# Patient Record
Sex: FEMALE | Race: WHITE | Employment: OTHER | URBAN - METROPOLITAN AREA
[De-identification: names, ages, dates, MRNs, and addresses within clinical notes are randomized per-mention and may not be internally consistent; named-entity substitution may affect disease eponyms.]

---

## 2018-11-21 ENCOUNTER — APPOINTMENT (OUTPATIENT)
Dept: CT IMAGING | Age: 83
DRG: 571 | End: 2018-11-21
Payer: MEDICARE

## 2018-11-21 ENCOUNTER — HOSPITAL ENCOUNTER (INPATIENT)
Age: 83
LOS: 4 days | Discharge: HOME HEALTH | DRG: 571 | End: 2018-11-25
Attending: FAMILY MEDICINE | Admitting: INTERNAL MEDICINE
Payer: MEDICARE

## 2018-11-21 DIAGNOSIS — L03.311 ABDOMINAL WALL CELLULITIS: Primary | ICD-10-CM

## 2018-11-21 DIAGNOSIS — R65.10 SIRS (SYSTEMIC INFLAMMATORY RESPONSE SYNDROME) (HCC): ICD-10-CM

## 2018-11-21 LAB
A/G RATIO: 1 (ref 1.1–2.2)
ALBUMIN SERPL-MCNC: 4 G/DL (ref 3.4–5)
ALP BLD-CCNC: 82 U/L (ref 40–129)
ALT SERPL-CCNC: 14 U/L (ref 10–40)
ANION GAP SERPL CALCULATED.3IONS-SCNC: 13 MMOL/L (ref 3–16)
AST SERPL-CCNC: 19 U/L (ref 15–37)
BASOPHILS ABSOLUTE: 0.1 K/UL (ref 0–0.2)
BASOPHILS RELATIVE PERCENT: 0.7 %
BILIRUB SERPL-MCNC: 0.3 MG/DL (ref 0–1)
BUN BLDV-MCNC: 24 MG/DL (ref 7–20)
CALCIUM SERPL-MCNC: 9.6 MG/DL (ref 8.3–10.6)
CHLORIDE BLD-SCNC: 101 MMOL/L (ref 99–110)
CO2: 24 MMOL/L (ref 21–32)
CREAT SERPL-MCNC: 1.2 MG/DL (ref 0.6–1.2)
EOSINOPHILS ABSOLUTE: 0.2 K/UL (ref 0–0.6)
EOSINOPHILS RELATIVE PERCENT: 2.9 %
GFR AFRICAN AMERICAN: 52
GFR NON-AFRICAN AMERICAN: 43
GLOBULIN: 4.1 G/DL
GLUCOSE BLD-MCNC: 119 MG/DL (ref 70–99)
GLUCOSE BLD-MCNC: 98 MG/DL (ref 70–99)
HCT VFR BLD CALC: 38.5 % (ref 36–48)
HEMOGLOBIN: 12.6 G/DL (ref 12–16)
INR BLD: 2.11 (ref 0.86–1.14)
LACTIC ACID, SEPSIS: 2 MMOL/L (ref 0.4–1.9)
LACTIC ACID, SEPSIS: 2.4 MMOL/L (ref 0.4–1.9)
LYMPHOCYTES ABSOLUTE: 1.6 K/UL (ref 1–5.1)
LYMPHOCYTES RELATIVE PERCENT: 20.6 %
MCH RBC QN AUTO: 31.4 PG (ref 26–34)
MCHC RBC AUTO-ENTMCNC: 32.7 G/DL (ref 31–36)
MCV RBC AUTO: 96.1 FL (ref 80–100)
MONOCYTES ABSOLUTE: 0.7 K/UL (ref 0–1.3)
MONOCYTES RELATIVE PERCENT: 8.5 %
NEUTROPHILS ABSOLUTE: 5.3 K/UL (ref 1.7–7.7)
NEUTROPHILS RELATIVE PERCENT: 67.3 %
PDW BLD-RTO: 14 % (ref 12.4–15.4)
PERFORMED ON: NORMAL
PLATELET # BLD: 265 K/UL (ref 135–450)
PMV BLD AUTO: 10.8 FL (ref 5–10.5)
POTASSIUM SERPL-SCNC: 5.2 MMOL/L (ref 3.5–5.1)
PROTHROMBIN TIME: 24 SEC (ref 9.8–13)
RBC # BLD: 4 M/UL (ref 4–5.2)
SODIUM BLD-SCNC: 138 MMOL/L (ref 136–145)
TOTAL PROTEIN: 8.1 G/DL (ref 6.4–8.2)
WBC # BLD: 7.9 K/UL (ref 4–11)

## 2018-11-21 PROCEDURE — 96367 TX/PROPH/DG ADDL SEQ IV INF: CPT

## 2018-11-21 PROCEDURE — 96366 THER/PROPH/DIAG IV INF ADDON: CPT

## 2018-11-21 PROCEDURE — 87186 SC STD MICRODIL/AGAR DIL: CPT

## 2018-11-21 PROCEDURE — 90715 TDAP VACCINE 7 YRS/> IM: CPT | Performed by: FAMILY MEDICINE

## 2018-11-21 PROCEDURE — 90471 IMMUNIZATION ADMIN: CPT | Performed by: FAMILY MEDICINE

## 2018-11-21 PROCEDURE — 85025 COMPLETE CBC W/AUTO DIFF WBC: CPT

## 2018-11-21 PROCEDURE — 2580000003 HC RX 258: Performed by: FAMILY MEDICINE

## 2018-11-21 PROCEDURE — 99285 EMERGENCY DEPT VISIT HI MDM: CPT

## 2018-11-21 PROCEDURE — 6360000002 HC RX W HCPCS: Performed by: FAMILY MEDICINE

## 2018-11-21 PROCEDURE — 96365 THER/PROPH/DIAG IV INF INIT: CPT

## 2018-11-21 PROCEDURE — 83605 ASSAY OF LACTIC ACID: CPT

## 2018-11-21 PROCEDURE — 87040 BLOOD CULTURE FOR BACTERIA: CPT

## 2018-11-21 PROCEDURE — 74177 CT ABD & PELVIS W/CONTRAST: CPT

## 2018-11-21 PROCEDURE — 83036 HEMOGLOBIN GLYCOSYLATED A1C: CPT

## 2018-11-21 PROCEDURE — 80053 COMPREHEN METABOLIC PANEL: CPT

## 2018-11-21 PROCEDURE — 6360000004 HC RX CONTRAST MEDICATION: Performed by: FAMILY MEDICINE

## 2018-11-21 PROCEDURE — 85610 PROTHROMBIN TIME: CPT

## 2018-11-21 PROCEDURE — 1200000000 HC SEMI PRIVATE

## 2018-11-21 PROCEDURE — 87801 DETECT AGNT MULT DNA AMPLI: CPT

## 2018-11-21 RX ORDER — HYDROCODONE BITARTRATE AND ACETAMINOPHEN 5; 325 MG/1; MG/1
1 TABLET ORAL EVERY 4 HOURS PRN
Status: DISCONTINUED | OUTPATIENT
Start: 2018-11-21 | End: 2018-11-25 | Stop reason: HOSPADM

## 2018-11-21 RX ORDER — SODIUM CHLORIDE 0.9 % (FLUSH) 0.9 %
10 SYRINGE (ML) INJECTION EVERY 12 HOURS SCHEDULED
Status: DISCONTINUED | OUTPATIENT
Start: 2018-11-22 | End: 2018-11-25 | Stop reason: HOSPADM

## 2018-11-21 RX ORDER — DEXTROSE MONOHYDRATE 25 G/50ML
12.5 INJECTION, SOLUTION INTRAVENOUS PRN
Status: DISCONTINUED | OUTPATIENT
Start: 2018-11-21 | End: 2018-11-25 | Stop reason: HOSPADM

## 2018-11-21 RX ORDER — 0.9 % SODIUM CHLORIDE 0.9 %
1000 INTRAVENOUS SOLUTION INTRAVENOUS ONCE
Status: COMPLETED | OUTPATIENT
Start: 2018-11-21 | End: 2018-11-21

## 2018-11-21 RX ORDER — SODIUM CHLORIDE 0.9 % (FLUSH) 0.9 %
10 SYRINGE (ML) INJECTION PRN
Status: DISCONTINUED | OUTPATIENT
Start: 2018-11-21 | End: 2018-11-25 | Stop reason: HOSPADM

## 2018-11-21 RX ORDER — WARFARIN SODIUM 2.5 MG/1
2.5 TABLET ORAL
Status: ON HOLD | COMMUNITY
End: 2019-03-21 | Stop reason: HOSPADM

## 2018-11-21 RX ORDER — HYDROCODONE BITARTRATE AND ACETAMINOPHEN 5; 325 MG/1; MG/1
2 TABLET ORAL EVERY 4 HOURS PRN
Status: DISCONTINUED | OUTPATIENT
Start: 2018-11-21 | End: 2018-11-25 | Stop reason: HOSPADM

## 2018-11-21 RX ORDER — CLONIDINE HYDROCHLORIDE 0.1 MG/1
0.1 TABLET ORAL 2 TIMES DAILY
COMMUNITY

## 2018-11-21 RX ORDER — DEXTROSE MONOHYDRATE 50 MG/ML
100 INJECTION, SOLUTION INTRAVENOUS PRN
Status: DISCONTINUED | OUTPATIENT
Start: 2018-11-21 | End: 2018-11-25 | Stop reason: HOSPADM

## 2018-11-21 RX ORDER — SODIUM CHLORIDE 9 MG/ML
INJECTION, SOLUTION INTRAVENOUS CONTINUOUS
Status: DISCONTINUED | OUTPATIENT
Start: 2018-11-22 | End: 2018-11-22

## 2018-11-21 RX ORDER — AMLODIPINE BESYLATE 10 MG/1
10 TABLET ORAL DAILY
Status: ON HOLD | COMMUNITY
End: 2019-03-21 | Stop reason: HOSPADM

## 2018-11-21 RX ORDER — CLONIDINE HYDROCHLORIDE 0.1 MG/1
0.1 TABLET ORAL 2 TIMES DAILY
Status: DISCONTINUED | OUTPATIENT
Start: 2018-11-22 | End: 2018-11-25 | Stop reason: HOSPADM

## 2018-11-21 RX ORDER — WARFARIN SODIUM 5 MG/1
2.5-5 TABLET ORAL DAILY
COMMUNITY

## 2018-11-21 RX ORDER — LISINOPRIL 20 MG/1
20 TABLET ORAL DAILY
Status: ON HOLD | COMMUNITY
End: 2021-01-31 | Stop reason: HOSPADM

## 2018-11-21 RX ORDER — AMLODIPINE BESYLATE 5 MG/1
5 TABLET ORAL DAILY
Status: DISCONTINUED | OUTPATIENT
Start: 2018-11-22 | End: 2018-11-24

## 2018-11-21 RX ORDER — LISINOPRIL 10 MG/1
20 TABLET ORAL DAILY
Status: DISCONTINUED | OUTPATIENT
Start: 2018-11-22 | End: 2018-11-25 | Stop reason: HOSPADM

## 2018-11-21 RX ORDER — ONDANSETRON 2 MG/ML
4 INJECTION INTRAMUSCULAR; INTRAVENOUS EVERY 6 HOURS PRN
Status: DISCONTINUED | OUTPATIENT
Start: 2018-11-21 | End: 2018-11-25 | Stop reason: HOSPADM

## 2018-11-21 RX ORDER — NICOTINE POLACRILEX 4 MG
15 LOZENGE BUCCAL PRN
Status: DISCONTINUED | OUTPATIENT
Start: 2018-11-21 | End: 2018-11-25 | Stop reason: HOSPADM

## 2018-11-21 RX ADMIN — SODIUM CHLORIDE 1000 ML: 9 INJECTION, SOLUTION INTRAVENOUS at 21:04

## 2018-11-21 RX ADMIN — VANCOMYCIN HYDROCHLORIDE 1500 MG: 10 INJECTION, POWDER, LYOPHILIZED, FOR SOLUTION INTRAVENOUS at 21:54

## 2018-11-21 RX ADMIN — TETANUS TOXOID, REDUCED DIPHTHERIA TOXOID AND ACELLULAR PERTUSSIS VACCINE, ADSORBED 0.5 ML: 5; 2.5; 8; 8; 2.5 SUSPENSION INTRAMUSCULAR at 23:08

## 2018-11-21 RX ADMIN — TAZOBACTAM SODIUM AND PIPERACILLIN SODIUM 4.5 G: 500; 4 INJECTION, SOLUTION INTRAVENOUS at 21:25

## 2018-11-21 RX ADMIN — IOPAMIDOL 75 ML: 755 INJECTION, SOLUTION INTRAVENOUS at 21:13

## 2018-11-21 ASSESSMENT — PAIN SCALES - GENERAL: PAINLEVEL_OUTOF10: 0

## 2018-11-22 LAB
ANION GAP SERPL CALCULATED.3IONS-SCNC: 12 MMOL/L (ref 3–16)
BASOPHILS ABSOLUTE: 0.1 K/UL (ref 0–0.2)
BASOPHILS RELATIVE PERCENT: 0.8 %
BUN BLDV-MCNC: 19 MG/DL (ref 7–20)
CALCIUM SERPL-MCNC: 9.1 MG/DL (ref 8.3–10.6)
CHLORIDE BLD-SCNC: 109 MMOL/L (ref 99–110)
CO2: 22 MMOL/L (ref 21–32)
CREAT SERPL-MCNC: 1.1 MG/DL (ref 0.6–1.2)
EOSINOPHILS ABSOLUTE: 0.2 K/UL (ref 0–0.6)
EOSINOPHILS RELATIVE PERCENT: 3.5 %
ESTIMATED AVERAGE GLUCOSE: 139.9 MG/DL
GFR AFRICAN AMERICAN: 58
GFR NON-AFRICAN AMERICAN: 48
GLUCOSE BLD-MCNC: 113 MG/DL (ref 70–99)
GLUCOSE BLD-MCNC: 142 MG/DL (ref 70–99)
GLUCOSE BLD-MCNC: 165 MG/DL (ref 70–99)
GLUCOSE BLD-MCNC: 173 MG/DL (ref 70–99)
GLUCOSE BLD-MCNC: 182 MG/DL (ref 70–99)
GLUCOSE BLD-MCNC: 303 MG/DL (ref 70–99)
HBA1C MFR BLD: 6.5 %
HCT VFR BLD CALC: 34.8 % (ref 36–48)
HEMOGLOBIN: 11.1 G/DL (ref 12–16)
INR BLD: 2.32 (ref 0.86–1.14)
LACTIC ACID: 1 MMOL/L (ref 0.4–2)
LYMPHOCYTES ABSOLUTE: 1.4 K/UL (ref 1–5.1)
LYMPHOCYTES RELATIVE PERCENT: 19.9 %
MCH RBC QN AUTO: 31.5 PG (ref 26–34)
MCHC RBC AUTO-ENTMCNC: 31.9 G/DL (ref 31–36)
MCV RBC AUTO: 99 FL (ref 80–100)
MONOCYTES ABSOLUTE: 0.7 K/UL (ref 0–1.3)
MONOCYTES RELATIVE PERCENT: 10.2 %
NEUTROPHILS ABSOLUTE: 4.6 K/UL (ref 1.7–7.7)
NEUTROPHILS RELATIVE PERCENT: 65.6 %
PDW BLD-RTO: 14.3 % (ref 12.4–15.4)
PERFORMED ON: ABNORMAL
PLATELET # BLD: 230 K/UL (ref 135–450)
PMV BLD AUTO: 11 FL (ref 5–10.5)
POTASSIUM REFLEX MAGNESIUM: 4.8 MMOL/L (ref 3.5–5.1)
PROTHROMBIN TIME: 26.5 SEC (ref 9.8–13)
RBC # BLD: 3.52 M/UL (ref 4–5.2)
SODIUM BLD-SCNC: 143 MMOL/L (ref 136–145)
WBC # BLD: 6.9 K/UL (ref 4–11)

## 2018-11-22 PROCEDURE — 6370000000 HC RX 637 (ALT 250 FOR IP): Performed by: NURSE PRACTITIONER

## 2018-11-22 PROCEDURE — 85025 COMPLETE CBC W/AUTO DIFF WBC: CPT

## 2018-11-22 PROCEDURE — 2580000003 HC RX 258: Performed by: NURSE PRACTITIONER

## 2018-11-22 PROCEDURE — 6360000002 HC RX W HCPCS: Performed by: NURSE PRACTITIONER

## 2018-11-22 PROCEDURE — 36415 COLL VENOUS BLD VENIPUNCTURE: CPT

## 2018-11-22 PROCEDURE — 85610 PROTHROMBIN TIME: CPT

## 2018-11-22 PROCEDURE — 1200000000 HC SEMI PRIVATE

## 2018-11-22 PROCEDURE — 83605 ASSAY OF LACTIC ACID: CPT

## 2018-11-22 PROCEDURE — 80048 BASIC METABOLIC PNL TOTAL CA: CPT

## 2018-11-22 PROCEDURE — 94760 N-INVAS EAR/PLS OXIMETRY 1: CPT

## 2018-11-22 RX ORDER — WARFARIN SODIUM 2.5 MG/1
2.5 TABLET ORAL
Status: DISCONTINUED | OUTPATIENT
Start: 2018-11-23 | End: 2018-11-23

## 2018-11-22 RX ORDER — WARFARIN SODIUM 5 MG/1
5 TABLET ORAL
Status: DISCONTINUED | OUTPATIENT
Start: 2018-11-22 | End: 2018-11-23

## 2018-11-22 RX ORDER — INSULIN LISPRO 100 [IU]/ML
10 INJECTION, SUSPENSION SUBCUTANEOUS 2 TIMES DAILY WITH MEALS
Status: DISCONTINUED | OUTPATIENT
Start: 2018-11-22 | End: 2018-11-25 | Stop reason: HOSPADM

## 2018-11-22 RX ORDER — DIPHENHYDRAMINE HCL 25 MG
25 TABLET ORAL ONCE
Status: COMPLETED | OUTPATIENT
Start: 2018-11-22 | End: 2018-11-22

## 2018-11-22 RX ADMIN — INSULIN LISPRO 1 UNITS: 100 INJECTION, SOLUTION INTRAVENOUS; SUBCUTANEOUS at 21:22

## 2018-11-22 RX ADMIN — INSULIN LISPRO 4 UNITS: 100 INJECTION, SOLUTION INTRAVENOUS; SUBCUTANEOUS at 18:00

## 2018-11-22 RX ADMIN — INSULIN LISPRO 1 UNITS: 100 INJECTION, SOLUTION INTRAVENOUS; SUBCUTANEOUS at 12:23

## 2018-11-22 RX ADMIN — AMLODIPINE BESYLATE 5 MG: 5 TABLET ORAL at 09:11

## 2018-11-22 RX ADMIN — HYDROCODONE BITARTRATE AND ACETAMINOPHEN 2 TABLET: 5; 325 TABLET ORAL at 23:38

## 2018-11-22 RX ADMIN — SODIUM CHLORIDE 3 G: 900 INJECTION INTRAVENOUS at 01:17

## 2018-11-22 RX ADMIN — INSULIN LISPRO 1 UNITS: 100 INJECTION, SOLUTION INTRAVENOUS; SUBCUTANEOUS at 01:24

## 2018-11-22 RX ADMIN — LISINOPRIL 20 MG: 10 TABLET ORAL at 09:10

## 2018-11-22 RX ADMIN — SODIUM CHLORIDE: 9 INJECTION, SOLUTION INTRAVENOUS at 00:56

## 2018-11-22 RX ADMIN — VANCOMYCIN HYDROCHLORIDE 1500 MG: 10 INJECTION, POWDER, LYOPHILIZED, FOR SOLUTION INTRAVENOUS at 21:16

## 2018-11-22 RX ADMIN — CLONIDINE HYDROCHLORIDE 0.1 MG: 0.1 TABLET ORAL at 09:11

## 2018-11-22 RX ADMIN — SODIUM CHLORIDE 3 G: 900 INJECTION INTRAVENOUS at 12:22

## 2018-11-22 RX ADMIN — INSULIN LISPRO 10 UNITS: 100 INJECTION, SUSPENSION SUBCUTANEOUS at 09:11

## 2018-11-22 RX ADMIN — SODIUM CHLORIDE 3 G: 900 INJECTION INTRAVENOUS at 23:38

## 2018-11-22 RX ADMIN — SODIUM CHLORIDE 3 G: 900 INJECTION INTRAVENOUS at 06:02

## 2018-11-22 RX ADMIN — WARFARIN SODIUM 5 MG: 5 TABLET ORAL at 09:11

## 2018-11-22 RX ADMIN — SODIUM CHLORIDE 3 G: 900 INJECTION INTRAVENOUS at 18:05

## 2018-11-22 RX ADMIN — DIPHENHYDRAMINE HCL 25 MG: 25 TABLET ORAL at 01:51

## 2018-11-22 RX ADMIN — SODIUM CHLORIDE: 9 INJECTION, SOLUTION INTRAVENOUS at 12:22

## 2018-11-22 RX ADMIN — Medication 10 ML: at 09:11

## 2018-11-22 RX ADMIN — INSULIN LISPRO 10 UNITS: 100 INJECTION, SUSPENSION SUBCUTANEOUS at 17:59

## 2018-11-22 RX ADMIN — CLONIDINE HYDROCHLORIDE 0.1 MG: 0.1 TABLET ORAL at 21:17

## 2018-11-22 RX ADMIN — CLONIDINE HYDROCHLORIDE 0.1 MG: 0.1 TABLET ORAL at 00:57

## 2018-11-22 RX ADMIN — Medication 10 ML: at 21:16

## 2018-11-22 RX ADMIN — HYDROCODONE BITARTRATE AND ACETAMINOPHEN 1 TABLET: 5; 325 TABLET ORAL at 16:07

## 2018-11-22 ASSESSMENT — PAIN SCALES - GENERAL
PAINLEVEL_OUTOF10: 7
PAINLEVEL_OUTOF10: 7

## 2018-11-22 NOTE — PROGRESS NOTES
Pt arrived to 5T in stable condition. VSS. Pt A&O x4. Unable to recall all home meds. Home med list completed with medications that pt could recall. Was able to stand and pivot from stretcher to bed but pt very weak. Oriented to room and call light system. drsg changed to R abdomen. Wound black, hard, pt denies any pain. Redness and warmth noted around wound to R lower abd. Pt states she cannot feel. FSBS 183. Call light in reach. Fall precautions in place. Will continue to monitor.

## 2018-11-22 NOTE — ED PROVIDER NOTES
mis-transcribed.)    Electronically signed, SHALINI Lopez CNP,          SHALINI Lopez CNP  11/21/18 8519

## 2018-11-22 NOTE — ED NOTES
Saline lock inserted with #2 blood culture drawn and sent to lab.  Pt to [phone] Stevo Ojeda RN  11/21/18 [id number]

## 2018-11-22 NOTE — PROGRESS NOTES
Hospital Medicine Progress Note     Date:  11/22/2018    PCP: No primary care provider on file. (Tel: None)    Date of Admission: 11/21/2018      Brief hospital course: Pt presented to the ER w/ right lower abdominal wound and cellulitis that she has been dealing with for the past 1.5 weeks. Subjective  Pt sitting up eating lunch, wondering when she can go home. Objective  Physical exam:  Vitals: /66   Pulse 75   Temp 98.7 °F (37.1 °C) (Oral)   Resp 16   Ht 5' (1.524 m)   Wt 254 lb 4.8 oz (115.3 kg)   SpO2 95%   BMI 49.66 kg/m²   Gen: Not in distress. Alert. Head: Normocephalic. Atraumatic. Eyes: EOMI. Good acuity. ENT: Oral mucosa moist  Neck: No JVD. No obvious thyromegaly. CVS: Nml S1S2, no MRG, RRR  Pulmomary: Clear bilaterally. No crackles. No wheezes. Gastrointestinal: Soft, NT/ND. Positive bowel sounds. Musculoskeletal: No edema. Warm  Neuro: No focal deficit. Moves extremity spontaneously. Psychiatry: Appropriate affect. Not agitated. Skin: RLQ abd wound necrotic w/ surround erythema, warmth, TTP      24HR INTAKE/OUTPUT:    Intake/Output Summary (Last 24 hours) at 11/22/18 1627  Last data filed at 11/22/18 1607   Gross per 24 hour   Intake              949 ml   Output             1600 ml   Net             -651 ml     I/O last 3 completed shifts: In: 0 [P.O.:120;  I.V.:829]  Out: 1500 [Urine:1500]  I/O this shift:  In: -   Out: 100 [Urine:100]      Meds:    insulin lispro prot & lispro  10 Units Subcutaneous BID WC    vancomycin  1,500 mg Intravenous Q24H    [START ON 11/23/2018] warfarin  2.5 mg Oral Once per day on Mon Wed Fri Sat    And    warfarin  5 mg Oral Once per day on Sun Tue Thu    lisinopril  20 mg Oral Daily    amLODIPine  5 mg Oral Daily    cloNIDine  0.1 mg Oral BID    sodium chloride flush  10 mL Intravenous 2 times per day    ampicillin-sulbactam  3 g Intravenous Q6H    insulin lispro  0-6 Units Subcutaneous TID WC    insulin lispro  0-3

## 2018-11-22 NOTE — ED PROVIDER NOTES
Triage Chief Complaint:   Wound Check (was seen by home karine today and was told wound was bad and looked like turning into MRSA. )    DARION Donaldson is a 82 y.o. female that presents after being sent to the emergency department by her home health care nurse with concern of a wound infection on her right lower abdomen. She denies fevers chills. She states that she believes it is been there for anywhere from 1-2 weeks but is gradually become more painful. No known injury although patient is an insulin-dependent diabetic and this may be where she injected insulin. No nausea vomiting. No diarrhea constipation. This area will occasionally bleed and ooze. She is not been seen by anybody else for it. Unknown last tetanus. ROS:  General:  No fevers, no chills, no weakness  Eyes:  No recent vison changes, no discharge  ENT:  No sore throat, no nasal congestion, no hearing changes  Cardiovascular:  No chest pain, no palpitations  Respiratory:  No shortness of breath, no cough, no wheezing  Gastrointestinal: Superficial and his pain. No intra-abdominal pain. Musculoskeletal:  No muscle pain, no joint pain  Skin:  No other rash or complaint of wounds other than right abdominal wall  Neurologic:  No speech problems, no headache, no extremity numbness, no extremity tingling, no extremity weakness  Psychiatric:  No anxiety, no hallucinations or delusions, no suicidal or homicidal ideation  Genitourinary:  No dysuria, no hematuria  Endocrine:  No unexpected weight gain, no unexpected weight loss  Extremities:  no edema, no pain    Past Medical History:   Diagnosis Date    Arthritis     [facility] occlusion [facility] infarction [facility]     Diabetes mellitus ([facility]     Hypertension     Pulmonary embolism ([address]     On warfarin     History reviewed. No pertinent surgical history. History reviewed. No pertinent family history. Social History     Social History    Marital status:       Spouse name: N/A    Number of children: N/A    Years of education: N/A     Occupational History    Not on file.      Social History Main Topics    Smoking status: Never Smoker    Smokeless tobacco: Not on file    Alcohol use No    Drug use: Unknown    Sexual activity: Not on file     Other Topics Concern    Not on file     Social History Narrative    No narrative on file     Current Facility-Administered Medications   Medication Dose Route Frequency Provider Last Rate Last Dose    insulin lispro protamine & lispro (HUMALOG MIX) (75-25) 100 UNIT per ML injection pen SUPN 10 Units  10 Units Subcutaneous BID WC Doree Stade, APRN - CNP        vancomycin (VANCOCIN) 1,500 mg in dextrose 5 % 250 mL IVPB  1,500 mg Intravenous Q24H Doree Stade, APRN - CNP        [START ON 11/23/2018] warfarin (COUMADIN) tablet 2.5 mg  2.5 mg Oral Once per day on Mon Wed Fri Sat Doree Stade, APRN - CNP        And    warfarin (COUMADIN) tablet 5 mg  5 mg Oral Once per day on Sun Tue Thu Doree Stade, APRN - CNP        lisinopril (PRINIVIL;ZESTRIL) tablet 20 mg  20 mg Oral Daily Doree Stade, APRN - CNP        amLODIPine (NORVASC) tablet 5 mg  5 mg Oral Daily Doree Stade, APRN - CNP        cloNIDine (CATAPRES) tablet 0.1 mg  0.1 mg Oral BID Doree Stade, APRN - CNP   0.1 mg at 11/22/18 0057    HYDROcodone-acetaminophen (NORCO) 5-325 MG per tablet 1 tablet  1 tablet Oral Q4H PRN Doree Stade, APRN - CNP        Or    HYDROcodone-acetaminophen (NORCO) 5-325 MG per tablet 2 tablet  2 tablet Oral Q4H PRN Doree Stade, APRN - CNP        0.9 % sodium chloride infusion   Intravenous Continuous Doree Stade, APRN -  mL/hr at 11/22/18 0056      sodium chloride flush 0.9 % injection 10 mL  10 mL Intravenous 2 times per day Doree Stade, APRN - CNP        sodium chloride flush 0.9 % injection 10 mL  10 mL Intravenous PRN Doree Stade, APRN - CNP        magnesium hydroxide (MILK OF MAGNESIA) 400 MG/5ML suspension 30 mL  30 mL Oral Daily PRN retroperitoneal, iliac chain, or inguinal lymphadenopathy is seen. Bones/Soft Tissues: There is subcutaneous fat stranding seen within the pannus, especially on the right. More focal area of dermal thickening is seen within the rightward aspect of the pannus measures approximately 4.2 cm. A rim enhancing fluid collection is not identified. No soft tissue gas is found. No acute or suspicious bony abnormalities. Fat stranding and dermal thickening within the pannus, especially on the right. Findings most compatible with cellulitis. No focal fluid collection or soft tissue gas is seen at this time. Moderate diverticulosis without CT evidence of diverticulitis. Moderate-sized hiatal hernia. Advanced atherosclerotic disease. MDM:  82-year-old nontoxic well-appearing female who is afebrile with no hypotension but is diabetic and therefore immunocompromised presents with concern for wound infection. Blood cultures sent. Patient treated empirically with Zosyn and vancomycin. CT scan demonstrates fat stranding and dermal thickening consistent with cellulitis but no abscess, necrotizing fasciitis, intra-abdominal wound or abnormality. Patient afebrile. White blood cell count 7.9 with no left shift. Patient does have a mildly elevated lactic acid at 2.4. I did not give a full 30 mL/kg fluid bolus as the patient has normal kidney function, normal cap refill, normal vital signs, and a normal white blood cell count and she is not septic. Glucose 119. Normal bicarb. Normal anion gap. No evidence of DKA. Hemoglobin normal no evidence of anemia. INR 2.11 which is therapeutic for the patient. Taking account patient's age, diabetes, and that she lives at home I do believe admission for IV antibiotics, wound care evaluation, and fever vital signs trending is appropriate. No acute surgical indication. No fluctuant area amenable to drainage.   Critical care time of 15 minutes with endocrine,

## 2018-11-22 NOTE — ED NOTES
Pt used bedpan. Tetanus booster given IM. #2 lactate drawn and sent to lab. No complaints verbalized. Pt resting. Admit bed ready -will call report.      Eleni Carrera RN  11/21/18 1642

## 2018-11-23 PROBLEM — N18.30 CKD (CHRONIC KIDNEY DISEASE) STAGE 3, GFR 30-59 ML/MIN (HCC): Status: ACTIVE | Noted: 2018-11-23

## 2018-11-23 PROBLEM — Z86.711 HISTORY OF PULMONARY EMBOLISM: Status: ACTIVE | Noted: 2018-11-23

## 2018-11-23 PROBLEM — E11.65 POORLY CONTROLLED TYPE 2 DIABETES MELLITUS (HCC): Status: ACTIVE | Noted: 2018-11-23

## 2018-11-23 PROBLEM — I10 HTN (HYPERTENSION): Status: ACTIVE | Noted: 2018-11-23

## 2018-11-23 LAB
BILIRUBIN URINE: NEGATIVE
BLOOD, URINE: NEGATIVE
CLARITY: CLEAR
COLOR: YELLOW
EPITHELIAL CELLS, UA: 5 /HPF (ref 0–5)
GLUCOSE BLD-MCNC: 116 MG/DL (ref 70–99)
GLUCOSE BLD-MCNC: 180 MG/DL (ref 70–99)
GLUCOSE BLD-MCNC: 203 MG/DL (ref 70–99)
GLUCOSE BLD-MCNC: 210 MG/DL (ref 70–99)
GLUCOSE URINE: NEGATIVE MG/DL
HYALINE CASTS: 1 /LPF (ref 0–8)
INR BLD: 1.97 (ref 0.86–1.14)
KETONES, URINE: NEGATIVE MG/DL
LEUKOCYTE ESTERASE, URINE: ABNORMAL
MICROSCOPIC EXAMINATION: YES
NITRITE, URINE: NEGATIVE
PERFORMED ON: ABNORMAL
PH UA: 6
PROTEIN UA: NEGATIVE MG/DL
PROTHROMBIN TIME: 22.5 SEC (ref 9.8–13)
RBC UA: 1 /HPF (ref 0–4)
REPORT: NORMAL
SPECIFIC GRAVITY UA: 1.02
URINE REFLEX TO CULTURE: YES
URINE TYPE: ABNORMAL
UROBILINOGEN, URINE: 0.2 E.U./DL
WBC UA: 5 /HPF (ref 0–5)

## 2018-11-23 PROCEDURE — 99223 1ST HOSP IP/OBS HIGH 75: CPT | Performed by: INTERNAL MEDICINE

## 2018-11-23 PROCEDURE — 2500000003 HC RX 250 WO HCPCS: Performed by: FAMILY MEDICINE

## 2018-11-23 PROCEDURE — 87086 URINE CULTURE/COLONY COUNT: CPT

## 2018-11-23 PROCEDURE — 36415 COLL VENOUS BLD VENIPUNCTURE: CPT

## 2018-11-23 PROCEDURE — 87116 MYCOBACTERIA CULTURE: CPT

## 2018-11-23 PROCEDURE — 81001 URINALYSIS AUTO W/SCOPE: CPT

## 2018-11-23 PROCEDURE — 87015 SPECIMEN INFECT AGNT CONCNTJ: CPT

## 2018-11-23 PROCEDURE — 87206 SMEAR FLUORESCENT/ACID STAI: CPT

## 2018-11-23 PROCEDURE — 6360000002 HC RX W HCPCS: Performed by: NURSE PRACTITIONER

## 2018-11-23 PROCEDURE — 0JB80ZZ EXCISION OF ABDOMEN SUBCUTANEOUS TISSUE AND FASCIA, OPEN APPROACH: ICD-10-PCS | Performed by: SURGERY

## 2018-11-23 PROCEDURE — 1200000000 HC SEMI PRIVATE

## 2018-11-23 PROCEDURE — 87070 CULTURE OTHR SPECIMN AEROBIC: CPT

## 2018-11-23 PROCEDURE — 11042 DBRDMT SUBQ TIS 1ST 20SQCM/<: CPT | Performed by: SURGERY

## 2018-11-23 PROCEDURE — 85610 PROTHROMBIN TIME: CPT

## 2018-11-23 PROCEDURE — 2580000003 HC RX 258: Performed by: NURSE PRACTITIONER

## 2018-11-23 PROCEDURE — 87205 SMEAR GRAM STAIN: CPT

## 2018-11-23 PROCEDURE — 6370000000 HC RX 637 (ALT 250 FOR IP): Performed by: FAMILY MEDICINE

## 2018-11-23 PROCEDURE — 87641 MR-STAPH DNA AMP PROBE: CPT

## 2018-11-23 PROCEDURE — 6370000000 HC RX 637 (ALT 250 FOR IP): Performed by: INTERNAL MEDICINE

## 2018-11-23 PROCEDURE — 6370000000 HC RX 637 (ALT 250 FOR IP): Performed by: NURSE PRACTITIONER

## 2018-11-23 PROCEDURE — 87102 FUNGUS ISOLATION CULTURE: CPT

## 2018-11-23 RX ORDER — WARFARIN SODIUM 2.5 MG/1
2.5 TABLET ORAL
Status: DISCONTINUED | OUTPATIENT
Start: 2018-11-24 | End: 2018-11-25 | Stop reason: HOSPADM

## 2018-11-23 RX ORDER — WARFARIN SODIUM 5 MG/1
5 TABLET ORAL
Status: COMPLETED | OUTPATIENT
Start: 2018-11-23 | End: 2018-11-23

## 2018-11-23 RX ORDER — LIDOCAINE HYDROCHLORIDE 10 MG/ML
5 INJECTION, SOLUTION EPIDURAL; INFILTRATION; INTRACAUDAL; PERINEURAL ONCE
Status: DISCONTINUED | OUTPATIENT
Start: 2018-11-23 | End: 2018-11-25 | Stop reason: HOSPADM

## 2018-11-23 RX ORDER — ACETAMINOPHEN 80 MG
TABLET,CHEWABLE ORAL
Status: DISPENSED
Start: 2018-11-23 | End: 2018-11-24

## 2018-11-23 RX ORDER — WARFARIN SODIUM 5 MG/1
5 TABLET ORAL
Status: DISCONTINUED | OUTPATIENT
Start: 2018-11-25 | End: 2018-11-25 | Stop reason: HOSPADM

## 2018-11-23 RX ORDER — LIDOCAINE HYDROCHLORIDE 10 MG/ML
INJECTION, SOLUTION EPIDURAL; INFILTRATION; INTRACAUDAL; PERINEURAL
Status: DISPENSED
Start: 2018-11-23 | End: 2018-11-24

## 2018-11-23 RX ORDER — CIPROFLOXACIN 500 MG/1
500 TABLET, FILM COATED ORAL EVERY 12 HOURS SCHEDULED
Status: DISCONTINUED | OUTPATIENT
Start: 2018-11-23 | End: 2018-11-25 | Stop reason: HOSPADM

## 2018-11-23 RX ADMIN — HYDROCODONE BITARTRATE AND ACETAMINOPHEN 2 TABLET: 5; 325 TABLET ORAL at 23:30

## 2018-11-23 RX ADMIN — INSULIN LISPRO 1 UNITS: 100 INJECTION, SOLUTION INTRAVENOUS; SUBCUTANEOUS at 21:55

## 2018-11-23 RX ADMIN — INSULIN LISPRO 2 UNITS: 100 INJECTION, SOLUTION INTRAVENOUS; SUBCUTANEOUS at 18:06

## 2018-11-23 RX ADMIN — Medication 10 ML: at 05:54

## 2018-11-23 RX ADMIN — MICONAZOLE NITRATE: 2 POWDER TOPICAL at 23:30

## 2018-11-23 RX ADMIN — INSULIN LISPRO 10 UNITS: 100 INJECTION, SUSPENSION SUBCUTANEOUS at 08:19

## 2018-11-23 RX ADMIN — AMLODIPINE BESYLATE 5 MG: 5 TABLET ORAL at 08:18

## 2018-11-23 RX ADMIN — SODIUM CHLORIDE 3 G: 900 INJECTION INTRAVENOUS at 11:13

## 2018-11-23 RX ADMIN — WARFARIN SODIUM 5 MG: 5 TABLET ORAL at 18:03

## 2018-11-23 RX ADMIN — CLONIDINE HYDROCHLORIDE 0.1 MG: 0.1 TABLET ORAL at 08:18

## 2018-11-23 RX ADMIN — LISINOPRIL 20 MG: 10 TABLET ORAL at 08:18

## 2018-11-23 RX ADMIN — CLONIDINE HYDROCHLORIDE 0.1 MG: 0.1 TABLET ORAL at 18:21

## 2018-11-23 RX ADMIN — HYDROCODONE BITARTRATE AND ACETAMINOPHEN 1 TABLET: 5; 325 TABLET ORAL at 06:01

## 2018-11-23 RX ADMIN — INSULIN LISPRO 10 UNITS: 100 INJECTION, SUSPENSION SUBCUTANEOUS at 18:04

## 2018-11-23 RX ADMIN — VANCOMYCIN HYDROCHLORIDE 1500 MG: 10 INJECTION, POWDER, LYOPHILIZED, FOR SOLUTION INTRAVENOUS at 22:06

## 2018-11-23 RX ADMIN — HYDROCODONE BITARTRATE AND ACETAMINOPHEN 1 TABLET: 5; 325 TABLET ORAL at 18:03

## 2018-11-23 RX ADMIN — Medication 10 ML: at 22:05

## 2018-11-23 RX ADMIN — SODIUM CHLORIDE 3 G: 900 INJECTION INTRAVENOUS at 05:53

## 2018-11-23 RX ADMIN — CIPROFLOXACIN HYDROCHLORIDE 500 MG: 500 TABLET, FILM COATED ORAL at 21:50

## 2018-11-23 RX ADMIN — HYDROCODONE BITARTRATE AND ACETAMINOPHEN 1 TABLET: 5; 325 TABLET ORAL at 11:13

## 2018-11-23 RX ADMIN — MICONAZOLE NITRATE: 2 POWDER TOPICAL at 14:23

## 2018-11-23 ASSESSMENT — PAIN DESCRIPTION - ORIENTATION
ORIENTATION_2: LOWER
ORIENTATION_2: LOWER

## 2018-11-23 ASSESSMENT — PAIN SCALES - GENERAL
PAINLEVEL_OUTOF10: 8
PAINLEVEL_OUTOF10: 6
PAINLEVEL_OUTOF10: 8
PAINLEVEL_OUTOF10: 7
PAINLEVEL_OUTOF10: 8

## 2018-11-23 ASSESSMENT — PAIN DESCRIPTION - INTENSITY
RATING_2: 8
RATING_2: 6

## 2018-11-23 ASSESSMENT — ENCOUNTER SYMPTOMS
STRIDOR: 0
DIARRHEA: 0
TROUBLE SWALLOWING: 0
SHORTNESS OF BREATH: 0
PHOTOPHOBIA: 0
NAUSEA: 0
EYE REDNESS: 0
ABDOMINAL PAIN: 0
COLOR CHANGE: 0
CHOKING: 0
FACIAL SWELLING: 0
RHINORRHEA: 0
CHEST TIGHTNESS: 0
BLOOD IN STOOL: 0
APNEA: 0
EYE DISCHARGE: 0
COUGH: 0

## 2018-11-23 ASSESSMENT — PAIN DESCRIPTION - LOCATION
LOCATION_2: ABDOMEN
LOCATION_2: ABDOMEN

## 2018-11-23 ASSESSMENT — PAIN DESCRIPTION - PAIN TYPE
TYPE: CHRONIC PAIN
TYPE_2: ACUTE PAIN
TYPE_2: ACUTE PAIN
TYPE: CHRONIC PAIN

## 2018-11-23 NOTE — PROGRESS NOTES
Intravenous 2 times per day    ampicillin-sulbactam  3 g Intravenous Q6H    insulin lispro  0-6 Units Subcutaneous TID WC    insulin lispro  0-3 Units Subcutaneous Nightly       Infusions:    dextrose           PRN Meds: HYDROcodone 5 mg - acetaminophen **OR** HYDROcodone 5 mg - acetaminophen, sodium chloride flush, magnesium hydroxide, ondansetron, glucose, dextrose, glucagon (rDNA), dextrose    Labs/imaging:  CBC:   Recent Labs      11/21/18 1931 11/22/18   0624   WBC  7.9  6.9   HGB  12.6  11.1*   PLT  265  230         BMP:    Recent Labs      11/21/18 1931 11/22/18   0624   NA  138  143   K  5.2*  4.8   CL  101  109   CO2  24  22   BUN  24*  19   CREATININE  1.2  1.1   GLUCOSE  119*  142*         Hepatic:   Recent Labs      11/21/18 1931   AST  19   ALT  14   BILITOT  0.3   ALKPHOS  82       Troponin: No results for input(s): TROPONINI in the last 72 hours. BNP: No results for input(s): BNP in the last 72 hours. INR:   Recent Labs      11/21/18 1931 11/22/18   0624  11/23/18   0539   INR  2.11*  2.32*  1.97*           Reviewed imaging and reports noted      Assessment:  Active Problems:    Abdominal wall cellulitis    IDDM (insulin dependent diabetes mellitus) (HCC)    HTN (hypertension)    CKD (chronic kidney disease) stage 3, GFR 30-59 ml/min ([facility])    History of pulmonary embolism  Resolved Problems:    * No resolved hospital problems.  *        Plan:  RLQ Abdominal wall cellulitis  - IV vanc and Unasyn, blood cxs pending  - wound care RN evaluated, GS did bedside debridement on 11/23/18  - ID consulted      IDDM  - metformin on hold, A1C 6.5, cont Humalog 75-25 10U BID AC (home dose if 70/30 15U QAm and 12U QPM) and SSI low for now      HTN  - BP variable, cont norvasc 5mg QD (home dose 10mg QD), clonidine 0.1mg BID, lisinopril 20mg QD, CTM      Hx of PE  - on coumadin      CKD 3  - stable at baseline, CTM        Diet: DIET CARB CONTROL; Carb Control: 4 carb choices (60 gms)/meal

## 2018-11-23 NOTE — CONSULTS
External ear normal.   Left Ear: External ear normal.   Nose: Nose normal.   Mouth/Throat: Oropharynx is clear and moist.   Eyes: Pupils are equal, round, and reactive to light. Conjunctivae are normal. Right eye exhibits no discharge. Left eye exhibits no discharge. No scleral icterus. Neck: Normal range of motion. Neck supple. Cardiovascular: Normal rate and regular rhythm. Exam reveals no friction rub. No murmur heard. Pulmonary/Chest: Effort normal. No stridor. She has no wheezes. She has no rales. She exhibits no tenderness. Abdominal: Soft. She exhibits no mass. There is no tenderness. There is no rebound and no guarding. Large deep ulcer with surrounding redness in the right lower quadrant area   Musculoskeletal: She exhibits no edema or tenderness. Lymphadenopathy:     She has no cervical adenopathy. Neurological: She is alert and oriented to person, place, and time. She exhibits normal muscle tone. Skin: Skin is warm and dry. No rash noted. She is not diaphoretic. There is erythema. Psychiatric: She has a normal mood and affect. Judgment normal.   Nursing note and vitals reviewed. Lines: All vascular access sites are healthy with no local erythema, discharge or tenderness. Intake and output:     I/O last 3 completed shifts: In: 240 [P.O.:240]  Out: 450 [Urine:450]    Lab Data:   All available labs were reviewed by me today.      CBC:   Recent Labs      11/21/18 1931 11/22/18   0624   WBC  7.9  6.9   RBC  4.00  3.52*   HGB  12.6  11.1*   HCT  38.5  34.8*   PLT  265  230   MCV  96.1  99.0   MCH  31.4  31.5   MCHC  32.7  31.9   RDW  14.0  14.3        BMP:  Recent Labs      11/21/18 1931 11/22/18   0624   NA  138  143   K  5.2*  4.8   CL  101  109   CO2  24  22   BUN  24*  19   CREATININE  1.2  1.1   CALCIUM  9.6  9.1   GLUCOSE  119*  142*        Hepatic FunctionPanel:   Lab Results   Component Value Date    ALKPHOS 82 11/21/2018    ALT 14 11/21/2018    AST 19 11/21/2018 PROT 8.1 11/21/2018    BILITOT 0.3 11/21/2018    LABALBU 4.0 11/21/2018       CPK: No results found for: CKTOTAL  ESR: No results found for: SEDRATE  CRP: No results found for: CRP      Imaging: All pertinent images and reports for the current visit were reviewed by meduring this visit. CT ABDOMEN PELVIS W IV CONTRAST   Final Result   Fat stranding and dermal thickening within the pannus, especially on the   right. Findings most compatible with cellulitis. No focal fluid collection   or soft tissue gas is seen at this time. Moderate diverticulosis without CT evidence of diverticulitis. Moderate-sized hiatal hernia. Advanced atherosclerotic disease. Outside records:    Labs, Microbiology, Radiology and pertinent results from Care everywhere, if available, were reviewed as a part ofthe consultation. Known drug Allergies: All allergies were reviewed and updated    No Known Allergies    Microbiology: I have reviewed allavailable micro lab data and cultures    · Blood culture (1/2) - collected on 11/21/2018: Coagulase negative Staphylococcus    Problems addressed today:       Patient Active Problem List   Diagnosis Code    Abdominal wall cellulitis L03.311    Poorly controlled type 2 diabetes mellitus ([address] E11.65    HTN (hypertension) I10    CKD (chronic kidney disease) stage 3, GFR 30-59 ml/min ([facility]) N18.3    History of pulmonary embolism Z86.711    Wound infection T14. 8XXA, L08.9    Open abdominal wall wound S31.109A    Severe infection B99.9    Acute kidney injury superimposed on chronic kidney disease (HCC) N17.9, N18.9    Diverticulosis of colon without diverticulitis K57.30    Morbid obesity due to excess calories ([facility]) E66.01         Assessment:     The patient is a 82 y.o. old female who  has a past medical history of Arthritis; Cerebral artery occlusion with cerebral infarction ([address] Diabetes mellitus ([address] Hypertension; and Pulmonary embolism ([address]  with following problems:    · Severe abdominal wall Infected wound  · Poorly controlled type 2 diabetes mellitus  · Acute kidney injury on chronic kidney disease stage III  · Essential hypertension  · History of pulmonary embolism  · History of stroke  · Diverticulosis without diverticulitis  · History of pulmonary embolism on warfarin  · Morbid obesity due to excess calories        Discussion:      The patient has been started on IV vancomycin and IV Unasyn. One set of blood culture is positive for coag was negative Staphylococcus    She seems to have developed lower abdominal wall panniculitis with secondary infection evolving into a large ulcer. The patient is afebrile. WBC count was 6900 on admission but lactic acid level was high. The patient noted the wound about 2 weeks ago while she was taking a shower and noted blood coming out from a right lower abdominal area. She has a larger deeper tender wound in the right lower quadrant area in the abdominal wall with surrounding redness    Plan:     Diagnostic Workup:    · Will order Nasal MRSA screen  · Will order wound culture for bacteria, fungi and AFB  · Continue to follow fever curve, WBC count and blood cultures  · Follow up on liverand renal functions closely    Antimicrobials:    · Agree with IV vancomycin  Check Vancomycin trough before the 5th dose. Target vancomycin trough of around 15. Keep vancomycin trough below 20 at all times. Avoid increasing the dose of vancomycin above a total of 4 grams in a 24-hour period in patients younger than 45 years and above 3 grams in a 24-hour period in a patient of age 39 years or older. Continue to monitor serum creatinine and Vanco levels closely, while the patient is on I/v Vancomycin.    · Will stop IV Unasyn today  · Will order oral ciprofloxacin 500 mg every 12 hours  · Will follow-up on the culture results and will make further changes in the antibiotics accordingly in coming days  · Abdominal wound care  · Pain

## 2018-11-24 LAB
ANION GAP SERPL CALCULATED.3IONS-SCNC: 12 MMOL/L (ref 3–16)
BUN BLDV-MCNC: 24 MG/DL (ref 7–20)
CALCIUM SERPL-MCNC: 8.6 MG/DL (ref 8.3–10.6)
CHLORIDE BLD-SCNC: 105 MMOL/L (ref 99–110)
CO2: 23 MMOL/L (ref 21–32)
CREAT SERPL-MCNC: 1.4 MG/DL (ref 0.6–1.2)
GFR AFRICAN AMERICAN: 44
GFR NON-AFRICAN AMERICAN: 36
GLUCOSE BLD-MCNC: 131 MG/DL (ref 70–99)
GLUCOSE BLD-MCNC: 131 MG/DL (ref 70–99)
GLUCOSE BLD-MCNC: 193 MG/DL (ref 70–99)
GLUCOSE BLD-MCNC: 224 MG/DL (ref 70–99)
GLUCOSE BLD-MCNC: 225 MG/DL (ref 70–99)
HCT VFR BLD CALC: 34.3 % (ref 36–48)
HEMOGLOBIN: 11.2 G/DL (ref 12–16)
INR BLD: 1.75 (ref 0.86–1.14)
MCH RBC QN AUTO: 31.6 PG (ref 26–34)
MCHC RBC AUTO-ENTMCNC: 32.7 G/DL (ref 31–36)
MCV RBC AUTO: 96.5 FL (ref 80–100)
MRSA SCREEN RT-PCR: NORMAL
PDW BLD-RTO: 14 % (ref 12.4–15.4)
PERFORMED ON: ABNORMAL
PLATELET # BLD: 238 K/UL (ref 135–450)
PMV BLD AUTO: 11.1 FL (ref 5–10.5)
POTASSIUM SERPL-SCNC: 4 MMOL/L (ref 3.5–5.1)
PROTHROMBIN TIME: 20 SEC (ref 9.8–13)
RBC # BLD: 3.55 M/UL (ref 4–5.2)
SODIUM BLD-SCNC: 140 MMOL/L (ref 136–145)
URINE CULTURE, ROUTINE: NORMAL
VANCOMYCIN TROUGH: 15.3 UG/ML (ref 10–20)
WBC # BLD: 6.9 K/UL (ref 4–11)

## 2018-11-24 PROCEDURE — 97116 GAIT TRAINING THERAPY: CPT

## 2018-11-24 PROCEDURE — 80048 BASIC METABOLIC PNL TOTAL CA: CPT

## 2018-11-24 PROCEDURE — 97530 THERAPEUTIC ACTIVITIES: CPT

## 2018-11-24 PROCEDURE — 6370000000 HC RX 637 (ALT 250 FOR IP): Performed by: INTERNAL MEDICINE

## 2018-11-24 PROCEDURE — G8982 BODY POS GOAL STATUS: HCPCS

## 2018-11-24 PROCEDURE — G8981 BODY POS CURRENT STATUS: HCPCS

## 2018-11-24 PROCEDURE — 97161 PT EVAL LOW COMPLEX 20 MIN: CPT

## 2018-11-24 PROCEDURE — 97165 OT EVAL LOW COMPLEX 30 MIN: CPT

## 2018-11-24 PROCEDURE — 6370000000 HC RX 637 (ALT 250 FOR IP): Performed by: FAMILY MEDICINE

## 2018-11-24 PROCEDURE — 2580000003 HC RX 258: Performed by: NURSE PRACTITIONER

## 2018-11-24 PROCEDURE — 36415 COLL VENOUS BLD VENIPUNCTURE: CPT

## 2018-11-24 PROCEDURE — 6370000000 HC RX 637 (ALT 250 FOR IP): Performed by: NURSE PRACTITIONER

## 2018-11-24 PROCEDURE — G8979 MOBILITY GOAL STATUS: HCPCS

## 2018-11-24 PROCEDURE — G8978 MOBILITY CURRENT STATUS: HCPCS

## 2018-11-24 PROCEDURE — 80202 ASSAY OF VANCOMYCIN: CPT

## 2018-11-24 PROCEDURE — 99024 POSTOP FOLLOW-UP VISIT: CPT | Performed by: SURGERY

## 2018-11-24 PROCEDURE — 85610 PROTHROMBIN TIME: CPT

## 2018-11-24 PROCEDURE — 1200000000 HC SEMI PRIVATE

## 2018-11-24 PROCEDURE — 6360000002 HC RX W HCPCS: Performed by: NURSE PRACTITIONER

## 2018-11-24 PROCEDURE — 97535 SELF CARE MNGMENT TRAINING: CPT

## 2018-11-24 PROCEDURE — 85027 COMPLETE CBC AUTOMATED: CPT

## 2018-11-24 RX ORDER — AMLODIPINE BESYLATE 5 MG/1
10 TABLET ORAL DAILY
Status: DISCONTINUED | OUTPATIENT
Start: 2018-11-25 | End: 2018-11-25 | Stop reason: HOSPADM

## 2018-11-24 RX ADMIN — HYDROCODONE BITARTRATE AND ACETAMINOPHEN 1 TABLET: 5; 325 TABLET ORAL at 10:43

## 2018-11-24 RX ADMIN — INSULIN LISPRO 1 UNITS: 100 INJECTION, SOLUTION INTRAVENOUS; SUBCUTANEOUS at 12:52

## 2018-11-24 RX ADMIN — MICONAZOLE NITRATE: 2 POWDER TOPICAL at 09:46

## 2018-11-24 RX ADMIN — HYDROCODONE BITARTRATE AND ACETAMINOPHEN 1 TABLET: 5; 325 TABLET ORAL at 04:46

## 2018-11-24 RX ADMIN — AMLODIPINE BESYLATE 5 MG: 5 TABLET ORAL at 09:46

## 2018-11-24 RX ADMIN — INSULIN LISPRO 10 UNITS: 100 INJECTION, SUSPENSION SUBCUTANEOUS at 17:40

## 2018-11-24 RX ADMIN — MICONAZOLE NITRATE: 2 POWDER TOPICAL at 21:49

## 2018-11-24 RX ADMIN — HYDROCODONE BITARTRATE AND ACETAMINOPHEN 2 TABLET: 5; 325 TABLET ORAL at 23:26

## 2018-11-24 RX ADMIN — CIPROFLOXACIN HYDROCHLORIDE 500 MG: 500 TABLET, FILM COATED ORAL at 21:45

## 2018-11-24 RX ADMIN — Medication 10 ML: at 21:49

## 2018-11-24 RX ADMIN — VANCOMYCIN HYDROCHLORIDE 1500 MG: 10 INJECTION, POWDER, LYOPHILIZED, FOR SOLUTION INTRAVENOUS at 23:41

## 2018-11-24 RX ADMIN — HYDROCODONE BITARTRATE AND ACETAMINOPHEN 1 TABLET: 5; 325 TABLET ORAL at 14:51

## 2018-11-24 RX ADMIN — LISINOPRIL 20 MG: 10 TABLET ORAL at 09:46

## 2018-11-24 RX ADMIN — WARFARIN SODIUM 2.5 MG: 2.5 TABLET ORAL at 17:43

## 2018-11-24 RX ADMIN — INSULIN LISPRO 10 UNITS: 100 INJECTION, SUSPENSION SUBCUTANEOUS at 09:47

## 2018-11-24 RX ADMIN — INSULIN LISPRO 2 UNITS: 100 INJECTION, SOLUTION INTRAVENOUS; SUBCUTANEOUS at 17:40

## 2018-11-24 RX ADMIN — INSULIN LISPRO 1 UNITS: 100 INJECTION, SOLUTION INTRAVENOUS; SUBCUTANEOUS at 21:50

## 2018-11-24 RX ADMIN — Medication 10 ML: at 09:46

## 2018-11-24 RX ADMIN — CLONIDINE HYDROCHLORIDE 0.1 MG: 0.1 TABLET ORAL at 09:45

## 2018-11-24 RX ADMIN — CIPROFLOXACIN HYDROCHLORIDE 500 MG: 500 TABLET, FILM COATED ORAL at 09:46

## 2018-11-24 RX ADMIN — CLONIDINE HYDROCHLORIDE 0.1 MG: 0.1 TABLET ORAL at 21:45

## 2018-11-24 ASSESSMENT — PAIN DESCRIPTION - INTENSITY
RATING_2: 5
RATING_2: 0

## 2018-11-24 ASSESSMENT — PAIN SCALES - GENERAL
PAINLEVEL_OUTOF10: 5
PAINLEVEL_OUTOF10: 7
PAINLEVEL_OUTOF10: 5
PAINLEVEL_OUTOF10: 6
PAINLEVEL_OUTOF10: 0
PAINLEVEL_OUTOF10: 7
PAINLEVEL_OUTOF10: 7
PAINLEVEL_OUTOF10: 5
PAINLEVEL_OUTOF10: 5
PAINLEVEL_OUTOF10: 7
PAINLEVEL_OUTOF10: 7

## 2018-11-24 ASSESSMENT — PAIN DESCRIPTION - PAIN TYPE
TYPE: ACUTE PAIN
TYPE_2: ACUTE PAIN
TYPE: CHRONIC PAIN

## 2018-11-24 ASSESSMENT — PAIN DESCRIPTION - LOCATION
LOCATION: ABDOMEN
LOCATION: ABDOMEN
LOCATION: GENERALIZED
LOCATION_2: ABDOMEN

## 2018-11-24 ASSESSMENT — PAIN DESCRIPTION - PROGRESSION: CLINICAL_PROGRESSION: GRADUALLY IMPROVING

## 2018-11-24 ASSESSMENT — PAIN DESCRIPTION - DESCRIPTORS
DESCRIPTORS: SORE
DESCRIPTORS: SORE

## 2018-11-24 ASSESSMENT — PAIN DESCRIPTION - ORIENTATION: ORIENTATION_2: LOWER

## 2018-11-24 NOTE — PROGRESS NOTES
Pt dressing changed to right lower quadrant. Old dressing removed with saturated dressing - bloody drainage. Wound cleansed with normal saline and aquacel ag applied to wound per order. Covered with gauze, abd and medipore. Pt had been medicated prior to dressing change and tolerated well.   .Electronically signed by Rosalie Ray RN on 11/24/2018 at 8:12 AM

## 2018-11-24 NOTE — PROGRESS NOTES
Bedside report received from [facility]. Pt sitting up in bed with no s/s pain or distress. No needs at this time. Removed food trays from room. Call light within reach and bed alarm engaged.   .Electronically signed by Waqas Jay RN on 11/23/2018 at 7:48 PM

## 2018-11-24 NOTE — PROGRESS NOTES
Walker  Assistance: Contact guard assistance  Quality of Gait: slow sudheer, steady gait, shuffling steps (ankles and knees in significant valgus)  Distance: 25' from chair to toilet; 10 ft back to chair  Stairs/Curb  Stairs?: No     Balance  Posture: Fair  Sitting - Static: Good  Sitting - Dynamic: Good  Standing - Static: Good  Standing - Dynamic: Fair;+        Assessment   Body structures, Functions, Activity limitations: Decreased functional mobility ; Decreased ROM; Decreased strength;Decreased endurance;Decreased high-level IADLs  Assessment: Pt presents with decreased strength and ease of functional mobility s/p abdominal cellulitis and surgery. She is near baseline but is needing a little extra help. Should be fine to return home with family assist and home health therapy. Prognosis: Good  Decision Making: Low Complexity  REQUIRES PT FOLLOW UP: Yes  Activity Tolerance  Activity Tolerance: Patient Tolerated treatment well         Plan   Plan  Times per week: 2-3  Times per day: Daily  Current Treatment Recommendations: Strengthening, Functional Mobility Training, Gait Training, Endurance Training  Safety Devices  Type of devices: Call light within reach, All fall risk precautions in place, Gait belt, Nurse notified, Left in chair    G-Code  PT G-Codes  Functional Assessment Tool Used: PT AM-PAC  Score: 17  Functional Limitation: Mobility: Walking and moving around  Mobility: Walking and Moving Around Current Status (): At least 40 percent but less than 60 percent impaired, limited or restricted  Mobility: Walking and Moving Around Goal Status ():  At least 20 percent but less than 40 percent impaired, limited or restricted  OutComes Score                                           AM-PAC Score  AM-PAC Inpatient Mobility Raw Score : 17  AM-PAC Inpatient T-Scale Score : 42.13  Mobility Inpatient CMS 0-100% Score: 50.57  Mobility Inpatient CMS G-Code Modifier : CK          Goals  Short term goals  Time

## 2018-11-25 VITALS
RESPIRATION RATE: 18 BRPM | BODY MASS INDEX: 50.38 KG/M2 | DIASTOLIC BLOOD PRESSURE: 69 MMHG | TEMPERATURE: 98.3 F | SYSTOLIC BLOOD PRESSURE: 143 MMHG | HEART RATE: 69 BPM | WEIGHT: 256.6 LBS | HEIGHT: 60 IN | OXYGEN SATURATION: 94 %

## 2018-11-25 LAB
ANION GAP SERPL CALCULATED.3IONS-SCNC: 14 MMOL/L (ref 3–16)
BUN BLDV-MCNC: 30 MG/DL (ref 7–20)
CALCIUM SERPL-MCNC: 8.5 MG/DL (ref 8.3–10.6)
CHLORIDE BLD-SCNC: 105 MMOL/L (ref 99–110)
CO2: 21 MMOL/L (ref 21–32)
CREAT SERPL-MCNC: 1.4 MG/DL (ref 0.6–1.2)
CULTURE, BLOOD 2: ABNORMAL
GFR AFRICAN AMERICAN: 44
GFR NON-AFRICAN AMERICAN: 36
GLUCOSE BLD-MCNC: 141 MG/DL (ref 70–99)
GLUCOSE BLD-MCNC: 151 MG/DL (ref 70–99)
GLUCOSE BLD-MCNC: 269 MG/DL (ref 70–99)
INR BLD: 1.62 (ref 0.86–1.14)
ORGANISM: ABNORMAL
ORGANISM: ABNORMAL
PERFORMED ON: ABNORMAL
PERFORMED ON: ABNORMAL
POTASSIUM SERPL-SCNC: 4 MMOL/L (ref 3.5–5.1)
PROTHROMBIN TIME: 18.5 SEC (ref 9.8–13)
SODIUM BLD-SCNC: 140 MMOL/L (ref 136–145)

## 2018-11-25 PROCEDURE — 6370000000 HC RX 637 (ALT 250 FOR IP): Performed by: NURSE PRACTITIONER

## 2018-11-25 PROCEDURE — 99232 SBSQ HOSP IP/OBS MODERATE 35: CPT | Performed by: INTERNAL MEDICINE

## 2018-11-25 PROCEDURE — 2580000003 HC RX 258: Performed by: NURSE PRACTITIONER

## 2018-11-25 PROCEDURE — 6370000000 HC RX 637 (ALT 250 FOR IP): Performed by: PHYSICIAN ASSISTANT

## 2018-11-25 PROCEDURE — 85610 PROTHROMBIN TIME: CPT

## 2018-11-25 PROCEDURE — 80048 BASIC METABOLIC PNL TOTAL CA: CPT

## 2018-11-25 PROCEDURE — 6370000000 HC RX 637 (ALT 250 FOR IP): Performed by: INTERNAL MEDICINE

## 2018-11-25 PROCEDURE — 36415 COLL VENOUS BLD VENIPUNCTURE: CPT

## 2018-11-25 RX ORDER — CLINDAMYCIN HYDROCHLORIDE 300 MG/1
300 CAPSULE ORAL 3 TIMES DAILY
Qty: 21 CAPSULE | Refills: 0 | Status: SHIPPED | OUTPATIENT
Start: 2018-11-25 | End: 2018-12-02

## 2018-11-25 RX ORDER — CLINDAMYCIN HYDROCHLORIDE 300 MG/1
300 CAPSULE ORAL 3 TIMES DAILY
Qty: 21 CAPSULE | Refills: 0 | Status: SHIPPED | OUTPATIENT
Start: 2018-11-25 | End: 2018-11-25

## 2018-11-25 RX ADMIN — INSULIN LISPRO 10 UNITS: 100 INJECTION, SUSPENSION SUBCUTANEOUS at 08:42

## 2018-11-25 RX ADMIN — MICONAZOLE NITRATE: 2 POWDER TOPICAL at 08:41

## 2018-11-25 RX ADMIN — AMLODIPINE BESYLATE 10 MG: 5 TABLET ORAL at 08:41

## 2018-11-25 RX ADMIN — INSULIN LISPRO 2 UNITS: 100 INJECTION, SOLUTION INTRAVENOUS; SUBCUTANEOUS at 08:43

## 2018-11-25 RX ADMIN — CLONIDINE HYDROCHLORIDE 0.1 MG: 0.1 TABLET ORAL at 08:41

## 2018-11-25 RX ADMIN — HYDROCODONE BITARTRATE AND ACETAMINOPHEN 1 TABLET: 5; 325 TABLET ORAL at 15:08

## 2018-11-25 RX ADMIN — CIPROFLOXACIN HYDROCHLORIDE 500 MG: 500 TABLET, FILM COATED ORAL at 08:41

## 2018-11-25 RX ADMIN — HYDROCODONE BITARTRATE AND ACETAMINOPHEN 1 TABLET: 5; 325 TABLET ORAL at 11:09

## 2018-11-25 RX ADMIN — LISINOPRIL 20 MG: 10 TABLET ORAL at 08:41

## 2018-11-25 RX ADMIN — Medication 10 ML: at 08:41

## 2018-11-25 RX ADMIN — INSULIN LISPRO 6 UNITS: 100 INJECTION, SOLUTION INTRAVENOUS; SUBCUTANEOUS at 12:04

## 2018-11-25 RX ADMIN — HYDROCODONE BITARTRATE AND ACETAMINOPHEN 1 TABLET: 5; 325 TABLET ORAL at 04:46

## 2018-11-25 ASSESSMENT — PAIN SCALES - GENERAL
PAINLEVEL_OUTOF10: 8
PAINLEVEL_OUTOF10: 2
PAINLEVEL_OUTOF10: 4
PAINLEVEL_OUTOF10: 2
PAINLEVEL_OUTOF10: 8
PAINLEVEL_OUTOF10: 8
PAINLEVEL_OUTOF10: 6
PAINLEVEL_OUTOF10: 4

## 2018-11-25 ASSESSMENT — PAIN DESCRIPTION - LOCATION
LOCATION: LEG
LOCATION: LEG

## 2018-11-25 ASSESSMENT — PAIN DESCRIPTION - PAIN TYPE
TYPE: CHRONIC PAIN
TYPE: CHRONIC PAIN

## 2018-11-25 ASSESSMENT — PAIN DESCRIPTION - ORIENTATION
ORIENTATION: RIGHT;LEFT
ORIENTATION: RIGHT;LEFT

## 2018-11-25 NOTE — DISCHARGE INSTR - COC
Nurse Assessment:  Last Vital Signs: BP (!) 172/70   Pulse 71   Temp 97.6 °F (36.4 °C) (Axillary)   Resp 17   Ht 5' (1.524 m)   Wt 256 lb 9.6 oz (116.4 kg)   SpO2 92%   BMI 50.11 kg/m²     Last documented pain score (0-10 scale): Pain Level: 6  Last Weight:   Wt Readings from Last 1 Encounters:   11/25/18 256 lb 9.6 oz (116.4 kg)     Mental Status:  oriented and alert    IV Access:  - None    Nursing Mobility/ADLs:  Walking   Assisted  Transfer  Assisted  Bathing  Assisted  Dressing  Assisted  Toileting  Assisted  Feeding  Assisted  Med Admin  Independent  Med Delivery   none    Wound Care Documentation and Therapy:  Wound 11/22/18 Abdomen Lower;Right (Active)   Wound Image   11/23/2018 10:35 AM   Wound Type Wound 11/25/2018  8:55 AM   Wound Other 11/25/2018 12:45 AM   Dressing Status Clean;Dry; Intact 11/25/2018  8:55 AM   Dressing Changed Changed/New 11/25/2018 12:45 AM   Dressing/Treatment 4x4;ABD; Medipore 11/25/2018  8:55 AM   Wound Cleansed Rinsed/Irrigated with saline 11/25/2018 12:45 AM   Dressing Change Due 11/26/18 11/25/2018  8:55 AM   Wound Length (cm) 4.5 cm 11/23/2018 10:35 AM   Wound Width (cm) 5 cm 11/23/2018 10:35 AM   Wound Depth (cm)  0 11/23/2018 10:35 AM   Calculated Wound Size (cm^2) (l*w) 22.5 cm^2 11/23/2018 10:35 AM   Wound Assessment Dry;Black;Drainage 11/23/2018 10:35 AM   Drainage Amount Scant 11/23/2018 10:35 AM   Drainage Description Serosanguinous 11/23/2018 10:35 AM   Odor None 11/23/2018 10:35 AM   Margins Defined edges; Attached edges 11/23/2018 10:35 AM   [place]-wound Assessment Red 11/23/2018 10:35 AM   Black%Wound Bed 100 11/23/2018 10:35 AM   Culture Taken No 11/25/2018 12:45 AM   Number of days: 3        Elimination:  Continence:   · Bowel:  Yes  · Bladder: Yes  Urinary Catheter: None   Colostomy/Ileostomy/Ileal Conduit: No       Date of Last BM: 11/25/18      Intake/Output Summary (Last 24 hours) at 11/25/18 1019  Last data filed at 11/25/18 0855   Gross per 24 hour

## 2018-11-25 NOTE — PROGRESS NOTES
Bedside report received from [facility]. Pt assisted to bsc and back to bed. No s/s pain or distress. Pt positioned for comfort. No needs at this time. Call light within reach and bed alarm engaged.   .Electronically signed by Myranda Cordon RN on 11/24/2018 at 7:31 PM

## 2018-11-25 NOTE — PROGRESS NOTES
Bedside rounding completed with [facility], RN. Pt denies needs at this time. White board updated. Call light in hand and pt verbalizes correct use. All needs within reach. Bed alarm set.  Electronically signed by Brett Westfall RN on 11/24/2018 at 7:25 PM

## 2018-11-25 NOTE — PROGRESS NOTES
Hospital Medicine Progress Note     Date:  11/25/2018    PCP: No primary care provider on file. (Tel: None)    Date of Admission: 11/21/2018      Brief hospital course: Pt presented to the ER w/ right lower abdominal wound and cellulitis that she has been dealing with for the past 1.5 weeks. Subjective  Feeling well, no complaints. Wants to go home. Objective  Physical exam:  Vitals: BP (!) 148/71   Pulse 56   Temp 97.7 °F (36.5 °C) (Oral)   Resp 16   Ht 5' (1.524 m)   Wt 256 lb 9.6 oz (116.4 kg)   SpO2 91%   BMI 50.11 kg/m²   Gen: Not in distress. Alert. Head: Normocephalic. Atraumatic. Eyes: EOMI. Good acuity. ENT: Oral mucosa moist  Neck: No JVD. No obvious thyromegaly. CVS: Nml S1S2, no MRG, RRR  Pulmomary: Clear bilaterally. No crackles. No wheezes. Gastrointestinal: Soft, NT/ND. Positive bowel sounds. Musculoskeletal: No edema. Warm  Neuro: No focal deficit. Moves extremity spontaneously. Psychiatry: Appropriate affect. Not agitated. Skin: RLQ abd wound covered, mild erythema around, warmth, mild TTP      24HR INTAKE/OUTPUT:      Intake/Output Summary (Last 24 hours) at 11/25/18 0816  Last data filed at 11/24/18 1924   Gross per 24 hour   Intake              720 ml   Output              200 ml   Net              520 ml     I/O last 3 completed shifts: In: 5 [P.O.:720]  Out: 200 [Urine:200]  No intake/output data recorded.       Meds:    amLODIPine  10 mg Oral Daily    warfarin  2.5 mg Oral Once per day on Mon Wed Fri Sat    And    warfarin  5 mg Oral Once per day on Sun Tue Thu    lidocaine PF  5 mL Intradermal Once    miconazole   Topical BID    ciprofloxacin  500 mg Oral 2 times per day    insulin lispro prot & lispro  10 Units Subcutaneous BID WC    vancomycin  1,500 mg Intravenous Q24H    lisinopril  20 mg Oral Daily    cloNIDine  0.1 mg Oral BID    sodium chloride flush  10 mL Intravenous 2 times per day    insulin lispro  0-6 Units Subcutaneous TID WC    insulin lispro  0-3 Units Subcutaneous Nightly       Infusions:    dextrose           PRN Meds: HYDROcodone 5 mg - acetaminophen **OR** HYDROcodone 5 mg - acetaminophen, sodium chloride flush, magnesium hydroxide, ondansetron, glucose, dextrose, glucagon (rDNA), dextrose    Labs/imaging:  CBC:   Recent Labs      11/24/18   0616   WBC  6.9   HGB  11.2*   PLT  238         BMP:    Recent Labs      11/24/18   0616  11/25/18   0602   NA  140  140   K  4.0  4.0   CL  105  105   CO2  23  21   BUN  24*  30*   CREATININE  1.4*  1.4*   GLUCOSE  131*  151*         Hepatic:   No results for input(s): AST, ALT, ALB, BILITOT, ALKPHOS in the last 72 hours. Troponin: No results for input(s): TROPONINI in the last 72 hours. BNP: No results for input(s): BNP in the last 72 hours. INR:   Recent Labs      11/23/18   0539  11/24/18   0616  11/25/18   0602   INR  1.97*  1.75*  1.62*       1/2 bl cultures positive for coag neg staph      Assessment:  Active Problems:    Abdominal wall cellulitis    Poorly controlled type 2 diabetes mellitus (HCC)    HTN (hypertension)    CKD (chronic kidney disease) stage 3, GFR 30-59 ml/min ([facility])    History of pulmonary embolism    Wound infection    Open abdominal wall wound    Severe infection    Acute kidney injury superimposed on chronic kidney disease ([facility] [address]    Diverticulosis of colon without diverticulitis    Morbid obesity due to excess calories ([facility] [address]  Resolved Problems:    * No resolved hospital problems. *        Plan:  1. RLQ Abdominal wall cellulitis-on vanco and unasyn-had bedside debridement. abx per ID. Cultures negative thus far. Hopefully home today if ok with ID.  2. Bacteremia-1/2 bottles-contaminate (coag neg staph)   3. DM 2-controlled. a1c 6.5. Change SSI to medium. 3. Hypertension-BP has been up but PCP reading show good control. Monitor for now. 4. H/p PE on coumadin  5. CKD 3 stable at baseline, creat 1.4 and appears at baseline. Care everywhere labs reviewed. Diet: DIET CARB CONTROL; Carb Control: 4 carb choices (60 gms)/meal    Activity: up with assist  Prophylaxis: coumadin    Code status: Full Code     ----------        Rajan Yarbrough PA-C  -------------------------------  Margo Eleanor Slater Hospitalist

## 2018-11-25 NOTE — CARE COORDINATION
Met with pt to discuss discharge plans, pt is active with home care but only received 2 RN visits prior to admit and doesn't remember the agency's name. Pt reports she has a hard time at home with cooking and getting around and is agreeable to home PT and OT too. Called Dr. Jules Coelho on call RN and they ordered [facility]. Pt is an active  and reports she drives better than she walks. All questions answered and support provided. Home Care order and ANGELO needs signed prior to d/c. Will continue to follow for support and discharge planning.  Reinier Campos, MSW, LSW

## 2018-11-26 LAB
BLOOD CULTURE, ROUTINE: NORMAL
GRAM STAIN RESULT: NORMAL
WOUND/ABSCESS: NORMAL

## 2018-12-04 ENCOUNTER — HOSPITAL ENCOUNTER (OUTPATIENT)
Dept: WOUND CARE | Age: 83
Discharge: HOME | End: 2018-12-04
Attending: SURGERY
Payer: MEDICARE

## 2018-12-04 VITALS
DIASTOLIC BLOOD PRESSURE: 81 MMHG | HEART RATE: 95 BPM | TEMPERATURE: 98 F | RESPIRATION RATE: 18 BRPM | SYSTOLIC BLOOD PRESSURE: 185 MMHG

## 2018-12-04 PROCEDURE — 11042 DBRDMT SUBQ TIS 1ST 20SQCM/<: CPT | Performed by: SURGERY

## 2018-12-04 PROCEDURE — 99213 OFFICE O/P EST LOW 20 MIN: CPT

## 2018-12-04 PROCEDURE — 11042 DBRDMT SUBQ TIS 1ST 20SQCM/<: CPT

## 2018-12-04 RX ORDER — LIDOCAINE HYDROCHLORIDE 40 MG/ML
SOLUTION TOPICAL ONCE
Status: DISCONTINUED | OUTPATIENT
Start: 2018-12-04 | End: 2018-12-05 | Stop reason: HOSPADM

## 2018-12-04 RX ORDER — GLIMEPIRIDE 4 MG/1
4 TABLET ORAL
Status: ON HOLD | COMMUNITY
End: 2021-01-31 | Stop reason: HOSPADM

## 2018-12-04 RX ORDER — HYDROCODONE BITARTRATE AND ACETAMINOPHEN 5; 325 MG/1; MG/1
1 TABLET ORAL EVERY 6 HOURS PRN
COMMUNITY
End: 2019-04-30 | Stop reason: HOSPADM

## 2018-12-04 RX ORDER — LOVASTATIN 40 MG/1
40 TABLET ORAL NIGHTLY
COMMUNITY

## 2018-12-04 NOTE — PROGRESS NOTES
Other%Wound Bed 0 12/4/2018 10:26 AM   Number of days: 0           Total Surface Area Debrided:  19 sq cm     Bleeding:  Minimal    Hemostasis Achieved:  by pressure    Procedural Pain:  3  / 10     Post Procedural Pain:  0 / 10     Response to treatment:  Well tolerated by patient. The nature of the patient's condition was explained in depth. The patient was informed that their compliance to the treatment plan is paramount to successful healing and prevention of further ulceration and/or infection     Treatment Plan:   A 51-year-old female who is here for follow-up of a wound of the right lower quadrant of the abdomen. The patient developed a hematoma from an injection site which subsequently became infected. She was seen in the hospital and is here today for follow-up. Debridement was performed at bedside. Will change to wet-to-dry dressing changes with hopes that it will have somewhat of a debriding effect. Follow-up in 1 week.     Lowell Resendez M.D.  12/4/2018

## 2018-12-11 ENCOUNTER — HOSPITAL ENCOUNTER (OUTPATIENT)
Dept: WOUND CARE | Age: 83
Discharge: HOME | End: 2018-12-11
Attending: SURGERY
Payer: MEDICARE

## 2018-12-11 VITALS — SYSTOLIC BLOOD PRESSURE: 185 MMHG | RESPIRATION RATE: 18 BRPM | DIASTOLIC BLOOD PRESSURE: 79 MMHG | HEART RATE: 82 BPM

## 2018-12-11 PROCEDURE — 11042 DBRDMT SUBQ TIS 1ST 20SQCM/<: CPT | Performed by: SURGERY

## 2018-12-11 PROCEDURE — 11045 DBRDMT SUBQ TISS EACH ADDL: CPT | Performed by: SURGERY

## 2018-12-11 PROCEDURE — 11042 DBRDMT SUBQ TIS 1ST 20SQCM/<: CPT

## 2018-12-11 PROCEDURE — 11045 DBRDMT SUBQ TISS EACH ADDL: CPT

## 2018-12-11 RX ORDER — LIDOCAINE HYDROCHLORIDE 40 MG/ML
SOLUTION TOPICAL ONCE
Status: DISCONTINUED | OUTPATIENT
Start: 2018-12-11 | End: 2018-12-12 | Stop reason: HOSPADM

## 2018-12-11 NOTE — PROGRESS NOTES
Matt Frances  AGE: 82 y.o. GENDER: female  : 1935  TODAY'S DATE:  2018    Chief Complaint   Patient presents with    Wound Check     Right abdominal wound        HISTORY of PRESENT ILLNESS HPI     Matt Frances is a 82 y.o. female who presents today for wound evaluation. History of Wound: Abdominal wound  Wound Pain:  moderate  Severity:  5 / 10   Wound Type: Infectious  Modifying Factors:  none  Associated Signs/Symptoms:  none    Procedure Note    Performed by: Vonnie Zabala MD    Consent obtained: Yes    Time out taken:  Yes    Pain Control: Anesthetic  Anesthetic: 4% Lidocaine Liquid Topical     Debridement:Excisional Debridement    Using curette, #15 blade scalpel and forceps the wound was sharply debrided    down through and including the removal of subcutaneous tissue. Devitalized Tissue Debrided:  necrotic/eschar    Pre Debridement Measurements:  Are located in the Wound Documentation Flow Sheet    Wound #: 1     Post  Debridement Measurements:  Wound 18 Abdomen Right #1 (Active)   Wound Image   2018 10:26 AM   Wound Other 2018 11:18 AM   Dressing/Treatment ABD; Moist to dry 2018 10:55 AM   Wound Cleansed Rinsed/Irrigated with saline 2018 11:29 AM   Wound Length (cm) 4.4 cm 2018 11:18 AM   Wound Width (cm) 7.2 cm 2018 11:18 AM   Wound Depth (cm) 1 cm 2018 11:18 AM   Wound Surface Area (cm^2) 31.68 cm^2 2018 11:18 AM   Change in Wound Size % (l*w) -81.03 2018 11:18 AM   Wound Volume (cm^3) 31.68 cm^3 2018 11:18 AM   Wound Healing % -503 2018 11:18 AM   Post-Procedure Length (cm) 4.4 cm 2018 11:29 AM   Post-Procedure Width (cm) 7.2 cm 2018 11:29 AM   Post-Procedure Depth (cm) 1 cm 2018 11:29 AM   Post-Procedure Surface Area (cm^2) 31.68 cm^2 2018 11:29 AM   Post-Procedure Volume (cm^3) 31.68 cm^3 2018 11:29 AM   Tunneling Position ___ O'Clock 1400 2018 11:29 AM

## 2018-12-18 ENCOUNTER — HOSPITAL ENCOUNTER (OUTPATIENT)
Dept: WOUND CARE | Age: 83
Discharge: HOME | End: 2018-12-18
Attending: SURGERY
Payer: MEDICARE

## 2018-12-18 VITALS
HEART RATE: 88 BPM | DIASTOLIC BLOOD PRESSURE: 80 MMHG | TEMPERATURE: 96.9 F | BODY MASS INDEX: 50 KG/M2 | WEIGHT: 256 LBS | SYSTOLIC BLOOD PRESSURE: 175 MMHG

## 2018-12-18 PROCEDURE — 11045 DBRDMT SUBQ TISS EACH ADDL: CPT

## 2018-12-18 PROCEDURE — 11042 DBRDMT SUBQ TIS 1ST 20SQCM/<: CPT | Performed by: SURGERY

## 2018-12-18 PROCEDURE — 11045 DBRDMT SUBQ TISS EACH ADDL: CPT | Performed by: SURGERY

## 2018-12-18 PROCEDURE — 11042 DBRDMT SUBQ TIS 1ST 20SQCM/<: CPT

## 2018-12-18 RX ORDER — LIDOCAINE HYDROCHLORIDE 40 MG/ML
SOLUTION TOPICAL ONCE
Status: DISCONTINUED | OUTPATIENT
Start: 2018-12-18 | End: 2018-12-19 | Stop reason: HOSPADM

## 2018-12-18 RX ORDER — LIDOCAINE HYDROCHLORIDE 20 MG/ML
10 INJECTION, SOLUTION INFILTRATION; PERINEURAL ONCE
Status: DISCONTINUED | OUTPATIENT
Start: 2018-12-18 | End: 2018-12-19 | Stop reason: HOSPADM

## 2018-12-18 NOTE — PROGRESS NOTES
Naya Balbuenar  AGE: 82 y.o. GENDER: female  : 1935  TODAY'S DATE:  2018    Chief Complaint   Patient presents with    Wound Check     right lower abdomen        HISTORY of PRESENT ILLNESS HPI     Naya Balbuenar is a 82 y.o. female who presents today for wound evaluation. History of Wound: Abdominal wound  Wound Pain:  moderate  Severity:  4 / 10   Wound Type: Infectious  Modifying Factors:  none  Associated Signs/Symptoms:  none    Procedure Note    Performed by: Kenia Whiet MD    Consent obtained: Yes    Time out taken:  Yes    Pain Control: Anesthetic  Anesthetic: 2% Lidocaine Injectable     Debridement:Excisional Debridement    Using curette, #15 blade scalpel, scissors and forceps the wound was sharply debrided    down through and including the removal of subcutaneous tissue. Devitalized Tissue Debrided:  necrotic/eschar    Pre Debridement Measurements:  Are located in the Wound Documentation Flow Sheet    Wound #: 1     Post  Debridement Measurements:  Wound 18 Abdomen Right #1 (Active)   Wound Image   2018 10:26 AM   Wound Other 2018 11:29 AM   Dressing/Treatment ABD; Moist to dry 2018 10:55 AM   Wound Cleansed Rinsed/Irrigated with saline 2018 11:47 AM   Wound Length (cm) 4 cm 2018 11:29 AM   Wound Width (cm) 7.5 cm 2018 11:29 AM   Wound Depth (cm) 1.8 cm 2018 11:29 AM   Wound Surface Area (cm^2) 30 cm^2 2018 11:29 AM   Change in Wound Size % (l*w) -71.43 2018 11:29 AM   Wound Volume (cm^3) 54 cm^3 2018 11:29 AM   Wound Healing % -929 2018 11:29 AM   Post-Procedure Length (cm) 4.4 cm 2018 11:47 AM   Post-Procedure Width (cm) 7.5 cm 2018 11:47 AM   Post-Procedure Depth (cm) 2 cm 2018 11:47 AM   Post-Procedure Surface Area (cm^2) 33 cm^2 2018 11:47 AM   Post-Procedure Volume (cm^3) 66 cm^3 2018 11:47 AM   Tunneling Position ___ O'Clock 1400 2018 11:29 AM   Undermining

## 2018-12-24 LAB
FUNGUS (MYCOLOGY) CULTURE: NORMAL
FUNGUS STAIN: NORMAL

## 2018-12-27 ENCOUNTER — HOSPITAL ENCOUNTER (OUTPATIENT)
Dept: WOUND CARE | Age: 83
Discharge: HOME | End: 2018-12-27
Attending: INTERNAL MEDICINE
Payer: MEDICARE

## 2018-12-27 VITALS
SYSTOLIC BLOOD PRESSURE: 126 MMHG | TEMPERATURE: 97 F | BODY MASS INDEX: 50 KG/M2 | HEART RATE: 81 BPM | DIASTOLIC BLOOD PRESSURE: 66 MMHG | WEIGHT: 256 LBS

## 2018-12-27 DIAGNOSIS — L97.412 DIABETIC ULCER OF RIGHT MIDFOOT ASSOCIATED WITH TYPE 2 DIABETES MELLITUS, WITH FAT LAYER EXPOSED (HCC): ICD-10-CM

## 2018-12-27 DIAGNOSIS — E11.621 DIABETIC ULCER OF RIGHT MIDFOOT ASSOCIATED WITH TYPE 2 DIABETES MELLITUS, WITH FAT LAYER EXPOSED (HCC): ICD-10-CM

## 2018-12-27 PROCEDURE — 99213 OFFICE O/P EST LOW 20 MIN: CPT

## 2018-12-27 PROCEDURE — 11042 DBRDMT SUBQ TIS 1ST 20SQCM/<: CPT | Performed by: INTERNAL MEDICINE

## 2018-12-27 PROCEDURE — 11045 DBRDMT SUBQ TISS EACH ADDL: CPT | Performed by: INTERNAL MEDICINE

## 2018-12-27 PROCEDURE — 11042 DBRDMT SUBQ TIS 1ST 20SQCM/<: CPT

## 2018-12-27 PROCEDURE — 99213 OFFICE O/P EST LOW 20 MIN: CPT | Performed by: INTERNAL MEDICINE

## 2018-12-27 PROCEDURE — 11045 DBRDMT SUBQ TISS EACH ADDL: CPT

## 2018-12-27 RX ORDER — CEPHALEXIN 500 MG/1
500 CAPSULE ORAL 4 TIMES DAILY
Qty: 40 CAPSULE | Refills: 0 | Status: SHIPPED | OUTPATIENT
Start: 2018-12-27 | End: 2019-01-06

## 2018-12-27 RX ORDER — LIDOCAINE HYDROCHLORIDE 40 MG/ML
SOLUTION TOPICAL ONCE
Status: DISCONTINUED | OUTPATIENT
Start: 2018-12-27 | End: 2018-12-28 | Stop reason: HOSPADM

## 2018-12-27 NOTE — PROGRESS NOTES
Tim Morin  Progress Note and Procedure Note      Dayan Metz  AGE: 82 y.o. GENDER: female  : 1935  TODAY'S DATE:  2018    Subjective:     Chief Complaint   Patient presents with    Wound Check     Abdomen  F/U         HISTORY of PRESENT ILLNESS HPI     Dayan Metz is a 82 y.o. female who presents today for wound evaluation. History of Wound: Abdominal; new foot wound after callus issue at right fifth metatarsal. Neighbor friend joins her and says that Lyubov does not wear shoes around house like recommended by her podiatrist.  Wound Pain:  mild  Severity:  3 / 10   Wound Type:  infectious  Modifying Factors:  none  Associated Signs/Symptoms:  none        PAST MEDICAL HISTORY        Diagnosis Date    Arthritis     Cerebral artery occlusion with cerebral infarction ([address]     Diabetes mellitus ([facility] [address]     Hypertension     Pulmonary embolism ([facility] [address]     On warfarin       PAST SURGICAL HISTORY    History reviewed. No pertinent surgical history. FAMILY HISTORY    History reviewed. No pertinent family history. SOCIAL HISTORY    Social History   Substance Use Topics    Smoking status: Never Smoker    Smokeless tobacco: Never Used    Alcohol use No       ALLERGIES    No Known Allergies    MEDICATIONS    Current Outpatient Prescriptions on File Prior to Encounter   Medication Sig Dispense Refill    aspirin 81 MG tablet Take by mouth daily      lovastatin (MEVACOR) 40 MG tablet Take 40 mg by mouth nightly      HYDROcodone-acetaminophen (NORCO) 5-325 MG per tablet Take 1 tablet by mouth every 6 hours as needed for Pain. Parveen Spaulding Insulin NPH Isophane & Regular (HUMULIN 70/30 SC) Inject into the skin      Cholecalciferol (VITAMIN D3) 5000 units TABS Take by mouth twice a week      glimepiride (AMARYL) 4 MG tablet Take 4 mg by mouth every morning (before breakfast)      warfarin (COUMADIN) 5 MG tablet Take 5 mg by mouth      warfarin (COUMADIN) 2.5 MG tablet Take 2.5

## 2019-01-02 ENCOUNTER — HOSPITAL ENCOUNTER (OUTPATIENT)
Dept: WOUND CARE | Age: 84
Discharge: HOME | End: 2019-01-02
Attending: SURGERY
Payer: MEDICARE

## 2019-01-02 VITALS
SYSTOLIC BLOOD PRESSURE: 141 MMHG | DIASTOLIC BLOOD PRESSURE: 59 MMHG | RESPIRATION RATE: 16 BRPM | HEART RATE: 70 BPM | TEMPERATURE: 96.7 F

## 2019-01-02 PROCEDURE — 11042 DBRDMT SUBQ TIS 1ST 20SQCM/<: CPT

## 2019-01-02 PROCEDURE — 11045 DBRDMT SUBQ TISS EACH ADDL: CPT

## 2019-01-02 PROCEDURE — 11042 DBRDMT SUBQ TIS 1ST 20SQCM/<: CPT | Performed by: SURGERY

## 2019-01-02 PROCEDURE — 11045 DBRDMT SUBQ TISS EACH ADDL: CPT | Performed by: SURGERY

## 2019-01-02 RX ORDER — LIDOCAINE HYDROCHLORIDE 40 MG/ML
SOLUTION TOPICAL ONCE
Status: DISCONTINUED | OUTPATIENT
Start: 2019-01-02 | End: 2019-01-03 | Stop reason: HOSPADM

## 2019-01-08 ENCOUNTER — HOSPITAL ENCOUNTER (OUTPATIENT)
Dept: WOUND CARE | Age: 84
Discharge: HOME | End: 2019-01-08
Attending: SURGERY
Payer: MEDICARE

## 2019-01-08 VITALS — TEMPERATURE: 96.9 F | SYSTOLIC BLOOD PRESSURE: 155 MMHG | DIASTOLIC BLOOD PRESSURE: 70 MMHG | HEART RATE: 73 BPM

## 2019-01-08 LAB
AFB CULTURE (MYCOBACTERIA): NORMAL
AFB SMEAR: NORMAL

## 2019-01-08 PROCEDURE — 11045 DBRDMT SUBQ TISS EACH ADDL: CPT | Performed by: SURGERY

## 2019-01-08 PROCEDURE — 11042 DBRDMT SUBQ TIS 1ST 20SQCM/<: CPT | Performed by: SURGERY

## 2019-01-08 PROCEDURE — 11045 DBRDMT SUBQ TISS EACH ADDL: CPT

## 2019-01-08 PROCEDURE — 11042 DBRDMT SUBQ TIS 1ST 20SQCM/<: CPT

## 2019-01-08 RX ORDER — LIDOCAINE HYDROCHLORIDE 20 MG/ML
5 INJECTION, SOLUTION INFILTRATION; PERINEURAL ONCE
Status: DISCONTINUED | OUTPATIENT
Start: 2019-01-08 | End: 2019-01-09 | Stop reason: HOSPADM

## 2019-01-08 RX ORDER — LIDOCAINE HYDROCHLORIDE 40 MG/ML
SOLUTION TOPICAL ONCE
Status: DISCONTINUED | OUTPATIENT
Start: 2019-01-08 | End: 2019-01-09 | Stop reason: HOSPADM

## 2019-01-15 ENCOUNTER — HOSPITAL ENCOUNTER (OUTPATIENT)
Dept: WOUND CARE | Age: 84
Discharge: HOME | End: 2019-01-15
Attending: SURGERY
Payer: MEDICARE

## 2019-01-15 VITALS — DIASTOLIC BLOOD PRESSURE: 72 MMHG | HEART RATE: 71 BPM | RESPIRATION RATE: 18 BRPM | SYSTOLIC BLOOD PRESSURE: 173 MMHG

## 2019-01-15 PROCEDURE — 88305 TISSUE EXAM BY PATHOLOGIST: CPT

## 2019-01-15 PROCEDURE — 11045 DBRDMT SUBQ TISS EACH ADDL: CPT

## 2019-01-15 PROCEDURE — 11042 DBRDMT SUBQ TIS 1ST 20SQCM/<: CPT

## 2019-01-15 PROCEDURE — 11104 PUNCH BX SKIN SINGLE LESION: CPT

## 2019-01-15 PROCEDURE — 11104 PUNCH BX SKIN SINGLE LESION: CPT | Performed by: SURGERY

## 2019-01-15 RX ORDER — LIDOCAINE HYDROCHLORIDE 40 MG/ML
SOLUTION TOPICAL ONCE
Status: DISCONTINUED | OUTPATIENT
Start: 2019-01-15 | End: 2019-01-16 | Stop reason: HOSPADM

## 2019-01-15 ASSESSMENT — PAIN DESCRIPTION - PAIN TYPE: TYPE: ACUTE PAIN

## 2019-01-15 ASSESSMENT — PAIN DESCRIPTION - LOCATION: LOCATION: ABDOMEN

## 2019-01-15 ASSESSMENT — PAIN SCALES - GENERAL: PAINLEVEL_OUTOF10: 7

## 2019-01-15 ASSESSMENT — PAIN DESCRIPTION - DESCRIPTORS: DESCRIPTORS: DISCOMFORT;BURNING

## 2019-01-22 ENCOUNTER — HOSPITAL ENCOUNTER (OUTPATIENT)
Dept: WOUND CARE | Age: 84
Discharge: HOME | End: 2019-01-22
Attending: SURGERY
Payer: MEDICARE

## 2019-01-22 VITALS
DIASTOLIC BLOOD PRESSURE: 71 MMHG | TEMPERATURE: 97.3 F | WEIGHT: 256 LBS | HEART RATE: 76 BPM | SYSTOLIC BLOOD PRESSURE: 150 MMHG | BODY MASS INDEX: 50 KG/M2

## 2019-01-22 PROCEDURE — 11042 DBRDMT SUBQ TIS 1ST 20SQCM/<: CPT | Performed by: SURGERY

## 2019-01-22 PROCEDURE — 11045 DBRDMT SUBQ TISS EACH ADDL: CPT | Performed by: SURGERY

## 2019-01-22 PROCEDURE — 11042 DBRDMT SUBQ TIS 1ST 20SQCM/<: CPT

## 2019-01-22 PROCEDURE — 11045 DBRDMT SUBQ TISS EACH ADDL: CPT

## 2019-01-22 RX ORDER — LIDOCAINE HYDROCHLORIDE 20 MG/ML
10 INJECTION, SOLUTION INFILTRATION; PERINEURAL ONCE
Status: DISCONTINUED | OUTPATIENT
Start: 2019-01-22 | End: 2019-01-23 | Stop reason: HOSPADM

## 2019-01-22 ASSESSMENT — PAIN DESCRIPTION - LOCATION: LOCATION: ABDOMEN

## 2019-01-22 ASSESSMENT — PAIN DESCRIPTION - PAIN TYPE: TYPE: CHRONIC PAIN

## 2019-01-22 ASSESSMENT — PAIN DESCRIPTION - ONSET: ONSET: ON-GOING

## 2019-01-22 ASSESSMENT — PAIN DESCRIPTION - DESCRIPTORS: DESCRIPTORS: ACHING

## 2019-01-22 ASSESSMENT — PAIN DESCRIPTION - FREQUENCY: FREQUENCY: CONTINUOUS

## 2019-01-22 ASSESSMENT — PAIN SCALES - GENERAL: PAINLEVEL_OUTOF10: 8

## 2019-01-29 ENCOUNTER — HOSPITAL ENCOUNTER (OUTPATIENT)
Dept: WOUND CARE | Age: 84
Discharge: HOME | End: 2019-01-29
Attending: SURGERY

## 2019-02-06 ENCOUNTER — HOSPITAL ENCOUNTER (OUTPATIENT)
Dept: WOUND CARE | Age: 84
Discharge: HOME | End: 2019-02-06
Payer: MEDICARE

## 2019-02-06 VITALS — HEART RATE: 79 BPM | RESPIRATION RATE: 18 BRPM | DIASTOLIC BLOOD PRESSURE: 65 MMHG | SYSTOLIC BLOOD PRESSURE: 154 MMHG

## 2019-02-06 PROCEDURE — 11042 DBRDMT SUBQ TIS 1ST 20SQCM/<: CPT | Performed by: SURGERY

## 2019-02-06 PROCEDURE — 11042 DBRDMT SUBQ TIS 1ST 20SQCM/<: CPT

## 2019-02-06 PROCEDURE — 11045 DBRDMT SUBQ TISS EACH ADDL: CPT

## 2019-02-06 PROCEDURE — 11045 DBRDMT SUBQ TISS EACH ADDL: CPT | Performed by: SURGERY

## 2019-02-06 RX ORDER — LIDOCAINE 40 MG/G
CREAM TOPICAL ONCE
Status: DISCONTINUED | OUTPATIENT
Start: 2019-02-06 | End: 2019-02-07 | Stop reason: HOSPADM

## 2019-02-12 ENCOUNTER — HOSPITAL ENCOUNTER (OUTPATIENT)
Dept: WOUND CARE | Age: 84
Discharge: HOME | End: 2019-02-12
Attending: SURGERY
Payer: MEDICARE

## 2019-02-12 VITALS
SYSTOLIC BLOOD PRESSURE: 114 MMHG | HEART RATE: 85 BPM | TEMPERATURE: 97.3 F | DIASTOLIC BLOOD PRESSURE: 61 MMHG | RESPIRATION RATE: 16 BRPM

## 2019-02-12 PROCEDURE — 99213 OFFICE O/P EST LOW 20 MIN: CPT | Performed by: SURGERY

## 2019-02-12 PROCEDURE — 99213 OFFICE O/P EST LOW 20 MIN: CPT

## 2019-02-12 RX ORDER — LIDOCAINE 40 MG/G
CREAM TOPICAL ONCE
Status: DISCONTINUED | OUTPATIENT
Start: 2019-02-12 | End: 2019-02-13 | Stop reason: HOSPADM

## 2019-02-12 ASSESSMENT — PAIN SCALES - GENERAL: PAINLEVEL_OUTOF10: 6

## 2019-02-12 ASSESSMENT — PAIN DESCRIPTION - PAIN TYPE: TYPE: CHRONIC PAIN

## 2019-02-12 ASSESSMENT — PAIN DESCRIPTION - DESCRIPTORS: DESCRIPTORS: ACHING

## 2019-02-12 ASSESSMENT — PAIN DESCRIPTION - LOCATION: LOCATION: ABDOMEN

## 2019-02-12 ASSESSMENT — PAIN DESCRIPTION - FREQUENCY: FREQUENCY: INTERMITTENT

## 2019-02-15 ENCOUNTER — ANESTHESIA EVENT (OUTPATIENT)
Dept: OPERATING ROOM | Age: 84
End: 2019-02-15
Payer: MEDICARE

## 2019-02-15 ENCOUNTER — ANESTHESIA (OUTPATIENT)
Dept: OPERATING ROOM | Age: 84
End: 2019-02-15
Payer: MEDICARE

## 2019-02-15 ENCOUNTER — HOSPITAL ENCOUNTER (OUTPATIENT)
Age: 84
Setting detail: OUTPATIENT SURGERY
Discharge: HOME | End: 2019-02-15
Attending: SURGERY | Admitting: SURGERY
Payer: MEDICARE

## 2019-02-15 VITALS
DIASTOLIC BLOOD PRESSURE: 56 MMHG | SYSTOLIC BLOOD PRESSURE: 119 MMHG | OXYGEN SATURATION: 95 % | RESPIRATION RATE: 2 BRPM

## 2019-02-15 VITALS
HEART RATE: 77 BPM | HEIGHT: 61 IN | DIASTOLIC BLOOD PRESSURE: 50 MMHG | BODY MASS INDEX: 48.33 KG/M2 | TEMPERATURE: 98.3 F | RESPIRATION RATE: 16 BRPM | SYSTOLIC BLOOD PRESSURE: 142 MMHG | OXYGEN SATURATION: 99 % | WEIGHT: 256 LBS

## 2019-02-15 DIAGNOSIS — S31.109D OPEN WOUND OF ABDOMEN, SUBSEQUENT ENCOUNTER: Primary | ICD-10-CM

## 2019-02-15 LAB
HCT VFR BLD CALC: 31.5 % (ref 36–48)
HEMOGLOBIN: 10.3 G/DL (ref 12–16)
INR BLD: 2.16 (ref 0.86–1.14)
MCH RBC QN AUTO: 31.5 PG (ref 26–34)
MCHC RBC AUTO-ENTMCNC: 32.9 G/DL (ref 31–36)
MCV RBC AUTO: 95.9 FL (ref 80–100)
PDW BLD-RTO: 14.8 % (ref 12.4–15.4)
PLATELET # BLD: 333 K/UL (ref 135–450)
PMV BLD AUTO: 10.5 FL (ref 5–10.5)
PROTHROMBIN TIME: 24.6 SEC (ref 9.8–13)
RBC # BLD: 3.28 M/UL (ref 4–5.2)
WBC # BLD: 9.1 K/UL (ref 4–11)

## 2019-02-15 PROCEDURE — 2500000003 HC RX 250 WO HCPCS: Performed by: NURSE ANESTHETIST, CERTIFIED REGISTERED

## 2019-02-15 PROCEDURE — 3700000001 HC ADD 15 MINUTES (ANESTHESIA): Performed by: SURGERY

## 2019-02-15 PROCEDURE — 11045 DBRDMT SUBQ TISS EACH ADDL: CPT | Performed by: SURGERY

## 2019-02-15 PROCEDURE — 7100000000 HC PACU RECOVERY - FIRST 15 MIN: Performed by: SURGERY

## 2019-02-15 PROCEDURE — 7100000010 HC PHASE II RECOVERY - FIRST 15 MIN: Performed by: SURGERY

## 2019-02-15 PROCEDURE — 2580000003 HC RX 258: Performed by: SURGERY

## 2019-02-15 PROCEDURE — 3600000012 HC SURGERY LEVEL 2 ADDTL 15MIN: Performed by: SURGERY

## 2019-02-15 PROCEDURE — 2709999900 HC NON-CHARGEABLE SUPPLY: Performed by: SURGERY

## 2019-02-15 PROCEDURE — 3600000002 HC SURGERY LEVEL 2 BASE: Performed by: SURGERY

## 2019-02-15 PROCEDURE — 6360000002 HC RX W HCPCS: Performed by: FAMILY MEDICINE

## 2019-02-15 PROCEDURE — 88304 TISSUE EXAM BY PATHOLOGIST: CPT

## 2019-02-15 PROCEDURE — 85610 PROTHROMBIN TIME: CPT

## 2019-02-15 PROCEDURE — 2580000003 HC RX 258: Performed by: NURSE ANESTHETIST, CERTIFIED REGISTERED

## 2019-02-15 PROCEDURE — 11042 DBRDMT SUBQ TIS 1ST 20SQCM/<: CPT | Performed by: SURGERY

## 2019-02-15 PROCEDURE — 85027 COMPLETE CBC AUTOMATED: CPT

## 2019-02-15 PROCEDURE — 3700000000 HC ANESTHESIA ATTENDED CARE: Performed by: SURGERY

## 2019-02-15 PROCEDURE — 7100000001 HC PACU RECOVERY - ADDTL 15 MIN: Performed by: SURGERY

## 2019-02-15 PROCEDURE — 6360000002 HC RX W HCPCS: Performed by: NURSE ANESTHETIST, CERTIFIED REGISTERED

## 2019-02-15 PROCEDURE — 6370000000 HC RX 637 (ALT 250 FOR IP)

## 2019-02-15 PROCEDURE — 6360000002 HC RX W HCPCS

## 2019-02-15 RX ORDER — FENTANYL CITRATE 50 UG/ML
INJECTION, SOLUTION INTRAMUSCULAR; INTRAVENOUS PRN
Status: DISCONTINUED | OUTPATIENT
Start: 2019-02-15 | End: 2019-02-15 | Stop reason: SDUPTHER

## 2019-02-15 RX ORDER — MEPERIDINE HYDROCHLORIDE 25 MG/ML
12.5 INJECTION INTRAMUSCULAR; INTRAVENOUS; SUBCUTANEOUS EVERY 5 MIN PRN
Status: DISCONTINUED | OUTPATIENT
Start: 2019-02-15 | End: 2019-02-18 | Stop reason: HOSPADM

## 2019-02-15 RX ORDER — DEXAMETHASONE SODIUM PHOSPHATE 4 MG/ML
INJECTION, SOLUTION INTRA-ARTICULAR; INTRALESIONAL; INTRAMUSCULAR; INTRAVENOUS; SOFT TISSUE PRN
Status: DISCONTINUED | OUTPATIENT
Start: 2019-02-15 | End: 2019-02-15 | Stop reason: SDUPTHER

## 2019-02-15 RX ORDER — LIDOCAINE HYDROCHLORIDE 20 MG/ML
INJECTION, SOLUTION INFILTRATION; PERINEURAL PRN
Status: DISCONTINUED | OUTPATIENT
Start: 2019-02-15 | End: 2019-02-15 | Stop reason: SDUPTHER

## 2019-02-15 RX ORDER — FENTANYL CITRATE 50 UG/ML
INJECTION, SOLUTION INTRAMUSCULAR; INTRAVENOUS
Status: COMPLETED
Start: 2019-02-15 | End: 2019-02-15

## 2019-02-15 RX ORDER — HYDROCODONE BITARTRATE AND ACETAMINOPHEN 5; 325 MG/1; MG/1
1-2 TABLET ORAL EVERY 6 HOURS PRN
Qty: 30 TABLET | Refills: 0 | Status: SHIPPED | OUTPATIENT
Start: 2019-02-15 | End: 2019-02-22

## 2019-02-15 RX ORDER — DIPHENHYDRAMINE HYDROCHLORIDE 50 MG/ML
12.5 INJECTION INTRAMUSCULAR; INTRAVENOUS
Status: ACTIVE | OUTPATIENT
Start: 2019-02-15 | End: 2019-02-15

## 2019-02-15 RX ORDER — HYDROMORPHONE HCL 110MG/55ML
0.25 PATIENT CONTROLLED ANALGESIA SYRINGE INTRAVENOUS EVERY 5 MIN PRN
Status: DISCONTINUED | OUTPATIENT
Start: 2019-02-15 | End: 2019-02-18 | Stop reason: HOSPADM

## 2019-02-15 RX ORDER — HYDROCODONE BITARTRATE AND ACETAMINOPHEN 5; 325 MG/1; MG/1
1 TABLET ORAL
Status: COMPLETED | OUTPATIENT
Start: 2019-02-15 | End: 2019-02-15

## 2019-02-15 RX ORDER — LABETALOL HYDROCHLORIDE 5 MG/ML
5 INJECTION, SOLUTION INTRAVENOUS EVERY 10 MIN PRN
Status: DISCONTINUED | OUTPATIENT
Start: 2019-02-15 | End: 2019-02-18 | Stop reason: HOSPADM

## 2019-02-15 RX ORDER — MAGNESIUM HYDROXIDE 1200 MG/15ML
LIQUID ORAL CONTINUOUS PRN
Status: DISCONTINUED | OUTPATIENT
Start: 2019-02-15 | End: 2019-02-18 | Stop reason: ALTCHOICE

## 2019-02-15 RX ORDER — OXYCODONE HYDROCHLORIDE 5 MG/1
5 TABLET ORAL PRN
Status: DISCONTINUED | OUTPATIENT
Start: 2019-02-15 | End: 2019-02-15

## 2019-02-15 RX ORDER — PROPOFOL 10 MG/ML
INJECTION, EMULSION INTRAVENOUS PRN
Status: DISCONTINUED | OUTPATIENT
Start: 2019-02-15 | End: 2019-02-15 | Stop reason: SDUPTHER

## 2019-02-15 RX ORDER — ONDANSETRON 2 MG/ML
INJECTION INTRAMUSCULAR; INTRAVENOUS PRN
Status: DISCONTINUED | OUTPATIENT
Start: 2019-02-15 | End: 2019-02-15 | Stop reason: SDUPTHER

## 2019-02-15 RX ORDER — SODIUM CHLORIDE 9 MG/ML
INJECTION, SOLUTION INTRAVENOUS CONTINUOUS PRN
Status: DISCONTINUED | OUTPATIENT
Start: 2019-02-15 | End: 2019-02-15 | Stop reason: SDUPTHER

## 2019-02-15 RX ORDER — FENTANYL CITRATE 50 UG/ML
50 INJECTION, SOLUTION INTRAMUSCULAR; INTRAVENOUS EVERY 5 MIN PRN
Status: DISCONTINUED | OUTPATIENT
Start: 2019-02-15 | End: 2019-02-18 | Stop reason: HOSPADM

## 2019-02-15 RX ORDER — PROMETHAZINE HYDROCHLORIDE 25 MG/ML
6.25 INJECTION, SOLUTION INTRAMUSCULAR; INTRAVENOUS PRN
Status: DISCONTINUED | OUTPATIENT
Start: 2019-02-15 | End: 2019-02-18 | Stop reason: HOSPADM

## 2019-02-15 RX ORDER — HYDROMORPHONE HCL 110MG/55ML
0.5 PATIENT CONTROLLED ANALGESIA SYRINGE INTRAVENOUS EVERY 5 MIN PRN
Status: DISCONTINUED | OUTPATIENT
Start: 2019-02-15 | End: 2019-02-18 | Stop reason: HOSPADM

## 2019-02-15 RX ORDER — HYDROCODONE BITARTRATE AND ACETAMINOPHEN 5; 325 MG/1; MG/1
TABLET ORAL
Status: COMPLETED
Start: 2019-02-15 | End: 2019-02-15

## 2019-02-15 RX ORDER — OXYCODONE HYDROCHLORIDE 5 MG/1
10 TABLET ORAL PRN
Status: DISCONTINUED | OUTPATIENT
Start: 2019-02-15 | End: 2019-02-15

## 2019-02-15 RX ORDER — SODIUM CHLORIDE 9 MG/ML
INJECTION, SOLUTION INTRAVENOUS
Status: COMPLETED
Start: 2019-02-15 | End: 2019-02-15

## 2019-02-15 RX ADMIN — FENTANYL CITRATE 50 MCG: 50 INJECTION INTRAMUSCULAR; INTRAVENOUS at 18:52

## 2019-02-15 RX ADMIN — HYDROCODONE BITARTRATE AND ACETAMINOPHEN 1 TABLET: 5; 325 TABLET ORAL at 19:42

## 2019-02-15 RX ADMIN — PROPOFOL 30 MG: 10 INJECTION, EMULSION INTRAVENOUS at 18:03

## 2019-02-15 RX ADMIN — PROPOFOL 30 MG: 10 INJECTION, EMULSION INTRAVENOUS at 18:13

## 2019-02-15 RX ADMIN — SODIUM CHLORIDE: 9 INJECTION, SOLUTION INTRAVENOUS at 17:51

## 2019-02-15 RX ADMIN — LIDOCAINE HYDROCHLORIDE 60 MG: 20 INJECTION, SOLUTION INFILTRATION; PERINEURAL at 17:56

## 2019-02-15 RX ADMIN — PROPOFOL 30 MG: 10 INJECTION, EMULSION INTRAVENOUS at 18:09

## 2019-02-15 RX ADMIN — FENTANYL CITRATE 50 MCG: 50 INJECTION INTRAMUSCULAR; INTRAVENOUS at 19:12

## 2019-02-15 RX ADMIN — FENTANYL CITRATE 25 MCG: 50 INJECTION, SOLUTION INTRAMUSCULAR; INTRAVENOUS at 18:02

## 2019-02-15 RX ADMIN — FENTANYL CITRATE 25 MCG: 50 INJECTION, SOLUTION INTRAMUSCULAR; INTRAVENOUS at 18:10

## 2019-02-15 RX ADMIN — PROPOFOL 30 MG: 10 INJECTION, EMULSION INTRAVENOUS at 18:05

## 2019-02-15 RX ADMIN — DEXAMETHASONE SODIUM PHOSPHATE 4 MG: 4 INJECTION, SOLUTION INTRAMUSCULAR; INTRAVENOUS at 18:14

## 2019-02-15 RX ADMIN — FENTANYL CITRATE 25 MCG: 50 INJECTION, SOLUTION INTRAMUSCULAR; INTRAVENOUS at 18:20

## 2019-02-15 RX ADMIN — ONDANSETRON 4 MG: 2 INJECTION INTRAMUSCULAR; INTRAVENOUS at 18:14

## 2019-02-15 RX ADMIN — PROPOFOL 50 MG: 10 INJECTION, EMULSION INTRAVENOUS at 17:56

## 2019-02-15 RX ADMIN — FENTANYL CITRATE 25 MCG: 50 INJECTION, SOLUTION INTRAMUSCULAR; INTRAVENOUS at 17:56

## 2019-02-15 RX ADMIN — PROPOFOL 30 MG: 10 INJECTION, EMULSION INTRAVENOUS at 18:00

## 2019-02-15 RX ADMIN — FENTANYL CITRATE 50 MCG: 50 INJECTION, SOLUTION INTRAMUSCULAR; INTRAVENOUS at 18:30

## 2019-02-15 ASSESSMENT — PULMONARY FUNCTION TESTS
PIF_VALUE: 9
PIF_VALUE: 1
PIF_VALUE: 11
PIF_VALUE: 11
PIF_VALUE: 0
PIF_VALUE: 11
PIF_VALUE: 12
PIF_VALUE: 0
PIF_VALUE: 16
PIF_VALUE: 0
PIF_VALUE: 1
PIF_VALUE: 0
PIF_VALUE: 0
PIF_VALUE: 2
PIF_VALUE: 0
PIF_VALUE: 1
PIF_VALUE: 0
PIF_VALUE: 1
PIF_VALUE: 0
PIF_VALUE: 1
PIF_VALUE: 3
PIF_VALUE: 18
PIF_VALUE: 0
PIF_VALUE: 18
PIF_VALUE: 13
PIF_VALUE: 5
PIF_VALUE: 11
PIF_VALUE: 0
PIF_VALUE: 1
PIF_VALUE: 0
PIF_VALUE: 10
PIF_VALUE: 11
PIF_VALUE: 0
PIF_VALUE: 16
PIF_VALUE: 1
PIF_VALUE: 2
PIF_VALUE: 1
PIF_VALUE: 0

## 2019-02-15 ASSESSMENT — PAIN SCALES - GENERAL
PAINLEVEL_OUTOF10: 6
PAINLEVEL_OUTOF10: 6
PAINLEVEL_OUTOF10: 10
PAINLEVEL_OUTOF10: 7

## 2019-02-15 ASSESSMENT — PAIN - FUNCTIONAL ASSESSMENT: PAIN_FUNCTIONAL_ASSESSMENT: 0-10

## 2019-02-16 LAB
GLUCOSE BLD-MCNC: 50 MG/DL (ref 70–99)
GLUCOSE BLD-MCNC: 77 MG/DL (ref 70–99)
GLUCOSE BLD-MCNC: 87 MG/DL (ref 70–99)
PERFORMED ON: ABNORMAL
PERFORMED ON: NORMAL
PERFORMED ON: NORMAL

## 2019-02-26 ENCOUNTER — HOSPITAL ENCOUNTER (OUTPATIENT)
Dept: WOUND CARE | Age: 84
Discharge: HOME | End: 2019-02-26
Attending: SURGERY
Payer: MEDICARE

## 2019-02-26 VITALS
BODY MASS INDEX: 48.37 KG/M2 | DIASTOLIC BLOOD PRESSURE: 71 MMHG | WEIGHT: 256 LBS | SYSTOLIC BLOOD PRESSURE: 155 MMHG | HEART RATE: 96 BPM | TEMPERATURE: 97.6 F

## 2019-02-26 PROCEDURE — 11042 DBRDMT SUBQ TIS 1ST 20SQCM/<: CPT | Performed by: SURGERY

## 2019-02-26 PROCEDURE — 11045 DBRDMT SUBQ TISS EACH ADDL: CPT

## 2019-02-26 PROCEDURE — 11045 DBRDMT SUBQ TISS EACH ADDL: CPT | Performed by: SURGERY

## 2019-02-26 PROCEDURE — 11042 DBRDMT SUBQ TIS 1ST 20SQCM/<: CPT

## 2019-02-26 RX ORDER — AMOXICILLIN AND CLAVULANATE POTASSIUM 875; 125 MG/1; MG/1
1 TABLET, FILM COATED ORAL 2 TIMES DAILY
Qty: 14 TABLET | Refills: 0 | Status: SHIPPED | OUTPATIENT
Start: 2019-02-26 | End: 2019-03-05

## 2019-02-26 RX ORDER — LIDOCAINE 40 MG/G
CREAM TOPICAL ONCE
Status: DISCONTINUED | OUTPATIENT
Start: 2019-02-26 | End: 2019-02-27 | Stop reason: HOSPADM

## 2019-02-26 RX ORDER — SODIUM HYPOCHLORITE 1.25 MG/ML
SOLUTION TOPICAL
Qty: 1000 ML | Refills: 2 | Status: ON HOLD | OUTPATIENT
Start: 2019-02-26 | End: 2021-01-11 | Stop reason: HOSPADM

## 2019-02-26 ASSESSMENT — PAIN DESCRIPTION - DESCRIPTORS: DESCRIPTORS: ACHING

## 2019-02-26 ASSESSMENT — PAIN DESCRIPTION - PAIN TYPE: TYPE: CHRONIC PAIN

## 2019-02-26 ASSESSMENT — PAIN DESCRIPTION - PROGRESSION: CLINICAL_PROGRESSION: NOT CHANGED

## 2019-02-26 ASSESSMENT — PAIN SCALES - GENERAL: PAINLEVEL_OUTOF10: 3

## 2019-02-26 ASSESSMENT — PAIN DESCRIPTION - LOCATION: LOCATION: ABDOMEN

## 2019-02-26 ASSESSMENT — PAIN DESCRIPTION - ONSET: ONSET: ON-GOING

## 2019-02-26 ASSESSMENT — PAIN DESCRIPTION - FREQUENCY: FREQUENCY: INTERMITTENT

## 2019-03-01 ENCOUNTER — OFFICE VISIT (OUTPATIENT)
Dept: SURGERY | Age: 84
End: 2019-03-01

## 2019-03-01 VITALS — DIASTOLIC BLOOD PRESSURE: 70 MMHG | SYSTOLIC BLOOD PRESSURE: 132 MMHG

## 2019-03-01 DIAGNOSIS — S31.109D OPEN WOUND OF ABDOMEN, SUBSEQUENT ENCOUNTER: Primary | ICD-10-CM

## 2019-03-01 PROCEDURE — 99024 POSTOP FOLLOW-UP VISIT: CPT | Performed by: SURGERY

## 2019-03-12 ENCOUNTER — HOSPITAL ENCOUNTER (OUTPATIENT)
Dept: WOUND CARE | Age: 84
Discharge: HOME | End: 2019-03-12
Attending: SURGERY
Payer: MEDICARE

## 2019-03-12 VITALS — WEIGHT: 256 LBS | TEMPERATURE: 97 F | BODY MASS INDEX: 48.37 KG/M2

## 2019-03-12 PROCEDURE — 11042 DBRDMT SUBQ TIS 1ST 20SQCM/<: CPT | Performed by: SURGERY

## 2019-03-12 PROCEDURE — 11045 DBRDMT SUBQ TISS EACH ADDL: CPT | Performed by: SURGERY

## 2019-03-12 ASSESSMENT — PAIN DESCRIPTION - DESCRIPTORS: DESCRIPTORS: ACHING

## 2019-03-12 ASSESSMENT — PAIN DESCRIPTION - FREQUENCY: FREQUENCY: CONTINUOUS

## 2019-03-12 ASSESSMENT — PAIN DESCRIPTION - ONSET: ONSET: ON-GOING

## 2019-03-12 ASSESSMENT — PAIN DESCRIPTION - PAIN TYPE: TYPE: CHRONIC PAIN

## 2019-03-12 ASSESSMENT — PAIN SCALES - GENERAL: PAINLEVEL_OUTOF10: 2

## 2019-03-13 PROBLEM — L08.9 WOUND INFECTION: Status: ACTIVE | Noted: 2019-03-13

## 2019-03-13 PROBLEM — T14.8XXA WOUND INFECTION: Status: ACTIVE | Noted: 2019-03-13

## 2019-03-14 ENCOUNTER — HOSPITAL ENCOUNTER (INPATIENT)
Age: 84
LOS: 7 days | Discharge: HOME HEALTH | DRG: 571 | End: 2019-03-21
Attending: INTERNAL MEDICINE | Admitting: INTERNAL MEDICINE
Payer: MEDICARE

## 2019-03-14 PROBLEM — S31.109A CHRONIC WOUND INFECTION OF ABDOMEN: Status: ACTIVE | Noted: 2019-03-13

## 2019-03-14 LAB
ANION GAP SERPL CALCULATED.3IONS-SCNC: 13 MMOL/L (ref 3–16)
BASOPHILS ABSOLUTE: 0.1 K/UL (ref 0–0.2)
BASOPHILS RELATIVE PERCENT: 0.7 %
BUN BLDV-MCNC: 20 MG/DL (ref 7–20)
C-REACTIVE PROTEIN: 41.4 MG/L (ref 0–5.1)
CALCIUM SERPL-MCNC: 9.2 MG/DL (ref 8.3–10.6)
CHLORIDE BLD-SCNC: 103 MMOL/L (ref 99–110)
CO2: 24 MMOL/L (ref 21–32)
CREAT SERPL-MCNC: 1.2 MG/DL (ref 0.6–1.2)
EOSINOPHILS ABSOLUTE: 0.3 K/UL (ref 0–0.6)
EOSINOPHILS RELATIVE PERCENT: 3.1 %
GFR AFRICAN AMERICAN: 52
GFR NON-AFRICAN AMERICAN: 43
GLUCOSE BLD-MCNC: 118 MG/DL (ref 70–99)
GLUCOSE BLD-MCNC: 128 MG/DL (ref 70–99)
GLUCOSE BLD-MCNC: 223 MG/DL (ref 70–99)
GLUCOSE BLD-MCNC: 39 MG/DL (ref 70–99)
GLUCOSE BLD-MCNC: 80 MG/DL (ref 70–99)
HCT VFR BLD CALC: 30.2 % (ref 36–48)
HEMOGLOBIN: 9.8 G/DL (ref 12–16)
LYMPHOCYTES ABSOLUTE: 2.4 K/UL (ref 1–5.1)
LYMPHOCYTES RELATIVE PERCENT: 28.3 %
MCH RBC QN AUTO: 30.3 PG (ref 26–34)
MCHC RBC AUTO-ENTMCNC: 32.4 G/DL (ref 31–36)
MCV RBC AUTO: 93.3 FL (ref 80–100)
MONOCYTES ABSOLUTE: 0.9 K/UL (ref 0–1.3)
MONOCYTES RELATIVE PERCENT: 11.1 %
NEUTROPHILS ABSOLUTE: 4.9 K/UL (ref 1.7–7.7)
NEUTROPHILS RELATIVE PERCENT: 56.8 %
PDW BLD-RTO: 14.7 % (ref 12.4–15.4)
PERFORMED ON: ABNORMAL
PERFORMED ON: NORMAL
PLATELET # BLD: 426 K/UL (ref 135–450)
PMV BLD AUTO: 10.1 FL (ref 5–10.5)
POTASSIUM REFLEX MAGNESIUM: 4 MMOL/L (ref 3.5–5.1)
RBC # BLD: 3.24 M/UL (ref 4–5.2)
SEDIMENTATION RATE, ERYTHROCYTE: 116 MM/HR (ref 0–30)
SODIUM BLD-SCNC: 140 MMOL/L (ref 136–145)
WBC # BLD: 8.6 K/UL (ref 4–11)

## 2019-03-14 PROCEDURE — 6370000000 HC RX 637 (ALT 250 FOR IP): Performed by: NURSE PRACTITIONER

## 2019-03-14 PROCEDURE — 87206 SMEAR FLUORESCENT/ACID STAI: CPT

## 2019-03-14 PROCEDURE — APPNB30 APP NON BILLABLE TIME 0-30 MINS: Performed by: NURSE PRACTITIONER

## 2019-03-14 PROCEDURE — 2500000003 HC RX 250 WO HCPCS: Performed by: INTERNAL MEDICINE

## 2019-03-14 PROCEDURE — 87040 BLOOD CULTURE FOR BACTERIA: CPT

## 2019-03-14 PROCEDURE — 87205 SMEAR GRAM STAIN: CPT

## 2019-03-14 PROCEDURE — 87070 CULTURE OTHR SPECIMN AEROBIC: CPT

## 2019-03-14 PROCEDURE — 87075 CULTR BACTERIA EXCEPT BLOOD: CPT

## 2019-03-14 PROCEDURE — 2580000003 HC RX 258: Performed by: INTERNAL MEDICINE

## 2019-03-14 PROCEDURE — 2580000003 HC RX 258

## 2019-03-14 PROCEDURE — 1200000000 HC SEMI PRIVATE

## 2019-03-14 PROCEDURE — 99024 POSTOP FOLLOW-UP VISIT: CPT | Performed by: SURGERY

## 2019-03-14 PROCEDURE — 86480 TB TEST CELL IMMUN MEASURE: CPT

## 2019-03-14 PROCEDURE — 87077 CULTURE AEROBIC IDENTIFY: CPT

## 2019-03-14 PROCEDURE — 85652 RBC SED RATE AUTOMATED: CPT

## 2019-03-14 PROCEDURE — 87385 HISTOPLASMA CAPSUL AG IA: CPT

## 2019-03-14 PROCEDURE — 87102 FUNGUS ISOLATION CULTURE: CPT

## 2019-03-14 PROCEDURE — 86698 HISTOPLASMA ANTIBODY: CPT

## 2019-03-14 PROCEDURE — 83036 HEMOGLOBIN GLYCOSYLATED A1C: CPT

## 2019-03-14 PROCEDURE — 99223 1ST HOSP IP/OBS HIGH 75: CPT | Performed by: INTERNAL MEDICINE

## 2019-03-14 PROCEDURE — 86606 ASPERGILLUS ANTIBODY: CPT

## 2019-03-14 PROCEDURE — 6360000002 HC RX W HCPCS: Performed by: INTERNAL MEDICINE

## 2019-03-14 PROCEDURE — 86635 COCCIDIOIDES ANTIBODY: CPT

## 2019-03-14 PROCEDURE — 87186 SC STD MICRODIL/AGAR DIL: CPT

## 2019-03-14 PROCEDURE — 87116 MYCOBACTERIA CULTURE: CPT

## 2019-03-14 PROCEDURE — 80048 BASIC METABOLIC PNL TOTAL CA: CPT

## 2019-03-14 PROCEDURE — 85025 COMPLETE CBC W/AUTO DIFF WBC: CPT

## 2019-03-14 PROCEDURE — 86140 C-REACTIVE PROTEIN: CPT

## 2019-03-14 PROCEDURE — 87015 SPECIMEN INFECT AGNT CONCNTJ: CPT

## 2019-03-14 PROCEDURE — 87641 MR-STAPH DNA AMP PROBE: CPT

## 2019-03-14 PROCEDURE — 6370000000 HC RX 637 (ALT 250 FOR IP): Performed by: INTERNAL MEDICINE

## 2019-03-14 PROCEDURE — 86612 BLASTOMYCES ANTIBODY: CPT

## 2019-03-14 PROCEDURE — 36415 COLL VENOUS BLD VENIPUNCTURE: CPT

## 2019-03-14 RX ORDER — ONDANSETRON 2 MG/ML
4 INJECTION INTRAMUSCULAR; INTRAVENOUS EVERY 6 HOURS PRN
Status: DISCONTINUED | OUTPATIENT
Start: 2019-03-14 | End: 2019-03-21 | Stop reason: HOSPADM

## 2019-03-14 RX ORDER — LINEZOLID 2 MG/ML
600 INJECTION, SOLUTION INTRAVENOUS EVERY 12 HOURS
Status: DISCONTINUED | OUTPATIENT
Start: 2019-03-14 | End: 2019-03-18

## 2019-03-14 RX ORDER — SODIUM CHLORIDE 9 MG/ML
INJECTION, SOLUTION INTRAVENOUS
Status: COMPLETED
Start: 2019-03-14 | End: 2019-03-14

## 2019-03-14 RX ORDER — CLONIDINE HYDROCHLORIDE 0.1 MG/1
0.1 TABLET ORAL 2 TIMES DAILY
Status: DISCONTINUED | OUTPATIENT
Start: 2019-03-14 | End: 2019-03-21 | Stop reason: HOSPADM

## 2019-03-14 RX ORDER — SODIUM CHLORIDE 0.9 % (FLUSH) 0.9 %
10 SYRINGE (ML) INJECTION EVERY 12 HOURS SCHEDULED
Status: DISCONTINUED | OUTPATIENT
Start: 2019-03-14 | End: 2019-03-21 | Stop reason: HOSPADM

## 2019-03-14 RX ORDER — HYDROCODONE BITARTRATE AND ACETAMINOPHEN 5; 325 MG/1; MG/1
1 TABLET ORAL EVERY 6 HOURS PRN
Status: DISCONTINUED | OUTPATIENT
Start: 2019-03-14 | End: 2019-03-14

## 2019-03-14 RX ORDER — NICOTINE POLACRILEX 4 MG
15 LOZENGE BUCCAL PRN
Status: DISCONTINUED | OUTPATIENT
Start: 2019-03-14 | End: 2019-03-21 | Stop reason: HOSPADM

## 2019-03-14 RX ORDER — LISINOPRIL 20 MG/1
20 TABLET ORAL DAILY
Status: DISCONTINUED | OUTPATIENT
Start: 2019-03-14 | End: 2019-03-18

## 2019-03-14 RX ORDER — ACETAMINOPHEN 325 MG/1
650 TABLET ORAL EVERY 4 HOURS PRN
Status: DISCONTINUED | OUTPATIENT
Start: 2019-03-14 | End: 2019-03-21 | Stop reason: HOSPADM

## 2019-03-14 RX ORDER — HYDROCODONE BITARTRATE AND ACETAMINOPHEN 5; 325 MG/1; MG/1
1 TABLET ORAL EVERY 4 HOURS PRN
Status: DISCONTINUED | OUTPATIENT
Start: 2019-03-14 | End: 2019-03-21 | Stop reason: HOSPADM

## 2019-03-14 RX ORDER — LANOLIN ALCOHOL/MO/W.PET/CERES
1000 CREAM (GRAM) TOPICAL
COMMUNITY

## 2019-03-14 RX ORDER — SODIUM HYPOCHLORITE 1.25 MG/ML
SOLUTION TOPICAL 2 TIMES DAILY
Status: DISCONTINUED | OUTPATIENT
Start: 2019-03-14 | End: 2019-03-14

## 2019-03-14 RX ORDER — DEXTROSE MONOHYDRATE 50 MG/ML
100 INJECTION, SOLUTION INTRAVENOUS PRN
Status: DISCONTINUED | OUTPATIENT
Start: 2019-03-14 | End: 2019-03-21 | Stop reason: HOSPADM

## 2019-03-14 RX ORDER — AMLODIPINE BESYLATE 5 MG/1
10 TABLET ORAL DAILY
Status: DISCONTINUED | OUTPATIENT
Start: 2019-03-15 | End: 2019-03-18

## 2019-03-14 RX ORDER — SODIUM HYPOCHLORITE 1.25 MG/ML
SOLUTION TOPICAL DAILY
Status: DISCONTINUED | OUTPATIENT
Start: 2019-03-14 | End: 2019-03-14

## 2019-03-14 RX ORDER — SIMVASTATIN 10 MG
20 TABLET ORAL NIGHTLY
Status: DISCONTINUED | OUTPATIENT
Start: 2019-03-14 | End: 2019-03-21 | Stop reason: HOSPADM

## 2019-03-14 RX ORDER — ASPIRIN 81 MG/1
81 TABLET ORAL DAILY
Status: DISCONTINUED | OUTPATIENT
Start: 2019-03-15 | End: 2019-03-21 | Stop reason: HOSPADM

## 2019-03-14 RX ORDER — HYDROCODONE BITARTRATE AND ACETAMINOPHEN 5; 325 MG/1; MG/1
2 TABLET ORAL EVERY 4 HOURS PRN
Status: DISCONTINUED | OUTPATIENT
Start: 2019-03-14 | End: 2019-03-21 | Stop reason: HOSPADM

## 2019-03-14 RX ORDER — SODIUM CHLORIDE 0.9 % (FLUSH) 0.9 %
10 SYRINGE (ML) INJECTION PRN
Status: DISCONTINUED | OUTPATIENT
Start: 2019-03-14 | End: 2019-03-21 | Stop reason: HOSPADM

## 2019-03-14 RX ORDER — DEXTROSE MONOHYDRATE 25 G/50ML
12.5 INJECTION, SOLUTION INTRAVENOUS PRN
Status: DISCONTINUED | OUTPATIENT
Start: 2019-03-14 | End: 2019-03-21 | Stop reason: HOSPADM

## 2019-03-14 RX ADMIN — HYDROCODONE BITARTRATE AND ACETAMINOPHEN 2 TABLET: 5; 325 TABLET ORAL at 22:17

## 2019-03-14 RX ADMIN — HYDROCODONE BITARTRATE AND ACETAMINOPHEN 1 TABLET: 5; 325 TABLET ORAL at 13:23

## 2019-03-14 RX ADMIN — SODIUM CHLORIDE 250 ML: 9 INJECTION, SOLUTION INTRAVENOUS at 14:45

## 2019-03-14 RX ADMIN — HYDROCODONE BITARTRATE AND ACETAMINOPHEN 1 TABLET: 5; 325 TABLET ORAL at 17:58

## 2019-03-14 RX ADMIN — LISINOPRIL 20 MG: 20 TABLET ORAL at 13:27

## 2019-03-14 RX ADMIN — INSULIN LISPRO 2 UNITS: 100 INJECTION, SOLUTION INTRAVENOUS; SUBCUTANEOUS at 17:13

## 2019-03-14 RX ADMIN — TAZOBACTAM SODIUM AND PIPERACILLIN SODIUM 3.38 G: 375; 3 INJECTION, SOLUTION INTRAVENOUS at 14:42

## 2019-03-14 RX ADMIN — LINEZOLID 600 MG: 600 INJECTION, SOLUTION INTRAVENOUS at 14:48

## 2019-03-14 RX ADMIN — Medication 10 ML: at 14:49

## 2019-03-14 ASSESSMENT — PAIN SCALES - GENERAL
PAINLEVEL_OUTOF10: 0
PAINLEVEL_OUTOF10: 9
PAINLEVEL_OUTOF10: 0
PAINLEVEL_OUTOF10: 6
PAINLEVEL_OUTOF10: 8
PAINLEVEL_OUTOF10: 7

## 2019-03-14 ASSESSMENT — PAIN DESCRIPTION - PAIN TYPE
TYPE: ACUTE PAIN;CHRONIC PAIN
TYPE: ACUTE PAIN;CHRONIC PAIN
TYPE: ACUTE PAIN

## 2019-03-14 ASSESSMENT — ENCOUNTER SYMPTOMS
FACIAL SWELLING: 0
SHORTNESS OF BREATH: 0
COLOR CHANGE: 0
APNEA: 0
COUGH: 0
BLOOD IN STOOL: 0
CHOKING: 0
STRIDOR: 0
EYE REDNESS: 0
PHOTOPHOBIA: 0
DIARRHEA: 0
EYE DISCHARGE: 0
TROUBLE SWALLOWING: 0
CHEST TIGHTNESS: 0
NAUSEA: 0
RHINORRHEA: 0
ABDOMINAL PAIN: 0

## 2019-03-14 ASSESSMENT — PAIN DESCRIPTION - PROGRESSION: CLINICAL_PROGRESSION: NOT CHANGED

## 2019-03-14 ASSESSMENT — PAIN DESCRIPTION - LOCATION
LOCATION: ABDOMEN

## 2019-03-14 ASSESSMENT — PAIN DESCRIPTION - ONSET: ONSET: ON-GOING

## 2019-03-14 ASSESSMENT — PAIN DESCRIPTION - FREQUENCY: FREQUENCY: CONTINUOUS

## 2019-03-14 ASSESSMENT — PAIN DESCRIPTION - ORIENTATION
ORIENTATION: RIGHT;LOWER
ORIENTATION: RIGHT
ORIENTATION: RIGHT;LOWER

## 2019-03-15 PROBLEM — Z71.89 DIABETES EDUCATION, ENCOUNTER FOR: Status: ACTIVE | Noted: 2018-11-23

## 2019-03-15 LAB
ESTIMATED AVERAGE GLUCOSE: 128.4 MG/DL
GLUCOSE BLD-MCNC: 191 MG/DL (ref 70–99)
GLUCOSE BLD-MCNC: 212 MG/DL (ref 70–99)
GLUCOSE BLD-MCNC: 303 MG/DL (ref 70–99)
GLUCOSE BLD-MCNC: 76 MG/DL (ref 70–99)
HBA1C MFR BLD: 6.1 %
HCT VFR BLD CALC: 28.1 % (ref 36–48)
HEMOGLOBIN: 9.2 G/DL (ref 12–16)
INR BLD: 2.26 (ref 0.86–1.14)
MCH RBC QN AUTO: 30.4 PG (ref 26–34)
MCHC RBC AUTO-ENTMCNC: 32.7 G/DL (ref 31–36)
MCV RBC AUTO: 93 FL (ref 80–100)
MRSA SCREEN RT-PCR: ABNORMAL
MRSA SCREEN RT-PCR: ABNORMAL
ORGANISM: ABNORMAL
PDW BLD-RTO: 14.9 % (ref 12.4–15.4)
PERFORMED ON: ABNORMAL
PERFORMED ON: NORMAL
PLATELET # BLD: 372 K/UL (ref 135–450)
PMV BLD AUTO: 9.6 FL (ref 5–10.5)
PROTHROMBIN TIME: 25.8 SEC (ref 9.8–13)
RBC # BLD: 3.02 M/UL (ref 4–5.2)
WBC # BLD: 7.8 K/UL (ref 4–11)

## 2019-03-15 PROCEDURE — 6370000000 HC RX 637 (ALT 250 FOR IP): Performed by: INTERNAL MEDICINE

## 2019-03-15 PROCEDURE — APPNB30 APP NON BILLABLE TIME 0-30 MINS: Performed by: NURSE PRACTITIONER

## 2019-03-15 PROCEDURE — 85610 PROTHROMBIN TIME: CPT

## 2019-03-15 PROCEDURE — 6370000000 HC RX 637 (ALT 250 FOR IP): Performed by: PHYSICIAN ASSISTANT

## 2019-03-15 PROCEDURE — 85027 COMPLETE CBC AUTOMATED: CPT

## 2019-03-15 PROCEDURE — 1200000000 HC SEMI PRIVATE

## 2019-03-15 PROCEDURE — 6360000002 HC RX W HCPCS: Performed by: INTERNAL MEDICINE

## 2019-03-15 PROCEDURE — 99024 POSTOP FOLLOW-UP VISIT: CPT | Performed by: SURGERY

## 2019-03-15 PROCEDURE — 2580000003 HC RX 258: Performed by: INTERNAL MEDICINE

## 2019-03-15 PROCEDURE — 36415 COLL VENOUS BLD VENIPUNCTURE: CPT

## 2019-03-15 PROCEDURE — 2500000003 HC RX 250 WO HCPCS: Performed by: INTERNAL MEDICINE

## 2019-03-15 PROCEDURE — 99233 SBSQ HOSP IP/OBS HIGH 50: CPT | Performed by: INTERNAL MEDICINE

## 2019-03-15 PROCEDURE — 6370000000 HC RX 637 (ALT 250 FOR IP): Performed by: NURSE PRACTITIONER

## 2019-03-15 RX ADMIN — CLONIDINE HYDROCHLORIDE 0.1 MG: 0.1 TABLET ORAL at 10:05

## 2019-03-15 RX ADMIN — CLONIDINE HYDROCHLORIDE 0.1 MG: 0.1 TABLET ORAL at 00:40

## 2019-03-15 RX ADMIN — HYDROCODONE BITARTRATE AND ACETAMINOPHEN 2 TABLET: 5; 325 TABLET ORAL at 23:11

## 2019-03-15 RX ADMIN — LISINOPRIL 20 MG: 20 TABLET ORAL at 10:05

## 2019-03-15 RX ADMIN — AMLODIPINE BESYLATE 10 MG: 5 TABLET ORAL at 10:05

## 2019-03-15 RX ADMIN — HYDROCODONE BITARTRATE AND ACETAMINOPHEN 1 TABLET: 5; 325 TABLET ORAL at 02:25

## 2019-03-15 RX ADMIN — INSULIN LISPRO 2 UNITS: 100 INJECTION, SOLUTION INTRAVENOUS; SUBCUTANEOUS at 22:25

## 2019-03-15 RX ADMIN — HYDROCODONE BITARTRATE AND ACETAMINOPHEN 1 TABLET: 5; 325 TABLET ORAL at 06:48

## 2019-03-15 RX ADMIN — Medication 10 ML: at 22:25

## 2019-03-15 RX ADMIN — HYDROCODONE BITARTRATE AND ACETAMINOPHEN 1 TABLET: 5; 325 TABLET ORAL at 11:35

## 2019-03-15 RX ADMIN — INSULIN LISPRO 8 UNITS: 100 INJECTION, SOLUTION INTRAVENOUS; SUBCUTANEOUS at 17:04

## 2019-03-15 RX ADMIN — HYDROCODONE BITARTRATE AND ACETAMINOPHEN 1 TABLET: 5; 325 TABLET ORAL at 18:50

## 2019-03-15 RX ADMIN — TAZOBACTAM SODIUM AND PIPERACILLIN SODIUM 3.38 G: 375; 3 INJECTION, SOLUTION INTRAVENOUS at 00:41

## 2019-03-15 RX ADMIN — SIMVASTATIN 20 MG: 10 TABLET, FILM COATED ORAL at 00:40

## 2019-03-15 RX ADMIN — TAZOBACTAM SODIUM AND PIPERACILLIN SODIUM 3.38 G: 375; 3 INJECTION, SOLUTION INTRAVENOUS at 17:06

## 2019-03-15 RX ADMIN — LINEZOLID 600 MG: 600 INJECTION, SOLUTION INTRAVENOUS at 16:58

## 2019-03-15 RX ADMIN — CLONIDINE HYDROCHLORIDE 0.1 MG: 0.1 TABLET ORAL at 22:23

## 2019-03-15 RX ADMIN — Medication 10 ML: at 00:42

## 2019-03-15 RX ADMIN — SIMVASTATIN 20 MG: 10 TABLET, FILM COATED ORAL at 22:24

## 2019-03-15 RX ADMIN — HYOSCYAMINE SULFATE: 16 SOLUTION at 23:13

## 2019-03-15 RX ADMIN — ASPIRIN 81 MG: 81 TABLET, COATED ORAL at 10:06

## 2019-03-15 RX ADMIN — TAZOBACTAM SODIUM AND PIPERACILLIN SODIUM 3.38 G: 375; 3 INJECTION, SOLUTION INTRAVENOUS at 10:05

## 2019-03-15 RX ADMIN — LINEZOLID 600 MG: 600 INJECTION, SOLUTION INTRAVENOUS at 03:31

## 2019-03-15 RX ADMIN — HYOSCYAMINE SULFATE: 16 SOLUTION at 14:04

## 2019-03-15 ASSESSMENT — PAIN SCALES - GENERAL
PAINLEVEL_OUTOF10: 5
PAINLEVEL_OUTOF10: 6
PAINLEVEL_OUTOF10: 7
PAINLEVEL_OUTOF10: 5
PAINLEVEL_OUTOF10: 8
PAINLEVEL_OUTOF10: 4
PAINLEVEL_OUTOF10: 0
PAINLEVEL_OUTOF10: 6

## 2019-03-15 ASSESSMENT — ENCOUNTER SYMPTOMS
APNEA: 0
ABDOMINAL PAIN: 0
TROUBLE SWALLOWING: 0
EYE DISCHARGE: 0
COUGH: 0
STRIDOR: 0
COLOR CHANGE: 0
FACIAL SWELLING: 0
PHOTOPHOBIA: 0
RHINORRHEA: 0
DIARRHEA: 0
CHEST TIGHTNESS: 0
SHORTNESS OF BREATH: 0
NAUSEA: 0
EYE REDNESS: 0
CHOKING: 0
BLOOD IN STOOL: 0

## 2019-03-15 ASSESSMENT — PAIN DESCRIPTION - ORIENTATION: ORIENTATION: RIGHT;LOWER

## 2019-03-15 ASSESSMENT — PAIN DESCRIPTION - LOCATION: LOCATION: ABDOMEN

## 2019-03-16 ENCOUNTER — APPOINTMENT (OUTPATIENT)
Dept: CT IMAGING | Age: 84
DRG: 571 | End: 2019-03-16
Attending: INTERNAL MEDICINE
Payer: MEDICARE

## 2019-03-16 LAB
ANION GAP SERPL CALCULATED.3IONS-SCNC: 13 MMOL/L (ref 3–16)
BUN BLDV-MCNC: 17 MG/DL (ref 7–20)
CALCIUM SERPL-MCNC: 7.9 MG/DL (ref 8.3–10.6)
CHLORIDE BLD-SCNC: 102 MMOL/L (ref 99–110)
CO2: 22 MMOL/L (ref 21–32)
CREAT SERPL-MCNC: 1.2 MG/DL (ref 0.6–1.2)
GFR AFRICAN AMERICAN: 52
GFR NON-AFRICAN AMERICAN: 43
GLUCOSE BLD-MCNC: 163 MG/DL (ref 70–99)
GLUCOSE BLD-MCNC: 247 MG/DL (ref 70–99)
GLUCOSE BLD-MCNC: 257 MG/DL (ref 70–99)
GLUCOSE BLD-MCNC: 279 MG/DL (ref 70–99)
HISTOPLASMA ANTIGEN URINE INTERP: NOT DETECTED
HISTOPLASMA ANTIGEN URINE: NOT DETECTED NG/ML
INR BLD: 1.62 (ref 0.86–1.14)
PERFORMED ON: ABNORMAL
POTASSIUM SERPL-SCNC: 4.3 MMOL/L (ref 3.5–5.1)
PROTHROMBIN TIME: 18.5 SEC (ref 9.8–13)
SODIUM BLD-SCNC: 137 MMOL/L (ref 136–145)

## 2019-03-16 PROCEDURE — 74177 CT ABD & PELVIS W/CONTRAST: CPT

## 2019-03-16 PROCEDURE — 85610 PROTHROMBIN TIME: CPT

## 2019-03-16 PROCEDURE — 6370000000 HC RX 637 (ALT 250 FOR IP): Performed by: PHYSICIAN ASSISTANT

## 2019-03-16 PROCEDURE — 6370000000 HC RX 637 (ALT 250 FOR IP): Performed by: NURSE PRACTITIONER

## 2019-03-16 PROCEDURE — 6360000004 HC RX CONTRAST MEDICATION: Performed by: INTERNAL MEDICINE

## 2019-03-16 PROCEDURE — 1200000000 HC SEMI PRIVATE

## 2019-03-16 PROCEDURE — 6370000000 HC RX 637 (ALT 250 FOR IP): Performed by: INTERNAL MEDICINE

## 2019-03-16 PROCEDURE — 80048 BASIC METABOLIC PNL TOTAL CA: CPT

## 2019-03-16 PROCEDURE — 2500000003 HC RX 250 WO HCPCS: Performed by: INTERNAL MEDICINE

## 2019-03-16 PROCEDURE — 6360000004 HC RX CONTRAST MEDICATION

## 2019-03-16 PROCEDURE — 6360000002 HC RX W HCPCS: Performed by: INTERNAL MEDICINE

## 2019-03-16 PROCEDURE — 99231 SBSQ HOSP IP/OBS SF/LOW 25: CPT | Performed by: SURGERY

## 2019-03-16 PROCEDURE — 36415 COLL VENOUS BLD VENIPUNCTURE: CPT

## 2019-03-16 RX ORDER — WARFARIN SODIUM 2.5 MG/1
2.5 TABLET ORAL DAILY
Status: DISCONTINUED | OUTPATIENT
Start: 2019-03-16 | End: 2019-03-19

## 2019-03-16 RX ADMIN — CLONIDINE HYDROCHLORIDE 0.1 MG: 0.1 TABLET ORAL at 10:20

## 2019-03-16 RX ADMIN — TAZOBACTAM SODIUM AND PIPERACILLIN SODIUM 3.38 G: 375; 3 INJECTION, SOLUTION INTRAVENOUS at 12:35

## 2019-03-16 RX ADMIN — IOPAMIDOL 75 ML: 755 INJECTION, SOLUTION INTRAVENOUS at 13:21

## 2019-03-16 RX ADMIN — HYOSCYAMINE SULFATE: 16 SOLUTION at 10:20

## 2019-03-16 RX ADMIN — TAZOBACTAM SODIUM AND PIPERACILLIN SODIUM 3.38 G: 375; 3 INJECTION, SOLUTION INTRAVENOUS at 02:55

## 2019-03-16 RX ADMIN — HYDROCODONE BITARTRATE AND ACETAMINOPHEN 2 TABLET: 5; 325 TABLET ORAL at 23:15

## 2019-03-16 RX ADMIN — INSULIN GLARGINE 10 UNITS: 100 INJECTION, SOLUTION SUBCUTANEOUS at 21:15

## 2019-03-16 RX ADMIN — AMLODIPINE BESYLATE 10 MG: 5 TABLET ORAL at 10:20

## 2019-03-16 RX ADMIN — WARFARIN SODIUM 2.5 MG: 2.5 TABLET ORAL at 18:00

## 2019-03-16 RX ADMIN — ASPIRIN 81 MG: 81 TABLET, COATED ORAL at 10:20

## 2019-03-16 RX ADMIN — LISINOPRIL 20 MG: 20 TABLET ORAL at 10:20

## 2019-03-16 RX ADMIN — HYDROCODONE BITARTRATE AND ACETAMINOPHEN 1 TABLET: 5; 325 TABLET ORAL at 18:03

## 2019-03-16 RX ADMIN — INSULIN LISPRO 2 UNITS: 100 INJECTION, SOLUTION INTRAVENOUS; SUBCUTANEOUS at 10:26

## 2019-03-16 RX ADMIN — LINEZOLID 600 MG: 600 INJECTION, SOLUTION INTRAVENOUS at 01:50

## 2019-03-16 RX ADMIN — HYDROCODONE BITARTRATE AND ACETAMINOPHEN 2 TABLET: 5; 325 TABLET ORAL at 04:12

## 2019-03-16 RX ADMIN — HYDROCODONE BITARTRATE AND ACETAMINOPHEN 2 TABLET: 5; 325 TABLET ORAL at 14:01

## 2019-03-16 RX ADMIN — SIMVASTATIN 20 MG: 10 TABLET, FILM COATED ORAL at 21:11

## 2019-03-16 RX ADMIN — TAZOBACTAM SODIUM AND PIPERACILLIN SODIUM 3.38 G: 375; 3 INJECTION, SOLUTION INTRAVENOUS at 21:11

## 2019-03-16 RX ADMIN — INSULIN LISPRO 3 UNITS: 100 INJECTION, SOLUTION INTRAVENOUS; SUBCUTANEOUS at 21:16

## 2019-03-16 RX ADMIN — INSULIN LISPRO 6 UNITS: 100 INJECTION, SOLUTION INTRAVENOUS; SUBCUTANEOUS at 12:38

## 2019-03-16 RX ADMIN — CLONIDINE HYDROCHLORIDE 0.1 MG: 0.1 TABLET ORAL at 21:11

## 2019-03-16 RX ADMIN — HYOSCYAMINE SULFATE: 16 SOLUTION at 23:16

## 2019-03-16 RX ADMIN — HYDROCODONE BITARTRATE AND ACETAMINOPHEN 1 TABLET: 5; 325 TABLET ORAL at 09:29

## 2019-03-16 RX ADMIN — IOHEXOL 50 ML: 240 INJECTION, SOLUTION INTRATHECAL; INTRAVASCULAR; INTRAVENOUS; ORAL at 10:22

## 2019-03-16 RX ADMIN — INSULIN LISPRO 4 UNITS: 100 INJECTION, SOLUTION INTRAVENOUS; SUBCUTANEOUS at 18:00

## 2019-03-16 RX ADMIN — LINEZOLID 600 MG: 600 INJECTION, SOLUTION INTRAVENOUS at 16:11

## 2019-03-16 ASSESSMENT — PAIN DESCRIPTION - LOCATION
LOCATION: ABDOMEN

## 2019-03-16 ASSESSMENT — PAIN DESCRIPTION - ORIENTATION
ORIENTATION: RIGHT;LOWER

## 2019-03-16 ASSESSMENT — PAIN SCALES - GENERAL
PAINLEVEL_OUTOF10: 4
PAINLEVEL_OUTOF10: 0
PAINLEVEL_OUTOF10: 7
PAINLEVEL_OUTOF10: 8
PAINLEVEL_OUTOF10: 4
PAINLEVEL_OUTOF10: 6
PAINLEVEL_OUTOF10: 5
PAINLEVEL_OUTOF10: 0
PAINLEVEL_OUTOF10: 6
PAINLEVEL_OUTOF10: 8

## 2019-03-16 ASSESSMENT — PAIN DESCRIPTION - PAIN TYPE
TYPE: CHRONIC PAIN

## 2019-03-17 LAB
ANION GAP SERPL CALCULATED.3IONS-SCNC: 14 MMOL/L (ref 3–16)
BASOPHILS ABSOLUTE: 0.1 K/UL (ref 0–0.2)
BASOPHILS RELATIVE PERCENT: 0.8 %
BUN BLDV-MCNC: 18 MG/DL (ref 7–20)
CALCIUM SERPL-MCNC: 8.3 MG/DL (ref 8.3–10.6)
CHLORIDE BLD-SCNC: 101 MMOL/L (ref 99–110)
CO2: 22 MMOL/L (ref 21–32)
CREAT SERPL-MCNC: 1.2 MG/DL (ref 0.6–1.2)
EOSINOPHILS ABSOLUTE: 0.5 K/UL (ref 0–0.6)
EOSINOPHILS RELATIVE PERCENT: 7.6 %
GFR AFRICAN AMERICAN: 52
GFR NON-AFRICAN AMERICAN: 43
GLUCOSE BLD-MCNC: 139 MG/DL (ref 70–99)
GLUCOSE BLD-MCNC: 169 MG/DL (ref 70–99)
GLUCOSE BLD-MCNC: 181 MG/DL (ref 70–99)
GLUCOSE BLD-MCNC: 296 MG/DL (ref 70–99)
GLUCOSE BLD-MCNC: 301 MG/DL (ref 70–99)
HCT VFR BLD CALC: 29.3 % (ref 36–48)
HEMOGLOBIN: 9.4 G/DL (ref 12–16)
INR BLD: 1.48 (ref 0.86–1.14)
LYMPHOCYTES ABSOLUTE: 1.6 K/UL (ref 1–5.1)
LYMPHOCYTES RELATIVE PERCENT: 22.1 %
MCH RBC QN AUTO: 30.4 PG (ref 26–34)
MCHC RBC AUTO-ENTMCNC: 32 G/DL (ref 31–36)
MCV RBC AUTO: 95.1 FL (ref 80–100)
MONOCYTES ABSOLUTE: 0.8 K/UL (ref 0–1.3)
MONOCYTES RELATIVE PERCENT: 11 %
NEUTROPHILS ABSOLUTE: 4.1 K/UL (ref 1.7–7.7)
NEUTROPHILS RELATIVE PERCENT: 58.5 %
PDW BLD-RTO: 14.8 % (ref 12.4–15.4)
PERFORMED ON: ABNORMAL
PLATELET # BLD: 369 K/UL (ref 135–450)
PMV BLD AUTO: 9.3 FL (ref 5–10.5)
POTASSIUM SERPL-SCNC: 4.3 MMOL/L (ref 3.5–5.1)
PROTHROMBIN TIME: 16.9 SEC (ref 9.8–13)
QUANTI TB GOLD PLUS: NEGATIVE
QUANTI TB1 MINUS NIL: 0.03 IU/ML (ref 0–0.34)
QUANTI TB2 MINUS NIL: 0.06 IU/ML (ref 0–0.34)
QUANTIFERON MITOGEN: 9.72 IU/ML
QUANTIFERON NIL: 0.02 IU/ML
RBC # BLD: 3.08 M/UL (ref 4–5.2)
SODIUM BLD-SCNC: 137 MMOL/L (ref 136–145)
WBC # BLD: 7.1 K/UL (ref 4–11)

## 2019-03-17 PROCEDURE — 6370000000 HC RX 637 (ALT 250 FOR IP): Performed by: NURSE PRACTITIONER

## 2019-03-17 PROCEDURE — 6360000002 HC RX W HCPCS: Performed by: INTERNAL MEDICINE

## 2019-03-17 PROCEDURE — 6370000000 HC RX 637 (ALT 250 FOR IP): Performed by: PHYSICIAN ASSISTANT

## 2019-03-17 PROCEDURE — 2500000003 HC RX 250 WO HCPCS: Performed by: INTERNAL MEDICINE

## 2019-03-17 PROCEDURE — 36415 COLL VENOUS BLD VENIPUNCTURE: CPT

## 2019-03-17 PROCEDURE — 6360000002 HC RX W HCPCS: Performed by: PHYSICIAN ASSISTANT

## 2019-03-17 PROCEDURE — 85610 PROTHROMBIN TIME: CPT

## 2019-03-17 PROCEDURE — 6370000000 HC RX 637 (ALT 250 FOR IP): Performed by: INTERNAL MEDICINE

## 2019-03-17 PROCEDURE — 1200000000 HC SEMI PRIVATE

## 2019-03-17 PROCEDURE — 85025 COMPLETE CBC W/AUTO DIFF WBC: CPT

## 2019-03-17 PROCEDURE — 80048 BASIC METABOLIC PNL TOTAL CA: CPT

## 2019-03-17 RX ORDER — KETOROLAC TROMETHAMINE 15 MG/ML
15 INJECTION, SOLUTION INTRAMUSCULAR; INTRAVENOUS EVERY 6 HOURS PRN
Status: DISCONTINUED | OUTPATIENT
Start: 2019-03-17 | End: 2019-03-18

## 2019-03-17 RX ORDER — SODIUM CHLORIDE 9 MG/ML
INJECTION, SOLUTION INTRAVENOUS
Status: DISPENSED
Start: 2019-03-17 | End: 2019-03-17

## 2019-03-17 RX ADMIN — INSULIN LISPRO 8 UNITS: 100 INJECTION, SOLUTION INTRAVENOUS; SUBCUTANEOUS at 17:59

## 2019-03-17 RX ADMIN — KETOROLAC TROMETHAMINE 15 MG: 15 INJECTION, SOLUTION INTRAMUSCULAR; INTRAVENOUS at 23:29

## 2019-03-17 RX ADMIN — INSULIN LISPRO 2 UNITS: 100 INJECTION, SOLUTION INTRAVENOUS; SUBCUTANEOUS at 10:23

## 2019-03-17 RX ADMIN — CLONIDINE HYDROCHLORIDE 0.1 MG: 0.1 TABLET ORAL at 10:23

## 2019-03-17 RX ADMIN — TAZOBACTAM SODIUM AND PIPERACILLIN SODIUM 3.38 G: 375; 3 INJECTION, SOLUTION INTRAVENOUS at 11:33

## 2019-03-17 RX ADMIN — HYDROCODONE BITARTRATE AND ACETAMINOPHEN 1 TABLET: 5; 325 TABLET ORAL at 03:17

## 2019-03-17 RX ADMIN — INSULIN LISPRO 6 UNITS: 100 INJECTION, SOLUTION INTRAVENOUS; SUBCUTANEOUS at 11:51

## 2019-03-17 RX ADMIN — INSULIN LISPRO 5 UNITS: 100 INJECTION, SOLUTION INTRAVENOUS; SUBCUTANEOUS at 18:00

## 2019-03-17 RX ADMIN — WARFARIN SODIUM 2.5 MG: 2.5 TABLET ORAL at 16:50

## 2019-03-17 RX ADMIN — LINEZOLID 600 MG: 600 INJECTION, SOLUTION INTRAVENOUS at 02:30

## 2019-03-17 RX ADMIN — HYDROCODONE BITARTRATE AND ACETAMINOPHEN 2 TABLET: 5; 325 TABLET ORAL at 11:33

## 2019-03-17 RX ADMIN — KETOROLAC TROMETHAMINE 15 MG: 15 INJECTION, SOLUTION INTRAMUSCULAR; INTRAVENOUS at 16:50

## 2019-03-17 RX ADMIN — LINEZOLID 600 MG: 600 INJECTION, SOLUTION INTRAVENOUS at 15:10

## 2019-03-17 RX ADMIN — AMLODIPINE BESYLATE 10 MG: 5 TABLET ORAL at 10:23

## 2019-03-17 RX ADMIN — HYOSCYAMINE SULFATE: 16 SOLUTION at 22:58

## 2019-03-17 RX ADMIN — ASPIRIN 81 MG: 81 TABLET, COATED ORAL at 10:23

## 2019-03-17 RX ADMIN — LISINOPRIL 20 MG: 20 TABLET ORAL at 10:23

## 2019-03-17 RX ADMIN — TAZOBACTAM SODIUM AND PIPERACILLIN SODIUM 3.38 G: 375; 3 INJECTION, SOLUTION INTRAVENOUS at 17:59

## 2019-03-17 RX ADMIN — INSULIN LISPRO 5 UNITS: 100 INJECTION, SOLUTION INTRAVENOUS; SUBCUTANEOUS at 11:51

## 2019-03-17 RX ADMIN — CLONIDINE HYDROCHLORIDE 0.1 MG: 0.1 TABLET ORAL at 22:53

## 2019-03-17 RX ADMIN — HYDROCODONE BITARTRATE AND ACETAMINOPHEN 2 TABLET: 5; 325 TABLET ORAL at 07:23

## 2019-03-17 RX ADMIN — HYOSCYAMINE SULFATE: 16 SOLUTION at 10:23

## 2019-03-17 RX ADMIN — TAZOBACTAM SODIUM AND PIPERACILLIN SODIUM 3.38 G: 375; 3 INJECTION, SOLUTION INTRAVENOUS at 03:32

## 2019-03-17 RX ADMIN — SIMVASTATIN 20 MG: 10 TABLET, FILM COATED ORAL at 22:53

## 2019-03-17 ASSESSMENT — PAIN DESCRIPTION - LOCATION
LOCATION: ABDOMEN
LOCATION: ABDOMEN

## 2019-03-17 ASSESSMENT — PAIN DESCRIPTION - PAIN TYPE
TYPE: CHRONIC PAIN
TYPE: CHRONIC PAIN

## 2019-03-17 ASSESSMENT — PAIN SCALES - GENERAL
PAINLEVEL_OUTOF10: 8
PAINLEVEL_OUTOF10: 9
PAINLEVEL_OUTOF10: 5
PAINLEVEL_OUTOF10: 0
PAINLEVEL_OUTOF10: 8
PAINLEVEL_OUTOF10: 5

## 2019-03-17 ASSESSMENT — PAIN DESCRIPTION - ORIENTATION
ORIENTATION: RIGHT;LOWER
ORIENTATION: RIGHT;LOWER

## 2019-03-18 LAB
ALBUMIN SERPL-MCNC: 2.9 G/DL (ref 3.4–5)
ANION GAP SERPL CALCULATED.3IONS-SCNC: 12 MMOL/L (ref 3–16)
BASOPHILS ABSOLUTE: 0.1 K/UL (ref 0–0.2)
BASOPHILS RELATIVE PERCENT: 0.8 %
BUN BLDV-MCNC: 23 MG/DL (ref 7–20)
CALCIUM SERPL-MCNC: 8.2 MG/DL (ref 8.3–10.6)
CHLORIDE BLD-SCNC: 101 MMOL/L (ref 99–110)
CO2: 23 MMOL/L (ref 21–32)
CREAT SERPL-MCNC: 1.5 MG/DL (ref 0.6–1.2)
EOSINOPHILS ABSOLUTE: 0.6 K/UL (ref 0–0.6)
EOSINOPHILS RELATIVE PERCENT: 7.7 %
GFR AFRICAN AMERICAN: 40
GFR NON-AFRICAN AMERICAN: 33
GLUCOSE BLD-MCNC: 149 MG/DL (ref 70–99)
GLUCOSE BLD-MCNC: 191 MG/DL (ref 70–99)
GLUCOSE BLD-MCNC: 265 MG/DL (ref 70–99)
GLUCOSE BLD-MCNC: 280 MG/DL (ref 70–99)
GLUCOSE BLD-MCNC: 305 MG/DL (ref 70–99)
HCT VFR BLD CALC: 29.5 % (ref 36–48)
HEMOGLOBIN: 9.3 G/DL (ref 12–16)
INR BLD: 1.53 (ref 0.86–1.14)
LYMPHOCYTES ABSOLUTE: 1.1 K/UL (ref 1–5.1)
LYMPHOCYTES RELATIVE PERCENT: 14.9 %
MCH RBC QN AUTO: 29.3 PG (ref 26–34)
MCHC RBC AUTO-ENTMCNC: 31.4 G/DL (ref 31–36)
MCV RBC AUTO: 93.2 FL (ref 80–100)
MONOCYTES ABSOLUTE: 0.7 K/UL (ref 0–1.3)
MONOCYTES RELATIVE PERCENT: 9.1 %
NEUTROPHILS ABSOLUTE: 4.9 K/UL (ref 1.7–7.7)
NEUTROPHILS RELATIVE PERCENT: 67.5 %
PDW BLD-RTO: 14.7 % (ref 12.4–15.4)
PERFORMED ON: ABNORMAL
PHOSPHORUS: 4 MG/DL (ref 2.5–4.9)
PLATELET # BLD: 371 K/UL (ref 135–450)
PMV BLD AUTO: 9.3 FL (ref 5–10.5)
POTASSIUM SERPL-SCNC: 5.5 MMOL/L (ref 3.5–5.1)
PROTHROMBIN TIME: 17.4 SEC (ref 9.8–13)
RBC # BLD: 3.17 M/UL (ref 4–5.2)
SODIUM BLD-SCNC: 136 MMOL/L (ref 136–145)
WBC # BLD: 7.2 K/UL (ref 4–11)

## 2019-03-18 PROCEDURE — 85610 PROTHROMBIN TIME: CPT

## 2019-03-18 PROCEDURE — 80069 RENAL FUNCTION PANEL: CPT

## 2019-03-18 PROCEDURE — 6370000000 HC RX 637 (ALT 250 FOR IP): Performed by: INTERNAL MEDICINE

## 2019-03-18 PROCEDURE — APPSS15 APP SPLIT SHARED TIME 0-15 MINUTES: Performed by: NURSE PRACTITIONER

## 2019-03-18 PROCEDURE — 6370000000 HC RX 637 (ALT 250 FOR IP): Performed by: NURSE PRACTITIONER

## 2019-03-18 PROCEDURE — 82570 ASSAY OF URINE CREATININE: CPT

## 2019-03-18 PROCEDURE — 2580000003 HC RX 258: Performed by: INTERNAL MEDICINE

## 2019-03-18 PROCEDURE — 84300 ASSAY OF URINE SODIUM: CPT

## 2019-03-18 PROCEDURE — 1200000000 HC SEMI PRIVATE

## 2019-03-18 PROCEDURE — 99024 POSTOP FOLLOW-UP VISIT: CPT | Performed by: SURGERY

## 2019-03-18 PROCEDURE — 85025 COMPLETE CBC W/AUTO DIFF WBC: CPT

## 2019-03-18 PROCEDURE — 99233 SBSQ HOSP IP/OBS HIGH 50: CPT | Performed by: INTERNAL MEDICINE

## 2019-03-18 PROCEDURE — 83935 ASSAY OF URINE OSMOLALITY: CPT

## 2019-03-18 PROCEDURE — APPNB30 APP NON BILLABLE TIME 0-30 MINS: Performed by: NURSE PRACTITIONER

## 2019-03-18 PROCEDURE — 36415 COLL VENOUS BLD VENIPUNCTURE: CPT

## 2019-03-18 PROCEDURE — 2500000003 HC RX 250 WO HCPCS: Performed by: INTERNAL MEDICINE

## 2019-03-18 PROCEDURE — 6360000002 HC RX W HCPCS: Performed by: INTERNAL MEDICINE

## 2019-03-18 RX ORDER — AMLODIPINE BESYLATE 5 MG/1
5 TABLET ORAL DAILY
Status: DISCONTINUED | OUTPATIENT
Start: 2019-03-19 | End: 2019-03-21 | Stop reason: HOSPADM

## 2019-03-18 RX ORDER — CIPROFLOXACIN 500 MG/1
500 TABLET, FILM COATED ORAL EVERY 12 HOURS SCHEDULED
Qty: 42 TABLET | Refills: 0 | Status: SHIPPED | OUTPATIENT
Start: 2019-03-18 | End: 2019-03-21

## 2019-03-18 RX ORDER — LACTOBACILLUS RHAMNOSUS GG 10B CELL
1 CAPSULE ORAL 2 TIMES DAILY WITH MEALS
Qty: 60 CAPSULE | Refills: 0 | Status: SHIPPED | OUTPATIENT
Start: 2019-03-18 | End: 2019-03-21

## 2019-03-18 RX ORDER — METRONIDAZOLE 500 MG/1
500 TABLET ORAL EVERY 8 HOURS SCHEDULED
Qty: 63 TABLET | Refills: 0 | Status: SHIPPED | OUTPATIENT
Start: 2019-03-18 | End: 2019-03-21

## 2019-03-18 RX ORDER — METRONIDAZOLE 250 MG/1
500 TABLET ORAL EVERY 8 HOURS SCHEDULED
Status: DISCONTINUED | OUTPATIENT
Start: 2019-03-18 | End: 2019-03-21 | Stop reason: HOSPADM

## 2019-03-18 RX ORDER — SODIUM CHLORIDE 9 MG/ML
INJECTION, SOLUTION INTRAVENOUS
Status: DISCONTINUED
Start: 2019-03-18 | End: 2019-03-18 | Stop reason: WASHOUT

## 2019-03-18 RX ORDER — LACTOBACILLUS RHAMNOSUS GG 10B CELL
1 CAPSULE ORAL 2 TIMES DAILY WITH MEALS
Status: DISCONTINUED | OUTPATIENT
Start: 2019-03-18 | End: 2019-03-21 | Stop reason: HOSPADM

## 2019-03-18 RX ORDER — KETOROLAC TROMETHAMINE 15 MG/ML
15 INJECTION, SOLUTION INTRAMUSCULAR; INTRAVENOUS EVERY 8 HOURS PRN
Status: DISCONTINUED | OUTPATIENT
Start: 2019-03-18 | End: 2019-03-19

## 2019-03-18 RX ORDER — CIPROFLOXACIN 500 MG/1
500 TABLET, FILM COATED ORAL EVERY 12 HOURS SCHEDULED
Status: DISCONTINUED | OUTPATIENT
Start: 2019-03-18 | End: 2019-03-21 | Stop reason: HOSPADM

## 2019-03-18 RX ORDER — SODIUM CHLORIDE 9 MG/ML
INJECTION, SOLUTION INTRAVENOUS CONTINUOUS
Status: DISCONTINUED | OUTPATIENT
Start: 2019-03-18 | End: 2019-03-20

## 2019-03-18 RX ADMIN — Medication 1 CAPSULE: at 10:04

## 2019-03-18 RX ADMIN — SIMVASTATIN 20 MG: 10 TABLET, FILM COATED ORAL at 20:08

## 2019-03-18 RX ADMIN — CIPROFLOXACIN HYDROCHLORIDE 500 MG: 500 TABLET, FILM COATED ORAL at 14:28

## 2019-03-18 RX ADMIN — CIPROFLOXACIN HYDROCHLORIDE 500 MG: 500 TABLET, FILM COATED ORAL at 20:08

## 2019-03-18 RX ADMIN — INSULIN LISPRO 6 UNITS: 100 INJECTION, SOLUTION INTRAVENOUS; SUBCUTANEOUS at 17:39

## 2019-03-18 RX ADMIN — CLONIDINE HYDROCHLORIDE 0.1 MG: 0.1 TABLET ORAL at 08:10

## 2019-03-18 RX ADMIN — HYDROCODONE BITARTRATE AND ACETAMINOPHEN 1 TABLET: 5; 325 TABLET ORAL at 11:25

## 2019-03-18 RX ADMIN — LINEZOLID 600 MG: 600 INJECTION, SOLUTION INTRAVENOUS at 02:47

## 2019-03-18 RX ADMIN — INSULIN LISPRO 5 UNITS: 100 INJECTION, SOLUTION INTRAVENOUS; SUBCUTANEOUS at 08:21

## 2019-03-18 RX ADMIN — INSULIN LISPRO 5 UNITS: 100 INJECTION, SOLUTION INTRAVENOUS; SUBCUTANEOUS at 12:23

## 2019-03-18 RX ADMIN — INSULIN LISPRO 8 UNITS: 100 INJECTION, SOLUTION INTRAVENOUS; SUBCUTANEOUS at 12:22

## 2019-03-18 RX ADMIN — HYDROCODONE BITARTRATE AND ACETAMINOPHEN 1 TABLET: 5; 325 TABLET ORAL at 15:38

## 2019-03-18 RX ADMIN — HYOSCYAMINE SULFATE: 16 SOLUTION at 21:59

## 2019-03-18 RX ADMIN — INSULIN LISPRO 1 UNITS: 100 INJECTION, SOLUTION INTRAVENOUS; SUBCUTANEOUS at 20:28

## 2019-03-18 RX ADMIN — INSULIN LISPRO 2 UNITS: 100 INJECTION, SOLUTION INTRAVENOUS; SUBCUTANEOUS at 08:20

## 2019-03-18 RX ADMIN — CLONIDINE HYDROCHLORIDE 0.1 MG: 0.1 TABLET ORAL at 20:08

## 2019-03-18 RX ADMIN — METRONIDAZOLE 500 MG: 250 TABLET, FILM COATED ORAL at 21:59

## 2019-03-18 RX ADMIN — HYDROCODONE BITARTRATE AND ACETAMINOPHEN 1 TABLET: 5; 325 TABLET ORAL at 20:08

## 2019-03-18 RX ADMIN — Medication 10 ML: at 20:09

## 2019-03-18 RX ADMIN — LISINOPRIL 20 MG: 20 TABLET ORAL at 08:11

## 2019-03-18 RX ADMIN — ASPIRIN 81 MG: 81 TABLET, COATED ORAL at 08:11

## 2019-03-18 RX ADMIN — WARFARIN SODIUM 2.5 MG: 2.5 TABLET ORAL at 17:44

## 2019-03-18 RX ADMIN — Medication 1 CAPSULE: at 17:44

## 2019-03-18 RX ADMIN — HYDROCODONE BITARTRATE AND ACETAMINOPHEN 2 TABLET: 5; 325 TABLET ORAL at 02:02

## 2019-03-18 RX ADMIN — INSULIN LISPRO 5 UNITS: 100 INJECTION, SOLUTION INTRAVENOUS; SUBCUTANEOUS at 17:40

## 2019-03-18 RX ADMIN — TAZOBACTAM SODIUM AND PIPERACILLIN SODIUM 3.38 G: 375; 3 INJECTION, SOLUTION INTRAVENOUS at 04:47

## 2019-03-18 RX ADMIN — HYOSCYAMINE SULFATE: 16 SOLUTION at 14:39

## 2019-03-18 RX ADMIN — SODIUM CHLORIDE: 9 INJECTION, SOLUTION INTRAVENOUS at 20:20

## 2019-03-18 RX ADMIN — METRONIDAZOLE 500 MG: 250 TABLET, FILM COATED ORAL at 14:28

## 2019-03-18 RX ADMIN — HYDROCODONE BITARTRATE AND ACETAMINOPHEN 1 TABLET: 5; 325 TABLET ORAL at 07:00

## 2019-03-18 RX ADMIN — AMLODIPINE BESYLATE 10 MG: 5 TABLET ORAL at 08:09

## 2019-03-18 ASSESSMENT — PAIN SCALES - GENERAL
PAINLEVEL_OUTOF10: 2
PAINLEVEL_OUTOF10: 3
PAINLEVEL_OUTOF10: 6
PAINLEVEL_OUTOF10: 9
PAINLEVEL_OUTOF10: 3
PAINLEVEL_OUTOF10: 6

## 2019-03-18 ASSESSMENT — PAIN DESCRIPTION - PAIN TYPE
TYPE: CHRONIC PAIN
TYPE: CHRONIC PAIN

## 2019-03-18 ASSESSMENT — PAIN DESCRIPTION - ORIENTATION
ORIENTATION: RIGHT;LOWER
ORIENTATION: RIGHT;LOWER

## 2019-03-18 ASSESSMENT — ENCOUNTER SYMPTOMS
PHOTOPHOBIA: 0
CHOKING: 0
CHEST TIGHTNESS: 0
NAUSEA: 0
TROUBLE SWALLOWING: 0
COLOR CHANGE: 0
APNEA: 0
EYE DISCHARGE: 0
DIARRHEA: 0
RHINORRHEA: 0
SHORTNESS OF BREATH: 0
FACIAL SWELLING: 0
BLOOD IN STOOL: 0
EYE REDNESS: 0
ABDOMINAL PAIN: 0
COUGH: 0
STRIDOR: 0

## 2019-03-18 ASSESSMENT — PAIN DESCRIPTION - FREQUENCY: FREQUENCY: CONTINUOUS

## 2019-03-18 ASSESSMENT — PAIN DESCRIPTION - LOCATION
LOCATION: ABDOMEN
LOCATION: ABDOMEN

## 2019-03-18 ASSESSMENT — PAIN DESCRIPTION - DESCRIPTORS: DESCRIPTORS: THROBBING

## 2019-03-18 ASSESSMENT — PAIN DESCRIPTION - ONSET: ONSET: ON-GOING

## 2019-03-19 ENCOUNTER — ANESTHESIA EVENT (OUTPATIENT)
Dept: OPERATING ROOM | Age: 84
DRG: 571 | End: 2019-03-19
Payer: MEDICARE

## 2019-03-19 ENCOUNTER — HOSPITAL ENCOUNTER (INPATIENT)
Dept: WOUND CARE | Age: 84
DRG: 571 | End: 2019-03-19
Attending: INTERNAL MEDICINE
Payer: MEDICARE

## 2019-03-19 ENCOUNTER — ANESTHESIA (OUTPATIENT)
Dept: OPERATING ROOM | Age: 84
DRG: 571 | End: 2019-03-19
Payer: MEDICARE

## 2019-03-19 VITALS — OXYGEN SATURATION: 97 % | SYSTOLIC BLOOD PRESSURE: 125 MMHG | DIASTOLIC BLOOD PRESSURE: 59 MMHG

## 2019-03-19 LAB
ALBUMIN SERPL-MCNC: 3.1 G/DL (ref 3.4–5)
ANAEROBIC CULTURE: ABNORMAL
ANION GAP SERPL CALCULATED.3IONS-SCNC: 12 MMOL/L (ref 3–16)
ASPERGILLUS ANTIBODY ID: NORMAL
BASOPHILS ABSOLUTE: 0.1 K/UL (ref 0–0.2)
BASOPHILS RELATIVE PERCENT: 1.1 %
BLASTOMYCES ANTIBODY ID: NORMAL
BLOOD CULTURE, ROUTINE: NORMAL
BUN BLDV-MCNC: 22 MG/DL (ref 7–20)
CALCIUM SERPL-MCNC: 8.5 MG/DL (ref 8.3–10.6)
CHLORIDE BLD-SCNC: 107 MMOL/L (ref 99–110)
CO2: 22 MMOL/L (ref 21–32)
COCCIDIOIDES ANTIBODY ID: NORMAL
CREAT SERPL-MCNC: 1.4 MG/DL (ref 0.6–1.2)
CREATININE URINE: 77.5 MG/DL (ref 28–259)
CULTURE, BLOOD 2: NORMAL
EOSINOPHILS ABSOLUTE: 0.6 K/UL (ref 0–0.6)
EOSINOPHILS RELATIVE PERCENT: 9.4 %
GFR AFRICAN AMERICAN: 44
GFR NON-AFRICAN AMERICAN: 36
GLUCOSE BLD-MCNC: 130 MG/DL (ref 70–99)
GLUCOSE BLD-MCNC: 136 MG/DL (ref 70–99)
GLUCOSE BLD-MCNC: 136 MG/DL (ref 70–99)
GLUCOSE BLD-MCNC: 137 MG/DL (ref 70–99)
GLUCOSE BLD-MCNC: 176 MG/DL (ref 70–99)
GLUCOSE BLD-MCNC: 182 MG/DL (ref 70–99)
GLUCOSE BLD-MCNC: 230 MG/DL (ref 70–99)
GRAM STAIN RESULT: ABNORMAL
HCT VFR BLD CALC: 28.3 % (ref 36–48)
HEMOGLOBIN: 9 G/DL (ref 12–16)
HISTOPLASMA ABS, ID: NORMAL
INR BLD: 1.42 (ref 0.86–1.14)
LYMPHOCYTES ABSOLUTE: 1.3 K/UL (ref 1–5.1)
LYMPHOCYTES RELATIVE PERCENT: 21.1 %
MAGNESIUM: 2.3 MG/DL (ref 1.8–2.4)
MCH RBC QN AUTO: 29.6 PG (ref 26–34)
MCHC RBC AUTO-ENTMCNC: 31.7 G/DL (ref 31–36)
MCV RBC AUTO: 93.5 FL (ref 80–100)
MONOCYTES ABSOLUTE: 0.6 K/UL (ref 0–1.3)
MONOCYTES RELATIVE PERCENT: 9.3 %
NEUTROPHILS ABSOLUTE: 3.7 K/UL (ref 1.7–7.7)
NEUTROPHILS RELATIVE PERCENT: 59.1 %
ORGANISM: ABNORMAL
OSMOLALITY URINE: 419 MOSM/KG (ref 390–1070)
PDW BLD-RTO: 15.1 % (ref 12.4–15.4)
PERFORMED ON: ABNORMAL
PHOSPHORUS: 3.3 MG/DL (ref 2.5–4.9)
PLATELET # BLD: 369 K/UL (ref 135–450)
PMV BLD AUTO: 8.9 FL (ref 5–10.5)
POTASSIUM SERPL-SCNC: 4.4 MMOL/L (ref 3.5–5.1)
PROTHROMBIN TIME: 16.2 SEC (ref 9.8–13)
RBC # BLD: 3.03 M/UL (ref 4–5.2)
SODIUM BLD-SCNC: 141 MMOL/L (ref 136–145)
SODIUM URINE: 65 MMOL/L
WBC # BLD: 6.3 K/UL (ref 4–11)
WOUND/ABSCESS: ABNORMAL

## 2019-03-19 PROCEDURE — 3700000001 HC ADD 15 MINUTES (ANESTHESIA): Performed by: SURGERY

## 2019-03-19 PROCEDURE — 3700000000 HC ANESTHESIA ATTENDED CARE: Performed by: SURGERY

## 2019-03-19 PROCEDURE — 99232 SBSQ HOSP IP/OBS MODERATE 35: CPT | Performed by: INTERNAL MEDICINE

## 2019-03-19 PROCEDURE — 2700000000 HC OXYGEN THERAPY PER DAY

## 2019-03-19 PROCEDURE — 11005 DBRDMT SKIN ABDOMINAL WALL: CPT | Performed by: SURGERY

## 2019-03-19 PROCEDURE — 6370000000 HC RX 637 (ALT 250 FOR IP): Performed by: SURGERY

## 2019-03-19 PROCEDURE — 85025 COMPLETE CBC W/AUTO DIFF WBC: CPT

## 2019-03-19 PROCEDURE — 85610 PROTHROMBIN TIME: CPT

## 2019-03-19 PROCEDURE — 1200000000 HC SEMI PRIVATE

## 2019-03-19 PROCEDURE — 2500000003 HC RX 250 WO HCPCS: Performed by: NURSE ANESTHETIST, CERTIFIED REGISTERED

## 2019-03-19 PROCEDURE — 2580000003 HC RX 258: Performed by: NURSE ANESTHETIST, CERTIFIED REGISTERED

## 2019-03-19 PROCEDURE — 2580000003 HC RX 258: Performed by: SURGERY

## 2019-03-19 PROCEDURE — 36415 COLL VENOUS BLD VENIPUNCTURE: CPT

## 2019-03-19 PROCEDURE — 2709999900 HC NON-CHARGEABLE SUPPLY: Performed by: SURGERY

## 2019-03-19 PROCEDURE — 6360000002 HC RX W HCPCS: Performed by: SURGERY

## 2019-03-19 PROCEDURE — 80069 RENAL FUNCTION PANEL: CPT

## 2019-03-19 PROCEDURE — 6370000000 HC RX 637 (ALT 250 FOR IP): Performed by: NURSE PRACTITIONER

## 2019-03-19 PROCEDURE — 6360000002 HC RX W HCPCS: Performed by: ANESTHESIOLOGY

## 2019-03-19 PROCEDURE — 83735 ASSAY OF MAGNESIUM: CPT

## 2019-03-19 PROCEDURE — 6360000002 HC RX W HCPCS: Performed by: INTERNAL MEDICINE

## 2019-03-19 PROCEDURE — 6360000002 HC RX W HCPCS: Performed by: NURSE ANESTHETIST, CERTIFIED REGISTERED

## 2019-03-19 PROCEDURE — 7100000000 HC PACU RECOVERY - FIRST 15 MIN: Performed by: SURGERY

## 2019-03-19 PROCEDURE — 3600000012 HC SURGERY LEVEL 2 ADDTL 15MIN: Performed by: SURGERY

## 2019-03-19 PROCEDURE — 0JB80ZZ EXCISION OF ABDOMEN SUBCUTANEOUS TISSUE AND FASCIA, OPEN APPROACH: ICD-10-PCS | Performed by: INTERNAL MEDICINE

## 2019-03-19 PROCEDURE — 3600000002 HC SURGERY LEVEL 2 BASE: Performed by: SURGERY

## 2019-03-19 PROCEDURE — 2500000003 HC RX 250 WO HCPCS: Performed by: SURGERY

## 2019-03-19 PROCEDURE — 7100000001 HC PACU RECOVERY - ADDTL 15 MIN: Performed by: SURGERY

## 2019-03-19 RX ORDER — PROPOFOL 10 MG/ML
INJECTION, EMULSION INTRAVENOUS PRN
Status: DISCONTINUED | OUTPATIENT
Start: 2019-03-19 | End: 2019-03-19 | Stop reason: SDUPTHER

## 2019-03-19 RX ORDER — LIDOCAINE HYDROCHLORIDE 10 MG/ML
1 INJECTION, SOLUTION EPIDURAL; INFILTRATION; INTRACAUDAL; PERINEURAL
Status: CANCELLED | OUTPATIENT
Start: 2019-03-19 | End: 2019-03-19

## 2019-03-19 RX ORDER — HYDROMORPHONE HCL 110MG/55ML
0.25 PATIENT CONTROLLED ANALGESIA SYRINGE INTRAVENOUS EVERY 5 MIN PRN
Status: DISCONTINUED | OUTPATIENT
Start: 2019-03-19 | End: 2019-03-19 | Stop reason: HOSPADM

## 2019-03-19 RX ORDER — SODIUM CHLORIDE 0.9 % (FLUSH) 0.9 %
10 SYRINGE (ML) INJECTION EVERY 12 HOURS SCHEDULED
Status: CANCELLED | OUTPATIENT
Start: 2019-03-19

## 2019-03-19 RX ORDER — SODIUM CHLORIDE 9 MG/ML
INJECTION, SOLUTION INTRAVENOUS CONTINUOUS PRN
Status: DISCONTINUED | OUTPATIENT
Start: 2019-03-19 | End: 2019-03-19 | Stop reason: SDUPTHER

## 2019-03-19 RX ORDER — FENTANYL CITRATE 50 UG/ML
INJECTION, SOLUTION INTRAMUSCULAR; INTRAVENOUS PRN
Status: DISCONTINUED | OUTPATIENT
Start: 2019-03-19 | End: 2019-03-19 | Stop reason: SDUPTHER

## 2019-03-19 RX ORDER — MEPERIDINE HYDROCHLORIDE 25 MG/ML
12.5 INJECTION INTRAMUSCULAR; INTRAVENOUS; SUBCUTANEOUS EVERY 5 MIN PRN
Status: DISCONTINUED | OUTPATIENT
Start: 2019-03-19 | End: 2019-03-19 | Stop reason: HOSPADM

## 2019-03-19 RX ORDER — FENTANYL CITRATE 50 UG/ML
25 INJECTION, SOLUTION INTRAMUSCULAR; INTRAVENOUS EVERY 5 MIN PRN
Status: DISCONTINUED | OUTPATIENT
Start: 2019-03-19 | End: 2019-03-19 | Stop reason: HOSPADM

## 2019-03-19 RX ORDER — WARFARIN SODIUM 2.5 MG/1
2.5 TABLET ORAL DAILY
Status: DISCONTINUED | OUTPATIENT
Start: 2019-03-20 | End: 2019-03-20

## 2019-03-19 RX ORDER — ONDANSETRON 2 MG/ML
4 INJECTION INTRAMUSCULAR; INTRAVENOUS PRN
Status: CANCELLED | OUTPATIENT
Start: 2019-03-19

## 2019-03-19 RX ORDER — OXYCODONE HYDROCHLORIDE 5 MG/1
10 TABLET ORAL PRN
Status: DISCONTINUED | OUTPATIENT
Start: 2019-03-19 | End: 2019-03-19 | Stop reason: HOSPADM

## 2019-03-19 RX ORDER — SODIUM CHLORIDE 9 MG/ML
INJECTION, SOLUTION INTRAVENOUS CONTINUOUS
Status: CANCELLED | OUTPATIENT
Start: 2019-03-19

## 2019-03-19 RX ORDER — SODIUM CHLORIDE 0.9 % (FLUSH) 0.9 %
10 SYRINGE (ML) INJECTION PRN
Status: CANCELLED | OUTPATIENT
Start: 2019-03-19

## 2019-03-19 RX ORDER — OXYCODONE HYDROCHLORIDE 5 MG/1
5 TABLET ORAL PRN
Status: DISCONTINUED | OUTPATIENT
Start: 2019-03-19 | End: 2019-03-19 | Stop reason: HOSPADM

## 2019-03-19 RX ORDER — LIDOCAINE HYDROCHLORIDE AND EPINEPHRINE 10; 10 MG/ML; UG/ML
INJECTION, SOLUTION INFILTRATION; PERINEURAL
Status: COMPLETED | OUTPATIENT
Start: 2019-03-19 | End: 2019-03-19

## 2019-03-19 RX ORDER — ONDANSETRON 2 MG/ML
4 INJECTION INTRAMUSCULAR; INTRAVENOUS
Status: DISCONTINUED | OUTPATIENT
Start: 2019-03-19 | End: 2019-03-19 | Stop reason: HOSPADM

## 2019-03-19 RX ORDER — MORPHINE SULFATE 2 MG/ML
2 INJECTION, SOLUTION INTRAMUSCULAR; INTRAVENOUS
Status: COMPLETED | OUTPATIENT
Start: 2019-03-19 | End: 2019-03-19

## 2019-03-19 RX ORDER — KETAMINE HYDROCHLORIDE 10 MG/ML
INJECTION, SOLUTION INTRAMUSCULAR; INTRAVENOUS PRN
Status: DISCONTINUED | OUTPATIENT
Start: 2019-03-19 | End: 2019-03-19 | Stop reason: SDUPTHER

## 2019-03-19 RX ORDER — WARFARIN SODIUM 5 MG/1
5 TABLET ORAL
Status: COMPLETED | OUTPATIENT
Start: 2019-03-19 | End: 2019-03-19

## 2019-03-19 RX ORDER — HYDROMORPHONE HCL 110MG/55ML
0.5 PATIENT CONTROLLED ANALGESIA SYRINGE INTRAVENOUS EVERY 5 MIN PRN
Status: DISCONTINUED | OUTPATIENT
Start: 2019-03-19 | End: 2019-03-19 | Stop reason: HOSPADM

## 2019-03-19 RX ORDER — MAGNESIUM HYDROXIDE 1200 MG/15ML
LIQUID ORAL CONTINUOUS PRN
Status: COMPLETED | OUTPATIENT
Start: 2019-03-19 | End: 2019-03-19

## 2019-03-19 RX ORDER — FENTANYL CITRATE 50 UG/ML
50 INJECTION, SOLUTION INTRAMUSCULAR; INTRAVENOUS EVERY 5 MIN PRN
Status: DISCONTINUED | OUTPATIENT
Start: 2019-03-19 | End: 2019-03-19 | Stop reason: HOSPADM

## 2019-03-19 RX ADMIN — INSULIN LISPRO 2 UNITS: 100 INJECTION, SOLUTION INTRAVENOUS; SUBCUTANEOUS at 22:10

## 2019-03-19 RX ADMIN — SODIUM CHLORIDE: 9 INJECTION, SOLUTION INTRAVENOUS at 13:15

## 2019-03-19 RX ADMIN — INSULIN LISPRO 2 UNITS: 100 INJECTION, SOLUTION INTRAVENOUS; SUBCUTANEOUS at 16:21

## 2019-03-19 RX ADMIN — PROPOFOL 30 MG: 10 INJECTION, EMULSION INTRAVENOUS at 13:27

## 2019-03-19 RX ADMIN — PROPOFOL 30 MG: 10 INJECTION, EMULSION INTRAVENOUS at 13:23

## 2019-03-19 RX ADMIN — PROPOFOL 20 MG: 10 INJECTION, EMULSION INTRAVENOUS at 13:31

## 2019-03-19 RX ADMIN — FENTANYL CITRATE 25 MCG: 50 INJECTION, SOLUTION INTRAMUSCULAR; INTRAVENOUS at 13:25

## 2019-03-19 RX ADMIN — PROPOFOL 10 MG: 10 INJECTION, EMULSION INTRAVENOUS at 13:22

## 2019-03-19 RX ADMIN — CLONIDINE HYDROCHLORIDE 0.1 MG: 0.1 TABLET ORAL at 22:02

## 2019-03-19 RX ADMIN — PROPOFOL 20 MG: 10 INJECTION, EMULSION INTRAVENOUS at 13:33

## 2019-03-19 RX ADMIN — HYDROCODONE BITARTRATE AND ACETAMINOPHEN 1 TABLET: 5; 325 TABLET ORAL at 18:41

## 2019-03-19 RX ADMIN — PROPOFOL 50 MG: 10 INJECTION, EMULSION INTRAVENOUS at 13:21

## 2019-03-19 RX ADMIN — SIMVASTATIN 20 MG: 10 TABLET, FILM COATED ORAL at 22:02

## 2019-03-19 RX ADMIN — FENTANYL CITRATE 25 MCG: 50 INJECTION INTRAMUSCULAR; INTRAVENOUS at 14:08

## 2019-03-19 RX ADMIN — INSULIN LISPRO 5 UNITS: 100 INJECTION, SOLUTION INTRAVENOUS; SUBCUTANEOUS at 16:24

## 2019-03-19 RX ADMIN — PROPOFOL 40 MG: 10 INJECTION, EMULSION INTRAVENOUS at 13:29

## 2019-03-19 RX ADMIN — Medication 1 CAPSULE: at 16:21

## 2019-03-19 RX ADMIN — FENTANYL CITRATE 25 MCG: 50 INJECTION INTRAMUSCULAR; INTRAVENOUS at 14:29

## 2019-03-19 RX ADMIN — PROPOFOL 30 MG: 10 INJECTION, EMULSION INTRAVENOUS at 13:25

## 2019-03-19 RX ADMIN — CIPROFLOXACIN HYDROCHLORIDE 500 MG: 500 TABLET, FILM COATED ORAL at 22:02

## 2019-03-19 RX ADMIN — HYDROCODONE BITARTRATE AND ACETAMINOPHEN 1 TABLET: 5; 325 TABLET ORAL at 06:15

## 2019-03-19 RX ADMIN — SODIUM CHLORIDE: 9 INJECTION, SOLUTION INTRAVENOUS at 22:09

## 2019-03-19 RX ADMIN — WARFARIN SODIUM 5 MG: 5 TABLET ORAL at 16:21

## 2019-03-19 RX ADMIN — FENTANYL CITRATE 25 MCG: 50 INJECTION, SOLUTION INTRAMUSCULAR; INTRAVENOUS at 13:29

## 2019-03-19 RX ADMIN — METRONIDAZOLE 500 MG: 250 TABLET, FILM COATED ORAL at 22:02

## 2019-03-19 RX ADMIN — PROPOFOL 20 MG: 10 INJECTION, EMULSION INTRAVENOUS at 13:28

## 2019-03-19 RX ADMIN — MORPHINE SULFATE 2 MG: 2 INJECTION, SOLUTION INTRAMUSCULAR; INTRAVENOUS at 12:34

## 2019-03-19 RX ADMIN — HYDROCODONE BITARTRATE AND ACETAMINOPHEN 2 TABLET: 5; 325 TABLET ORAL at 23:07

## 2019-03-19 RX ADMIN — MORPHINE SULFATE 2 MG: 2 INJECTION, SOLUTION INTRAMUSCULAR; INTRAVENOUS at 10:22

## 2019-03-19 RX ADMIN — KETAMINE HYDROCHLORIDE 20 MG: 10 INJECTION, SOLUTION INTRAMUSCULAR; INTRAVENOUS at 13:21

## 2019-03-19 RX ADMIN — HYDROCODONE BITARTRATE AND ACETAMINOPHEN 1 TABLET: 5; 325 TABLET ORAL at 02:05

## 2019-03-19 RX ADMIN — ENOXAPARIN SODIUM 100 MG: 100 INJECTION SUBCUTANEOUS at 22:02

## 2019-03-19 ASSESSMENT — PULMONARY FUNCTION TESTS
PIF_VALUE: 1

## 2019-03-19 ASSESSMENT — PAIN DESCRIPTION - LOCATION
LOCATION: ABDOMEN

## 2019-03-19 ASSESSMENT — PAIN SCALES - GENERAL
PAINLEVEL_OUTOF10: 6
PAINLEVEL_OUTOF10: 6
PAINLEVEL_OUTOF10: 8
PAINLEVEL_OUTOF10: 0
PAINLEVEL_OUTOF10: 6
PAINLEVEL_OUTOF10: 7
PAINLEVEL_OUTOF10: 0
PAINLEVEL_OUTOF10: 8
PAINLEVEL_OUTOF10: 7
PAINLEVEL_OUTOF10: 6
PAINLEVEL_OUTOF10: 5
PAINLEVEL_OUTOF10: 7
PAINLEVEL_OUTOF10: 3
PAINLEVEL_OUTOF10: 4
PAINLEVEL_OUTOF10: 0

## 2019-03-19 ASSESSMENT — PAIN DESCRIPTION - DESCRIPTORS
DESCRIPTORS: THROBBING
DESCRIPTORS: THROBBING

## 2019-03-19 ASSESSMENT — ENCOUNTER SYMPTOMS
SHORTNESS OF BREATH: 1
SHORTNESS OF BREATH: 0
EYE REDNESS: 0
FACIAL SWELLING: 0
COLOR CHANGE: 0
NAUSEA: 0
BLOOD IN STOOL: 0
APNEA: 0
CHEST TIGHTNESS: 0
STRIDOR: 0
ABDOMINAL PAIN: 0
DIARRHEA: 0
RHINORRHEA: 0
COUGH: 0
CHOKING: 0
PHOTOPHOBIA: 0
TROUBLE SWALLOWING: 0
EYE DISCHARGE: 0

## 2019-03-19 ASSESSMENT — PAIN - FUNCTIONAL ASSESSMENT: PAIN_FUNCTIONAL_ASSESSMENT: 0-10

## 2019-03-19 ASSESSMENT — PAIN DESCRIPTION - PAIN TYPE
TYPE: CHRONIC PAIN

## 2019-03-19 ASSESSMENT — PAIN DESCRIPTION - FREQUENCY
FREQUENCY: CONTINUOUS
FREQUENCY: CONTINUOUS

## 2019-03-19 ASSESSMENT — PAIN DESCRIPTION - ORIENTATION
ORIENTATION: RIGHT;LOWER

## 2019-03-19 ASSESSMENT — PAIN DESCRIPTION - ONSET: ONSET: AWAKENED FROM SLEEP

## 2019-03-20 LAB
ALBUMIN SERPL-MCNC: 2.9 G/DL (ref 3.4–5)
ANION GAP SERPL CALCULATED.3IONS-SCNC: 12 MMOL/L (ref 3–16)
BASOPHILS ABSOLUTE: 0.1 K/UL (ref 0–0.2)
BASOPHILS RELATIVE PERCENT: 1 %
BILIRUBIN URINE: NEGATIVE
BLOOD, URINE: NEGATIVE
BUN BLDV-MCNC: 17 MG/DL (ref 7–20)
CALCIUM SERPL-MCNC: 8.5 MG/DL (ref 8.3–10.6)
CHLORIDE BLD-SCNC: 111 MMOL/L (ref 99–110)
CLARITY: CLEAR
CO2: 21 MMOL/L (ref 21–32)
COLOR: YELLOW
CREAT SERPL-MCNC: 1.1 MG/DL (ref 0.6–1.2)
CREATININE URINE: 42.2 MG/DL (ref 28–259)
EOSINOPHILS ABSOLUTE: 0.4 K/UL (ref 0–0.6)
EOSINOPHILS RELATIVE PERCENT: 6.4 %
GFR AFRICAN AMERICAN: 58
GFR NON-AFRICAN AMERICAN: 48
GLUCOSE BLD-MCNC: 123 MG/DL (ref 70–99)
GLUCOSE BLD-MCNC: 124 MG/DL (ref 70–99)
GLUCOSE BLD-MCNC: 145 MG/DL (ref 70–99)
GLUCOSE BLD-MCNC: 289 MG/DL (ref 70–99)
GLUCOSE URINE: 250 MG/DL
HCT VFR BLD CALC: 27.7 % (ref 36–48)
HEMOGLOBIN: 8.9 G/DL (ref 12–16)
INR BLD: 1.58 (ref 0.86–1.14)
KETONES, URINE: NEGATIVE MG/DL
LEUKOCYTE ESTERASE, URINE: NEGATIVE
LYMPHOCYTES ABSOLUTE: 1.3 K/UL (ref 1–5.1)
LYMPHOCYTES RELATIVE PERCENT: 19.4 %
MAGNESIUM: 1.9 MG/DL (ref 1.8–2.4)
MCH RBC QN AUTO: 30.7 PG (ref 26–34)
MCHC RBC AUTO-ENTMCNC: 32 G/DL (ref 31–36)
MCV RBC AUTO: 96 FL (ref 80–100)
MICROALBUMIN UR-MCNC: 1.3 MG/DL
MICROALBUMIN/CREAT UR-RTO: 30.8 MG/G (ref 0–30)
MICROSCOPIC EXAMINATION: ABNORMAL
MONOCYTES ABSOLUTE: 0.7 K/UL (ref 0–1.3)
MONOCYTES RELATIVE PERCENT: 9.8 %
NEUTROPHILS ABSOLUTE: 4.4 K/UL (ref 1.7–7.7)
NEUTROPHILS RELATIVE PERCENT: 63.4 %
NITRITE, URINE: NEGATIVE
PDW BLD-RTO: 15.6 % (ref 12.4–15.4)
PERFORMED ON: ABNORMAL
PH UA: 6 (ref 5–8)
PHOSPHORUS: 3.1 MG/DL (ref 2.5–4.9)
PLATELET # BLD: 347 K/UL (ref 135–450)
PMV BLD AUTO: 8.6 FL (ref 5–10.5)
POTASSIUM SERPL-SCNC: 4.5 MMOL/L (ref 3.5–5.1)
PROTEIN UA: NEGATIVE MG/DL
PROTHROMBIN TIME: 18 SEC (ref 9.8–13)
RBC # BLD: 2.89 M/UL (ref 4–5.2)
SODIUM BLD-SCNC: 144 MMOL/L (ref 136–145)
SPECIFIC GRAVITY UA: 1.01 (ref 1–1.03)
URINE TYPE: ABNORMAL
UROBILINOGEN, URINE: 0.2 E.U./DL
WBC # BLD: 6.9 K/UL (ref 4–11)

## 2019-03-20 PROCEDURE — 6370000000 HC RX 637 (ALT 250 FOR IP): Performed by: SURGERY

## 2019-03-20 PROCEDURE — 85025 COMPLETE CBC W/AUTO DIFF WBC: CPT

## 2019-03-20 PROCEDURE — APPNB30 APP NON BILLABLE TIME 0-30 MINS: Performed by: NURSE PRACTITIONER

## 2019-03-20 PROCEDURE — 6360000002 HC RX W HCPCS: Performed by: SURGERY

## 2019-03-20 PROCEDURE — 99024 POSTOP FOLLOW-UP VISIT: CPT | Performed by: SURGERY

## 2019-03-20 PROCEDURE — 82043 UR ALBUMIN QUANTITATIVE: CPT

## 2019-03-20 PROCEDURE — 2580000003 HC RX 258: Performed by: SURGERY

## 2019-03-20 PROCEDURE — 82570 ASSAY OF URINE CREATININE: CPT

## 2019-03-20 PROCEDURE — 81003 URINALYSIS AUTO W/O SCOPE: CPT

## 2019-03-20 PROCEDURE — 99232 SBSQ HOSP IP/OBS MODERATE 35: CPT | Performed by: INTERNAL MEDICINE

## 2019-03-20 PROCEDURE — 83735 ASSAY OF MAGNESIUM: CPT

## 2019-03-20 PROCEDURE — 85610 PROTHROMBIN TIME: CPT

## 2019-03-20 PROCEDURE — 80069 RENAL FUNCTION PANEL: CPT

## 2019-03-20 PROCEDURE — 94760 N-INVAS EAR/PLS OXIMETRY 1: CPT

## 2019-03-20 PROCEDURE — 6370000000 HC RX 637 (ALT 250 FOR IP): Performed by: INTERNAL MEDICINE

## 2019-03-20 PROCEDURE — 36415 COLL VENOUS BLD VENIPUNCTURE: CPT

## 2019-03-20 PROCEDURE — APPSS15 APP SPLIT SHARED TIME 0-15 MINUTES: Performed by: NURSE PRACTITIONER

## 2019-03-20 PROCEDURE — 1200000000 HC SEMI PRIVATE

## 2019-03-20 RX ORDER — WARFARIN SODIUM 2.5 MG/1
2.5 TABLET ORAL DAILY
Status: DISCONTINUED | OUTPATIENT
Start: 2019-03-21 | End: 2019-03-21

## 2019-03-20 RX ORDER — WARFARIN SODIUM 5 MG/1
5 TABLET ORAL
Status: COMPLETED | OUTPATIENT
Start: 2019-03-20 | End: 2019-03-20

## 2019-03-20 RX ADMIN — Medication 1 CAPSULE: at 17:38

## 2019-03-20 RX ADMIN — METRONIDAZOLE 500 MG: 250 TABLET, FILM COATED ORAL at 06:21

## 2019-03-20 RX ADMIN — HYDROCODONE BITARTRATE AND ACETAMINOPHEN 1 TABLET: 5; 325 TABLET ORAL at 11:22

## 2019-03-20 RX ADMIN — WARFARIN SODIUM 5 MG: 5 TABLET ORAL at 19:03

## 2019-03-20 RX ADMIN — INSULIN LISPRO 5 UNITS: 100 INJECTION, SOLUTION INTRAVENOUS; SUBCUTANEOUS at 09:33

## 2019-03-20 RX ADMIN — HYDROCODONE BITARTRATE AND ACETAMINOPHEN 1 TABLET: 5; 325 TABLET ORAL at 06:50

## 2019-03-20 RX ADMIN — CIPROFLOXACIN HYDROCHLORIDE 500 MG: 500 TABLET, FILM COATED ORAL at 09:20

## 2019-03-20 RX ADMIN — Medication 1 CAPSULE: at 09:20

## 2019-03-20 RX ADMIN — INSULIN LISPRO 3 UNITS: 100 INJECTION, SOLUTION INTRAVENOUS; SUBCUTANEOUS at 17:39

## 2019-03-20 RX ADMIN — AMLODIPINE BESYLATE 5 MG: 5 TABLET ORAL at 09:20

## 2019-03-20 RX ADMIN — SODIUM CHLORIDE: 9 INJECTION, SOLUTION INTRAVENOUS at 09:20

## 2019-03-20 RX ADMIN — INSULIN LISPRO 5 UNITS: 100 INJECTION, SOLUTION INTRAVENOUS; SUBCUTANEOUS at 11:26

## 2019-03-20 RX ADMIN — INSULIN LISPRO 9 UNITS: 100 INJECTION, SOLUTION INTRAVENOUS; SUBCUTANEOUS at 11:23

## 2019-03-20 RX ADMIN — ASPIRIN 81 MG: 81 TABLET, COATED ORAL at 09:20

## 2019-03-20 RX ADMIN — HYDROCODONE BITARTRATE AND ACETAMINOPHEN 2 TABLET: 5; 325 TABLET ORAL at 23:10

## 2019-03-20 RX ADMIN — CLONIDINE HYDROCHLORIDE 0.1 MG: 0.1 TABLET ORAL at 09:20

## 2019-03-20 RX ADMIN — HYDROCODONE BITARTRATE AND ACETAMINOPHEN 1 TABLET: 5; 325 TABLET ORAL at 03:12

## 2019-03-20 RX ADMIN — INSULIN LISPRO 5 UNITS: 100 INJECTION, SOLUTION INTRAVENOUS; SUBCUTANEOUS at 17:43

## 2019-03-20 RX ADMIN — HYOSCYAMINE SULFATE: 16 SOLUTION at 09:22

## 2019-03-20 RX ADMIN — ENOXAPARIN SODIUM 100 MG: 100 INJECTION SUBCUTANEOUS at 09:20

## 2019-03-20 RX ADMIN — METRONIDAZOLE 500 MG: 250 TABLET, FILM COATED ORAL at 14:58

## 2019-03-20 RX ADMIN — HYDROCODONE BITARTRATE AND ACETAMINOPHEN 1 TABLET: 5; 325 TABLET ORAL at 16:36

## 2019-03-20 ASSESSMENT — PAIN SCALES - GENERAL
PAINLEVEL_OUTOF10: 6
PAINLEVEL_OUTOF10: 4
PAINLEVEL_OUTOF10: 3
PAINLEVEL_OUTOF10: 6
PAINLEVEL_OUTOF10: 8
PAINLEVEL_OUTOF10: 6
PAINLEVEL_OUTOF10: 4
PAINLEVEL_OUTOF10: 6

## 2019-03-20 ASSESSMENT — ENCOUNTER SYMPTOMS
WHEEZING: 0
BACK PAIN: 0
PHOTOPHOBIA: 0
SINUS PAIN: 0
EYE REDNESS: 0
SHORTNESS OF BREATH: 0
CHOKING: 0
APNEA: 0
SORE THROAT: 0
STRIDOR: 0
COUGH: 0
CHEST TIGHTNESS: 0
EYE PAIN: 0
VOMITING: 0
BLOOD IN STOOL: 0
ABDOMINAL DISTENTION: 0
DIARRHEA: 0
NAUSEA: 0
EYE DISCHARGE: 0
COLOR CHANGE: 0
ABDOMINAL PAIN: 1
RHINORRHEA: 0
FACIAL SWELLING: 0
ABDOMINAL PAIN: 0
DIARRHEA: 1
TROUBLE SWALLOWING: 0

## 2019-03-20 ASSESSMENT — PAIN DESCRIPTION - PAIN TYPE: TYPE: SURGICAL PAIN

## 2019-03-20 ASSESSMENT — PAIN DESCRIPTION - LOCATION: LOCATION: ABDOMEN

## 2019-03-21 VITALS
HEIGHT: 61 IN | TEMPERATURE: 99 F | SYSTOLIC BLOOD PRESSURE: 165 MMHG | DIASTOLIC BLOOD PRESSURE: 74 MMHG | HEART RATE: 90 BPM | BODY MASS INDEX: 46.61 KG/M2 | RESPIRATION RATE: 16 BRPM | WEIGHT: 246.9 LBS | OXYGEN SATURATION: 93 %

## 2019-03-21 LAB
ALBUMIN SERPL-MCNC: 3.3 G/DL (ref 3.4–5)
ANION GAP SERPL CALCULATED.3IONS-SCNC: 13 MMOL/L (ref 3–16)
BASOPHILS ABSOLUTE: 0.1 K/UL (ref 0–0.2)
BASOPHILS RELATIVE PERCENT: 1.4 %
BUN BLDV-MCNC: 14 MG/DL (ref 7–20)
CALCIUM SERPL-MCNC: 8.8 MG/DL (ref 8.3–10.6)
CHLORIDE BLD-SCNC: 107 MMOL/L (ref 99–110)
CO2: 21 MMOL/L (ref 21–32)
CREAT SERPL-MCNC: 1 MG/DL (ref 0.6–1.2)
EOSINOPHILS ABSOLUTE: 0.5 K/UL (ref 0–0.6)
EOSINOPHILS RELATIVE PERCENT: 6.7 %
FERRITIN: 55.3 NG/ML (ref 15–150)
FOLATE: 7.34 NG/ML (ref 4.78–24.2)
GFR AFRICAN AMERICAN: >60
GFR NON-AFRICAN AMERICAN: 53
GLUCOSE BLD-MCNC: 188 MG/DL (ref 70–99)
GLUCOSE BLD-MCNC: 202 MG/DL (ref 70–99)
GLUCOSE BLD-MCNC: 242 MG/DL (ref 70–99)
GLUCOSE BLD-MCNC: 268 MG/DL (ref 70–99)
HCT VFR BLD CALC: 30 % (ref 36–48)
HEMOGLOBIN: 9.5 G/DL (ref 12–16)
IMMATURE RETIC FRACT: 0.5 (ref 0.21–0.37)
INR BLD: 1.66 (ref 0.86–1.14)
IRON SATURATION: 49 % (ref 15–50)
IRON: 123 UG/DL (ref 37–145)
LYMPHOCYTES ABSOLUTE: 1.1 K/UL (ref 1–5.1)
LYMPHOCYTES RELATIVE PERCENT: 16 %
MAGNESIUM: 1.7 MG/DL (ref 1.8–2.4)
MCH RBC QN AUTO: 29.4 PG (ref 26–34)
MCHC RBC AUTO-ENTMCNC: 31.6 G/DL (ref 31–36)
MCV RBC AUTO: 93 FL (ref 80–100)
MONOCYTES ABSOLUTE: 0.8 K/UL (ref 0–1.3)
MONOCYTES RELATIVE PERCENT: 11.1 %
NEUTROPHILS ABSOLUTE: 4.5 K/UL (ref 1.7–7.7)
NEUTROPHILS RELATIVE PERCENT: 64.8 %
PARATHYROID HORMONE INTACT: 44.9 PG/ML (ref 14–72)
PDW BLD-RTO: 15.7 % (ref 12.4–15.4)
PERFORMED ON: ABNORMAL
PHOSPHORUS: 3.4 MG/DL (ref 2.5–4.9)
PLATELET # BLD: 362 K/UL (ref 135–450)
PMV BLD AUTO: 8.5 FL (ref 5–10.5)
POTASSIUM SERPL-SCNC: 4.3 MMOL/L (ref 3.5–5.1)
PROTHROMBIN TIME: 18.9 SEC (ref 9.8–13)
RBC # BLD: 3.22 M/UL (ref 4–5.2)
RETICULOCYTE ABSOLUTE COUNT: 0.05 M/UL (ref 0.02–0.1)
RETICULOCYTE COUNT PCT: 1.58 % (ref 0.5–2.18)
SODIUM BLD-SCNC: 141 MMOL/L (ref 136–145)
TOTAL IRON BINDING CAPACITY: 252 UG/DL (ref 260–445)
TRANSFERRIN: 203 MG/DL (ref 200–360)
VITAMIN B-12: 362 PG/ML (ref 211–911)
VITAMIN D 25-HYDROXY: 46.6 NG/ML
WBC # BLD: 6.9 K/UL (ref 4–11)

## 2019-03-21 PROCEDURE — 82607 VITAMIN B-12: CPT

## 2019-03-21 PROCEDURE — 85025 COMPLETE CBC W/AUTO DIFF WBC: CPT

## 2019-03-21 PROCEDURE — 6370000000 HC RX 637 (ALT 250 FOR IP): Performed by: SURGERY

## 2019-03-21 PROCEDURE — 80069 RENAL FUNCTION PANEL: CPT

## 2019-03-21 PROCEDURE — 85045 AUTOMATED RETICULOCYTE COUNT: CPT

## 2019-03-21 PROCEDURE — 83540 ASSAY OF IRON: CPT

## 2019-03-21 PROCEDURE — 82306 VITAMIN D 25 HYDROXY: CPT

## 2019-03-21 PROCEDURE — 83735 ASSAY OF MAGNESIUM: CPT

## 2019-03-21 PROCEDURE — 2580000003 HC RX 258: Performed by: SURGERY

## 2019-03-21 PROCEDURE — 36415 COLL VENOUS BLD VENIPUNCTURE: CPT

## 2019-03-21 PROCEDURE — 83970 ASSAY OF PARATHORMONE: CPT

## 2019-03-21 PROCEDURE — 84466 ASSAY OF TRANSFERRIN: CPT

## 2019-03-21 PROCEDURE — 82746 ASSAY OF FOLIC ACID SERUM: CPT

## 2019-03-21 PROCEDURE — 85610 PROTHROMBIN TIME: CPT

## 2019-03-21 PROCEDURE — 6360000002 HC RX W HCPCS: Performed by: SURGERY

## 2019-03-21 PROCEDURE — 82728 ASSAY OF FERRITIN: CPT

## 2019-03-21 PROCEDURE — 6370000000 HC RX 637 (ALT 250 FOR IP): Performed by: INTERNAL MEDICINE

## 2019-03-21 RX ORDER — WARFARIN SODIUM 5 MG/1
5 TABLET ORAL DAILY
Status: DISCONTINUED | OUTPATIENT
Start: 2019-03-21 | End: 2019-03-21 | Stop reason: HOSPADM

## 2019-03-21 RX ORDER — METRONIDAZOLE 500 MG/1
500 TABLET ORAL EVERY 8 HOURS SCHEDULED
Qty: 63 TABLET | Refills: 0 | Status: SHIPPED | OUTPATIENT
Start: 2019-03-21 | End: 2019-04-11

## 2019-03-21 RX ORDER — CIPROFLOXACIN 500 MG/1
500 TABLET, FILM COATED ORAL EVERY 12 HOURS SCHEDULED
Qty: 42 TABLET | Refills: 0 | Status: SHIPPED | OUTPATIENT
Start: 2019-03-21 | End: 2019-04-11

## 2019-03-21 RX ORDER — LACTOBACILLUS RHAMNOSUS GG 10B CELL
1 CAPSULE ORAL 2 TIMES DAILY WITH MEALS
Qty: 60 CAPSULE | Refills: 0 | Status: SHIPPED | OUTPATIENT
Start: 2019-03-21 | End: 2019-04-20

## 2019-03-21 RX ADMIN — INSULIN LISPRO 9 UNITS: 100 INJECTION, SOLUTION INTRAVENOUS; SUBCUTANEOUS at 12:45

## 2019-03-21 RX ADMIN — Medication 1 CAPSULE: at 09:02

## 2019-03-21 RX ADMIN — INSULIN LISPRO 5 UNITS: 100 INJECTION, SOLUTION INTRAVENOUS; SUBCUTANEOUS at 09:04

## 2019-03-21 RX ADMIN — HYDROCODONE BITARTRATE AND ACETAMINOPHEN 1 TABLET: 5; 325 TABLET ORAL at 10:39

## 2019-03-21 RX ADMIN — AMLODIPINE BESYLATE 5 MG: 5 TABLET ORAL at 09:03

## 2019-03-21 RX ADMIN — METRONIDAZOLE 500 MG: 250 TABLET, FILM COATED ORAL at 14:22

## 2019-03-21 RX ADMIN — METRONIDAZOLE 500 MG: 250 TABLET, FILM COATED ORAL at 00:40

## 2019-03-21 RX ADMIN — Medication 10 ML: at 00:43

## 2019-03-21 RX ADMIN — ENOXAPARIN SODIUM 100 MG: 100 INJECTION SUBCUTANEOUS at 09:02

## 2019-03-21 RX ADMIN — INSULIN LISPRO 5 UNITS: 100 INJECTION, SOLUTION INTRAVENOUS; SUBCUTANEOUS at 12:49

## 2019-03-21 RX ADMIN — HYOSCYAMINE SULFATE: 16 SOLUTION at 00:16

## 2019-03-21 RX ADMIN — METRONIDAZOLE 500 MG: 250 TABLET, FILM COATED ORAL at 05:38

## 2019-03-21 RX ADMIN — INSULIN LISPRO 6 UNITS: 100 INJECTION, SOLUTION INTRAVENOUS; SUBCUTANEOUS at 09:11

## 2019-03-21 RX ADMIN — HYDROCODONE BITARTRATE AND ACETAMINOPHEN 1 TABLET: 5; 325 TABLET ORAL at 05:38

## 2019-03-21 RX ADMIN — INSULIN LISPRO 3 UNITS: 100 INJECTION, SOLUTION INTRAVENOUS; SUBCUTANEOUS at 00:43

## 2019-03-21 RX ADMIN — ASPIRIN 81 MG: 81 TABLET, COATED ORAL at 09:03

## 2019-03-21 RX ADMIN — CIPROFLOXACIN HYDROCHLORIDE 500 MG: 500 TABLET, FILM COATED ORAL at 09:03

## 2019-03-21 RX ADMIN — HYDROCODONE BITARTRATE AND ACETAMINOPHEN 1 TABLET: 5; 325 TABLET ORAL at 15:50

## 2019-03-21 RX ADMIN — CIPROFLOXACIN HYDROCHLORIDE 500 MG: 500 TABLET, FILM COATED ORAL at 00:41

## 2019-03-21 RX ADMIN — ENOXAPARIN SODIUM 100 MG: 100 INJECTION SUBCUTANEOUS at 00:41

## 2019-03-21 RX ADMIN — CLONIDINE HYDROCHLORIDE 0.1 MG: 0.1 TABLET ORAL at 09:02

## 2019-03-21 RX ADMIN — CLONIDINE HYDROCHLORIDE 0.1 MG: 0.1 TABLET ORAL at 00:40

## 2019-03-21 ASSESSMENT — PAIN SCALES - GENERAL
PAINLEVEL_OUTOF10: 6
PAINLEVEL_OUTOF10: 5
PAINLEVEL_OUTOF10: 5
PAINLEVEL_OUTOF10: 8
PAINLEVEL_OUTOF10: 8

## 2019-03-26 ENCOUNTER — HOSPITAL ENCOUNTER (OUTPATIENT)
Dept: WOUND CARE | Age: 84
Discharge: HOME | End: 2019-03-26
Attending: SURGERY
Payer: MEDICARE

## 2019-03-26 VITALS — HEART RATE: 90 BPM | DIASTOLIC BLOOD PRESSURE: 74 MMHG | SYSTOLIC BLOOD PRESSURE: 183 MMHG | RESPIRATION RATE: 18 BRPM

## 2019-03-26 PROCEDURE — 11045 DBRDMT SUBQ TISS EACH ADDL: CPT

## 2019-03-26 PROCEDURE — 11042 DBRDMT SUBQ TIS 1ST 20SQCM/<: CPT | Performed by: SURGERY

## 2019-03-26 PROCEDURE — 11042 DBRDMT SUBQ TIS 1ST 20SQCM/<: CPT

## 2019-03-26 PROCEDURE — 11045 DBRDMT SUBQ TISS EACH ADDL: CPT | Performed by: SURGERY

## 2019-03-26 RX ORDER — LIDOCAINE 40 MG/G
CREAM TOPICAL ONCE
Status: DISCONTINUED | OUTPATIENT
Start: 2019-03-26 | End: 2019-03-27 | Stop reason: HOSPADM

## 2019-04-02 ENCOUNTER — HOSPITAL ENCOUNTER (OUTPATIENT)
Dept: WOUND CARE | Age: 84
Discharge: HOME | End: 2019-04-02
Attending: SURGERY
Payer: MEDICARE

## 2019-04-02 VITALS — RESPIRATION RATE: 16 BRPM | HEART RATE: 96 BPM | SYSTOLIC BLOOD PRESSURE: 153 MMHG | DIASTOLIC BLOOD PRESSURE: 72 MMHG

## 2019-04-02 PROCEDURE — 11042 DBRDMT SUBQ TIS 1ST 20SQCM/<: CPT | Performed by: SURGERY

## 2019-04-02 PROCEDURE — 11042 DBRDMT SUBQ TIS 1ST 20SQCM/<: CPT

## 2019-04-02 RX ORDER — LIDOCAINE 40 MG/G
CREAM TOPICAL ONCE
Status: DISCONTINUED | OUTPATIENT
Start: 2019-04-02 | End: 2019-04-03 | Stop reason: HOSPADM

## 2019-04-02 NOTE — PLAN OF CARE
Discharge instructions given. Patient verbalized understanding. Return to [address] [place] [address],[address] in 2 weeks.

## 2019-04-02 NOTE — PLAN OF CARE
Discharge instructions given. Patient verbalized understanding. Return to [address] [place] [address],[address] in 1 week.

## 2019-04-02 NOTE — PROGRESS NOTES
___ O'Clock 1300 1/15/2019 11:09 AM   Undermining Starts ___ O'Clock 3.5 12/11/2018 11:29 AM   Wound Assessment Granulation tissue;Red;Slough;Brown 4/2/2019 11:02 AM   Drainage Amount Large 4/2/2019 11:02 AM   Drainage Description Serosanguinous 4/2/2019 11:02 AM   Odor None 4/2/2019 11:02 AM   [place]-wound Assessment Clean;Dry 4/2/2019 11:02 AM   [place]%Wound Bed 0 4/2/2019 11:02 AM   Red%Wound Bed 90 4/2/2019 11:02 AM   Yellow%Wound Bed 0 4/2/2019 11:02 AM   Black%Wound Bed 0 4/2/2019 11:02 AM   Purple%Wound Bed 0 4/2/2019 11:02 AM   Other%Wound Bed 10 4/2/2019 11:02 AM   Number of days: 119       Wound 02/06/19 Abdomen Lateral;Right #4 (Active)   Wound Image   2/6/2019 10:44 AM   Wound Other 4/2/2019 11:02 AM   Dressing Status Clean;Dry; Intact 3/21/2019  9:14 AM   Dressing Changed Changed/New 3/21/2019  9:14 AM   Dressing/Treatment Moist to dry;ABD 3/21/2019  9:14 AM   Wound Cleansed Rinsed/Irrigated with saline 4/2/2019 11:12 AM   Wound Length (cm) 3.5 cm 4/2/2019 11:02 AM   Wound Width (cm) 4 cm 4/2/2019 11:02 AM   Wound Depth (cm) 0.1 cm 4/2/2019 11:02 AM   Wound Surface Area (cm^2) 14 cm^2 4/2/2019 11:02 AM   Change in Wound Size % (l*w) -324.24 4/2/2019 11:02 AM   Wound Volume (cm^3) 1.4 cm^3 4/2/2019 11:02 AM   Wound Healing % -324 4/2/2019 11:02 AM   Post-Procedure Length (cm) 3.5 cm 4/2/2019 11:12 AM   Post-Procedure Width (cm) 4 cm 4/2/2019 11:12 AM   Post-Procedure Depth (cm) 0.1 cm 4/2/2019 11:12 AM   Post-Procedure Surface Area (cm^2) 14 cm^2 4/2/2019 11:12 AM   Post-Procedure Volume (cm^3) 1.4 cm^3 4/2/2019 11:12 AM   Wound Assessment Bleeding 4/2/2019 11:12 AM   Drainage Amount Moderate 4/2/2019 11:12 AM   Drainage Description Serosanguinous 4/2/2019 11:02 AM   Odor None 4/2/2019 11:02 AM   [place]-wound Assessment Clean;Dry 4/2/2019 11:02 AM   [place]%Wound Bed 0 4/2/2019 11:02 AM   Red%Wound Bed 90 4/2/2019 11:02 AM   Yellow%Wound Bed 0 4/2/2019 11:02 AM   Black%Wound Bed 0 4/2/2019 11:02 AM   Purple%Wound Bed 0 4/2/2019 11:02 AM   Other%Wound Bed 10 4/2/2019 11:02 AM   Number of days: 54           Total Surface Area Debrided:  16.25 ( 10% of total surface area 0 sq cm     Bleeding:  Minimal    Hemostasis Achieved:  by pressure    Procedural Pain:  4  / 10     Post Procedural Pain:  0 / 10     Response to treatment:  With complaints of pain. The nature of the patient's condition was explained in depth. The patient was informed that their compliance to the treatment plan is paramount to successful healing and prevention of further ulceration and/or infection     Treatment Plan:   82-year-old female with a large wound of the right lower quadrant of her abdomen. The wound is finally appearing more healthy. The residual areas of necrotic debris were debrided. We will continue Dakin's wet-to-dry dressing changes and she will follow-up in one week.     Isa Chavira M.D.  4/2/2019

## 2019-04-10 ENCOUNTER — HOSPITAL ENCOUNTER (OUTPATIENT)
Dept: WOUND CARE | Age: 84
Discharge: HOME | End: 2019-04-10
Attending: SURGERY
Payer: MEDICARE

## 2019-04-10 VITALS — RESPIRATION RATE: 16 BRPM

## 2019-04-10 PROCEDURE — 11042 DBRDMT SUBQ TIS 1ST 20SQCM/<: CPT | Performed by: SURGERY

## 2019-04-10 PROCEDURE — 11045 DBRDMT SUBQ TISS EACH ADDL: CPT

## 2019-04-10 PROCEDURE — 11042 DBRDMT SUBQ TIS 1ST 20SQCM/<: CPT

## 2019-04-10 PROCEDURE — 11045 DBRDMT SUBQ TISS EACH ADDL: CPT | Performed by: SURGERY

## 2019-04-10 RX ORDER — LIDOCAINE 40 MG/G
CREAM TOPICAL ONCE
Status: DISCONTINUED | OUTPATIENT
Start: 2019-04-10 | End: 2019-04-11 | Stop reason: HOSPADM

## 2019-04-10 NOTE — PROGRESS NOTES
[address] Procedure Note    Peter Orta  AGE: 82 y.o. GENDER: female    : 1935  TODAY'S DATE: 4/10/2019    Chief Complaint   Patient presents with    Wound Check     Right lower abdomen        History of Present Illness     Peter Orta is a 82 y.o. female who presents today for wound evaluation. History of Wound: undetermined wound located on the abdomen. Wound Pain:  mild  Severity:  2  10   Wound Type:  undetermined  Modifying Factors:  none  Associated Signs/Symptoms:  none    Procedure Note:     Performed by: Candice Michelle MD    Consent obtained: Yes    Time out taken: Yes    Pain Control: Anesthetic  Anesthetic: Other (comment) topical lidocaine    Debridement: Excisional Debridement    Using curette the wound was sharply debrided    down through and including the removal of epidermis, dermis and subcutaneous tissue.         Devitalized Tissue Debrided: fibrin, biofilm and slough    Pre Debridement Measurements:  Are located in the Wound Documentation Flow Sheet     Wound #: 1 and 4     Post  Debridement Measurements:  Wound 18 Abdomen Right #1 (Active)   Wound Image   2019 10:44 AM   Wound Non-Healing Surgical 4/10/2019 10:49 AM   Dressing Changed Changed/New 3/16/2019 11:45 PM   Dressing/Treatment ABD 2019 11:41 AM   Wound Cleansed Rinsed/Irrigated with saline 4/10/2019 11:09 AM   Wound Length (cm) 9.5 cm 4/10/2019 10:49 AM   Wound Width (cm) 12.4 cm 4/10/2019 10:49 AM   Wound Depth (cm) 1 cm 4/10/2019 10:49 AM   Wound Surface Area (cm^2) 117.8 cm^2 4/10/2019 10:49 AM   Change in Wound Size % (l*w) -573.14 4/10/2019 10:49 AM   Wound Volume (cm^3) 117.8 cm^3 4/10/2019 10:49 AM   Wound Healing % -2144 4/10/2019 10:49 AM   Post-Procedure Length (cm) 9.5 cm 4/10/2019 11:09 AM   Post-Procedure Width (cm) 12.4 cm 4/10/2019 11:09 AM   Post-Procedure Depth (cm) 1 cm 4/10/2019 11:09 AM   Post-Procedure Surface Area (cm^2) 117.8 cm^2 4/10/2019 11:09 AM Post-Procedure Volume (cm^3) 117.8 cm^3 4/10/2019 11:09 AM   Distance Tunneling (cm) 2.4 cm 1/15/2019 11:09 AM   Tunneling Position ___ O'Clock 1300 1/15/2019 11:09 AM   Undermining Starts ___ O'Clock 3.5 12/11/2018 11:29 AM   Wound Assessment Bleeding 4/10/2019 11:09 AM   Drainage Amount Moderate 4/10/2019 11:09 AM   Drainage Description Serosanguinous 4/10/2019 10:49 AM   Odor None 4/10/2019 10:49 AM   [place]-wound Assessment Dry;Clean 4/10/2019 10:49 AM   [place]%Wound Bed 90 4/10/2019 10:49 AM   Red%Wound Bed 0 4/10/2019 10:49 AM   Yellow%Wound Bed 10 4/10/2019 10:49 AM   Black%Wound Bed 0 4/10/2019 10:49 AM   Purple%Wound Bed 0 4/10/2019 10:49 AM   Other%Wound Bed 10 4/2/2019 11:02 AM   Number of days: 127       Wound 02/06/19 Abdomen Lateral;Right #4 (Active)   Wound Image   2/6/2019 10:44 AM   Wound Non-Healing Surgical 4/10/2019 10:49 AM   Dressing Status Clean;Dry; Intact 3/21/2019  9:14 AM   Dressing Changed Changed/New 3/21/2019  9:14 AM   Dressing/Treatment ABD 4/2/2019 11:41 AM   Wound Cleansed Rinsed/Irrigated with saline 4/10/2019 11:09 AM   Wound Length (cm) 3.5 cm 4/10/2019 10:49 AM   Wound Width (cm) 4 cm 4/10/2019 10:49 AM   Wound Depth (cm) 0.1 cm 4/10/2019 10:49 AM   Wound Surface Area (cm^2) 14 cm^2 4/10/2019 10:49 AM   Change in Wound Size % (l*w) -324.24 4/10/2019 10:49 AM   Wound Volume (cm^3) 1.4 cm^3 4/10/2019 10:49 AM   Wound Healing % -324 4/10/2019 10:49 AM   Post-Procedure Length (cm) 3.5 cm 4/10/2019 11:09 AM   Post-Procedure Width (cm) 4 cm 4/10/2019 11:09 AM   Post-Procedure Depth (cm) 0.1 cm 4/10/2019 11:09 AM   Post-Procedure Surface Area (cm^2) 14 cm^2 4/10/2019 11:09 AM   Post-Procedure Volume (cm^3) 1.4 cm^3 4/10/2019 11:09 AM   Wound Assessment Bleeding 4/10/2019 11:09 AM   Drainage Amount Moderate 4/10/2019 11:09 AM   Drainage Description Serosanguinous 4/10/2019 10:49 AM   Odor None 4/10/2019 10:49 AM   [place]-wound Assessment Clean;Dry 4/10/2019 10:49 AM   [place]%Wound Bed 100 4/10/2019 10:49 AM   Red%Wound Bed 0 4/10/2019 10:49 AM   Yellow%Wound Bed 0 4/10/2019 10:49 AM   Black%Wound Bed 0 4/10/2019 10:49 AM   Purple%Wound Bed 0 4/2/2019 11:02 AM   Other%Wound Bed 10 4/2/2019 11:02 AM   Number of days: 63       Total Surface Area Debrided:  131.8 sq cm     Bleeding:  Minimal    Hemostasis Achieved:  by pressure    Procedural Pain:  2  / 10     Post Procedural Pain:  5 / 10     Response to treatment:  Well tolerated by patient. Assessment:     Wound looks stable. (improved, worse or stable)    Patient tolerated procedure well and was given proper instruction. The nature of the patient's condition was explained in depth. The patient was informed that their compliance to the treatment plan is paramount to successful healing and prevention of further ulceration and/or infection       Plan:     Treatment Plan: With each dressing change, rinse wounds with 0.9% Saline. (May use wound wash or soft contact solution. Both can be purchased at a local drug store). If unable to obtain saline, may use a gentle soap and water.     Dressing care: Abdomen- Skin Prep, 1/4 strength Dakins moistened gauze, dry dressing- change daily. Do Not use Tegaderm or anything occlusive. Latrell Damon 6  Maciej Long MD, FACS  4/10/2019  7:17 PM

## 2019-04-10 NOTE — PLAN OF CARE
Discharge instructions given. Patient verbalized understanding. Return to [address] [place] [address],[address] in 1 week.

## 2019-04-15 LAB
FUNGUS (MYCOLOGY) CULTURE: NORMAL
FUNGUS STAIN: NORMAL

## 2019-04-16 ENCOUNTER — HOSPITAL ENCOUNTER (OUTPATIENT)
Dept: WOUND CARE | Age: 84
Discharge: HOME | End: 2019-04-16
Attending: SURGERY
Payer: MEDICARE

## 2019-04-16 VITALS
RESPIRATION RATE: 16 BRPM | OXYGEN SATURATION: 94 % | TEMPERATURE: 97.5 F | SYSTOLIC BLOOD PRESSURE: 156 MMHG | DIASTOLIC BLOOD PRESSURE: 73 MMHG | HEART RATE: 86 BPM

## 2019-04-16 PROCEDURE — 11045 DBRDMT SUBQ TISS EACH ADDL: CPT | Performed by: SURGERY

## 2019-04-16 PROCEDURE — 11042 DBRDMT SUBQ TIS 1ST 20SQCM/<: CPT

## 2019-04-16 PROCEDURE — 11045 DBRDMT SUBQ TISS EACH ADDL: CPT

## 2019-04-16 PROCEDURE — 11042 DBRDMT SUBQ TIS 1ST 20SQCM/<: CPT | Performed by: SURGERY

## 2019-04-16 RX ORDER — LIDOCAINE 40 MG/G
CREAM TOPICAL ONCE
Status: DISCONTINUED | OUTPATIENT
Start: 2019-04-16 | End: 2019-04-17 | Stop reason: HOSPADM

## 2019-04-16 ASSESSMENT — PAIN DESCRIPTION - PAIN TYPE: TYPE: ACUTE PAIN

## 2019-04-16 ASSESSMENT — PAIN DESCRIPTION - LOCATION: LOCATION: SACRUM

## 2019-04-16 ASSESSMENT — PAIN DESCRIPTION - FREQUENCY: FREQUENCY: CONTINUOUS

## 2019-04-16 ASSESSMENT — PAIN DESCRIPTION - PROGRESSION: CLINICAL_PROGRESSION: NOT CHANGED

## 2019-04-16 ASSESSMENT — PAIN SCALES - GENERAL: PAINLEVEL_OUTOF10: 5

## 2019-04-16 NOTE — PROGRESS NOTES
cm 1/15/2019 11:09 AM   Tunneling Position ___ O'Clock 1300 1/15/2019 11:09 AM   Undermining Starts ___ O'Clock 3.5 12/11/2018 11:29 AM   Wound Assessment Bleeding 4/16/2019 11:53 AM   Drainage Amount Moderate 4/16/2019 11:53 AM   Drainage Description Serosanguinous 4/10/2019 10:49 AM   Odor None 4/10/2019 10:49 AM   [place]-wound Assessment Dry;Clean 4/10/2019 10:49 AM   [place]%Wound Bed 90 4/10/2019 10:49 AM   Red%Wound Bed 0 4/10/2019 10:49 AM   Yellow%Wound Bed 10 4/10/2019 10:49 AM   Black%Wound Bed 0 4/10/2019 10:49 AM   Purple%Wound Bed 0 4/10/2019 10:49 AM   Other%Wound Bed 10 4/2/2019 11:02 AM   Number of days: 133       Wound 02/06/19 Abdomen Lateral;Right #4 (Active)   Wound Image   2/6/2019 10:44 AM   Wound Non-Healing Surgical 4/16/2019 11:40 AM   Dressing Status Clean;Dry; Intact 3/21/2019  9:14 AM   Dressing Changed Changed/New 3/21/2019  9:14 AM   Dressing/Treatment ABD 4/2/2019 11:41 AM   Wound Cleansed Rinsed/Irrigated with saline 4/16/2019 11:53 AM   Wound Length (cm) 3.1 cm 4/16/2019 11:40 AM   Wound Width (cm) 3.4 cm 4/16/2019 11:40 AM   Wound Depth (cm) 0.1 cm 4/16/2019 11:40 AM   Wound Surface Area (cm^2) 10.54 cm^2 4/16/2019 11:40 AM   Change in Wound Size % (l*w) -219.39 4/16/2019 11:40 AM   Wound Volume (cm^3) 1.05 cm^3 4/16/2019 11:40 AM   Wound Healing % -218 4/16/2019 11:40 AM   Post-Procedure Length (cm) 3.1 cm 4/16/2019 11:53 AM   Post-Procedure Width (cm) 3.4 cm 4/16/2019 11:53 AM   Post-Procedure Depth (cm) 0.1 cm 4/16/2019 11:53 AM   Post-Procedure Surface Area (cm^2) 10.54 cm^2 4/16/2019 11:53 AM   Post-Procedure Volume (cm^3) 1.05 cm^3 4/16/2019 11:53 AM   Wound Assessment Bleeding 4/16/2019 11:53 AM   Drainage Amount Moderate 4/16/2019 11:53 AM   Drainage Description Serosanguinous 4/10/2019 10:49 AM   Odor None 4/10/2019 10:49 AM   [place]-wound Assessment Clean;Dry 4/10/2019 10:49 AM   [place]%Wound Bed 100 4/10/2019 10:49 AM   Red%Wound Bed 0 4/10/2019 10:49 AM   Yellow%Wound Bed 0 4/10/2019 10:49 AM   Black%Wound Bed 0 4/10/2019 10:49 AM   Purple%Wound Bed 0 4/2/2019 11:02 AM   Other%Wound Bed 10 4/2/2019 11:02 AM   Number of days: 69           Total Surface Area Debrided:  37.1 sq cm,  20% ( # 4 ), 30% of ( # 1 )       Bleeding:  Minimal    Hemostasis Achieved:  by pressure    Procedural Pain:  2  / 10     Post Procedural Pain:  0 / 10     Response to treatment:  Well tolerated by patient. The nature of the patient's condition was explained in depth. The patient was informed that their compliance to the treatment plan is paramount to successful healing and prevention of further ulceration and/or infection     Treatment Plan:   62-year-old female seen in follow-up for large right lower quadrant abdominal wound. She reports that she has been weak recently and has fallen on at least 2 occasions. The abdominal  wound has significantly improved. Debridement was performed. We will continue current dressing changes and follow up with her again in 2 weeks.     Jaret Woodward M.D.  4/16/2019

## 2019-04-16 NOTE — PLAN OF CARE
Discharge instructions given. Patient verbalized understanding. Return to [address] [place] [address],[address] in 2 weeks.

## 2019-04-30 ENCOUNTER — HOSPITAL ENCOUNTER (OUTPATIENT)
Dept: WOUND CARE | Age: 84
Discharge: HOME | End: 2019-04-30
Attending: SURGERY
Payer: MEDICARE

## 2019-04-30 VITALS — SYSTOLIC BLOOD PRESSURE: 154 MMHG | DIASTOLIC BLOOD PRESSURE: 75 MMHG | TEMPERATURE: 97.3 F

## 2019-04-30 DIAGNOSIS — R52 PAIN: Primary | ICD-10-CM

## 2019-04-30 LAB
AFB CULTURE (MYCOBACTERIA): NORMAL
AFB SMEAR: NORMAL

## 2019-04-30 PROCEDURE — 11045 DBRDMT SUBQ TISS EACH ADDL: CPT | Performed by: SURGERY

## 2019-04-30 PROCEDURE — 11045 DBRDMT SUBQ TISS EACH ADDL: CPT

## 2019-04-30 PROCEDURE — 11042 DBRDMT SUBQ TIS 1ST 20SQCM/<: CPT

## 2019-04-30 PROCEDURE — 11042 DBRDMT SUBQ TIS 1ST 20SQCM/<: CPT | Performed by: SURGERY

## 2019-04-30 RX ORDER — HYDROCODONE BITARTRATE AND ACETAMINOPHEN 5; 325 MG/1; MG/1
1 TABLET ORAL EVERY 6 HOURS PRN
Qty: 20 TABLET | Refills: 0 | Status: SHIPPED | OUTPATIENT
Start: 2019-04-30 | End: 2019-05-05

## 2019-04-30 RX ORDER — LIDOCAINE 40 MG/G
CREAM TOPICAL ONCE
Status: DISCONTINUED | OUTPATIENT
Start: 2019-04-30 | End: 2019-05-01 | Stop reason: HOSPADM

## 2019-04-30 NOTE — PLAN OF CARE
Discharge instructions given. Patient verbalized understanding. Return to [address] [place] [address],[address] in 2 weeks.   Called/faxed orders to [facility]

## 2019-04-30 NOTE — PROGRESS NOTES
Sterling Kawasaki  AGE: 82 y.o. GENDER: female  : 1935  TODAY'S DATE:  2019    Chief Complaint   Patient presents with    Wound Check     abdomen  F/U        HISTORY of PRESENT ILLNESS HPI     Sterling Kawasaki is a 82 y.o. female who presents today for wound evaluation. History of Wound: Abdominal wound  Wound Pain:  moderate  Severity:  5 / 10   Wound Type:  undetermined  Modifying Factors:  none  Associated Signs/Symptoms:  none    Procedure Note    Performed by: Yuliet Khalil MD    Consent obtained: Yes    Time out taken:  Yes    Pain Control: Anesthetic  Anesthetic: Other (comment)(4% lidocaine cream)     Debridement:Excisional Debridement    Using curette the wound was sharply debrided    down through and including the removal of subcutaneous tissue.         Devitalized Tissue Debrided:  necrotic/eschar    Pre Debridement Measurements:  Are located in the Wound Documentation Flow Sheet    Wound #: 1, 4 and 5     Post  Debridement Measurements:  Wound 18 Abdomen Right #1 (Active)   Wound Image   2019 10:44 AM   Wound Non-Healing Surgical 2019 10:33 AM   Dressing/Treatment ABD 2019 11:41 AM   Wound Cleansed Rinsed/Irrigated with saline 2019 10:59 AM   Wound Length (cm) 7.3 cm 2019 10:33 AM   Wound Width (cm) 12 cm 2019 10:33 AM   Wound Depth (cm) 0.2 cm 2019 10:33 AM   Wound Surface Area (cm^2) 87.6 cm^2 2019 10:33 AM   Change in Wound Size % (l*w) -400.57 2019 10:33 AM   Wound Volume (cm^3) 17.52 cm^3 2019 10:33 AM   Wound Healing % -234 2019 10:33 AM   Post-Procedure Length (cm) 7.3 cm 2019 10:59 AM   Post-Procedure Width (cm) 12 cm 2019 10:59 AM   Post-Procedure Depth (cm) 0.2 cm 2019 10:59 AM   Post-Procedure Surface Area (cm^2) 87.6 cm^2 2019 10:59 AM   Post-Procedure Volume (cm^3) 17.52 cm^3 2019 10:59 AM   Distance Tunneling (cm) 2.4 cm 1/15/2019 11:09 AM   Tunneling Position ___ O'Clock 1300 1/15/2019 11:09 AM   Undermining Starts ___ O'Clock 3.5 12/11/2018 11:29 AM   Wound Assessment Bleeding 4/30/2019 10:59 AM   Drainage Amount Moderate 4/30/2019 10:59 AM   Drainage Description Serosanguinous 4/30/2019 10:33 AM   Odor Mild 4/30/2019 10:33 AM   [place]-wound Assessment Dry 4/30/2019 10:33 AM   [place]%Wound Bed 50 4/30/2019 10:33 AM   Red%Wound Bed 0 4/30/2019 10:33 AM   Yellow%Wound Bed 40 4/30/2019 10:33 AM   Black%Wound Bed 10 4/30/2019 10:33 AM   Purple%Wound Bed 0 4/30/2019 10:33 AM   Other%Wound Bed 0 4/30/2019 10:33 AM   Number of days: 146       Wound 02/06/19 Abdomen Lateral;Right #4 (Active)   Wound Image   2/6/2019 10:44 AM   Wound Non-Healing Surgical 4/30/2019 10:33 AM   Dressing/Treatment ABD 4/2/2019 11:41 AM   Wound Cleansed Rinsed/Irrigated with saline 4/30/2019 10:59 AM   Wound Length (cm) 2 cm 4/30/2019 10:33 AM   Wound Width (cm) 2.6 cm 4/30/2019 10:33 AM   Wound Depth (cm) 0.1 cm 4/30/2019 10:33 AM   Wound Surface Area (cm^2) 5.2 cm^2 4/30/2019 10:33 AM   Change in Wound Size % (l*w) -57.58 4/30/2019 10:33 AM   Wound Volume (cm^3) 0.52 cm^3 4/30/2019 10:33 AM   Wound Healing % -58 4/30/2019 10:33 AM   Post-Procedure Length (cm) 2 cm 4/30/2019 10:59 AM   Post-Procedure Width (cm) 2.6 cm 4/30/2019 10:59 AM   Post-Procedure Depth (cm) 0.1 cm 4/30/2019 10:59 AM   Post-Procedure Surface Area (cm^2) 5.2 cm^2 4/30/2019 10:59 AM   Post-Procedure Volume (cm^3) 0.52 cm^3 4/30/2019 10:59 AM   Wound Assessment Bleeding 4/30/2019 10:59 AM   Drainage Amount Moderate 4/30/2019 10:59 AM   Drainage Description Serosanguinous 4/30/2019 10:33 AM   Odor None 4/30/2019 10:33 AM   [place]-wound Assessment Clean 4/30/2019 10:33 AM   [place]%Wound Bed 80 4/30/2019 10:33 AM   Red%Wound Bed 0 4/30/2019 10:33 AM   Yellow%Wound Bed 20 4/30/2019 10:33 AM   Black%Wound Bed 0 4/30/2019 10:33 AM   Purple%Wound Bed 0 4/30/2019 10:33 AM   Other%Wound Bed 0 4/30/2019 10:33 AM   Number of days: 82       Wound 04/30/19 Foot Lateral;Right ulceration on the lateral aspect of her right foot. She will paint this area with Betadine and wear a surgical shoe. Follow-up in 2 weeks.       Leatha Hu M.D.  4/30/2019

## 2019-05-14 ENCOUNTER — HOSPITAL ENCOUNTER (OUTPATIENT)
Dept: WOUND CARE | Age: 84
Discharge: HOME | End: 2019-05-14
Attending: SURGERY
Payer: MEDICARE

## 2019-05-14 VITALS
HEART RATE: 86 BPM | TEMPERATURE: 97.5 F | SYSTOLIC BLOOD PRESSURE: 164 MMHG | DIASTOLIC BLOOD PRESSURE: 70 MMHG | RESPIRATION RATE: 16 BRPM

## 2019-05-14 PROCEDURE — 11045 DBRDMT SUBQ TISS EACH ADDL: CPT

## 2019-05-14 PROCEDURE — 11042 DBRDMT SUBQ TIS 1ST 20SQCM/<: CPT | Performed by: SURGERY

## 2019-05-14 PROCEDURE — 11045 DBRDMT SUBQ TISS EACH ADDL: CPT | Performed by: SURGERY

## 2019-05-14 PROCEDURE — 11042 DBRDMT SUBQ TIS 1ST 20SQCM/<: CPT

## 2019-05-14 RX ORDER — LIDOCAINE 40 MG/G
CREAM TOPICAL ONCE
Status: DISCONTINUED | OUTPATIENT
Start: 2019-05-14 | End: 2019-05-15 | Stop reason: HOSPADM

## 2019-05-14 NOTE — PLAN OF CARE
Discharge instructions given. Patient verbalized understanding. Return to [facility] in 2 weeks.   Called/faxed orders to [facility]

## 2019-05-14 NOTE — PROGRESS NOTES
Brad Beauchamp  AGE: 82 y.o. GENDER: female  : 1935  TODAY'S DATE:  2019    No chief complaint on file. HISTORY of PRESENT ILLNESS HPI     Brad Beauchamp is a 82 y.o. female who presents today for wound evaluation. History of Wound: abdominal wound  Wound Pain:  moderate  Severity:  4 / 10   Wound Type:  undetermined  Modifying Factors:  none  Associated Signs/Symptoms:  none    Procedure Note    Performed by: Aiden Pascual MD    Consent obtained: Yes    Time out taken:  Yes    Pain Control: Anesthetic  Anesthetic: 2% Lidocaine Injectable     Debridement:Excisional Debridement    Using curette, #15 blade scalpel and forceps the wound was sharply debrided    down through and including the removal of subcutaneous tissue.         Devitalized Tissue Debrided:  necrotic/eschar    Pre Debridement Measurements:  Are located in the Wound Documentation Flow Sheet    Wound #: 1, 4 and 5     Post  Debridement Measurements:  Wound 18 Abdomen Right #1 (Active)   Wound Image   2019 10:44 AM   Wound Non-Healing Surgical 2019 10:33 AM   Dressing/Treatment ABD 2019 11:41 AM   Wound Cleansed Rinsed/Irrigated with saline 2019 11:15 AM   Wound Length (cm) 7.5 cm 2019 11:02 AM   Wound Width (cm) 11 cm 2019 11:02 AM   Wound Depth (cm) 0.1 cm 2019 11:02 AM   Wound Surface Area (cm^2) 82.5 cm^2 2019 11:02 AM   Change in Wound Size % (l*w) -371.43 2019 11:02 AM   Wound Volume (cm^3) 8.25 cm^3 2019 11:02 AM   Wound Healing % -57 2019 11:02 AM   Post-Procedure Length (cm) 7.5 cm 2019 11:15 AM   Post-Procedure Width (cm) 11 cm 2019 11:15 AM   Post-Procedure Depth (cm) 0.2 cm 2019 11:15 AM   Post-Procedure Surface Area (cm^2) 82.5 cm^2 2019 11:15 AM   Post-Procedure Volume (cm^3) 16.5 cm^3 2019 11:15 AM   Distance Tunneling (cm) 2.4 cm 1/15/2019 11:09 AM   Tunneling Position ___ O'Clock 1300 1/15/2019 11:09 AM   Undermining Starts ___ O'Clock 3.5 12/11/2018 11:29 AM   Wound Assessment Bleeding 5/14/2019 11:15 AM   Drainage Amount Moderate 5/14/2019 11:15 AM   Drainage Description Serosanguinous; Sanguinous 5/14/2019 11:02 AM   Odor Mild 5/14/2019 11:02 AM   [place]-wound Assessment Dry 5/14/2019 11:02 AM   [place]%Wound Bed 50 5/14/2019 11:02 AM   Red%Wound Bed 20 5/14/2019 11:02 AM   Yellow%Wound Bed 20 5/14/2019 11:02 AM   Black%Wound Bed 10 5/14/2019 11:02 AM   Purple%Wound Bed 0 5/14/2019 11:02 AM   Other%Wound Bed 0 5/14/2019 11:02 AM   Number of days: 160       Wound 02/06/19 Abdomen Lateral;Right #4 (Active)   Wound Image   2/6/2019 10:44 AM   Wound Non-Healing Surgical 4/30/2019 10:33 AM   Dressing/Treatment ABD 4/2/2019 11:41 AM   Wound Cleansed Rinsed/Irrigated with saline 5/14/2019 11:15 AM   Wound Length (cm) 1.5 cm 5/14/2019 11:02 AM   Wound Width (cm) 1 cm 5/14/2019 11:02 AM   Wound Depth (cm) 0.1 cm 5/14/2019 11:02 AM   Wound Surface Area (cm^2) 1.5 cm^2 5/14/2019 11:02 AM   Change in Wound Size % (l*w) 54.55 5/14/2019 11:02 AM   Wound Volume (cm^3) 0.15 cm^3 5/14/2019 11:02 AM   Wound Healing % 55 5/14/2019 11:02 AM   Post-Procedure Length (cm) 1.5 cm 5/14/2019 11:15 AM   Post-Procedure Width (cm) 1 cm 5/14/2019 11:15 AM   Post-Procedure Depth (cm) 0.1 cm 5/14/2019 11:15 AM   Post-Procedure Surface Area (cm^2) 1.5 cm^2 5/14/2019 11:15 AM   Post-Procedure Volume (cm^3) 0.15 cm^3 5/14/2019 11:15 AM   Wound Assessment Bleeding 5/14/2019 11:15 AM   Drainage Amount Moderate 5/14/2019 11:15 AM   Drainage Description Serosanguinous 5/14/2019 11:02 AM   Odor None 5/14/2019 11:02 AM   [place]-wound Assessment Clean 4/30/2019 10:33 AM   [place]%Wound Bed 60 5/14/2019 11:02 AM   Red%Wound Bed 40 5/14/2019 11:02 AM   Yellow%Wound Bed 0 5/14/2019 11:02 AM   Black%Wound Bed 0 5/14/2019 11:02 AM   Purple%Wound Bed 0 5/14/2019 11:02 AM   Other%Wound Bed 0 5/14/2019 11:02 AM   Number of days: 96       Wound 04/30/19 Foot Lateral;Right #5 (Active)   Wound Image   4/30/2019 10:45 AM   Wound Diabetic 5/14/2019 11:02 AM   Wound Cleansed Rinsed/Irrigated with saline 5/14/2019 11:15 AM   Wound Length (cm) 0.8 cm 5/14/2019 11:02 AM   Wound Width (cm) 0.5 cm 5/14/2019 11:02 AM   Wound Depth (cm) 0.1 cm 5/14/2019 11:02 AM   Wound Surface Area (cm^2) 0.4 cm^2 5/14/2019 11:02 AM   Change in Wound Size % (l*w) 33.33 5/14/2019 11:02 AM   Wound Volume (cm^3) 0.04 cm^3 5/14/2019 11:02 AM   Wound Healing % 33 5/14/2019 11:02 AM   Post-Procedure Length (cm) 0.8 cm 5/14/2019 11:15 AM   Post-Procedure Width (cm) 0.5 cm 5/14/2019 11:15 AM   Post-Procedure Depth (cm) 0.1 cm 5/14/2019 11:15 AM   Post-Procedure Surface Area (cm^2) 0.4 cm^2 5/14/2019 11:15 AM   Post-Procedure Volume (cm^3) 0.04 cm^3 5/14/2019 11:15 AM   Wound Assessment Pink 5/14/2019 11:02 AM   Drainage Amount Small 5/14/2019 11:02 AM   Drainage Description Serosanguinous 5/14/2019 11:02 AM   Odor None 5/14/2019 11:02 AM   [place]-wound Assessment Maceration 4/30/2019 10:45 AM   [place]%Wound Bed 100 5/14/2019 11:02 AM   Red%Wound Bed 0 5/14/2019 11:02 AM   Yellow%Wound Bed 0 5/14/2019 11:02 AM   Black%Wound Bed 0 5/14/2019 11:02 AM   Purple%Wound Bed 0 5/14/2019 11:02 AM   Other%Wound Bed 0 5/14/2019 11:02 AM   Number of days: 14           Total Surface Area Debrided:  43.5 sq cm ( only 50% of the larger abdominal wound was debrided )     Bleeding:  Minimal    Hemostasis Achieved:  by pressure    Procedural Pain:  3  / 10     Post Procedural Pain:  0 / 10     Response to treatment:  Well tolerated by patient. The nature of the patient's condition was explained in depth. The patient was informed that their compliance to the treatment plan is paramount to successful healing and prevention of further ulceration and/or infection     Treatment Plan:   73-year-old female with right lower quadrant abdominal wound. Overall, we are finally making good progress.   There was a new area of full-thickness necrosis of the upper medial portion of the larger abdominal wound. This was debrided after local anesthetic was injected. We also debrided a smaller abdominal wound and the small ulceration on the side of her right foot. The right foot ulceration likely is the result of pressure from her shoe. It is progressing adequately. She will paint this wound with Betadine and continue current abdominal dressing changes. Follow up in 2 weeks.     Alee Logan M.D.  5/14/2019

## 2019-05-28 ENCOUNTER — HOSPITAL ENCOUNTER (OUTPATIENT)
Dept: WOUND CARE | Age: 84
Discharge: HOME | End: 2019-05-28
Attending: SURGERY
Payer: MEDICARE

## 2019-05-28 VITALS — RESPIRATION RATE: 16 BRPM | DIASTOLIC BLOOD PRESSURE: 69 MMHG | SYSTOLIC BLOOD PRESSURE: 140 MMHG | HEART RATE: 89 BPM

## 2019-05-28 PROCEDURE — 87186 SC STD MICRODIL/AGAR DIL: CPT

## 2019-05-28 PROCEDURE — 11045 DBRDMT SUBQ TISS EACH ADDL: CPT | Performed by: SURGERY

## 2019-05-28 PROCEDURE — 87070 CULTURE OTHR SPECIMN AEROBIC: CPT

## 2019-05-28 PROCEDURE — 11045 DBRDMT SUBQ TISS EACH ADDL: CPT

## 2019-05-28 PROCEDURE — 87205 SMEAR GRAM STAIN: CPT

## 2019-05-28 PROCEDURE — 11042 DBRDMT SUBQ TIS 1ST 20SQCM/<: CPT

## 2019-05-28 PROCEDURE — 11042 DBRDMT SUBQ TIS 1ST 20SQCM/<: CPT | Performed by: SURGERY

## 2019-05-28 PROCEDURE — 87077 CULTURE AEROBIC IDENTIFY: CPT

## 2019-05-28 RX ORDER — LIDOCAINE HYDROCHLORIDE 20 MG/ML
5 INJECTION, SOLUTION INFILTRATION; PERINEURAL ONCE
Status: DISCONTINUED | OUTPATIENT
Start: 2019-05-28 | End: 2019-05-29 | Stop reason: HOSPADM

## 2019-05-28 NOTE — PLAN OF CARE
Discharge instructions given. Patient verbalized understanding. Return to [facility] in 1 week.   Culture

## 2019-05-28 NOTE — PROGRESS NOTES
Flory Graham  AGE: 82 y.o. GENDER: female  : 1935  TODAY'S DATE:  2019    No chief complaint on file. HISTORY of PRESENT ILLNESS HPI     Flory Graham is a 82 y.o. female who presents today for wound evaluation. History of Wound: abdominal wound  Wound Pain:  moderate  Severity:  4 / 10   Wound Type:  infectious  Modifying Factors:  none  Associated Signs/Symptoms:  none    Procedure Note    Performed by: Maribel Reddy MD    Consent obtained: Yes    Time out taken:  Yes    Pain Control: Anesthetic  Anesthetic: Other (comment)(4% cream)     Debridement:Excisional Debridement    Using curette, #15 blade scalpel and forceps the wound was sharply debrided    down through and including the removal of subcutaneous tissue.         Devitalized Tissue Debrided:  necrotic/eschar    Pre Debridement Measurements:  Are located in the Wound Documentation Flow Sheet    Wound #: 1     Post  Debridement Measurements:  Wound 18 Abdomen Right #1 (Active)   Wound Image   2019 10:44 AM   Wound Non-Healing Surgical 2019 10:33 AM   Dressing/Treatment ABD 2019 11:41 AM   Wound Cleansed Rinsed/Irrigated with saline 2019 11:46 AM   Wound Length (cm) 6.5 cm 2019 11:15 AM   Wound Width (cm) 8 cm 2019 11:15 AM   Wound Depth (cm) 0.1 cm 2019 11:15 AM   Wound Surface Area (cm^2) 52 cm^2 2019 11:15 AM   Change in Wound Size % (l*w) -197.14 2019 11:15 AM   Wound Volume (cm^3) 5.2 cm^3 2019 11:15 AM   Wound Healing % 1 2019 11:15 AM   Post-Procedure Length (cm) 6.5 cm 2019 11:46 AM   Post-Procedure Width (cm) 8 cm 2019 11:46 AM   Post-Procedure Depth (cm) 1 cm 2019 11:46 AM   Post-Procedure Surface Area (cm^2) 52 cm^2 2019 11:46 AM   Post-Procedure Volume (cm^3) 52 cm^3 2019 11:46 AM   Distance Tunneling (cm) 2.4 cm 1/15/2019 11:09 AM   Tunneling Position ___ O'Clock 1300 1/15/2019 11:09 AM   Undermining Starts ___ O'Clock 3.5 12/11/2018 11:29 AM   Wound Assessment Bleeding 5/28/2019 11:46 AM   Drainage Amount Moderate 5/28/2019 11:46 AM   Drainage Description Serosanguinous; Sanguinous 5/28/2019 11:15 AM   Odor Mild 5/28/2019 11:15 AM   [place]-wound Assessment Dry 5/28/2019 11:15 AM   [place]%Wound Bed 60 5/28/2019 11:15 AM   Red%Wound Bed 40 5/28/2019 11:15 AM   Yellow%Wound Bed 0 5/28/2019 11:15 AM   Black%Wound Bed 0 5/28/2019 11:15 AM   Purple%Wound Bed 0 5/28/2019 11:15 AM   Other%Wound Bed 0 5/28/2019 11:15 AM   Number of days: 175       Wound 02/06/19 Abdomen Lateral;Right #4 (Active)   Wound Image   2/6/2019 10:44 AM   Wound Non-Healing Surgical 5/28/2019 11:15 AM   Dressing/Treatment ABD 4/2/2019 11:41 AM   Wound Cleansed Rinsed/Irrigated with saline 5/28/2019 11:15 AM   Wound Length (cm) 0 cm 5/28/2019 11:15 AM   Wound Width (cm) 0 cm 5/28/2019 11:15 AM   Wound Depth (cm) 0 cm 5/28/2019 11:15 AM   Wound Surface Area (cm^2) 0 cm^2 5/28/2019 11:15 AM   Change in Wound Size % (l*w) 100 5/28/2019 11:15 AM   Wound Volume (cm^3) 0 cm^3 5/28/2019 11:15 AM   Wound Healing % 100 5/28/2019 11:15 AM   Post-Procedure Length (cm) 1.5 cm 5/14/2019 11:15 AM   Post-Procedure Width (cm) 1 cm 5/14/2019 11:15 AM   Post-Procedure Depth (cm) 0.1 cm 5/14/2019 11:15 AM   Post-Procedure Surface Area (cm^2) 1.5 cm^2 5/14/2019 11:15 AM   Post-Procedure Volume (cm^3) 0.15 cm^3 5/14/2019 11:15 AM   Wound Assessment Bleeding 5/28/2019 11:15 AM   Drainage Amount Moderate 5/28/2019 11:15 AM   Drainage Description Serosanguinous 5/28/2019 11:15 AM   Odor None 5/28/2019 11:15 AM   [place]-wound Assessment Clean 4/30/2019 10:33 AM   [place]%Wound Bed 50 5/28/2019 11:15 AM   Red%Wound Bed 50 5/28/2019 11:15 AM   Yellow%Wound Bed 0 5/28/2019 11:15 AM   Black%Wound Bed 0 5/28/2019 11:15 AM   Purple%Wound Bed 0 5/28/2019 11:15 AM   Other%Wound Bed 0 5/28/2019 11:15 AM   Number of days: 111       Wound 04/30/19 Foot Lateral;Right #5 (Active)   Wound Image   4/30/2019 10:45 AM anesthetic the wound was debrided. Cultures were obtained. We will continue wet-to-dry dressing changes and she will follow-up in one week.     Rasheed Manzo M.D.  5/28/2019

## 2019-05-31 LAB
GRAM STAIN RESULT: ABNORMAL
ORGANISM: ABNORMAL
WOUND/ABSCESS: ABNORMAL

## 2019-06-04 ENCOUNTER — HOSPITAL ENCOUNTER (OUTPATIENT)
Dept: WOUND CARE | Age: 84
Discharge: HOME | End: 2019-06-04
Attending: SURGERY
Payer: MEDICARE

## 2019-06-04 VITALS — HEART RATE: 90 BPM | SYSTOLIC BLOOD PRESSURE: 165 MMHG | RESPIRATION RATE: 16 BRPM | DIASTOLIC BLOOD PRESSURE: 74 MMHG

## 2019-06-04 PROCEDURE — 11045 DBRDMT SUBQ TISS EACH ADDL: CPT

## 2019-06-04 PROCEDURE — 11042 DBRDMT SUBQ TIS 1ST 20SQCM/<: CPT | Performed by: SURGERY

## 2019-06-04 PROCEDURE — 11042 DBRDMT SUBQ TIS 1ST 20SQCM/<: CPT

## 2019-06-04 PROCEDURE — 11045 DBRDMT SUBQ TISS EACH ADDL: CPT | Performed by: SURGERY

## 2019-06-04 RX ORDER — LIDOCAINE 50 MG/G
OINTMENT TOPICAL ONCE
Status: DISCONTINUED | OUTPATIENT
Start: 2019-06-04 | End: 2019-06-05 | Stop reason: HOSPADM

## 2019-06-04 RX ORDER — SULFAMETHOXAZOLE AND TRIMETHOPRIM 800; 160 MG/1; MG/1
1 TABLET ORAL 2 TIMES DAILY
Qty: 20 TABLET | Refills: 0 | Status: SHIPPED | OUTPATIENT
Start: 2019-06-04 | End: 2019-06-14

## 2019-06-04 NOTE — PROGRESS NOTES
Rita Gardiner  AGE: 82 y.o. GENDER: female  : 1935  TODAY'S DATE:  2019    Chief Complaint   Patient presents with    Wound Check     Follow up abdominal wound        HISTORY of PRESENT ILLNESS HPI     Rita Gardiner is a 82 y.o. female who presents today for wound evaluation. History of Wound: abdominal and foot wound  Wound Pain:  moderate  Severity:  4 / 10   Wound Type:  diabetic and infectious  Modifying Factors:  none  Associated Signs/Symptoms:  none    Procedure Note    Performed by: Tiffanie Vee MD    Consent obtained: Yes    Time out taken:  Yes    Pain Control: Anesthetic  Anesthetic: Other (comment)     Debridement:Excisional Debridement    Using curette, #15 blade scalpel, scissors and forceps the wound was sharply debrided    down through and including the removal of subcutaneous tissue.         Devitalized Tissue Debrided:  necrotic/eschar    Pre Debridement Measurements:  Are located in the Wound Documentation Flow Sheet    Wound #: 1     Post  Debridement Measurements:  Wound 18 Abdomen Right #1 (Active)   Wound Image   2019 10:44 AM   Wound Other 2019 10:29 AM   Dressing/Treatment ABD 2019 11:41 AM   Wound Cleansed Rinsed/Irrigated with saline 2019 11:00 AM   Wound Length (cm) 7 cm 2019 10:29 AM   Wound Width (cm) 8.5 cm 2019 10:29 AM   Wound Depth (cm) 0.1 cm 2019 10:29 AM   Wound Surface Area (cm^2) 59.5 cm^2 2019 10:29 AM   Change in Wound Size % (l*w) -240 2019 10:29 AM   Wound Volume (cm^3) 5.95 cm^3 2019 10:29 AM   Wound Healing % -13 2019 10:29 AM   Post-Procedure Length (cm) 7 cm 2019 11:00 AM   Post-Procedure Width (cm) 8.5 cm 2019 11:00 AM   Post-Procedure Depth (cm) 0.1 cm 2019 11:00 AM   Post-Procedure Surface Area (cm^2) 59.5 cm^2 2019 11:00 AM   Post-Procedure Volume (cm^3) 5.95 cm^3 2019 11:00 AM   Distance Tunneling (cm) 2.4 cm 1/15/2019 11:09 AM   Tunneling Position ___ O'Clock 1300 1/15/2019 11:09 AM   Undermining Starts ___ O'Clock 3.5 12/11/2018 11:29 AM   Wound Assessment Bleeding 6/4/2019 11:00 AM   Drainage Amount Moderate 6/4/2019 11:00 AM   Drainage Description Purulent;Yellow 6/4/2019 10:29 AM   Odor None 6/4/2019 10:29 AM   [place]-wound Assessment Dry 5/28/2019 11:15 AM   [place]%Wound Bed 0 6/4/2019 10:29 AM   Red%Wound Bed 75 6/4/2019 10:29 AM   Yellow%Wound Bed 20 6/4/2019 10:29 AM   Black%Wound Bed 0 6/4/2019 10:29 AM   Purple%Wound Bed 0 6/4/2019 10:29 AM   Other%Wound Bed 0 6/4/2019 10:29 AM   Number of days: 181       Wound 04/30/19 Foot Lateral;Right #5 (Active)   Wound Image   4/30/2019 10:45 AM   Wound Diabetic 6/4/2019 10:29 AM   Wound Cleansed Rinsed/Irrigated with saline 6/4/2019 11:00 AM   Wound Length (cm) 1 cm 6/4/2019 10:29 AM   Wound Width (cm) 0.7 cm 6/4/2019 10:29 AM   Wound Depth (cm) 0.1 cm 6/4/2019 10:29 AM   Wound Surface Area (cm^2) 0.7 cm^2 6/4/2019 10:29 AM   Change in Wound Size % (l*w) -16.67 6/4/2019 10:29 AM   Wound Volume (cm^3) 0.07 cm^3 6/4/2019 10:29 AM   Wound Healing % -17 6/4/2019 10:29 AM   Post-Procedure Length (cm) 1 cm 6/4/2019 11:00 AM   Post-Procedure Width (cm) 0.7 cm 6/4/2019 11:00 AM   Post-Procedure Depth (cm) 0.1 cm 6/4/2019 11:00 AM   Post-Procedure Surface Area (cm^2) 0.7 cm^2 6/4/2019 11:00 AM   Post-Procedure Volume (cm^3) 0.07 cm^3 6/4/2019 11:00 AM   Wound Assessment Bleeding 6/4/2019 11:00 AM   Drainage Amount Moderate 6/4/2019 11:00 AM   Drainage Description Serosanguinous 6/4/2019 10:29 AM   Odor None 6/4/2019 10:29 AM   [place]-wound Assessment Maceration 6/4/2019 10:29 AM   [place]%Wound Bed 100 6/4/2019 10:29 AM   Red%Wound Bed 0 6/4/2019 10:29 AM   Yellow%Wound Bed 0 6/4/2019 10:29 AM   Black%Wound Bed 0 6/4/2019 10:29 AM   Purple%Wound Bed 0 5/28/2019 11:15 AM   Other%Wound Bed 0 5/28/2019 11:15 AM   Number of days: 35           Total Surface Area Debrided:  59.5 sq cm     Bleeding:  Minimal    Hemostasis Achieved:  by pressure    Procedural Pain:  3  / 10     Post Procedural Pain:  0 / 10     Response to treatment:  Well tolerated by patient. The nature of the patient's condition was explained in depth. The patient was informed that their compliance to the treatment plan is paramount to successful healing and prevention of further ulceration and/or infection     Treatment Plan:   79-year-old female with a chronic right lower quadrant abdominal wound. There is less black necrotic tissue at the periphery of the wound today. Under local anesthetic, the entire wound was debrided. Cultures showed a polymicrobial infection. All of the bacteria are sensitive to Bactrim DS. She was prescribed a 10 day prescription of Bactrim DS 1 by mouth twice a day. She will check her INR every 3-4 days. There is a ulceration on the lateral aspect of the right foot. The ulceration is clean. She has strong biphasic DP and PT Doppler signals. We will dress the ulceration with antibiotic ointment/Adaptic/Kerlex. If the ulceration does not show signs of improvement, we will order a formal arterial studies. She will follow up with us again in one week.     Esme Kelly M.D.  6/4/2019

## 2019-06-04 NOTE — PLAN OF CARE
Discharge instructions given. Patient verbalized understanding. Return to [facility] in 1 week. Called/faxed orders to [facility]- Bactrim DS, [facility]  Pt is on Coumadin, pt states she checks her PT/INR herself at home- instructed to take every 3-4 days while on the Bactrim DS, MOO.

## 2019-06-11 ENCOUNTER — HOSPITAL ENCOUNTER (OUTPATIENT)
Dept: WOUND CARE | Age: 84
Discharge: HOME | End: 2019-06-11
Payer: MEDICARE

## 2019-06-11 VITALS — SYSTOLIC BLOOD PRESSURE: 143 MMHG | RESPIRATION RATE: 18 BRPM | DIASTOLIC BLOOD PRESSURE: 73 MMHG | HEART RATE: 85 BPM

## 2019-06-11 PROCEDURE — 11045 DBRDMT SUBQ TISS EACH ADDL: CPT

## 2019-06-11 PROCEDURE — 11045 DBRDMT SUBQ TISS EACH ADDL: CPT | Performed by: SURGERY

## 2019-06-11 PROCEDURE — 11042 DBRDMT SUBQ TIS 1ST 20SQCM/<: CPT

## 2019-06-11 PROCEDURE — 11042 DBRDMT SUBQ TIS 1ST 20SQCM/<: CPT | Performed by: SURGERY

## 2019-06-11 RX ORDER — LIDOCAINE 40 MG/G
CREAM TOPICAL ONCE
Status: DISCONTINUED | OUTPATIENT
Start: 2019-06-11 | End: 2019-06-12 | Stop reason: HOSPADM

## 2019-06-11 NOTE — PLAN OF CARE
Discharge instructions given. Patient verbalized understanding. Return to [facility] in 1 week.   Instructed to stop taking Bactrim DS

## 2019-06-11 NOTE — PROGRESS NOTES
Post Procedural Pain:  0 / 10     Response to treatment:  Well tolerated by patient. The nature of the patient's condition was explained in depth. The patient was informed that their compliance to the treatment plan is paramount to successful healing and prevention of further ulceration and/or infection     Treatment Plan:   82year old female seen in follow up for an abdominal wound and an ulcer on the lateral aspect of the R foot. The abdominal wound is improving. There is less necrotic tissue at the upper medial aspect of the wound. The R foot wound is progressing well. Continue current dressing changes and follow up in one week.      Amy Hines M.D.  6/11/2019

## 2019-06-18 ENCOUNTER — HOSPITAL ENCOUNTER (OUTPATIENT)
Dept: WOUND CARE | Age: 84
Discharge: HOME | End: 2019-06-18
Payer: MEDICARE

## 2019-06-18 VITALS
HEART RATE: 78 BPM | BODY MASS INDEX: 48.37 KG/M2 | SYSTOLIC BLOOD PRESSURE: 148 MMHG | WEIGHT: 256 LBS | DIASTOLIC BLOOD PRESSURE: 60 MMHG | TEMPERATURE: 97.2 F

## 2019-06-18 DIAGNOSIS — S31.109D OPEN ABDOMINAL WALL WOUND, SUBSEQUENT ENCOUNTER: Primary | ICD-10-CM

## 2019-06-18 PROCEDURE — 11045 DBRDMT SUBQ TISS EACH ADDL: CPT | Performed by: SURGERY

## 2019-06-18 PROCEDURE — 11042 DBRDMT SUBQ TIS 1ST 20SQCM/<: CPT

## 2019-06-18 PROCEDURE — 11045 DBRDMT SUBQ TISS EACH ADDL: CPT

## 2019-06-18 PROCEDURE — 11042 DBRDMT SUBQ TIS 1ST 20SQCM/<: CPT | Performed by: SURGERY

## 2019-06-18 RX ORDER — LIDOCAINE 40 MG/G
CREAM TOPICAL ONCE
Status: DISCONTINUED | OUTPATIENT
Start: 2019-06-18 | End: 2019-06-19 | Stop reason: HOSPADM

## 2019-06-18 NOTE — PLAN OF CARE
Discharge instructions given. Patient verbalized understanding. Return to [address] [place] [address],[address] in 1 week.   Called/faxed orders to [facility]  Referred to dermatology

## 2019-06-18 NOTE — PROGRESS NOTES
pressure    Procedural Pain:  3  / 10     Post Procedural Pain:  0 / 10     Response to treatment:  Well tolerated by patient. The nature of the patient's condition was explained in depth. The patient was informed that their compliance to the treatment plan is paramount to successful healing and prevention of further ulceration and/or infection     Treatment Plan:   53-year-old female with a right lower quadrant abdominal wound. The wound is slowly improving. There are less necrotic edges today. Debridement was performed. The right lateral foot ulceration has healed. We will continue current dressing changes on the abdominal wound. Follow-up in 2 weeks.     Lucia Sacks, M.D.  6/18/2019

## 2019-06-19 ENCOUNTER — TELEPHONE (OUTPATIENT)
Dept: DERMATOLOGY | Age: 84
End: 2019-06-19

## 2019-06-19 NOTE — TELEPHONE ENCOUNTER
Received a referral from wound care clinic  Called pt  # [phone] lmvm to call office to [facility] appt  Called cell recording voice mail not set up    Appt held for Dr. Paola Ceron on Monday, July 8, 2019 in [facility]

## 2019-07-02 ENCOUNTER — HOSPITAL ENCOUNTER (OUTPATIENT)
Dept: WOUND CARE | Age: 84
Discharge: HOME | End: 2019-07-02
Payer: MEDICARE

## 2019-07-02 VITALS
TEMPERATURE: 97 F | DIASTOLIC BLOOD PRESSURE: 62 MMHG | SYSTOLIC BLOOD PRESSURE: 143 MMHG | BODY MASS INDEX: 48.37 KG/M2 | WEIGHT: 256 LBS | HEART RATE: 68 BPM

## 2019-07-02 PROCEDURE — 11042 DBRDMT SUBQ TIS 1ST 20SQCM/<: CPT

## 2019-07-02 PROCEDURE — 11042 DBRDMT SUBQ TIS 1ST 20SQCM/<: CPT | Performed by: SURGERY

## 2019-07-02 PROCEDURE — 11045 DBRDMT SUBQ TISS EACH ADDL: CPT

## 2019-07-02 PROCEDURE — 11045 DBRDMT SUBQ TISS EACH ADDL: CPT | Performed by: SURGERY

## 2019-07-02 RX ORDER — LIDOCAINE 40 MG/G
CREAM TOPICAL ONCE
Status: DISCONTINUED | OUTPATIENT
Start: 2019-07-02 | End: 2019-07-03 | Stop reason: HOSPADM

## 2019-07-02 NOTE — PROGRESS NOTES
Starts ___ O'Clock 3.5 12/11/2018 11:29 AM   Wound Assessment Bleeding 7/2/2019 11:52 AM   Drainage Amount Moderate 7/2/2019 11:52 AM   Drainage Description Serosanguinous 7/2/2019 11:32 AM   Odor None 7/2/2019 11:32 AM   [place]-wound Assessment Dry 5/28/2019 11:15 AM   [place]%Wound Bed 0 7/2/2019 11:32 AM   Red%Wound Bed 80 7/2/2019 11:32 AM   Yellow%Wound Bed 20 7/2/2019 11:32 AM   Black%Wound Bed 0 7/2/2019 11:32 AM   Purple%Wound Bed 0 7/2/2019 11:32 AM   Other%Wound Bed 0 7/2/2019 11:32 AM   Number of days: 210       Wound 04/30/19 Foot Lateral;Right #5 (Active)   Wound Image   4/30/2019 10:45 AM   Wound Diabetic 7/2/2019 11:32 AM   Wound Cleansed Rinsed/Irrigated with saline 7/2/2019 11:52 AM   Wound Length (cm) 0.2 cm 7/2/2019 11:32 AM   Wound Width (cm) 0.2 cm 7/2/2019 11:32 AM   Wound Depth (cm) 0.1 cm 7/2/2019 11:32 AM   Wound Surface Area (cm^2) 0.04 cm^2 7/2/2019 11:32 AM   Change in Wound Size % (l*w) 93.33 7/2/2019 11:32 AM   Wound Volume (cm^3) 0 cm^3 7/2/2019 11:32 AM   Wound Healing % 100 7/2/2019 11:32 AM   Post-Procedure Length (cm) 0.2 cm 7/2/2019 11:52 AM   Post-Procedure Width (cm) 0.2 cm 7/2/2019 11:52 AM   Post-Procedure Depth (cm) 0.1 cm 7/2/2019 11:52 AM   Post-Procedure Surface Area (cm^2) 0.04 cm^2 7/2/2019 11:52 AM   Post-Procedure Volume (cm^3) 0 cm^3 7/2/2019 11:52 AM   Wound Assessment Bleeding 7/2/2019 11:52 AM   Drainage Amount Small 7/2/2019 11:52 AM   Drainage Description Yellow 6/18/2019 11:13 AM   Odor None 7/2/2019 11:32 AM   [place]-wound Assessment Dry 7/2/2019 11:32 AM   [place]%Wound Bed 100 7/2/2019 11:32 AM   Red%Wound Bed 0 7/2/2019 11:32 AM   Yellow%Wound Bed 0 7/2/2019 11:32 AM   Black%Wound Bed 0 7/2/2019 11:32 AM   Purple%Wound Bed 0 7/2/2019 11:32 AM   Other%Wound Bed 0 7/2/2019 11:32 AM   Number of days: 63           Total Surface Area Debrided:  68.16 sq cm     Bleeding:  Minimal    Hemostasis Achieved:  by pressure    Procedural Pain:  3  / 10     Post Procedural Pain:  0

## 2019-07-16 ENCOUNTER — HOSPITAL ENCOUNTER (OUTPATIENT)
Dept: WOUND CARE | Age: 84
Discharge: HOME | End: 2019-07-16
Payer: MEDICARE

## 2019-07-16 VITALS — SYSTOLIC BLOOD PRESSURE: 140 MMHG | DIASTOLIC BLOOD PRESSURE: 60 MMHG | RESPIRATION RATE: 18 BRPM | HEART RATE: 81 BPM

## 2019-07-16 PROCEDURE — 11042 DBRDMT SUBQ TIS 1ST 20SQCM/<: CPT

## 2019-07-16 PROCEDURE — 11042 DBRDMT SUBQ TIS 1ST 20SQCM/<: CPT | Performed by: SURGERY

## 2019-07-16 PROCEDURE — 11045 DBRDMT SUBQ TISS EACH ADDL: CPT

## 2019-07-16 PROCEDURE — 11045 DBRDMT SUBQ TISS EACH ADDL: CPT | Performed by: SURGERY

## 2019-07-16 RX ORDER — LIDOCAINE 40 MG/G
CREAM TOPICAL ONCE
Status: DISCONTINUED | OUTPATIENT
Start: 2019-07-16 | End: 2019-07-17 | Stop reason: HOSPADM

## 2019-07-16 NOTE — PROGRESS NOTES
AM   Undermining Starts ___ O'Clock 3.5 12/11/2018 11:29 AM   Wound Assessment Bleeding 7/16/2019 10:45 AM   Drainage Amount Moderate 7/16/2019 10:45 AM   Drainage Description Serosanguinous 7/16/2019 10:28 AM   Odor Mild 7/16/2019 10:28 AM   [place]-wound Assessment Dry 5/28/2019 11:15 AM   [place]%Wound Bed 0 7/16/2019 10:28 AM   Red%Wound Bed 80 7/16/2019 10:28 AM   Yellow%Wound Bed 0 7/16/2019 10:28 AM   Black%Wound Bed 0 7/16/2019 10:28 AM   Purple%Wound Bed 0 7/16/2019 10:28 AM   Other%Wound Bed 20 7/16/2019 10:28 AM   Number of days: 223       Wound 04/30/19 Foot Lateral;Right #5 (Active)   Wound Image   4/30/2019 10:45 AM   Wound Diabetic 7/2/2019 11:32 AM   Wound Cleansed Rinsed/Irrigated with saline 7/16/2019 10:28 AM   Wound Length (cm) 0 cm 7/16/2019 10:28 AM   Wound Width (cm) 0 cm 7/16/2019 10:28 AM   Wound Depth (cm) 0 cm 7/16/2019 10:28 AM   Wound Surface Area (cm^2) 0 cm^2 7/16/2019 10:28 AM   Change in Wound Size % (l*w) 100 7/16/2019 10:28 AM   Wound Volume (cm^3) 0 cm^3 7/16/2019 10:28 AM   Wound Healing % 100 7/16/2019 10:28 AM   Post-Procedure Length (cm) 0.2 cm 7/2/2019 11:52 AM   Post-Procedure Width (cm) 0.2 cm 7/2/2019 11:52 AM   Post-Procedure Depth (cm) 0.1 cm 7/2/2019 11:52 AM   Post-Procedure Surface Area (cm^2) 0.04 cm^2 7/2/2019 11:52 AM   Post-Procedure Volume (cm^3) 0 cm^3 7/2/2019 11:52 AM   Wound Assessment Dry;Epithelialization 7/16/2019 10:28 AM   Drainage Amount None 7/16/2019 10:28 AM   Drainage Description Yellow 6/18/2019 11:13 AM   Odor None 7/2/2019 11:32 AM   [place]-wound Assessment Dry 7/2/2019 11:32 AM   [place]%Wound Bed 100 7/16/2019 10:28 AM   Red%Wound Bed 0 7/16/2019 10:28 AM   Yellow%Wound Bed 0 7/16/2019 10:28 AM   Black%Wound Bed 0 7/16/2019 10:28 AM   Purple%Wound Bed 0 7/16/2019 10:28 AM   Other%Wound Bed 0 7/2/2019 11:32 AM   Number of days: 77           Total Surface Area Debrided:  30.15 ( 50% ) sq cm     Bleeding:  Minimal    Hemostasis Achieved:  by pressure    Procedural Pain:  3  / 10     Post Procedural Pain:  0 / 10     Response to treatment:  Well tolerated by patient. The nature of the patient's condition was explained in depth. The patient was informed that their compliance to the treatment plan is paramount to successful healing and prevention of further ulceration and/or infection     Treatment Plan:   82-year-old female with a chronic right lower quadrant abdominal wound. She still continues to form a few new small areas of black tissue. Overall the wound is improving and has decreased in size. Debridement was performed. We will continue Santyl dressing changes. The ulceration on the lateral aspect of the right foot has improved. Continue to paint this area with Betadine. Follow-up in 2 weeks.     Qing Goins M.D.  7/16/2019

## 2019-07-30 ENCOUNTER — HOSPITAL ENCOUNTER (OUTPATIENT)
Dept: WOUND CARE | Age: 84
Discharge: HOME | End: 2019-07-30
Payer: MEDICARE

## 2019-07-30 VITALS
TEMPERATURE: 97.6 F | DIASTOLIC BLOOD PRESSURE: 62 MMHG | SYSTOLIC BLOOD PRESSURE: 180 MMHG | HEART RATE: 79 BPM | WEIGHT: 256 LBS | BODY MASS INDEX: 48.37 KG/M2

## 2019-07-30 PROCEDURE — 87077 CULTURE AEROBIC IDENTIFY: CPT

## 2019-07-30 PROCEDURE — 11045 DBRDMT SUBQ TISS EACH ADDL: CPT

## 2019-07-30 PROCEDURE — 87070 CULTURE OTHR SPECIMN AEROBIC: CPT

## 2019-07-30 PROCEDURE — 87205 SMEAR GRAM STAIN: CPT

## 2019-07-30 PROCEDURE — 11042 DBRDMT SUBQ TIS 1ST 20SQCM/<: CPT

## 2019-07-30 PROCEDURE — 11045 DBRDMT SUBQ TISS EACH ADDL: CPT | Performed by: SURGERY

## 2019-07-30 PROCEDURE — 11042 DBRDMT SUBQ TIS 1ST 20SQCM/<: CPT | Performed by: SURGERY

## 2019-07-30 PROCEDURE — 87186 SC STD MICRODIL/AGAR DIL: CPT

## 2019-07-30 RX ORDER — LIDOCAINE 40 MG/G
CREAM TOPICAL PRN
Status: DISCONTINUED | OUTPATIENT
Start: 2019-07-30 | End: 2019-07-31 | Stop reason: HOSPADM

## 2019-08-01 LAB
GRAM STAIN RESULT: ABNORMAL
ORGANISM: ABNORMAL
WOUND/ABSCESS: ABNORMAL
WOUND/ABSCESS: ABNORMAL

## 2019-08-13 ENCOUNTER — HOSPITAL ENCOUNTER (OUTPATIENT)
Dept: WOUND CARE | Age: 84
Discharge: HOME | End: 2019-08-13
Payer: MEDICARE

## 2019-08-13 VITALS
HEART RATE: 82 BPM | SYSTOLIC BLOOD PRESSURE: 167 MMHG | DIASTOLIC BLOOD PRESSURE: 72 MMHG | RESPIRATION RATE: 18 BRPM | TEMPERATURE: 98 F

## 2019-08-13 PROCEDURE — 11045 DBRDMT SUBQ TISS EACH ADDL: CPT

## 2019-08-13 PROCEDURE — 11042 DBRDMT SUBQ TIS 1ST 20SQCM/<: CPT

## 2019-08-13 PROCEDURE — 11042 DBRDMT SUBQ TIS 1ST 20SQCM/<: CPT | Performed by: SURGERY

## 2019-08-13 RX ORDER — LIDOCAINE 40 MG/G
CREAM TOPICAL ONCE
Status: DISCONTINUED | OUTPATIENT
Start: 2019-08-13 | End: 2019-08-14 | Stop reason: HOSPADM

## 2019-08-27 ENCOUNTER — HOSPITAL ENCOUNTER (OUTPATIENT)
Dept: WOUND CARE | Age: 84
Discharge: HOME | End: 2019-08-27
Payer: MEDICARE

## 2019-08-27 PROCEDURE — 11042 DBRDMT SUBQ TIS 1ST 20SQCM/<: CPT | Performed by: SURGERY

## 2019-08-27 PROCEDURE — 11042 DBRDMT SUBQ TIS 1ST 20SQCM/<: CPT

## 2019-08-27 RX ORDER — LIDOCAINE 50 MG/G
OINTMENT TOPICAL ONCE
Status: DISCONTINUED | OUTPATIENT
Start: 2019-08-27 | End: 2019-08-28 | Stop reason: HOSPADM

## 2019-08-27 NOTE — PLAN OF CARE
Discharge instructions given. Patient verbalized understanding. Return to [facility] in 2 weeks.

## 2019-09-10 ENCOUNTER — HOSPITAL ENCOUNTER (OUTPATIENT)
Dept: WOUND CARE | Age: 84
Discharge: HOME | End: 2019-09-10
Payer: MEDICARE

## 2019-09-10 VITALS
HEART RATE: 60 BPM | SYSTOLIC BLOOD PRESSURE: 143 MMHG | TEMPERATURE: 97.2 F | DIASTOLIC BLOOD PRESSURE: 53 MMHG | RESPIRATION RATE: 18 BRPM

## 2019-09-10 PROCEDURE — 11042 DBRDMT SUBQ TIS 1ST 20SQCM/<: CPT | Performed by: SURGERY

## 2019-09-10 PROCEDURE — 11042 DBRDMT SUBQ TIS 1ST 20SQCM/<: CPT

## 2019-09-10 RX ORDER — LIDOCAINE 50 MG/G
OINTMENT TOPICAL ONCE
Status: DISCONTINUED | OUTPATIENT
Start: 2019-09-10 | End: 2019-09-11 | Stop reason: HOSPADM

## 2019-09-10 ASSESSMENT — PAIN SCALES - GENERAL: PAINLEVEL_OUTOF10: 0

## 2019-09-10 NOTE — PROGRESS NOTES
Keyla Fields  AGE: 82 y.o. GENDER: female  : 1935  TODAY'S DATE:  9/10/2019    Chief Complaint   Patient presents with    Wound Check     abdomen        HISTORY of PRESENT ILLNESS HPI     Keyla Fields is a 82 y.o. female who presents today for wound evaluation. History of Wound: abdominal wound  Wound Pain:  none  Severity:  2 / 10   Wound Type:  undetermined  Modifying Factors:  none  Associated Signs/Symptoms:  none    Procedure Note    Performed by: Ari Young MD    Consent obtained: Yes    Time out taken:  Yes    Pain Control: Anesthetic  Anesthetic: Other (comment)(4% lidocaine cream)     Debridement:Excisional Debridement    Using curette, #15 blade scalpel and forceps the wound was sharply debrided    down through and including the removal of subcutaneous tissue.         Devitalized Tissue Debrided:  necrotic/eschar    Pre Debridement Measurements:  Are located in the Wound Documentation Flow Sheet    Wound #: 1, 2 and 3     Post  Debridement Measurements:  Wound 18 Abdomen Right #1 (Active)   Wound Image   2019 10:44 AM   Wound Other 9/10/2019 10:51 AM   Dressing/Treatment Moist to dry 2019 11:26 AM   Wound Cleansed Rinsed/Irrigated with saline 9/10/2019 11:01 AM   Wound Length (cm) 1 cm 9/10/2019 10:52 AM   Wound Width (cm) 2.5 cm 9/10/2019 10:52 AM   Wound Depth (cm) 0.1 cm 9/10/2019 10:52 AM   Wound Surface Area (cm^2) 2.5 cm^2 9/10/2019 10:52 AM   Change in Wound Size % (l*w) 85.71 9/10/2019 10:52 AM   Wound Volume (cm^3) 0.25 cm^3 9/10/2019 10:52 AM   Wound Healing % 95 9/10/2019 10:52 AM   Post-Procedure Length (cm) 1 cm 9/10/2019 11:01 AM   Post-Procedure Width (cm) 2.5 cm 9/10/2019 11:01 AM   Post-Procedure Depth (cm) 0 cm 9/10/2019 11:01 AM   Post-Procedure Surface Area (cm^2) 2.5 cm^2 9/10/2019 11:01 AM   Post-Procedure Volume (cm^3) 0 cm^3 9/10/2019 11:01 AM   Distance Tunneling (cm) 2.4 cm 1/15/2019 11:09 AM   Tunneling Position ___ O'Clock 1300 1/15/2019

## 2019-09-24 ENCOUNTER — HOSPITAL ENCOUNTER (OUTPATIENT)
Dept: WOUND CARE | Age: 84
Discharge: HOME | End: 2019-09-24
Payer: MEDICARE

## 2019-09-24 VITALS
TEMPERATURE: 97 F | BODY MASS INDEX: 48.37 KG/M2 | HEART RATE: 77 BPM | SYSTOLIC BLOOD PRESSURE: 182 MMHG | WEIGHT: 256 LBS | DIASTOLIC BLOOD PRESSURE: 75 MMHG

## 2019-09-24 PROCEDURE — 11042 DBRDMT SUBQ TIS 1ST 20SQCM/<: CPT

## 2019-09-24 PROCEDURE — 11042 DBRDMT SUBQ TIS 1ST 20SQCM/<: CPT | Performed by: SURGERY

## 2019-09-24 RX ORDER — LIDOCAINE 50 MG/G
OINTMENT TOPICAL ONCE
Status: DISCONTINUED | OUTPATIENT
Start: 2019-09-24 | End: 2019-09-25 | Stop reason: HOSPADM

## 2019-09-24 NOTE — PLAN OF CARE
Discharge instructions given. Patient verbalized understanding. Return to [address] [place] [address],[address] in 2 weeks.   Called/faxed orders to [facility]  Pt has appt with Dr Ayleen Brambila 10/4

## 2019-10-08 ENCOUNTER — HOSPITAL ENCOUNTER (OUTPATIENT)
Dept: WOUND CARE | Age: 84
Discharge: HOME | End: 2019-10-08
Payer: MEDICARE

## 2019-10-08 VITALS
DIASTOLIC BLOOD PRESSURE: 60 MMHG | HEART RATE: 67 BPM | BODY MASS INDEX: 48.37 KG/M2 | WEIGHT: 256 LBS | TEMPERATURE: 97 F | SYSTOLIC BLOOD PRESSURE: 162 MMHG

## 2019-10-08 PROCEDURE — 11042 DBRDMT SUBQ TIS 1ST 20SQCM/<: CPT

## 2019-10-08 PROCEDURE — 11042 DBRDMT SUBQ TIS 1ST 20SQCM/<: CPT | Performed by: SURGERY

## 2019-10-08 RX ORDER — SULFAMETHOXAZOLE AND TRIMETHOPRIM 800; 160 MG/1; MG/1
1 TABLET ORAL 2 TIMES DAILY
Qty: 20 TABLET | Refills: 0 | Status: SHIPPED | OUTPATIENT
Start: 2019-10-08 | End: 2019-10-18

## 2019-10-08 RX ORDER — LIDOCAINE 40 MG/G
CREAM TOPICAL ONCE
Status: DISCONTINUED | OUTPATIENT
Start: 2019-10-08 | End: 2019-10-09 | Stop reason: HOSPADM

## 2019-10-22 ENCOUNTER — HOSPITAL ENCOUNTER (OUTPATIENT)
Dept: WOUND CARE | Age: 84
Discharge: HOME | End: 2019-10-22
Payer: MEDICARE

## 2019-10-22 VITALS
RESPIRATION RATE: 16 BRPM | HEART RATE: 76 BPM | SYSTOLIC BLOOD PRESSURE: 135 MMHG | TEMPERATURE: 97 F | DIASTOLIC BLOOD PRESSURE: 49 MMHG

## 2019-10-22 PROCEDURE — 11042 DBRDMT SUBQ TIS 1ST 20SQCM/<: CPT

## 2019-10-22 PROCEDURE — 11042 DBRDMT SUBQ TIS 1ST 20SQCM/<: CPT | Performed by: SURGERY

## 2019-10-22 RX ORDER — LIDOCAINE 40 MG/G
CREAM TOPICAL ONCE
Status: DISCONTINUED | OUTPATIENT
Start: 2019-10-22 | End: 2019-10-23 | Stop reason: HOSPADM

## 2019-11-05 ENCOUNTER — HOSPITAL ENCOUNTER (OUTPATIENT)
Dept: WOUND CARE | Age: 84
Discharge: HOME | End: 2019-11-05
Payer: MEDICARE

## 2019-11-05 VITALS — RESPIRATION RATE: 16 BRPM | DIASTOLIC BLOOD PRESSURE: 66 MMHG | HEART RATE: 73 BPM | SYSTOLIC BLOOD PRESSURE: 160 MMHG

## 2019-11-05 PROCEDURE — 99213 OFFICE O/P EST LOW 20 MIN: CPT | Performed by: SURGERY

## 2019-11-05 PROCEDURE — 99213 OFFICE O/P EST LOW 20 MIN: CPT

## 2019-11-05 RX ORDER — LIDOCAINE 40 MG/G
CREAM TOPICAL ONCE
Status: DISCONTINUED | OUTPATIENT
Start: 2019-11-05 | End: 2019-11-06 | Stop reason: HOSPADM

## 2019-11-19 ENCOUNTER — HOSPITAL ENCOUNTER (OUTPATIENT)
Dept: WOUND CARE | Age: 84
Discharge: HOME | End: 2019-11-19
Payer: MEDICARE

## 2019-11-19 VITALS — HEART RATE: 78 BPM | DIASTOLIC BLOOD PRESSURE: 71 MMHG | RESPIRATION RATE: 16 BRPM | SYSTOLIC BLOOD PRESSURE: 176 MMHG

## 2019-11-19 PROCEDURE — 99213 OFFICE O/P EST LOW 20 MIN: CPT

## 2019-11-19 PROCEDURE — 99212 OFFICE O/P EST SF 10 MIN: CPT | Performed by: SURGERY

## 2019-11-19 RX ORDER — LIDOCAINE 40 MG/G
CREAM TOPICAL ONCE
Status: DISCONTINUED | OUTPATIENT
Start: 2019-11-19 | End: 2019-11-20 | Stop reason: HOSPADM

## 2019-12-03 ENCOUNTER — HOSPITAL ENCOUNTER (OUTPATIENT)
Dept: WOUND CARE | Age: 84
Discharge: HOME | End: 2019-12-03

## 2019-12-17 ENCOUNTER — HOSPITAL ENCOUNTER (OUTPATIENT)
Age: 84
Discharge: HOME | End: 2019-12-17
Payer: MEDICARE

## 2019-12-17 ENCOUNTER — HOSPITAL ENCOUNTER (OUTPATIENT)
Dept: GENERAL RADIOLOGY | Age: 84
Discharge: HOME | End: 2019-12-17
Payer: MEDICARE

## 2019-12-17 ENCOUNTER — HOSPITAL ENCOUNTER (OUTPATIENT)
Dept: WOUND CARE | Age: 84
Discharge: HOME | End: 2019-12-17
Payer: MEDICARE

## 2019-12-17 VITALS — TEMPERATURE: 96.9 F | BODY MASS INDEX: 48.37 KG/M2 | WEIGHT: 256 LBS

## 2019-12-17 DIAGNOSIS — E11.65 POORLY CONTROLLED TYPE 2 DIABETES MELLITUS (HCC): ICD-10-CM

## 2019-12-17 DIAGNOSIS — R52 PAIN: ICD-10-CM

## 2019-12-17 DIAGNOSIS — R52 PAIN: Primary | ICD-10-CM

## 2019-12-17 DIAGNOSIS — R09.89 DECREASED PULSES IN FEET: ICD-10-CM

## 2019-12-17 DIAGNOSIS — L97.412 DIABETIC ULCER OF RIGHT MIDFOOT ASSOCIATED WITH TYPE 2 DIABETES MELLITUS, WITH FAT LAYER EXPOSED (HCC): ICD-10-CM

## 2019-12-17 DIAGNOSIS — E11.621 DIABETIC ULCER OF RIGHT MIDFOOT ASSOCIATED WITH TYPE 2 DIABETES MELLITUS, WITH FAT LAYER EXPOSED (HCC): ICD-10-CM

## 2019-12-17 PROCEDURE — 99213 OFFICE O/P EST LOW 20 MIN: CPT

## 2019-12-17 PROCEDURE — 99214 OFFICE O/P EST MOD 30 MIN: CPT | Performed by: SURGERY

## 2019-12-17 PROCEDURE — 73630 X-RAY EXAM OF FOOT: CPT

## 2019-12-17 RX ORDER — LIDOCAINE 40 MG/G
CREAM TOPICAL ONCE
Status: DISCONTINUED | OUTPATIENT
Start: 2019-12-17 | End: 2019-12-18 | Stop reason: HOSPADM

## 2019-12-24 ENCOUNTER — HOSPITAL ENCOUNTER (OUTPATIENT)
Dept: WOUND CARE | Age: 84
Discharge: HOME | End: 2019-12-24
Payer: MEDICARE

## 2019-12-24 VITALS — BODY MASS INDEX: 48.37 KG/M2 | DIASTOLIC BLOOD PRESSURE: 66 MMHG | SYSTOLIC BLOOD PRESSURE: 142 MMHG | WEIGHT: 256 LBS

## 2019-12-24 PROCEDURE — 99213 OFFICE O/P EST LOW 20 MIN: CPT | Performed by: SURGERY

## 2019-12-24 PROCEDURE — 99213 OFFICE O/P EST LOW 20 MIN: CPT

## 2019-12-24 RX ORDER — LIDOCAINE 40 MG/G
CREAM TOPICAL ONCE
Status: DISCONTINUED | OUTPATIENT
Start: 2019-12-24 | End: 2019-12-25 | Stop reason: HOSPADM

## 2019-12-24 ASSESSMENT — PAIN DESCRIPTION - PAIN TYPE: TYPE: CHRONIC PAIN

## 2020-01-07 ENCOUNTER — HOSPITAL ENCOUNTER (OUTPATIENT)
Dept: WOUND CARE | Age: 85
Discharge: HOME | End: 2020-01-07
Payer: MEDICARE

## 2020-01-07 VITALS — HEART RATE: 70 BPM | RESPIRATION RATE: 18 BRPM | DIASTOLIC BLOOD PRESSURE: 66 MMHG | SYSTOLIC BLOOD PRESSURE: 156 MMHG

## 2020-01-07 PROCEDURE — 99213 OFFICE O/P EST LOW 20 MIN: CPT | Performed by: SURGERY

## 2020-01-07 PROCEDURE — 99213 OFFICE O/P EST LOW 20 MIN: CPT

## 2020-01-07 RX ORDER — LIDOCAINE 40 MG/G
CREAM TOPICAL ONCE
Status: DISCONTINUED | OUTPATIENT
Start: 2020-01-07 | End: 2020-01-08 | Stop reason: HOSPADM

## 2020-01-07 NOTE — PROGRESS NOTES
lovastatin (MEVACOR) 40 MG tablet Take 40 mg by mouth nightly      Insulin NPH Isophane & Regular (HUMULIN 70/30 SC) Inject into the skin Indications: 13 units in am, 10 units at suppertime       Cholecalciferol (VITAMIN D3) 5000 units TABS Take by mouth three times a week Indications: mon, wed frid       glimepiride (AMARYL) 4 MG tablet Take 4 mg by mouth every morning (before breakfast)      warfarin (COUMADIN) 5 MG tablet Take 5 mg by mouth      cloNIDine (CATAPRES) 0.1 MG tablet Take 0.1 mg by mouth 2 times daily      lisinopril (PRINIVIL;ZESTRIL) 20 MG tablet Take 20 mg by mouth daily      metFORMIN (GLUCOPHAGE) 1000 MG tablet Take 5,000 mg by mouth 2 times daily (with meals)       No current facility-administered medications on file prior to encounter. REVIEW OF SYSTEMS    Pertinent items are noted in HPI.       Objective:      BP (!) 156/66   Pulse 70   Resp 18     PHYSICAL EXAM    General Appearance: alert and oriented to person, place and time, well-developed and well-nourished, in no acute distress  Skin: warm and dry, no rash or erythema  Head: normocephalic and atraumatic  Neck: neck supple and non tender without mass, no thyromegaly or thyroid nodules, no cervical lymphadenopathy   Abdomen: soft, non-tender, non-distended, normal bowel sounds, no masses or organomegaly  Right foot wound healed  There is a small dry eschar in the abdominal wound    Assessment:     Patient Active Problem List   Diagnosis    Abdominal wall cellulitis    Poorly controlled type 2 [facility] mellitus [facility]    HTN (hypertension)    CKD (chronic kidney disease) stage 3, GFR 30-59 ml/min [facility])    History of pulmonary embolism    Chronic abdominal wound infection, subsequent encounter    Open abdominal wall wound, subsequent encounter    Severe infection    Acute kidney injury superimposed on chronic kidney disease (HCC)    Diverticulosis of colon without diverticulitis    Morbid obesity due to excess calories ([address]    Open wound of abdomen    Diabetic ulcer of right midfoot associated with type 2 diabetes mellitus, with fat layer exposed ([address]    Chronic wound infection of abdomen    History of arterial ischemic stroke    Anticoagulated on Coumadin    MRSA colonization    Acinetobacter lwoffi infection    Citrobacter infection    Weight loss counseling, encounter for    Proteus infection       Wound 09/10/19 Abdomen Distal;Medial #2 (Active)   Wound Image   1/7/2020 10:31 AM   Wound Other 1/7/2020 10:31 AM   Wound Cleansed Rinsed/Irrigated with saline 1/7/2020 10:31 AM   Wound Length (cm) 0 cm 1/7/2020 10:31 AM   Wound Width (cm) 0 cm 1/7/2020 10:31 AM   Wound Depth (cm) 0 cm 1/7/2020 10:31 AM   Wound Surface Area (cm^2) 0 cm^2 1/7/2020 10:31 AM   Change in Wound Size % (l*w) 100 1/7/2020 10:31 AM   Wound Volume (cm^3) 0 cm^3 1/7/2020 10:31 AM   Wound Healing % 100 1/7/2020 10:31 AM   Post-Procedure Length (cm) 4 cm 10/22/2019 10:59 AM   Post-Procedure Width (cm) 4.5 cm 10/22/2019 10:59 AM   Post-Procedure Depth (cm) 0.4 cm 10/22/2019 10:59 AM   Post-Procedure Surface Area (cm^2) 18 cm^2 10/22/2019 10:59 AM   Post-Procedure Volume (cm^3) 7.2 cm^3 10/22/2019 10:59 AM   Wound Assessment Dry 1/7/2020 10:31 AM   Drainage Amount None 1/7/2020 10:31 AM   Drainage Description Serosanguinous 12/24/2019 11:04 AM   Odor None 12/24/2019 11:04 AM   [place]-wound Assessment Dry 12/24/2019 11:04 AM   [place]%Wound Bed 100 1/7/2020 10:31 AM   Red%Wound Bed 0 1/7/2020 10:31 AM   Yellow%Wound Bed 0 1/7/2020 10:31 AM   Black%Wound Bed 0 1/7/2020 10:31 AM   Purple%Wound Bed 0 1/7/2020 10:31 AM   Other%Wound Bed 0 12/24/2019 11:04 AM   Number of days: 119       Wound 12/17/19 Foot Lateral;Right #1 (Active)   Wound Image   1/7/2020 10:31 AM   Wound Diabetic [place] 2 1/7/2020 10:31 AM   Offloading for Diabetic Foot Ulcers Felt or foam 12/24/2019 11:04 AM   Wound Cleansed Rinsed/Irrigated with saline 12/24/2019 11:04 AM   Wound

## 2020-01-10 ENCOUNTER — HOSPITAL ENCOUNTER (OUTPATIENT)
Dept: VASCULAR LAB | Age: 85
Discharge: HOME | End: 2020-01-10
Payer: MEDICARE

## 2020-01-10 PROCEDURE — 93926 LOWER EXTREMITY STUDY: CPT

## 2020-01-21 ENCOUNTER — HOSPITAL ENCOUNTER (OUTPATIENT)
Dept: WOUND CARE | Age: 85
Discharge: HOME | End: 2020-01-21
Payer: MEDICARE

## 2020-01-21 VITALS — HEART RATE: 69 BPM | DIASTOLIC BLOOD PRESSURE: 71 MMHG | SYSTOLIC BLOOD PRESSURE: 191 MMHG | RESPIRATION RATE: 18 BRPM

## 2020-01-21 PROCEDURE — 99213 OFFICE O/P EST LOW 20 MIN: CPT | Performed by: SURGERY

## 2020-01-21 PROCEDURE — 99213 OFFICE O/P EST LOW 20 MIN: CPT

## 2020-01-21 RX ORDER — LIDOCAINE 40 MG/G
CREAM TOPICAL ONCE
Status: DISCONTINUED | OUTPATIENT
Start: 2020-01-21 | End: 2020-01-22 | Stop reason: HOSPADM

## 2020-01-21 RX ORDER — CEPHALEXIN 500 MG/1
500 CAPSULE ORAL 3 TIMES DAILY
Qty: 21 CAPSULE | Refills: 0 | Status: SHIPPED | OUTPATIENT
Start: 2020-01-21 | End: 2020-01-28

## 2020-01-21 NOTE — PROGRESS NOTES
obesity due to excess calories ([address]    Open wound of abdomen    Diabetic ulcer of right midfoot associated with type 2 diabetes mellitus, with fat layer exposed ([address]    Chronic wound infection of abdomen    History of arterial ischemic stroke    Anticoagulated on Coumadin    MRSA colonization    Acinetobacter lwoffi infection    Citrobacter infection    Weight loss counseling, encounter for    Proteus infection       Wound 09/10/19 Abdomen Distal;Medial #2 (Active)   Wound Image   1/7/2020 10:31 AM   Wound Other 1/21/2020 10:35 AM   Wound Cleansed Rinsed/Irrigated with saline 1/21/2020 10:35 AM   Wound Length (cm) 1 cm 1/21/2020 10:35 AM   Wound Width (cm) 1.8 cm 1/21/2020 10:35 AM   Wound Depth (cm) 0.1 cm 1/21/2020 10:35 AM   Wound Surface Area (cm^2) 1.8 cm^2 1/21/2020 10:35 AM   Change in Wound Size % (l*w) 52 1/21/2020 10:35 AM   Wound Volume (cm^3) 0.18 cm^3 1/21/2020 10:35 AM   Wound Healing % 53 1/21/2020 10:35 AM   Post-Procedure Length (cm) 4 cm 10/22/2019 10:59 AM   Post-Procedure Width (cm) 4.5 cm 10/22/2019 10:59 AM   Post-Procedure Depth (cm) 0.4 cm 10/22/2019 10:59 AM   Post-Procedure Surface Area (cm^2) 18 cm^2 10/22/2019 10:59 AM   Post-Procedure Volume (cm^3) 7.2 cm^3 10/22/2019 10:59 AM   Wound Assessment Granulation tissue;Pink;Bleeding 1/21/2020 10:35 AM   Drainage Amount Small 1/21/2020 10:35 AM   Drainage Description Serosanguinous 1/21/2020 10:35 AM   Odor None 1/21/2020 10:35 AM   [place]-wound Assessment Dry 1/21/2020 10:35 AM   [place]%Wound Bed 100 1/21/2020 10:35 AM   Red%Wound Bed 0 1/21/2020 10:35 AM   Yellow%Wound Bed 0 1/21/2020 10:35 AM   Black%Wound Bed 0 1/21/2020 10:35 AM   Purple%Wound Bed 0 1/21/2020 10:35 AM   Other%Wound Bed 0 12/24/2019 11:04 AM   Number of days: 133       Wound 12/17/19 Foot Lateral;Right #1 (Active)   Wound Image   1/7/2020 10:31 AM   Wound Diabetic [place] 2 1/21/2020 10:35 AM   Offloading for Diabetic Foot Ulcers Felt or foam 12/24/2019 11:04 AM Wound Cleansed Rinsed/Irrigated with saline 1/21/2020 10:35 AM   Wound Length (cm) 0.4 cm 1/21/2020 10:35 AM   Wound Width (cm) 0.4 cm 1/21/2020 10:35 AM   Wound Depth (cm) 0.1 cm 1/21/2020 10:35 AM   Wound Surface Area (cm^2) 0.16 cm^2 1/21/2020 10:35 AM   Change in Wound Size % (l*w) 85.45 1/21/2020 10:35 AM   Wound Volume (cm^3) 0.02 cm^3 1/21/2020 10:35 AM   Wound Healing % 91 1/21/2020 10:35 AM   Wound Assessment Granulation tissue;Pink 1/21/2020 10:35 AM   Drainage Amount None 1/21/2020 10:35 AM   Drainage Description Serosanguinous 12/24/2019 11:04 AM   Odor None 1/21/2020 10:35 AM   [place]-wound Assessment Dry 1/21/2020 10:35 AM   [place]%Wound Bed 100 1/21/2020 10:35 AM   Red%Wound Bed 0 1/21/2020 10:35 AM   Yellow%Wound Bed 0 1/21/2020 10:35 AM   Black%Wound Bed 0 1/21/2020 10:35 AM   Purple%Wound Bed 0 12/24/2019 11:04 AM   Other%Wound Bed 0 12/24/2019 11:04 AM   Number of days: 28    82year-old female diabetic seen in follow-up for an abdominal wound as well as a wound on the lateral aspect of her right foot. There was a scab on the abdominal wound which has come off. The underlying wound is clean. The right lateral foot wound was healed at last week's visit but has subsequently reopened. The recurrence of the wound is likely secondary to pressure from the metatarsal bone where it protrudes out underneath the ulcer. Vascular studies were limited secondary to noncompressible vessels. There were no significant flow alterations noted between the right femoral and popliteal vessels. Plan:     Antibiotic ointment and a dry bandage to both sites. She will consult with Dr. Sandy Quiroga for podiatric opinion on the right lateral foot ulcer.   Discharge Treatment          Written Patient Discharge Instructions Given            Electronically signed by Arnav Catalan MD on 1/21/2020 at 10:57 AM

## 2020-01-21 NOTE — PLAN OF CARE
Discharge instructions given. Patient verbalized understanding. Return to [address] [place] [address],[address] in 1 week.   Prescription for Keflex sent to [facility]

## 2020-01-29 ENCOUNTER — HOSPITAL ENCOUNTER (OUTPATIENT)
Dept: WOUND CARE | Age: 85
Discharge: HOME | End: 2020-01-29
Payer: MEDICARE

## 2020-01-29 VITALS
SYSTOLIC BLOOD PRESSURE: 194 MMHG | HEART RATE: 66 BPM | DIASTOLIC BLOOD PRESSURE: 71 MMHG | TEMPERATURE: 97.2 F | RESPIRATION RATE: 18 BRPM

## 2020-01-29 PROCEDURE — 11042 DBRDMT SUBQ TIS 1ST 20SQCM/<: CPT

## 2020-01-29 PROCEDURE — 11721 DEBRIDE NAIL 6 OR MORE: CPT

## 2020-01-29 RX ORDER — GENTAMICIN SULFATE 1 MG/G
CREAM TOPICAL
Qty: 15 G | Refills: 0 | Status: SHIPPED | OUTPATIENT
Start: 2020-01-29

## 2020-01-29 NOTE — PLAN OF CARE
Discharge instructions given. Patient verbalized understanding. Return to [facility] in 1 week.

## 2020-02-05 ENCOUNTER — HOSPITAL ENCOUNTER (OUTPATIENT)
Dept: WOUND CARE | Age: 85
Discharge: HOME | End: 2020-02-05
Payer: MEDICARE

## 2020-02-05 VITALS
RESPIRATION RATE: 18 BRPM | DIASTOLIC BLOOD PRESSURE: 60 MMHG | HEART RATE: 69 BPM | SYSTOLIC BLOOD PRESSURE: 163 MMHG | TEMPERATURE: 98.2 F

## 2020-02-05 PROCEDURE — 11042 DBRDMT SUBQ TIS 1ST 20SQCM/<: CPT

## 2020-02-05 RX ORDER — LIDOCAINE 40 MG/G
CREAM TOPICAL PRN
Status: DISCONTINUED | OUTPATIENT
Start: 2020-02-05 | End: 2020-02-06 | Stop reason: HOSPADM

## 2020-02-05 NOTE — PLAN OF CARE
Discharge instructions given. Patient verbalized understanding. Return to [facility] in 1 week. Pt needs LMN for PCP for Diabetic shoes.  Has appt with PCP in a week to have filled out  See Dr Audrey Lopez next week

## 2020-02-05 NOTE — PROGRESS NOTES
Diarrhea     Abdominal pain & Diarrhea       MEDICATIONS    Current Outpatient Medications on File Prior to Encounter   Medication Sig Dispense Refill    gentamicin (GARAMYCIN) 0.1 % cream Apply topically daily. Right foot 15 g 0    IRON PO Take 65 mg by mouth three times a week Indications: mon wed fri      vitamin B-12 (CYANOCOBALAMIN) 1000 MCG tablet Take 1,000 mcg by mouth three times a week Indications: mon wed fri      sodium hypochlorite (DAKINS) 0.125 % SOLN external solution Dakins moistened gauze daily 1000 mL 2    aspirin 81 MG tablet Take by mouth daily      lovastatin (MEVACOR) 40 MG tablet Take 40 mg by mouth nightly      Insulin NPH Isophane & Regular (HUMULIN 70/30 SC) Inject into the skin Indications: 13 units in am, 10 units at suppertime       Cholecalciferol (VITAMIN D3) 5000 units TABS Take by mouth three times a week Indications: mon, wed frid       glimepiride (AMARYL) 4 MG tablet Take 4 mg by mouth every morning (before breakfast)      warfarin (COUMADIN) 5 MG tablet Take 5 mg by mouth      cloNIDine (CATAPRES) 0.1 MG tablet Take 0.1 mg by mouth 2 times daily      lisinopril (PRINIVIL;ZESTRIL) 20 MG tablet Take 20 mg by mouth daily      metFORMIN (GLUCOPHAGE) 1000 MG tablet Take 5,000 mg by mouth 2 times daily (with meals)       No current facility-administered medications on file prior to encounter. REVIEW OF SYSTEMS    Pertinent items are noted in HPI. Objective:      BP (!) 163/60   Pulse 69   Temp 98.2 °F (36.8 °C) (Oral)   Resp 18     PHYSICAL EXAM    Vascular: Vascular status Impaired  palpable pedal pulses, right DP1/4 and PT1/4, left DP1/4 and PT1/4. CFT 3 seconds digits 1 to 5 bilateral.  Hair growth Absent  both lower extremities and feet. Skin temperature is warm to warm from pretibial area to distal digits bilateral.  Exam is negative for rubor, pallor, cyanosis or signs of acute vascular compromise bilaterally.   Exam is positive for edema bilateral lower extremity. Varicosities Present bilateral lower extremity. Neuro: Neurologic status diminished bilateral with epicritic Present , proprioceptive Present,  and protopathic Present. DTRs present bilateral Achilles. There were no reproducible neuritic symptoms on exam bilateral feet/ankles. Derm: Ulceration to right lateral foot. Ecchymosis Absent  bilateral feet/foot. Musculoskeletal: Minimal pain with debridement of wound. 5/5 muscle strength in/eversion and dorsi/plantarflexion bilateral feet. No gross instability noted. Assessment:     Patient Active Problem List   Diagnosis    Abdominal wall cellulitis    Poorly controlled type 2 diabetes mellitus ([address]    HTN (hypertension)    CKD (chronic kidney disease) stage 3, GFR 30-59 ml/min ([facility])    History of pulmonary embolism    Chronic abdominal wound infection, subsequent encounter    Open abdominal wall wound, subsequent encounter    Severe infection    Acute kidney injury superimposed on chronic kidney disease ([address]    Diverticulosis of colon without diverticulitis    Morbid obesity due to excess calories ([address]    Open wound of abdomen    Diabetic ulcer of right midfoot associated with type 2 diabetes mellitus, with fat layer exposed ([address]    Chronic wound infection of abdomen    History of arterial ischemic stroke    Anticoagulated on Coumadin    MRSA colonization    Acinetobacter lwoffi infection    Citrobacter infection    Weight loss counseling, encounter for    Proteus infection       Procedure Note    Performed by: Juan Lozano DPM    Consent obtained: Yes    Time out taken:  Yes    Pain Control: Anesthetic  Anesthetic: 4% Lidocaine Cream     Debridement:Excisional Debridement    Using curette the wound was sharply debrided    down through and including the removal of epidermis, dermis and subcutaneous tissue.         Devitalized Tissue Debrided:  fibrin, slough and exudate    Pre Debridement Measurements:  Are located in the Wound Documentation Flow Sheet    Wound Care Documentation:  Wound 09/10/19 Abdomen Distal;Medial #2 (Active)   Wound Image   1/7/2020 10:31 AM   Wound Other 1/21/2020 10:35 AM   Wound Cleansed Rinsed/Irrigated with saline 1/21/2020 10:35 AM   Wound Length (cm) 1 cm 1/21/2020 10:35 AM   Wound Width (cm) 1.8 cm 1/21/2020 10:35 AM   Wound Depth (cm) 0.1 cm 1/21/2020 10:35 AM   Wound Surface Area (cm^2) 1.8 cm^2 1/21/2020 10:35 AM   Change in Wound Size % (l*w) 52 1/21/2020 10:35 AM   Wound Volume (cm^3) 0.18 cm^3 1/21/2020 10:35 AM   Wound Healing % 53 1/21/2020 10:35 AM   Post-Procedure Length (cm) 4 cm 10/22/2019 10:59 AM   Post-Procedure Width (cm) 4.5 cm 10/22/2019 10:59 AM   Post-Procedure Depth (cm) 0.4 cm 10/22/2019 10:59 AM   Post-Procedure Surface Area (cm^2) 18 cm^2 10/22/2019 10:59 AM   Post-Procedure Volume (cm^3) 7.2 cm^3 10/22/2019 10:59 AM   Wound Assessment Granulation tissue;Pink;Bleeding 1/21/2020 10:35 AM   Drainage Amount Small 1/21/2020 10:35 AM   Drainage Description Serosanguinous 1/21/2020 10:35 AM   Odor None 1/21/2020 10:35 AM   [place]-wound Assessment Dry 1/21/2020 10:35 AM   [place]%Wound Bed 100 1/21/2020 10:35 AM   Red%Wound Bed 0 1/21/2020 10:35 AM   Yellow%Wound Bed 0 1/21/2020 10:35 AM   Black%Wound Bed 0 1/21/2020 10:35 AM   Purple%Wound Bed 0 1/21/2020 10:35 AM   Other%Wound Bed 0 12/24/2019 11:04 AM   Number of days: 148       Wound 12/17/19 Foot Lateral;Right #1 (Active)   Wound Image   1/7/2020 10:31 AM   Wound Diabetic 2/5/2020  1:14 PM   Offloading for Diabetic Foot Ulcers Felt or foam 2/5/2020  1:08 PM   Wound Cleansed Rinsed/Irrigated with saline 2/5/2020  1:08 PM   Wound Length (cm) 0.5 cm 2/5/2020  1:14 PM   Wound Width (cm) 0.2 cm 2/5/2020  1:14 PM   Wound Depth (cm) 0.2 cm 2/5/2020  1:14 PM   Wound Surface Area (cm^2) 0.1 cm^2 2/5/2020  1:14 PM   Change in Wound Size % (l*w) 90.91 2/5/2020  1:14 PM   Wound Volume (cm^3) 0.02 cm^3 2/5/2020  1:14

## 2020-02-11 ENCOUNTER — HOSPITAL ENCOUNTER (OUTPATIENT)
Dept: WOUND CARE | Age: 85
Discharge: HOME | End: 2020-02-11
Payer: MEDICARE

## 2020-02-11 VITALS — RESPIRATION RATE: 16 BRPM | TEMPERATURE: 97.4 F

## 2020-02-11 PROCEDURE — 99213 OFFICE O/P EST LOW 20 MIN: CPT | Performed by: SURGERY

## 2020-02-11 PROCEDURE — 99213 OFFICE O/P EST LOW 20 MIN: CPT

## 2020-02-11 RX ORDER — LIDOCAINE 40 MG/G
CREAM TOPICAL ONCE
Status: DISCONTINUED | OUTPATIENT
Start: 2020-02-11 | End: 2020-02-12 | Stop reason: HOSPADM

## 2020-02-11 NOTE — PROGRESS NOTES
Tim Morin  Progress Note       Artie Dixon  AGE: 82 y.o. GENDER: female  : 1935  TODAY'S DATE:  2020    Subjective:     Chief Complaint   Patient presents with    Wound Check     Abdomen         HISTORY of PRESENT ILLNESS HPI     Artie Dixon is a 82 y.o. female who presents today for wound evaluation. History of Wound: Abdominal wound and right foot ulceration    Wound Pain:  none  Severity:  0 / 10   Wound Type:  diabetic  Modifying Factors:  none  Associated Signs/Symptoms:  none        PAST MEDICAL HISTORY        Diagnosis Date    Arthritis     [facility]     Diabetes mellitus ([facility]     Hypertension     Pulmonary embolism ([facility] [facility]     On warfarin       PAST SURGICAL HISTORY    Past Surgical History:   Procedure Laterality Date    ABDOMEN SURGERY N/A 2/15/2019    DEBRIDEMENT OF OPEN ABDOMINAL WOUND performed by Katie Fine MD at [facility] N/A 3/19/2019    RIGHT LOWER QUADRANT ABDOMINAL DEBRIDEMENT performed by Katie Fine MD at [facility].    No family history on file. SOCIAL HISTORY    Social History     Tobacco Use    Smoking status: Never Smoker    Smokeless tobacco: Never Used   Substance Use Topics    Alcohol use: No    Drug use: Not on file       ALLERGIES    Allergies   Allergen Reactions    Bactrim Ds [Sulfamethoxazole-Trimethoprim] Diarrhea     Abdominal pain & Diarrhea       MEDICATIONS    Current Outpatient Medications on File Prior to Encounter   Medication Sig Dispense Refill    gentamicin (GARAMYCIN) 0.1 % cream Apply topically daily.  Right foot 15 g 0    IRON PO Take 65 mg by mouth three times a week Indications:       vitamin B-12 (CYANOCOBALAMIN) 1000 MCG tablet Take 1,000 mcg by mouth three times a week Indications:       sodium hypochlorite (DAKINS) 0.125 % SOLN external solution Dakins moistened gauze daily 1000 mL 2    aspirin 81 MG tablet Take by mouth daily      lovastatin (MEVACOR) 40 MG tablet Take 40 mg by mouth nightly      Insulin NPH Isophane & Regular (HUMULIN 70/30 SC) Inject into the skin Indications: 13 units in am, 10 units at suppertime       Cholecalciferol (VITAMIN D3) 5000 units TABS Take by mouth three times a week Indications: mon, wed frid       glimepiride (AMARYL) 4 MG tablet Take 4 mg by mouth every morning (before breakfast)      warfarin (COUMADIN) 5 MG tablet Take 5 mg by mouth      cloNIDine (CATAPRES) 0.1 MG tablet Take 0.1 mg by mouth 2 times daily      lisinopril (PRINIVIL;ZESTRIL) 20 MG tablet Take 20 mg by mouth daily      metFORMIN (GLUCOPHAGE) 1000 MG tablet Take 5,000 mg by mouth 2 times daily (with meals)       No current facility-administered medications on file prior to encounter. REVIEW OF SYSTEMS    Pertinent items are noted in HPI.       Objective:      Temp 97.4 °F (36.3 °C) (Oral)   Resp 16     PHYSICAL EXAM    General Appearance: alert and oriented to person, place and time, well-developed and well-nourished, in no acute distress  Skin: warm and dry, no rash or erythema  Head: normocephalic and atraumatic  Neck: neck supple and non tender without mass, no thyromegaly or thyroid nodules, no cervical lymphadenopathy   Abdomen: soft, non-tender, non-distended, normal bowel sounds, no masses or organomegaly  The abdominal wound has healed  The right foot ulceration has healed    Assessment:     Patient Active Problem List   Diagnosis    Abdominal wall cellulitis    Poorly controlled type 2 diabetes mellitus ([facility] [address] [facility]    HTN (hypertension)    CKD (chronic kidney disease) stage 3, GFR 30-59 ml/min ([facility])    History of pulmonary embolism    Chronic abdominal wound infection, subsequent encounter    Open abdominal wall wound, subsequent encounter    Severe infection    Acute kidney injury superimposed on chronic kidney disease (HCC)    Diverticulosis of colon without diverticulitis    Morbid obesity due to excess calories ([address]    Open wound of abdomen    Diabetic ulcer of right midfoot associated with type 2 diabetes mellitus, with fat layer exposed ([address]    Chronic wound infection of abdomen    History of arterial ischemic stroke    Anticoagulated on Coumadin    MRSA colonization    Acinetobacter lwoffi infection    Citrobacter infection    Weight loss counseling, encounter for    Proteus infection       Wound 09/10/19 Abdomen Distal;Medial #2 (Active)   Wound Image   2/11/2020 10:58 AM   Wound Other 2/11/2020 10:58 AM   Wound Cleansed Rinsed/Irrigated with saline 2/11/2020 10:58 AM   Wound Length (cm) 0 cm 2/11/2020 10:58 AM   Wound Width (cm) 0 cm 2/11/2020 10:58 AM   Wound Depth (cm) 0 cm 2/11/2020 10:58 AM   Wound Surface Area (cm^2) 0 cm^2 2/11/2020 10:58 AM   Change in Wound Size % (l*w) 100 2/11/2020 10:58 AM   Wound Volume (cm^3) 0 cm^3 2/11/2020 10:58 AM   Wound Healing % 100 2/11/2020 10:58 AM   Post-Procedure Length (cm) 4 cm 10/22/2019 10:59 AM   Post-Procedure Width (cm) 4.5 cm 10/22/2019 10:59 AM   Post-Procedure Depth (cm) 0.4 cm 10/22/2019 10:59 AM   Post-Procedure Surface Area (cm^2) 18 cm^2 10/22/2019 10:59 AM   Post-Procedure Volume (cm^3) 7.2 cm^3 10/22/2019 10:59 AM   Wound Assessment Epithelialization 2/11/2020 10:58 AM   Drainage Amount None 2/11/2020 10:58 AM   Drainage Description Serosanguinous 1/21/2020 10:35 AM   Odor None 2/11/2020 10:58 AM   [place]-wound Assessment Dry 2/11/2020 10:58 AM   [place]%Wound Bed 100 2/11/2020 10:58 AM   Red%Wound Bed 0 2/11/2020 10:58 AM   Yellow%Wound Bed 0 2/11/2020 10:58 AM   Black%Wound Bed 0 2/11/2020 10:58 AM   Purple%Wound Bed 0 2/11/2020 10:58 AM   Other%Wound Bed 0 2/11/2020 10:58 AM   Number of days: 154       Wound 12/17/19 Foot Lateral;Right #1 (Active)   Wound Image   1/7/2020 10:31 AM   Wound Diabetic 2/11/2020 10:58 AM   Offloading for Diabetic Foot Ulcers Felt or foam 2/11/2020 10:58 AM   Wound Cleansed Rinsed/Irrigated with saline 2/11/2020 10:58 AM   Wound Length (cm) 0 cm 2/11/2020 10:58 AM   Wound Width (cm) 0 cm 2/11/2020 10:58 AM   Wound Depth (cm) 0 cm 2/11/2020 10:58 AM   Wound Surface Area (cm^2) 0 cm^2 2/11/2020 10:58 AM   Change in Wound Size % (l*w) 100 2/11/2020 10:58 AM   Wound Volume (cm^3) 0 cm^3 2/11/2020 10:58 AM   Wound Healing % 100 2/11/2020 10:58 AM   Post-Procedure Length (cm) 0.5 cm 2/5/2020  1:36 PM   Post-Procedure Width (cm) 0.2 cm 2/5/2020  1:36 PM   Post-Procedure Depth (cm) 0.1 cm 2/5/2020  1:36 PM   Post-Procedure Surface Area (cm^2) 0.1 cm^2 2/5/2020  1:36 PM   Post-Procedure Volume (cm^3) 0.01 cm^3 2/5/2020  1:36 PM   Wound Assessment Dry 2/11/2020 10:58 AM   Drainage Amount None 2/11/2020 10:58 AM   Drainage Description Yellow; Tan 2/5/2020  1:12 PM   Odor None 2/11/2020 10:58 AM   [place]-wound Assessment Dry 2/11/2020 10:58 AM   [place]%Wound Bed 0 2/11/2020 10:58 AM   Red%Wound Bed 0 2/11/2020 10:58 AM   Yellow%Wound Bed 0 2/11/2020 10:58 AM   Black%Wound Bed 0 2/11/2020 10:58 AM   Purple%Wound Bed 0 2/11/2020 10:58 AM   Other%Wound Bed 0 2/11/2020 10:58 AM   Number of days: 64    82year-old female seen in follow-up for an abdominal wound. The abdominal wound has now completely healed. She is also seen in follow-up for diabetic ulceration on the lateral aspect of her right foot. The ulceration has healed. Plan:     The patient will obtain appropriately fitting diabetic shoes to avoid pressure to the area of the previous ulceration on the right foot. No further care is recommended regarding the abdominal wound. Follow-up as needed.     Discharge Treatment          Written Patient Discharge Instructions Given            Electronically signed by Teja Stanley MD on 2/11/2020 at 11:17 AM

## 2020-02-19 ENCOUNTER — HOSPITAL ENCOUNTER (OUTPATIENT)
Dept: WOUND CARE | Age: 85
Discharge: HOME | End: 2020-02-19
Payer: MEDICARE

## 2020-02-19 VITALS — SYSTOLIC BLOOD PRESSURE: 170 MMHG | RESPIRATION RATE: 16 BRPM | HEART RATE: 64 BPM | DIASTOLIC BLOOD PRESSURE: 62 MMHG

## 2020-02-19 PROCEDURE — 99212 OFFICE O/P EST SF 10 MIN: CPT

## 2020-02-19 RX ORDER — NIFEDIPINE 30 MG/1
30 TABLET, FILM COATED, EXTENDED RELEASE ORAL DAILY
COMMUNITY

## 2020-02-19 NOTE — PROGRESS NOTES
Tim   Progress Note and Procedure Note      Florence Pack  AGE: 82 y.o. GENDER: female  : 1935  TODAY'S DATE:  2020    Subjective:     Chief Complaint   Patient presents with    Wound Check     Right foot         HISTORY of PRESENT ILLNESS HPI     Florence Pack is a 82 y.o. female who presents today for wound evaluation. History of Wound: This wound is been present on and off for the last 2 years. She states that the wound healed and stayed healed for about a year and then it reopened and has closed and reopened a few times since. She has been changing the dressings as directed. She has no other complaints at this time. She denies any pain. She states that the wound is feeling better. Wound Pain:  intermittent  Severity:  1 / 10   Wound Type:  venous, arterial, diabetic and pressure  Modifying Factors:  venous stasis, lymphedema, diabetes, chronic pressure and shear force  Associated Signs/Symptoms:  edema, drainage and pain        PAST MEDICAL HISTORY        Diagnosis Date    Arthritis     Cerebral artery occlusion with cerebral infarction ([address]     Diabetes mellitus ([address]     Hypertension     Pulmonary embolism ([address]     On warfarin       PAST SURGICAL HISTORY    Past Surgical History:   Procedure Laterality Date    ABDOMEN SURGERY N/A 2/15/2019    DEBRIDEMENT OF OPEN ABDOMINAL WOUND performed by Teja Stanley MD at [facility] N/A 3/19/2019    RIGHT LOWER QUADRANT ABDOMINAL DEBRIDEMENT performed by Teja Stanley MD at [facility].    History reviewed. No pertinent family history.     SOCIAL HISTORY    Social History     Tobacco Use    Smoking status: Never Smoker    Smokeless tobacco: Never Used   Substance Use Topics    Alcohol use: No    Drug use: Not on file       ALLERGIES    Allergies   Allergen Reactions    Bactrim Ds [Sulfamethoxazole-Trimethoprim] Diarrhea     Abdominal pain & Diarrhea

## 2021-01-01 NOTE — PROGRESS NOTES
ALLERGIES    Allergies   Allergen Reactions    Bactrim Ds [Sulfamethoxazole-Trimethoprim] Diarrhea     Abdominal pain & Diarrhea       MEDICATIONS    Current Outpatient Medications on File Prior to Encounter   Medication Sig Dispense Refill    IRON PO Take 65 mg by mouth three times a week Indications: mon wed fri      vitamin B-12 (CYANOCOBALAMIN) 1000 MCG tablet Take 1,000 mcg by mouth three times a week Indications: mon wed fri      sodium hypochlorite (DAKINS) 0.125 % SOLN external solution Dakins moistened gauze daily 1000 mL 2    aspirin 81 MG tablet Take by mouth daily      lovastatin (MEVACOR) 40 MG tablet Take 40 mg by mouth nightly      Insulin NPH Isophane & Regular (HUMULIN 70/30 SC) Inject into the skin Indications: 13 units in am, 10 units at suppertime       Cholecalciferol (VITAMIN D3) 5000 units TABS Take by mouth three times a week Indications: mon, wed frid       glimepiride (AMARYL) 4 MG tablet Take 4 mg by mouth every morning (before breakfast)      warfarin (COUMADIN) 5 MG tablet Take 5 mg by mouth      cloNIDine (CATAPRES) 0.1 MG tablet Take 0.1 mg by mouth 2 times daily      lisinopril (PRINIVIL;ZESTRIL) 20 MG tablet Take 20 mg by mouth daily      metFORMIN (GLUCOPHAGE) 1000 MG tablet Take 5,000 mg by mouth 2 times daily (with meals)       No current facility-administered medications on file prior to encounter. REVIEW OF SYSTEMS    Pertinent items are noted in HPI. Objective:      BP (!) 194/71   Pulse 66   Temp 97.2 °F (36.2 °C) (Oral)   Resp 18     PHYSICAL EXAM    Vascular: Vascular status Impaired  palpable pedal pulses, right DP1/4 and PT1/4, left DP1/4 and PT1/4. CFT 3 seconds digits 1 to 5 bilateral.  Hair growth Absent  both lower extremities and feet. Skin temperature is warm to warm from pretibial area to distal digits bilateral.  Exam is negative for rubor, pallor, cyanosis or signs of acute vascular compromise bilaterally.   Exam is positive for
1

## 2021-01-08 ENCOUNTER — APPOINTMENT (OUTPATIENT)
Dept: CT IMAGING | Age: 86
End: 2021-01-08
Payer: MEDICARE

## 2021-01-08 ENCOUNTER — HOSPITAL ENCOUNTER (OUTPATIENT)
Age: 86
Setting detail: OBSERVATION
Discharge: NURSING, REHAB OR LONG-TERM CARE | End: 2021-01-12
Attending: EMERGENCY MEDICINE | Admitting: INTERNAL MEDICINE
Payer: MEDICARE

## 2021-01-08 ENCOUNTER — APPOINTMENT (OUTPATIENT)
Dept: GENERAL RADIOLOGY | Age: 86
End: 2021-01-08
Payer: MEDICARE

## 2021-01-08 DIAGNOSIS — R26.2 AMBULATORY DYSFUNCTION: Primary | ICD-10-CM

## 2021-01-08 DIAGNOSIS — M25.562 ACUTE PAIN OF BOTH KNEES: ICD-10-CM

## 2021-01-08 DIAGNOSIS — M25.561 ACUTE PAIN OF BOTH KNEES: ICD-10-CM

## 2021-01-08 PROBLEM — R29.6 FREQUENT FALLS: Status: ACTIVE | Noted: 2021-01-08

## 2021-01-08 LAB
A/G RATIO: 0.9 (ref 1.1–2.2)
ALBUMIN SERPL-MCNC: 3.1 G/DL (ref 3.4–5)
ALP BLD-CCNC: 97 U/L (ref 40–129)
ALT SERPL-CCNC: 11 U/L (ref 10–40)
ANION GAP SERPL CALCULATED.3IONS-SCNC: 8 MMOL/L (ref 3–16)
AST SERPL-CCNC: 26 U/L (ref 15–37)
BACTERIA: ABNORMAL /HPF
BASOPHILS ABSOLUTE: 0 K/UL (ref 0–0.2)
BASOPHILS RELATIVE PERCENT: 0.7 %
BILIRUB SERPL-MCNC: <0.2 MG/DL (ref 0–1)
BILIRUBIN URINE: NEGATIVE
BLOOD, URINE: ABNORMAL
BUN BLDV-MCNC: 28 MG/DL (ref 7–20)
CALCIUM SERPL-MCNC: 8.7 MG/DL (ref 8.3–10.6)
CHLORIDE BLD-SCNC: 100 MMOL/L (ref 99–110)
CLARITY: ABNORMAL
CO2: 27 MMOL/L (ref 21–32)
COLOR: YELLOW
CREAT SERPL-MCNC: 1.4 MG/DL (ref 0.6–1.2)
EOSINOPHILS ABSOLUTE: 0 K/UL (ref 0–0.6)
EOSINOPHILS RELATIVE PERCENT: 0.4 %
EPITHELIAL CELLS, UA: 10 /HPF (ref 0–5)
GFR AFRICAN AMERICAN: 43
GFR NON-AFRICAN AMERICAN: 36
GLOBULIN: 3.3 G/DL
GLUCOSE BLD-MCNC: 179 MG/DL (ref 70–99)
GLUCOSE URINE: NEGATIVE MG/DL
HCT VFR BLD CALC: 27.2 % (ref 36–48)
HEMOGLOBIN: 8.6 G/DL (ref 12–16)
HYALINE CASTS: 3 /LPF (ref 0–8)
INR BLD: 3.03 (ref 0.86–1.14)
KETONES, URINE: NEGATIVE MG/DL
LACTIC ACID: 1.1 MMOL/L (ref 0.4–2)
LEUKOCYTE ESTERASE, URINE: ABNORMAL
LIPASE: 17 U/L (ref 13–60)
LYMPHOCYTES ABSOLUTE: 0.8 K/UL (ref 1–5.1)
LYMPHOCYTES RELATIVE PERCENT: 13.4 %
MCH RBC QN AUTO: 28 PG (ref 26–34)
MCHC RBC AUTO-ENTMCNC: 31.4 G/DL (ref 31–36)
MCV RBC AUTO: 89.2 FL (ref 80–100)
MICROSCOPIC EXAMINATION: YES
MONOCYTES ABSOLUTE: 1 K/UL (ref 0–1.3)
MONOCYTES RELATIVE PERCENT: 16.2 %
NEUTROPHILS ABSOLUTE: 4.2 K/UL (ref 1.7–7.7)
NEUTROPHILS RELATIVE PERCENT: 69.3 %
NITRITE, URINE: NEGATIVE
PDW BLD-RTO: 13.7 % (ref 12.4–15.4)
PH UA: 8 (ref 5–8)
PLATELET # BLD: 257 K/UL (ref 135–450)
PMV BLD AUTO: 10.4 FL (ref 5–10.5)
POTASSIUM REFLEX MAGNESIUM: 4.3 MMOL/L (ref 3.5–5.1)
PRO-BNP: 742 PG/ML (ref 0–449)
PROTEIN UA: NEGATIVE MG/DL
PROTHROMBIN TIME: 35.6 SEC (ref 10–13.2)
RBC # BLD: 3.05 M/UL (ref 4–5.2)
RBC UA: ABNORMAL /HPF (ref 0–4)
SODIUM BLD-SCNC: 135 MMOL/L (ref 136–145)
SPECIFIC GRAVITY UA: 1.02 (ref 1–1.03)
TOTAL CK: 328 U/L (ref 26–192)
TOTAL PROTEIN: 6.4 G/DL (ref 6.4–8.2)
TROPONIN: 0.02 NG/ML
URINE TYPE: ABNORMAL
UROBILINOGEN, URINE: 1 E.U./DL
WBC # BLD: 6 K/UL (ref 4–11)
WBC UA: 4 /HPF (ref 0–5)

## 2021-01-08 PROCEDURE — 87077 CULTURE AEROBIC IDENTIFY: CPT

## 2021-01-08 PROCEDURE — 87086 URINE CULTURE/COLONY COUNT: CPT

## 2021-01-08 PROCEDURE — 85610 PROTHROMBIN TIME: CPT

## 2021-01-08 PROCEDURE — 73562 X-RAY EXAM OF KNEE 3: CPT

## 2021-01-08 PROCEDURE — 93005 ELECTROCARDIOGRAM TRACING: CPT | Performed by: EMERGENCY MEDICINE

## 2021-01-08 PROCEDURE — 70450 CT HEAD/BRAIN W/O DYE: CPT

## 2021-01-08 PROCEDURE — 83880 ASSAY OF NATRIURETIC PEPTIDE: CPT

## 2021-01-08 PROCEDURE — 81001 URINALYSIS AUTO W/SCOPE: CPT

## 2021-01-08 PROCEDURE — 2580000003 HC RX 258: Performed by: PHYSICIAN ASSISTANT

## 2021-01-08 PROCEDURE — 74176 CT ABD & PELVIS W/O CONTRAST: CPT

## 2021-01-08 PROCEDURE — 83605 ASSAY OF LACTIC ACID: CPT

## 2021-01-08 PROCEDURE — 85025 COMPLETE CBC W/AUTO DIFF WBC: CPT

## 2021-01-08 PROCEDURE — 82550 ASSAY OF CK (CPK): CPT

## 2021-01-08 PROCEDURE — 87186 SC STD MICRODIL/AGAR DIL: CPT

## 2021-01-08 PROCEDURE — 71045 X-RAY EXAM CHEST 1 VIEW: CPT

## 2021-01-08 PROCEDURE — 99285 EMERGENCY DEPT VISIT HI MDM: CPT

## 2021-01-08 PROCEDURE — 84484 ASSAY OF TROPONIN QUANT: CPT

## 2021-01-08 PROCEDURE — 72125 CT NECK SPINE W/O DYE: CPT

## 2021-01-08 PROCEDURE — 72170 X-RAY EXAM OF PELVIS: CPT

## 2021-01-08 PROCEDURE — G0378 HOSPITAL OBSERVATION PER HR: HCPCS

## 2021-01-08 PROCEDURE — 80053 COMPREHEN METABOLIC PANEL: CPT

## 2021-01-08 PROCEDURE — 83690 ASSAY OF LIPASE: CPT

## 2021-01-08 RX ORDER — ATORVASTATIN CALCIUM 20 MG/1
20 TABLET, FILM COATED ORAL NIGHTLY
Status: DISCONTINUED | OUTPATIENT
Start: 2021-01-08 | End: 2021-01-12 | Stop reason: HOSPADM

## 2021-01-08 RX ORDER — INSULIN LISPRO 100 [IU]/ML
14 INJECTION, SUSPENSION SUBCUTANEOUS 2 TIMES DAILY WITH MEALS
Status: DISCONTINUED | OUTPATIENT
Start: 2021-01-09 | End: 2021-01-09

## 2021-01-08 RX ORDER — 0.9 % SODIUM CHLORIDE 0.9 %
500 INTRAVENOUS SOLUTION INTRAVENOUS ONCE
Status: COMPLETED | OUTPATIENT
Start: 2021-01-08 | End: 2021-01-08

## 2021-01-08 RX ORDER — AMLODIPINE BESYLATE 5 MG/1
5 TABLET ORAL DAILY
Status: ON HOLD | COMMUNITY
End: 2021-01-11 | Stop reason: HOSPADM

## 2021-01-08 RX ORDER — ASPIRIN 81 MG/1
81 TABLET, CHEWABLE ORAL DAILY
Status: DISCONTINUED | OUTPATIENT
Start: 2021-01-09 | End: 2021-01-12 | Stop reason: HOSPADM

## 2021-01-08 RX ORDER — NIFEDIPINE 30 MG/1
30 TABLET, EXTENDED RELEASE ORAL DAILY
Status: DISCONTINUED | OUTPATIENT
Start: 2021-01-09 | End: 2021-01-12 | Stop reason: HOSPADM

## 2021-01-08 RX ORDER — CLONIDINE HYDROCHLORIDE 0.1 MG/1
0.1 TABLET ORAL 2 TIMES DAILY
Status: DISCONTINUED | OUTPATIENT
Start: 2021-01-08 | End: 2021-01-09

## 2021-01-08 RX ORDER — HYDROCODONE BITARTRATE AND ACETAMINOPHEN 5; 325 MG/1; MG/1
1 TABLET ORAL EVERY 6 HOURS PRN
Status: ON HOLD | COMMUNITY
End: 2021-01-12 | Stop reason: SDUPTHER

## 2021-01-08 RX ADMIN — SODIUM CHLORIDE 500 ML: 9 INJECTION, SOLUTION INTRAVENOUS at 21:41

## 2021-01-08 ASSESSMENT — PAIN DESCRIPTION - ONSET: ONSET_2: PROGRESSIVE

## 2021-01-08 ASSESSMENT — PAIN DESCRIPTION - PAIN TYPE
TYPE: ACUTE PAIN
TYPE: ACUTE PAIN

## 2021-01-08 ASSESSMENT — PAIN SCALES - GENERAL: PAINLEVEL_OUTOF10: 10

## 2021-01-08 ASSESSMENT — PAIN DESCRIPTION - LOCATION
LOCATION: KNEE
LOCATION: KNEE

## 2021-01-08 ASSESSMENT — PAIN DESCRIPTION - PROGRESSION: CLINICAL_PROGRESSION: NOT CHANGED

## 2021-01-08 ASSESSMENT — PAIN DESCRIPTION - DURATION: DURATION_2: INTERMITTENT

## 2021-01-08 NOTE — ED PROVIDER NOTES
I independently performed a history and physical on Nakia Youngblood. All diagnostic, treatment, and disposition decisions were made by myself in conjunction with the advanced practice provider. Briefly, this is a 82 y.o. female here for frequent falls from home. She is a morbidly obese 53-year-old female who lives at home with family. Falls frequently and was unable to get up. Today she fell initially refused transport to the hospital with EMS, but after spending all day in the recliner urinating on pads and unable to get to the bathroom for a bowel movement was basically told to come here for placement because she cannot care for herself at home. On exam, she is well-appearing in no acute distress. Morbidly obese. Heart is regular rate and rhythm. Lungs are clear. Abdomen soft nontender nondistended. She is erythema to bilateral knees. Intact range of motion lower extremities as well as sensation lower extremities. No obvious step-offs or deformities. EKG  EKG reviewed by myself  Dated today 1822  Rate 80's, NSR, Normal EKG  No prior. Screenings               MDM  85yoF, NAD, from home for a few weeks of progressive ambulatory dysfunction, falls, urinary incontinence, inability to care for self. Fell to knees today, couldn't get back up. Exam has abrasions to knees, but otherwise benign. Imaging to eval for acute bony injury. Labs/UA as well  Plan to admit for placement. Patient Referrals:  No follow-up provider specified. Discharge Medications:  New Prescriptions    No medications on file       FINAL IMPRESSION  1. Ambulatory dysfunction        Blood pressure (!) 137/52, pulse 99, temperature 99.3 °F (37.4 °C), temperature source Oral, resp. rate 21, height 5' (1.524 m), weight 250 lb (113.4 kg), SpO2 95 %. For further details of [address] [place] [address] emergency department encounter, please see documentation by advanced practice provider, Enrique King.        Shwetha Estrella,

## 2021-01-08 NOTE — ED PROVIDER NOTES
[address]        Pt Name: Preethi Mason  MRN: [id number]  [place] 1935  Date of evaluation: 1/8/2021  Provider: Annalisa Siu PA-C  PCP: Ramin Torres     I have seen and evaluated this patient with my supervising physician Elie Ortiz MD.    [address] [place] [address]       Chief Complaint   Patient presents with   Olivia Simpson this morning, brought in via squad from home after initial refusal. Hit both knees. Unable to walk since. HISTORY OF PRESENT ILLNESS   (Location, Timing/Onset, Context/Setting, Quality, Duration, Modifying Factors, Severity, Associated Signs and Symptoms)  Note limiting factors. Preethi Mason is a 82 y.o. female that presents to the emergency department with general weakness, bilateral knee pain. She states this morning at 9:00 she swung her legs out of bed to get out of bed and just fell onto her knees. Her head did land in the trash can but she does not believe she hit her head but she is anticoagulated on Coumadin. She states she has been having bilateral knee pain. Was laying on the ground for about an hour before squad came to get her and put her in her chair. She states she has been unable to get up out of her chair since then has been urinating herself. She denies headache, change in her chronic back pain, chest pain, shortness of breath. Denies any abdominal pain at this time but states she has been having some nausea specifically with eating with some abdominal cramping whenever she eats. Less of she had this was yesterday. She denies dysuria, hematuria, diarrhea, bloody stool or any other symptoms. Denies fevers, cough. She lives at home by herself. Normally able to ambulate with a walker but now unable to ambulate at all since the fall. 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     Nursing Notes were all reviewed and agreed with or any disagreements were addressed in the HPI. REVIEW OF SYSTEMS    (2-9 systems for level 4, 10 or more for level 5)     Review of Systems    Positives and Pertinent negatives as per HPI. Except as noted above in the ROS, all other systems were reviewed and negative. PAST MEDICAL HISTORY     Past Medical History:   Diagnosis Date    Arthritis     [facility]     Diabetes mellitus ([facility]     Hypertension     Pulmonary embolism ([facility]     On warfarin         SURGICAL HISTORY     Past Surgical History:   Procedure Laterality Date    ABDOMEN SURGERY N/A 2/15/2019    DEBRIDEMENT OF OPEN ABDOMINAL WOUND performed by Melanie Oneill MD at [facility] N/A 3/19/2019    RIGHT LOWER QUADRANT ABDOMINAL DEBRIDEMENT performed by Melanie Oneill MD at [address] [place] [address]       Current Discharge Medication List      CONTINUE these medications which have NOT CHANGED    Details   amLODIPine (NORVASC) 5 MG tablet Take 5 mg by mouth daily      HYDROcodone-acetaminophen (NORCO) 5-325 MG per tablet Take 1 tablet by mouth every 6 hours as needed for Pain.       IRON PO Take 65 mg by mouth three times a week Indications: mon wed fri      vitamin B-12 (CYANOCOBALAMIN) 1000 MCG tablet Take 1,000 mcg by mouth three times a week Indications: mon wed fri      aspirin 81 MG tablet Take by mouth daily      lovastatin (MEVACOR) 40 MG tablet Take 40 mg by mouth nightly      Insulin NPH Isophane & Regular (HUMULIN 70/30 SC) Inject into the skin Indications: 15 units in am, 14 units at suppertime       Cholecalciferol (VITAMIN D3) 5000 units TABS Take by mouth three times a week Indications: mon, wed frid       glimepiride (AMARYL) 4 MG tablet Take 4 mg by mouth every morning (before breakfast)      warfarin (COUMADIN) 5 MG tablet Take 5 mg by mouth Indications: 5 mg daily, except Wed take 2.5 mg       cloNIDine (CATAPRES) 0.1 MG tablet Take 0.1 mg by mouth 2 times daily lisinopril (PRINIVIL;ZESTRIL) 20 MG tablet Take 20 mg by mouth daily      metFORMIN (GLUCOPHAGE) 1000 MG tablet Take 5,000 mg by mouth 2 times daily (with meals)      NIFEdipine (ADALAT CC) 30 MG extended release tablet Take 30 mg by mouth daily      gentamicin (GARAMYCIN) 0.1 % cream Apply topically daily. Right foot  Qty: 15 g, Refills: 0      sodium hypochlorite (DAKINS) 0.125 % SOLN external solution Dakins moistened gauze daily  Qty: 1000 mL, Refills: 2               ALLERGIES     Bactrim ds [sulfamethoxazole-trimethoprim]    FAMILYHISTORY     History reviewed. No pertinent family history. SOCIAL HISTORY       Social History     Tobacco Use    Smoking status: Never Smoker    Smokeless tobacco: Never Used   Substance Use Topics    Alcohol use: No    Drug use: Never       SCREENINGS    [place] Coma Scale  Eye Opening: Spontaneous  Best Verbal Response: Oriented  Best Motor Response: Obeys commands  [place] Coma Scale Score: 15        PHYSICAL EXAM    (up to 7 for level 4, 8 or more for level 5)     ED Triage Vitals [01/08/21 1721]   BP Temp Temp Source Pulse Resp SpO2 Height Weight   (!) 158/64 99.3 °F (37.4 °C) Oral 101 15 95 % 5' (1.524 m) 250 lb (113.4 kg)       Physical Exam  Vitals signs and nursing note reviewed. Constitutional:       Appearance: She is well-developed. She is not diaphoretic. Comments: Chronically ill-appearing   HENT:      Head: Atraumatic. Nose: Nose normal.   Eyes:      General:         Right eye: No discharge. Left eye: No discharge. Neck:      Musculoskeletal: Normal range of motion. Cardiovascular:      Rate and Rhythm: Normal rate and regular rhythm. Heart sounds: No murmur. No friction rub. No gallop. Pulmonary:      Effort: Pulmonary effort is normal. No respiratory distress. Breath sounds: No stridor. No wheezing, rhonchi or rales. Abdominal:      General: Bowel sounds are normal. There is no distension.       Palpations: Abdomen is soft. There is no mass. Tenderness: There is no abdominal tenderness. There is no guarding or rebound. Hernia: No hernia is present. Musculoskeletal: Normal range of motion. General: Tenderness present. No swelling. Comments: Some mild tenderness over the bilateral knees with decreased range of motion with flexion bilaterally worse on the right. Decreased range of motion flexion bilateral hips. Full range of motion of bilateral ankles. No midline tenderness or step-off in the neck or back. Equal range of motion of bilateral upper extremities. Skin:     General: Skin is warm and dry. Findings: No erythema or rash. Neurological:      Mental Status: She is alert and oriented to person, place, and time. Cranial Nerves: No cranial nerve deficit.    Psychiatric:         Behavior: Behavior normal.         DIAGNOSTIC RESULTS   LABS:    Labs Reviewed   CBC WITH AUTO DIFFERENTIAL - Abnormal; Notable for the following components:       Result Value    RBC 3.05 (*)     Hemoglobin 8.6 (*)     Hematocrit 27.2 (*)     Lymphocytes Absolute 0.8 (*)     All other components within normal limits    Narrative:     Performed at:  [facility] [address], [place], [address] [employer]   Phone [phone]   COMPREHENSIVE METABOLIC PANEL W/ REFLEX TO MG FOR LOW K - Abnormal; Notable for the following components:    Sodium 135 (*)     Glucose 179 (*)     BUN 28 (*)     CREATININE 1.4 (*)     GFR Non- 36 (*)     GFR  43 (*)     Alb 3.1 (*)     Albumin/Globulin Ratio 0.9 (*)     All other components within normal limits    Narrative:     Performed at:  [facility] [address], [place], [facility] [employer]   Phone [phone]   TROPONIN - Abnormal; Notable for the following components:    Troponin 0.02 (*)     All other components within normal limits    Narrative:     Performed at:  [facility] - [facility]  [address] [facility],  [place], [facility] [employer]   Phone [phone]   BRAIN NATRIURETIC PEPTIDE - Abnormal; Notable for the following components:    Pro- (*)     All other components within normal limits    Narrative:     Performed at:  [facility] [address] [place] [employer]   Phone [phone] - Abnormal; Notable for the following components:    Clarity, UA TURBID (*)     Blood, Urine MODERATE (*)     Leukocyte Esterase, Urine SMALL (*)     All other components within normal limits    Narrative:     Performed at:  [facility] [address],  [place], [facility] [employer]   Phone [phone]   PROTIME-INR - Abnormal; Notable for the following components:    Protime 35.6 (*)     INR 3.03 (*)     All other components within normal limits    Narrative:     Performed at:  [facility] [address],  [place] [address] [employer]   Phone [phone]   CK - Abnormal; Notable for the following components:     Total  (*)     All other components within normal limits    Narrative:     Performed at:  [facility] [address],  [place], [facility] [employer]   Phone [phone]   MICROSCOPIC URINALYSIS - Abnormal; Notable for the following components:    RBC, UA 5-10 (*)     Bacteria, UA 1+ (*)     Epithelial Cells, UA 10 (*)     All other components within normal limits    Narrative:     Performed at:  [facility] [address],  [place] [address] [employer]   Phone [phone]   BASIC METABOLIC PANEL - Abnormal; Notable for the following components:    Glucose 105 (*)     BUN 24 (*)     CREATININE 1.4 (*)     GFR Non- 36 (*)     GFR  43 (*)     All other components within normal limits    Narrative:     Performed at:  [facility]  [address] [facility], [place]   Phone [phone]   POCT GLUCOSE - Abnormal; Notable for the following components:    POC Glucose 331 (*)     All other components within normal limits    Narrative:     Performed at:  [facility] [address] [employer]  [place] [place]   Phone [phone]   POCT GLUCOSE - Abnormal; Notable for the following components:    POC Glucose 166 (*)     All other components within normal limits    Narrative:     Performed at:  [facility] [address]   Phone [phone]   POCT GLUCOSE - Abnormal; Notable for the following components:    POC Glucose 110 (*)     All other components within normal limits    Narrative:     Performed at:  [facility] [address] [email or web address]   Phone [phone]   CULTURE, URINE   LIPASE    Narrative:     Performed at:  [facility] [address] [employer]   Phone [phone]   LACTIC ACID, PLASMA    Narrative:     Performed at:  [facility] [address] [email or web address]   Phone [phone]   POCT GLUCOSE   POCT GLUCOSE   POCT GLUCOSE   POCT GLUCOSE       All other labs were within normal range or not returned as of this dictation. EKG: All EKG's are interpreted by the Emergency Department Physician in the absence of a cardiologist.  Please see their note for interpretation of EKG. RADIOLOGY:   Non-plain film images such as CT, Ultrasound and MRI are read by the radiologist. Plain radiographic images are visualized and preliminarily interpreted by the ED Provider with the below findings:        Interpretation per the Radiologist below, if available at the time of this note:    CT ABDOMEN PELVIS WO CONTRAST Additional Contrast? None   Final Result   Previous cholecystectomy which is unchanged with no acute abnormality seen. Moderate hiatal hernia which is unchanged and hazy bibasilar opacities which   could represent early infiltrates or atelectasis. Recommend follow-up      Perirenal scarring around both kidneys with no hydronephrosis or urinary   obstruction      Extensive diverticulosis throughout the sigmoid colon with no pericolonic   inflammation. Atrophic uterus with no pelvic mass. Moderate calcified plaque throughout the aorta which is normal caliber and   unchanged. CT Cervical Spine WO Contrast   Final Result   Moderate degenerative disc changes throughout the lower cervical spine which   is most severe at C6-7 with no acute abnormality seen. Moderate disc osteophyte complexes at C4-5 and C5-6. Recommend clinical   follow-up. Moderate osteoarthritic changes of the facets throughout         CT Head WO Contrast   Final Result   No acute intracranial abnormality. XR CHEST PORTABLE   Final Result   Overlying EKG lead artifact limiting the exam with no obvious acute   abnormality seen. XR PELVIS (1-2 VIEWS)   Final Result   Moderate osteoarthritic changes in both hips and diffuse osteopenia with no   acute bony abnormality. XR KNEE LEFT (3 VIEWS)   Final Result   Status post total left knee replacement in good position. Diffuse osteopenia limiting the exam with some questionable mild irregular   linear lucency along the distal femoral condyle extending to the prosthesis   centrally which is only seen on lateral view and may just be artifactual or   related to the prior surgery. If there is  clinical concern for a   nondisplaced fracture in the area. Recommend orthopedic follow-up. Postop changes posterior to the patella and moderate soft tissue swelling   anterior to the knee with no obvious joint effusion.          XR KNEE RIGHT (3 VIEWS)   Final Result   Moderately severe osteoarthritic changes in the knee which is more prominent   laterally with no obvious acute fracture. Diffuse osteopenia limiting the exam.      Moderate patellofemoral degenerative changes and a small suprapatellar   effusion. PROCEDURES   Unless otherwise noted below, none     Procedures    CRITICAL CARE TIME   N/A    CONSULTS:  None      EMERGENCY DEPARTMENT COURSE and DIFFERENTIAL DIAGNOSIS/MDM:   Vitals:    Vitals:    01/08/21 2300 01/08/21 2341 01/09/21 0545 01/09/21 0800   BP: (!) 134/98 (!) 164/51 (!) 145/54 (!) 194/60   Pulse: 87 92 73 81   Resp: 20 20 18 18   Temp:  98.5 °F (36.9 °C) 98.3 °F (36.8 °C) 98.8 °F (37.1 °C)   TempSrc:  Oral Oral Oral   SpO2: 97% 93% 93% 91%   Weight:       Height:           Patient was given the following medications:  Medications   cloNIDine (CATAPRES) tablet 0.1 mg (0.1 mg Oral Given 1/9/21 0937)   aspirin chewable tablet 81 mg (81 mg Oral Given 1/9/21 0937)   NIFEdipine (PROCARDIA XL) extended release tablet 30 mg (30 mg Oral Given 1/9/21 0937)   atorvastatin (LIPITOR) tablet 20 mg ( Oral Canceled Entry 1/9/21 0005)   insulin lispro protamine & lispro (HUMALOG MIX) (75-25) 100 UNIT per ML injection pen SUPN 14 Units (14 Units Subcutaneous Given 1/9/21 0940)   insulin lispro (1 Unit Dial) 0-6 Units (0 Units Subcutaneous Not Given 1/9/21 7966)   glucose (GLUTOSE) 40 % oral gel 15 g (has no administration in time range)   dextrose 50 % IV solution (has no administration in time range)   glucagon (rDNA) injection 1 mg (has no administration in time range)   dextrose 5 % solution (has no administration in time range)   HYDROcodone-acetaminophen (NORCO) 5-325 MG per tablet 1 tablet (1 tablet Oral Given 1/9/21 7954)   insulin lispro (1 Unit Dial) 0-3 Units (3 Units Subcutaneous Given 1/9/21 6584)   0.9 % sodium chloride bolus (0 mLs Intravenous Stopped 1/8/21 1982)           Patient presented after she has been unable to ambulate all day after she fell off her bed at around 9 AM this morning. She has mild KIKO with creatinine of 1.4.   Troponin is slightly elevated 0.02 but she denies any chest pain or shortness of breath. 1+ bacteria in urinalysis but she denies any urinary symptoms and this is contaminated with 10 epithelial cells. She is anticoagulated on Coumadin with mildly supratherapeutic INR. X-rays reveal some osteoarthritis. X-ray of the left knee does reveal linear lucency over the distal femoral condyle. She has more pain in her right knee and has better range of motion with her left knee then compared to her right. Low suspicion for acute fracture. CT imaging is still pending given the patient did land with her head in a trash can and she is anticoagulated she is also been having some intermittent abdominal discomfort and decreased oral intake because of this over the past 1 month. Patient lives at home by herself. Most likely will require admission for placement in rehab. Please see attending note for final disposition. Patient was stable at the end of my shift at 8 PM.    FINAL IMPRESSION      1. Ambulatory dysfunction          DISPOSITION/PLAN   DISPOSITION Admitted 01/08/2021 09:19:44 PM      PATIENT REFERREDTO:  No follow-up provider specified.     DISCHARGE MEDICATIONS:  Current Discharge Medication List          DISCONTINUED MEDICATIONS:  Current Discharge Medication List                 (Please note that portions of this note were completed with a voice recognition program.  Efforts were made to edit the dictations but occasionally words are mis-transcribed.)    Celeste Campuzano PA-C (electronically signed)           Celeste Campuzano PA-C  01/09/21 1016

## 2021-01-09 ENCOUNTER — APPOINTMENT (OUTPATIENT)
Dept: CT IMAGING | Age: 86
End: 2021-01-09
Payer: MEDICARE

## 2021-01-09 LAB
ANION GAP SERPL CALCULATED.3IONS-SCNC: 10 MMOL/L (ref 3–16)
BUN BLDV-MCNC: 24 MG/DL (ref 7–20)
CALCIUM SERPL-MCNC: 8.4 MG/DL (ref 8.3–10.6)
CHLORIDE BLD-SCNC: 106 MMOL/L (ref 99–110)
CO2: 24 MMOL/L (ref 21–32)
CREAT SERPL-MCNC: 1.4 MG/DL (ref 0.6–1.2)
EKG ATRIAL RATE: 81 BPM
EKG DIAGNOSIS: NORMAL
EKG P-R INTERVAL: 132 MS
EKG Q-T INTERVAL: 398 MS
EKG QRS DURATION: 122 MS
EKG QTC CALCULATION (BAZETT): 462 MS
EKG R AXIS: -3 DEGREES
EKG T AXIS: -7 DEGREES
EKG VENTRICULAR RATE: 81 BPM
GFR AFRICAN AMERICAN: 43
GFR NON-AFRICAN AMERICAN: 36
GLUCOSE BLD-MCNC: 105 MG/DL (ref 70–99)
GLUCOSE BLD-MCNC: 110 MG/DL (ref 70–99)
GLUCOSE BLD-MCNC: 166 MG/DL (ref 70–99)
GLUCOSE BLD-MCNC: 167 MG/DL (ref 70–99)
GLUCOSE BLD-MCNC: 185 MG/DL (ref 70–99)
GLUCOSE BLD-MCNC: 263 MG/DL (ref 70–99)
GLUCOSE BLD-MCNC: 331 MG/DL (ref 70–99)
PERFORMED ON: ABNORMAL
POTASSIUM SERPL-SCNC: 3.9 MMOL/L (ref 3.5–5.1)
SODIUM BLD-SCNC: 140 MMOL/L (ref 136–145)

## 2021-01-09 PROCEDURE — 6360000002 HC RX W HCPCS: Performed by: NURSE PRACTITIONER

## 2021-01-09 PROCEDURE — 6370000000 HC RX 637 (ALT 250 FOR IP): Performed by: NURSE PRACTITIONER

## 2021-01-09 PROCEDURE — 97161 PT EVAL LOW COMPLEX 20 MIN: CPT

## 2021-01-09 PROCEDURE — 6370000000 HC RX 637 (ALT 250 FOR IP): Performed by: EMERGENCY MEDICINE

## 2021-01-09 PROCEDURE — 92526 ORAL FUNCTION THERAPY: CPT

## 2021-01-09 PROCEDURE — 73700 CT LOWER EXTREMITY W/O DYE: CPT

## 2021-01-09 PROCEDURE — 92610 EVALUATE SWALLOWING FUNCTION: CPT

## 2021-01-09 PROCEDURE — G0378 HOSPITAL OBSERVATION PER HR: HCPCS

## 2021-01-09 PROCEDURE — 2580000003 HC RX 258: Performed by: NURSE PRACTITIONER

## 2021-01-09 PROCEDURE — 6370000000 HC RX 637 (ALT 250 FOR IP): Performed by: INTERNAL MEDICINE

## 2021-01-09 PROCEDURE — 93010 ELECTROCARDIOGRAM REPORT: CPT | Performed by: INTERNAL MEDICINE

## 2021-01-09 PROCEDURE — 80048 BASIC METABOLIC PNL TOTAL CA: CPT

## 2021-01-09 PROCEDURE — 36415 COLL VENOUS BLD VENIPUNCTURE: CPT

## 2021-01-09 PROCEDURE — 97166 OT EVAL MOD COMPLEX 45 MIN: CPT

## 2021-01-09 PROCEDURE — 96374 THER/PROPH/DIAG INJ IV PUSH: CPT

## 2021-01-09 RX ORDER — WARFARIN SODIUM 5 MG/1
5 TABLET ORAL DAILY
Status: DISCONTINUED | OUTPATIENT
Start: 2021-01-10 | End: 2021-01-12 | Stop reason: HOSPADM

## 2021-01-09 RX ORDER — HYDROCODONE BITARTRATE AND ACETAMINOPHEN 5; 325 MG/1; MG/1
1 TABLET ORAL EVERY 6 HOURS PRN
Status: COMPLETED | OUTPATIENT
Start: 2021-01-09 | End: 2021-01-09

## 2021-01-09 RX ORDER — DEXTROSE MONOHYDRATE 25 G/50ML
12.5 INJECTION, SOLUTION INTRAVENOUS PRN
Status: DISCONTINUED | OUTPATIENT
Start: 2021-01-09 | End: 2021-01-12 | Stop reason: HOSPADM

## 2021-01-09 RX ORDER — INSULIN LISPRO 100 [IU]/ML
0-6 INJECTION, SOLUTION INTRAVENOUS; SUBCUTANEOUS
Status: DISCONTINUED | OUTPATIENT
Start: 2021-01-09 | End: 2021-01-12 | Stop reason: HOSPADM

## 2021-01-09 RX ORDER — NICOTINE POLACRILEX 4 MG
15 LOZENGE BUCCAL PRN
Status: DISCONTINUED | OUTPATIENT
Start: 2021-01-09 | End: 2021-01-12 | Stop reason: HOSPADM

## 2021-01-09 RX ORDER — CLONIDINE HYDROCHLORIDE 0.1 MG/1
0.2 TABLET ORAL 2 TIMES DAILY
Status: DISCONTINUED | OUTPATIENT
Start: 2021-01-09 | End: 2021-01-12 | Stop reason: HOSPADM

## 2021-01-09 RX ORDER — INSULIN LISPRO 100 [IU]/ML
12 INJECTION, SUSPENSION SUBCUTANEOUS 2 TIMES DAILY WITH MEALS
Status: DISCONTINUED | OUTPATIENT
Start: 2021-01-09 | End: 2021-01-12 | Stop reason: HOSPADM

## 2021-01-09 RX ORDER — WARFARIN SODIUM 5 MG/1
5 TABLET ORAL DAILY
Status: DISCONTINUED | OUTPATIENT
Start: 2021-01-09 | End: 2021-01-09

## 2021-01-09 RX ORDER — SODIUM CHLORIDE 9 MG/ML
INJECTION, SOLUTION INTRAVENOUS CONTINUOUS
Status: ACTIVE | OUTPATIENT
Start: 2021-01-09 | End: 2021-01-10

## 2021-01-09 RX ORDER — DEXTROSE MONOHYDRATE 50 MG/ML
100 INJECTION, SOLUTION INTRAVENOUS PRN
Status: DISCONTINUED | OUTPATIENT
Start: 2021-01-09 | End: 2021-01-12 | Stop reason: HOSPADM

## 2021-01-09 RX ORDER — INSULIN LISPRO 100 [IU]/ML
0-3 INJECTION, SOLUTION INTRAVENOUS; SUBCUTANEOUS EVERY EVENING
Status: DISCONTINUED | OUTPATIENT
Start: 2021-01-09 | End: 2021-01-12 | Stop reason: HOSPADM

## 2021-01-09 RX ORDER — PANTOPRAZOLE SODIUM 40 MG/1
40 TABLET, DELAYED RELEASE ORAL
Status: DISCONTINUED | OUTPATIENT
Start: 2021-01-10 | End: 2021-01-12 | Stop reason: HOSPADM

## 2021-01-09 RX ORDER — INSULIN LISPRO 100 [IU]/ML
0-3 INJECTION, SOLUTION INTRAVENOUS; SUBCUTANEOUS NIGHTLY
Status: DISCONTINUED | OUTPATIENT
Start: 2021-01-09 | End: 2021-01-09

## 2021-01-09 RX ORDER — HYDROCODONE BITARTRATE AND ACETAMINOPHEN 5; 325 MG/1; MG/1
1 TABLET ORAL EVERY 6 HOURS PRN
Status: DISCONTINUED | OUTPATIENT
Start: 2021-01-09 | End: 2021-01-12 | Stop reason: HOSPADM

## 2021-01-09 RX ORDER — SUCRALFATE 1 G/1
1 TABLET ORAL EVERY 6 HOURS SCHEDULED
Status: DISCONTINUED | OUTPATIENT
Start: 2021-01-09 | End: 2021-01-12 | Stop reason: HOSPADM

## 2021-01-09 RX ADMIN — INSULIN LISPRO 2 UNITS: 100 INJECTION, SOLUTION INTRAVENOUS; SUBCUTANEOUS at 20:11

## 2021-01-09 RX ADMIN — INSULIN LISPRO 1 UNITS: 100 INJECTION, SOLUTION INTRAVENOUS; SUBCUTANEOUS at 11:35

## 2021-01-09 RX ADMIN — INSULIN LISPRO 14 UNITS: 100 INJECTION, SUSPENSION SUBCUTANEOUS at 09:40

## 2021-01-09 RX ADMIN — INSULIN LISPRO 3 UNITS: 100 INJECTION, SOLUTION INTRAVENOUS; SUBCUTANEOUS at 02:54

## 2021-01-09 RX ADMIN — SODIUM CHLORIDE: 9 INJECTION, SOLUTION INTRAVENOUS at 17:21

## 2021-01-09 RX ADMIN — Medication 1 G: at 17:19

## 2021-01-09 RX ADMIN — ASPIRIN 81 MG: 81 TABLET, CHEWABLE ORAL at 09:37

## 2021-01-09 RX ADMIN — HYDROCODONE BITARTRATE AND ACETAMINOPHEN 1 TABLET: 5; 325 TABLET ORAL at 02:54

## 2021-01-09 RX ADMIN — DICLOFENAC 2 G: 10 GEL TOPICAL at 20:09

## 2021-01-09 RX ADMIN — CLONIDINE HYDROCHLORIDE 0.2 MG: 0.1 TABLET ORAL at 20:08

## 2021-01-09 RX ADMIN — HYDROCODONE BITARTRATE AND ACETAMINOPHEN 1 TABLET: 5; 325 TABLET ORAL at 21:41

## 2021-01-09 RX ADMIN — HYDROCODONE BITARTRATE AND ACETAMINOPHEN 1 TABLET: 5; 325 TABLET ORAL at 11:31

## 2021-01-09 RX ADMIN — INSULIN LISPRO 12 UNITS: 100 INJECTION, SUSPENSION SUBCUTANEOUS at 17:48

## 2021-01-09 RX ADMIN — SUCRALFATE 1 G: 1 TABLET ORAL at 17:19

## 2021-01-09 RX ADMIN — SUCRALFATE 1 G: 1 TABLET ORAL at 23:18

## 2021-01-09 RX ADMIN — CLONIDINE HYDROCHLORIDE 0.1 MG: 0.1 TABLET ORAL at 00:04

## 2021-01-09 RX ADMIN — DICLOFENAC 2 G: 10 GEL TOPICAL at 11:31

## 2021-01-09 RX ADMIN — ATORVASTATIN CALCIUM 20 MG: 20 TABLET, FILM COATED ORAL at 20:08

## 2021-01-09 RX ADMIN — INSULIN LISPRO 1 UNITS: 100 INJECTION, SOLUTION INTRAVENOUS; SUBCUTANEOUS at 17:48

## 2021-01-09 RX ADMIN — HYDROCODONE BITARTRATE AND ACETAMINOPHEN 1 TABLET: 5; 325 TABLET ORAL at 15:31

## 2021-01-09 RX ADMIN — CLONIDINE HYDROCHLORIDE 0.1 MG: 0.1 TABLET ORAL at 09:37

## 2021-01-09 RX ADMIN — NIFEDIPINE 30 MG: 30 TABLET, FILM COATED, EXTENDED RELEASE ORAL at 09:37

## 2021-01-09 RX ADMIN — SUCRALFATE 1 G: 1 TABLET ORAL at 11:31

## 2021-01-09 ASSESSMENT — PAIN DESCRIPTION - FREQUENCY: FREQUENCY: CONTINUOUS

## 2021-01-09 ASSESSMENT — PAIN SCALES - GENERAL
PAINLEVEL_OUTOF10: 2
PAINLEVEL_OUTOF10: 6

## 2021-01-09 ASSESSMENT — PAIN DESCRIPTION - PAIN TYPE
TYPE: CHRONIC PAIN;ACUTE PAIN
TYPE: CHRONIC PAIN
TYPE: ACUTE PAIN

## 2021-01-09 ASSESSMENT — PAIN DESCRIPTION - DESCRIPTORS
DESCRIPTORS: ACHING
DESCRIPTORS: ACHING

## 2021-01-09 ASSESSMENT — PAIN DESCRIPTION - ONSET: ONSET: ON-GOING

## 2021-01-09 ASSESSMENT — PAIN DESCRIPTION - ORIENTATION
ORIENTATION: RIGHT;LEFT

## 2021-01-09 ASSESSMENT — PAIN DESCRIPTION - LOCATION
LOCATION: KNEE
LOCATION: KNEE

## 2021-01-09 NOTE — PROGRESS NOTES
clinical s/s of aspiration/penetration. Frequent belching was noted with Pt intermittently c/o food sticking in upper chest/lower throat area. At this time, recommend a regular texture diet with consideration of GI consult due to Pt's reported symptoms. Dietary Recommendations:  1.) Regular texture diet with thin liquids, meds as tolerated (use of GERD precautions)  2.) Consider GI consult due to suspected esophageal dysphagia and Pt c/o vomiting \"brown liquid\" after she eats    Strategies: 90 degree positioning with all p.o. intake; small bites/sips; alternate textures through meal; reduce rate of intake; GERD precautions     Treatment/Goals: Speech therapy for dysphagia tx 1-2x to ensure diet tolerance     ST.) Pt will tolerate recommended diet without s/s of aspiration     Oral motor Exam:  Dentition: edentulous (dentures at home)   Labial/Facial: within functional limits   Lingual: within functional limits   Voice: within functional limits     Oral Phase:   Prolonged mastication    Pharyngeal Phase:  Grossly within functional limits   No overt clinical s/s of aspiration/penetration     Patient/Family Education:Education, results and recommendations given to the Pt who verbalized understanding    Timed Code Treatment: 0 minutes     Total Treatment Time: 25 minutes     Discharge Recommendations: No suspected need for Speech/Dysphagia treatment at discharge. If patient discharges prior to next session this note will serve as a discharge summary.       Gogo Porter M.A., [address] [place] [address]  Speech-Language Pathologist

## 2021-01-09 NOTE — ED NOTES
PT needs to urinate but unable to roll or stand well to knee and hip pain. Purewick external catheter applied at this time, working appropriately.        Diana Galarza RN  01/08/21 7843

## 2021-01-09 NOTE — CARE COORDINATION
Discharge Planning Assessment    SW discharge planner met with patient to discuss reason for admission, current living situation, and potential needs at the time of discharge. Pt in ED d/t fall, weakness and unable to ambulate or transfer. Demographics/Insurance verified:  Yes    Current type of dwelling:  House    Living arrangements:  Alone    Level of function/Support:  Pt normally ambulates with walker. States she has bad knees that \"bukcle\" sometimes. Pt fell this morning, picked up by EMS, then could not get out of her chair all day. Dtr encouraged to go to ED. PCP:  Dr. Andrea Luna. Dtr contacted [phone] to set up home visits for patient. Last Visit to PCP:  Aug 2020    DME:  [employer], cane, grab bars, shower chair    Active with any community resources/agencies/skilled home care:  Yes, active with [employer] for home delivered meals, lifeline button and aide 1x/week for cleaning. Pt reports she also has a friend who comes Saturday and Sunday morning to assist with breakfast.    Medication compliance issues:  No    Financial issues that could impact healthcare:  No    Tentative discharge plan:  Unsure at this time. Pt is still being worked up in ED for possible admission. If admitted, pt is willing to go to SNF. If discharged, pt agreeable to [facility]. Pt has had them in the past.  Left fax face sheet with instructions for RN to have doctor write [facility] order, then RN fax order and AVS to [facility]. ANDREI spoke with dtr, Cady Collins, and informed of these plans whether pt admits or discharges today. Transportation at the time of discharge:  Pt will need assistance if home.     Electronically signed by BEREKET Prince, JOSE on 1/8/2021 at 8:04 PM

## 2021-01-09 NOTE — PLAN OF CARE
Problem: Falls - Risk of:  Goal: Will remain free from falls  Description: Will remain free from falls  1/9/2021 0149 by Mandy Mckenzie RN  Outcome: Met This Shift  1/8/2021 2351 by Mandy Mckenzie RN  Outcome: Met This Shift  Goal: Absence of physical injury  Description: Absence of physical injury  1/9/2021 0149 by Mandy Mckenzie RN  Outcome: Met This Shift  1/8/2021 2351 by Mandy Mckenzie RN  Outcome: Met This Shift     Problem: Pain:  Goal: Pain level will decrease  Description: Pain level will decrease  1/9/2021 0149 by Mandy Mckenzie RN  Outcome: Ongoing  1/8/2021 2351 by Mandy Mckenzie RN  Outcome: Ongoing  Goal: Control of acute pain  Description: Control of acute pain  1/9/2021 0149 by Mandy Mckenzie RN  Outcome: Ongoing  1/8/2021 2351 by Mandy Mckenzie RN  Outcome: Ongoing  Goal: Control of chronic pain  Description: Control of chronic pain  1/9/2021 0149 by Mandy Mckenzie RN  Outcome: Ongoing  1/8/2021 2351 by Mandy Mckenzie RN  Outcome: Ongoing     Problem: Skin Integrity:  Goal: Will show no infection signs and symptoms  Description: Will show no infection signs and symptoms  1/9/2021 0149 by Mandy Mckenzie RN  Outcome: Ongoing  1/8/2021 2351 by Mandy Mckenzie RN  Outcome: Ongoing  Goal: Absence of new skin breakdown  Description: Absence of new skin breakdown  1/9/2021 0149 by Mandy Mckenzie RN  Outcome: Ongoing  1/8/2021 2351 by Mandy Mckenzie RN  Outcome: Ongoing

## 2021-01-09 NOTE — ED NOTES
PT placed on bedpan at this time. Cleaned and turned. Maximum assist, difficulty turning due to pain. Wound on front of panus noted, PT states it has been present for 1 week.             Samara Francois RN  01/08/21 2509

## 2021-01-09 NOTE — ED NOTES
PT brought in from home via [facility]. PT states she fell out of bed this morning and was unable to walk afterwards but did not want to come to ED. PT states she was down for 1 hour before being able to get to the phone to call her children who then contacted her PCP. PCP suggested she come to the ED. PT states she \"went down on both knees\" and is reporting severe pain in both. PT states that when she fell she had lost control of her bowel and bladder which is not normal for her. PT states that she had a colonoscopy and endoscopy 8-9 months ago where they found 2 gastric ulcers. States that for the past 3-4 weeks she has been experiencing abdominal pain and vomiting \"brown liquid\" every time she eats. She states that because of the pain she has been trying not to eat. She states that her PCP recently prescribed 3 new stomach medications but is unsure which medications they are. PT states she has not eaten anything today and has not taken any of her medications yet today. PT states that she has had a chronic wound on her right foot X3 years which is being treated by her PCP. PT alert and oriented X4, denies CP or SOB. PT placed on telemetry monitor with ST elevation capabilities. Will continue with current plan of care. Daughter, Curtis Shabazz [facility]), called and stated that PT has been putting pads in her chair and peeing in them so that she doesn't have to get up and go to the bathroom recently.  She also stated that PT is unreliable historian and will downplay her issues so that she doesn't have to stay in the hospital.       Linda Jimenez RN  01/08/21 1928

## 2021-01-09 NOTE — H&P
158/64, saturating 95%. CNS:  Alert, awake and oriented to time, place and person. PSYCH:  Cooperative, answering questions appropriately. EYES:  Pupils are bilaterally equal.  Extraocular muscle movements are  intact. RESPIRATORY SYSTEM:  No obvious rhonchi or wheezes. CVS:  Regular rate and rhythm. ABDOMEN:  Morbidly obese. MUSCULOSKELETAL:  No acute deformities. The patient does have  significant tenderness over bilateral knee more on the left than the  right. SKIN:  The patient does not have any lacerations or lesions. DIAGNOSTIC DATA:  EKG independently reviewed by me shows ventricular  rate of 81 beats per minute with normal sinus rhythm with some premature  supraventricular complexes. X-ray of the pelvis shows no fractures. The patient's right and left knee x-rays show no evidence of acute  fracture. The left knee shows a well-placed prosthesis. INR 3.03. BUN  28, creatinine 1.4. CT abdomen and pelvis without contrast shows no  acute obvious anomaly. REVIEW OF PREVIOUS MEDICAL RECORDS:  Shows extensive bilateral pulmonary  embolism from 2011. ASSESSMENT:  1.  Mechanical fall. 2.  Knee osteoarthritis with knee pain status post fall. 3.  Morbid obesity with a BMI of 48.82 kg/m2. 4.  Type 2 diabetes. 5.  Hypertension. 6.  Stage III chronic kidney disease. 7.  Chronic thromboembolic disease. PLAN OF CARE:  The patient is admitted to the Internal Medicine Service  to observation. Home medications will be continued. Case management  and social work will need to be consulted for assistance with placement. CODE STATUS:  Full. EXPECTED LENGTH OF STAY:  Less than two midnights based on plan of care  above. DISPOSITION:  Observation.         Magda Toribio MD    D: 01/09/2021 6:29:27       T: 01/09/2021 7:19:18     [email or web address]  Job#: [id number]     Doc#: [id number]    CC:

## 2021-01-09 NOTE — PROGRESS NOTES
[facility] PROGRESS NOTE    1/9/2021 8:07 AM        Name: Thmoas Romero . Admitted: 1/8/2021  Primary Care Provider: Sugar Caldwell (Tel: [phone])      Subjective:  . Antonella Sizer while getting out of bed  \"I slid out of the bed and fell on both of my knees. \"    Reports hx of PE ,  On coumadin    Pain is worse over the left knee  May need CT of the knee if unable to work with therapy     Lives alone ,uses her walker 100% of the time  Gets meals on wheels with COA   Daughter help out at home     Reviewed interval ancillary notes    Current Medications      insulin lispro (1 Unit Dial) 0-6 Units, TID WC      glucose (GLUTOSE) 40 % oral gel 15 g, PRN      dextrose 50 % IV solution, PRN      glucagon (rDNA) injection 1 mg, PRN      dextrose 5 % solution, PRN      HYDROcodone-acetaminophen (NORCO) 5-325 MG per tablet 1 tablet, Q6H PRN      insulin lispro (1 Unit Dial) 0-3 Units, QPM      cloNIDine (CATAPRES) tablet 0.1 mg, BID      aspirin chewable tablet 81 mg, Daily      NIFEdipine (PROCARDIA XL) extended release tablet 30 mg, Daily      atorvastatin (LIPITOR) tablet 20 mg, Nightly      insulin lispro protamine & lispro (HUMALOG MIX) (75-25) 100 UNIT per ML injection pen SUPN 14 Units, BID WC        Objective:  BP (!) 145/54   Pulse 73   Temp 98.3 °F (36.8 °C) (Oral)   Resp 18   Ht 5' (1.524 m)   Wt 250 lb (113.4 kg)   SpO2 93%   BMI 48.82 kg/m²     Intake/Output Summary (Last 24 hours) at 1/9/2021 0807  Last data filed at 1/9/2021 0630  Gross per 24 hour   Intake 220 ml   Output 550 ml   Net -330 ml      Wt Readings from Last 3 Encounters:   01/08/21 250 lb (113.4 kg)   12/24/19 256 lb (116.1 kg)   12/17/19 256 lb (116.1 kg)       General appearance:  Appears comfortable in bed. Alert and pleasant. She is obese  Eyes: Sclera clear. Pupils equal.  ENT: Moist oral mucosa.  Trachea midline, no adenopathy. Cardiovascular: Regular rhythm, normal S1, S2. No murmur. No edema in lower extremities  Respiratory: Not using accessory muscles. Good inspiratory effort. Clear to auscultation bilaterally, no wheeze or crackles. GI: Abdomen soft, no tenderness, not distended, normal bowel sounds  Musculoskeletal: No cyanosis in digits, neck supple, left knee with 3 inch area of redness noted. This region is very tender with limited mobility. Right knee is unremarkable except for pain with movement ( chronic)  Neurology: CN 2-12 grossly intact. No speech or motor deficits  Psych: Normal affect. Alert and oriented in time, place and person  Skin: Warm, dry, normal turgor    Labs and Tests:  CBC:   Recent Labs     01/08/21 1851   WBC 6.0   HGB 8.6*        BMP:    Recent Labs     01/08/21 1851 01/09/21  0845   * 140   K 4.3 3.9    106   CO2 27 24   BUN 28* 24*   CREATININE 1.4* 1.4*   GLUCOSE 179* 105*     Hepatic:   Recent Labs     01/08/21 1851   AST 26   ALT 11   BILITOT <0.2   ALKPHOS 97   CT head   No acute intracranial abnormality. CT C spine:     Moderate degenerative disc changes throughout the lower cervical spine which   is most severe at C6-7 with no acute abnormality seen.       Moderate disc osteophyte complexes at C4-5 and C5-6.  Recommend clinical   follow-up.       Moderate osteoarthritic changes of the facets throughout         CT abd pelvis    Impression   Previous cholecystectomy which is unchanged with no acute abnormality seen.       Moderate hiatal hernia which is unchanged and hazy bibasilar opacities which   could represent early infiltrates or atelectasis.  Recommend follow-up       Perirenal scarring around both kidneys with no hydronephrosis or urinary   obstruction       Extensive diverticulosis throughout the sigmoid colon with no pericolonic   inflammation.       Atrophic uterus with no pelvic mass.       Moderate calcified plaque throughout the aorta which is normal caliber and   unchanged. Left Knee  Status post total left knee replacement in good position.       Diffuse osteopenia limiting the exam with some questionable mild irregular   linear lucency along the distal femoral condyle extending to the prosthesis   centrally which is only seen on lateral view and may just be artifactual or   related to the prior surgery.  If there is  clinical concern for a   nondisplaced fracture in the area.  Recommend orthopedic follow-up.       Postop changes posterior to the patella and moderate soft tissue swelling   anterior to the knee with no obvious joint effusion. Xray right knee  Moderately severe osteoarthritic changes in the knee which is more prominent   laterally with no obvious acute fracture.       Diffuse osteopenia limiting the exam.       Moderate patellofemoral degenerative changes and a small suprapatellar   effusion. Pelvic xray: Moderate osteoarthritic changes in both hips and diffuse osteopenia with no   acute bony abnormality. Results for Denilson Camacho (MRN [id number]) as of 1/9/2021 16:04   Ref. Range 1/9/2021 01:43 1/9/2021 05:56 1/9/2021 07:51 1/9/2021 10:43   POC Glucose Latest Ref Range: 70 - 99 mg/dl 331 (H) 166 (H) 110 (H) 185 (H)         Problem List  Active Problems:    Frequent falls  Resolved Problems:    * No resolved hospital problems. *       Assessment & Plan:   1. Fall with subsequent knee injury:  Ortho and PT and OT to follow. Will likely need CT of the left Knee. I have added voltaren gel. 2. Bacteria noted in U/A. Culture with GNR: will start rocephin for 3 doses and follow the culture  3. Mild KIKO:  Will give 1 liter of IV fluids over the next 10 hours and recheck in am. ACE and metformin on hold   4. Hx of ulcers: On carafate and PPI therapy   5. HTN; Increase clonidine dose and follow   6. 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 INR 3 upon admission. Hold coumadin this evening.   Will check daily 7. T2DM: add low dose correction. Decrease bid 75/25 mix to 12 units for now.  Follow closely , AIC pending       Diet: DIET CARB CONTROL; Carb Control: 4 carb choices (60 gms)/meal; Dental Soft  Code:Prior  DVT PPX      Heather Morales, SHALINI - CNP   1/9/2021 8:07 AM

## 2021-01-09 NOTE — PROGRESS NOTES
Occupational Therapy   Occupational Therapy Initial Assessment  Date: 2021   Patient Name: Orlin Gonzalez  MRN: [id number]     : 1935    Date of Service: 2021    Discharge Recommendations:  Orlin Gonzalez scored a 13/24 on the AM-PAC ADL Inpatient form. Current research shows that an AM-PAC score of 17 or less is typically not associated with a discharge to the patient's home setting. Based on the patient's AM-PAC score and their current ADL deficits, it is recommended that the patient have 3-5 sessions per week of Occupational Therapy at d/c to increase the patient's independence. Please see assessment section for further patient specific details. If patient discharges prior to next session this note will serve as a discharge summary. Please see below for the latest assessment towards goals. OT Equipment Recommendations  Equipment Needed: No(assess with progress)    Assessment   Performance deficits / Impairments: Decreased functional mobility ; Decreased ADL status; Decreased high-level IADLs;Decreased endurance;Decreased strength;Decreased balance(simple IADL tasks)  Assessment: pt not at baseline, lives a mosty sedentary lifestyle. pt unsafe to return home independently at this time d/t level of assist. pt would benefit from ongoing skilled OT servies in order to return to PLOF  Treatment Diagnosis: frequent falls  Prognosis: Fair  Decision Making: Medium Complexity  History: pt lives alone, multiple falls. uses 4WW. independent with reports bathing and dressing, however going to the bathroom in chair per chair. Assistance / Modification: assist of 2  Patient Education: eval, POC, discharge-continue to educate for carryover  REQUIRES OT FOLLOW UP: Yes  Activity Tolerance  Activity Tolerance: Patient Tolerated treatment well  Safety Devices  Safety Devices in place: Yes  Type of devices: All fall risk precautions in place;Nurse notified; Patient at risk for falls;Call light within reach; Left in bed;Bed alarm in place  Restraints  Initially in place: No           Patient Diagnosis(es): The encounter diagnosis was Ambulatory dysfunction. has a past medical history of Arthritis, Cerebral artery occlusion with cerebral infarction ([facility] [address] Diabetes mellitus ([facility] [address] Hypertension, and Pulmonary embolism ([facility] [address] has a past surgical history that includes Abdomen surgery (N/A, 2/15/2019) and Abdomen surgery (N/A, 3/19/2019). Treatment Diagnosis: frequent falls      Restrictions  Restrictions/Precautions  Restrictions/Precautions: Fall Risk, Contact Precautions(high fall risk)  Required Braces or Orthoses?: No  Position Activity Restriction  Other position/activity restrictions: Falls frequently and was unable to get up. Today she fell initially refused transport to the hospital with EMS, but after spending all day in the recliner urinating on pads and unable to get to the bathroom for a bowel movement was basically told to come here for placement because she cannot care for herself at home. X ray in B knees showed arthritis    Subjective   General  Chart Reviewed: Yes  Patient assessed for rehabilitation services?: Yes  Family / Caregiver Present: No  Diagnosis: frequent falls  Subjective  Subjective: pt supine upon arrival, purewick present. agreeable to OT kanchan, reporting minimal pain 2/10 in B knees  Patient Currently in Pain: Yes  Pain Assessment  Pain Assessment: 0-10  Pain Level: 2  Pain Type: Acute pain;Chronic pain  Pain Location: Knee  Pain Orientation: Right;Left  Pain Descriptors: Aching  Pain Frequency: Continuous  Pain Onset: On-going  Vital Signs  Patient Currently in Pain: Yes  Social/Functional History  Social/Functional History  Lives With: Alone  Type of Home: House  Home Layout: One level  Home Access: Stairs to enter with rails(5 WINSTON in front, back with 4 WINSTON.  pt goes through the back)  Entrance Stairs - Rails: Both(son in law is with pt when pt does the steps, pt uses cane)  Bathroom Shower/Tub: Walk-in shower  Bathroom Toilet: Handicap height  Bathroom Equipment: Built-in shower seat, Hand-held shower, Grab bars in shower  Home Equipment: 4 wheeled walker, Cane, Alert Button  ADL Assistance: Independent  Homemaking Assistance: Needs assistance([employer] provides meals-7 meals to pt. pt does simple meal prep. COA cleans thursdays. Saturday/Sunday assist for breakfast and laundry)  Ambulation Assistance: Independent(R knee hayden)  Transfer Assistance: Independent  Active : No  Additional Comments: pt slid out of bed 1/8/20 on B knees; had not had a fall for a year prior to this       Objective   Vision: Impaired  Vision Exceptions: Wears glasses for reading  Hearing: Within functional limits    Orientation  Overall Orientation Status: Within Functional Limits  Observation/Palpation  Posture: Poor(increased thoracic kyphosis)  Observation: swelling in B knees, pure wick present  Balance  Sitting Balance: Contact guard assistance  Standing Balance: Dependent/Total(Benita of 2)  Standing Balance  Time: ~1 minute  Activity: stance EOB with RW, posterior leaning. pt continued to report \"I just wish I had my 4WW, I feel more comfortable with it. \"  Comment: Benita of 2 static stance  Functional Mobility  Functional Mobility Comments: unsafe to attempt this date  ADL  LE Dressing: Dependent/Total  Toileting: (pure wick present.  pt refused ADLs this date)  Tone RUE  RUE Tone: Normotonic  Tone LUE  LUE Tone: Normotonic  Coordination  Movements Are Fluid And Coordinated: Yes     Bed mobility  Rolling to Left: Moderate assistance  Rolling to Right: Moderate assistance  Supine to Sit: 2 Person assistance(modA+Benita)  Sit to Supine: 2 Person assistance(Dx2)  Scooting: Maximal assistance  Transfers  Sit to stand: 2 Person assistance(modA+maxA, cues for hand placement on RW. posterior lean)  Stand to sit: 2 Person assistance  Transfer Comments: cues for hand placement     Cognition  Overall Cognitive Status: WFL  Perception  Overall Perceptual Status: WFL     Sensation  Overall Sensation Status: WFL        LUE AROM (degrees)  LUE AROM : WFL  RUE AROM (degrees)  RUE AROM : WFL  LUE Strength  LUE Strength Comment: B UE weakness, breaks with resistence                   Plan   Plan  Times per week: 3-5  Times per day: Daily  Current Treatment Recommendations: Strengthening, Balance Training, Functional Mobility Training, Safety Education & Training, Self-Care / ADL, Home Management Training    G-Code     OutComes Score                                                  AM-PAC Score        AM-[facility] Inpatient Daily Activity Raw Score: 13 (01/09/21 1454)  AM-PAC Inpatient ADL T-Scale Score : 32.03 (01/09/21 1454)  ADL Inpatient CMS 0-100% Score: 63.03 (01/09/21 1454)  ADL Inpatient CMS G-Code Modifier : CL (01/09/21 1454)    Goals  Short term goals  Time Frame for Short term goals: discharge  Short term goal 1: bed mobility maxA  Short term goal 2: standing tolerance for ADL task ~3-4 minutes  Short term goal 3: transfer for ADL task modA  Short term goal 4: as pt progresses: functional mobility modA  Short term goal 5: UB/LB ADLs Benita  Patient Goals   Patient goals : get home       Therapy Time   Individual Concurrent Group Co-treatment   Time In 1351         Time Out 1417         Minutes 26           Timed Code Treatment Minutes:   11 minutes    Total Treatment Minutes:  26 minutes    Pt in Observation Statuslucia shared with [address] [facility], OTR/L [id number]           Diamond White, OT

## 2021-01-09 NOTE — PROGRESS NOTES
Physical Therapy    Facility/Department: [address] ORTHO/NEURO NURSING  Initial Assessment    NAME: Khadijah Stephen  : 1935  MRN: [id number]    Date of Service: 2021    Discharge Recommendations: Khadijah Sessions scored a  on the AM-PAC short mobility form. Current research shows that an AM-PAC score of 17 or less is typically not associated with a discharge to the patient's home setting. Based on the patient's AM-PAC score and their current functional mobility deficits, it is recommended that the patient have 3-5 sessions per week of Physical Therapy at d/c to increase the patient's independence. Please see assessment section for further patient specific details. If patient discharges prior to next session this note will serve as a discharge summary. Please see below for the latest assessment towards goals. PT Equipment Recommendations  Equipment Needed: No    Assessment   Body structures, Functions, Activity limitations: Decreased functional mobility ; Decreased balance; Increased pain;Decreased posture;Decreased strength;Decreased endurance  Assessment: 81 yo female admitted 2021 s/p fall at home. Pt is currently not functioning at baseline and would not be safe to return there indep. Pt would benefit from continued skilled therapy to maximize independence and safely transition home. Treatment Diagnosis: decreased functional mobility, decreased endurance  Prognosis: Good  Decision Making: Low Complexity  History: DM, HTN, pulmonary embolus, CVA  Exam: bed mobility, transfers  Clinical Presentation: stable  PT Education: Goals; General Safety;Gait Training;PT Role;Plan of Care; Functional Mobility Training;Transfer Training  Patient Education: pt verbalized understanding - will benefit from continued reinforcement  Barriers to Learning: none  REQUIRES PT FOLLOW UP: Yes  Activity Tolerance  Activity Tolerance: Patient Tolerated treatment well       Patient Diagnosis(es): The encounter diagnosis was Ambulatory dysfunction. has a past medical history of Arthritis, Cerebral artery occlusion with cerebral infarction ([address] Diabetes mellitus ([address] Hypertension, and Pulmonary embolism ([address] has a past surgical history that includes Abdomen surgery (N/A, 2/15/2019) and Abdomen surgery (N/A, 3/19/2019). Restrictions  Restrictions/Precautions  Restrictions/Precautions: Fall Risk, Contact Precautions(high fall risk)  Required Braces or Orthoses?: No  Position Activity Restriction  Other position/activity restrictions: Falls frequently and was unable to get up. Today she fell initially refused transport to the hospital with EMS, but after spending all day in the recliner urinating on pads and unable to get to the bathroom for a bowel movement was basically told to come here for placement because she cannot care for herself at home. X ray in B knees showed arthritis     Vision/Hearing  Vision: Impaired  Vision Exceptions: Wears glasses for reading  Hearing: Within functional limits       Subjective  General  Chart Reviewed: Yes  Patient assessed for rehabilitation services?: Yes  Response To Previous Treatment: Not applicable  Family / Caregiver Present: No  Follows Commands: Within Functional Limits  General Comment  Comments: \"I haven't fallen in 1 year. I was getting out of bed and just slid to the floor on my knees. My life alert button was around my rollator. I was stuck between a chair in my room and my bed and then EMT came and got me up. \"  Subjective  Subjective: Pt is agreeable to PT/OT evaluation.   Pain Screening  Patient Currently in Pain: Yes  Pain Assessment  Pain Assessment: 0-10  Pain Level: 2  Pain Type: Acute pain;Chronic pain  Pain Location: Knee  Pain Orientation: Right;Left  Pain Descriptors: Aching  Pain Frequency: Continuous  Pain Onset: On-going  Vital Signs  Patient Currently in Pain: Yes       Orientation  Orientation  Overall Orientation Status: Within Normal Limits Social/Functional History  Social/Functional History  Lives With: Alone  Type of Home: House  Home Layout: One level  Home Access: Stairs to enter with rails(5 WINSTON in front, back with 4 WINSTON. pt goes through the back)  Entrance Stairs - Rails: Both(son in law is with pt when pt does the steps, pt uses cane)  Bathroom Shower/Tub: Walk-in shower  Bathroom Toilet: Handicap height  Bathroom Equipment: Built-in shower seat, Hand-held shower, Grab bars in [address] [place]: 4 wheeled walker, Cane, Alert Button  ADL Assistance: Independent  Homemaking Assistance: Needs assistance([employer] provides meals-7 meals to pt. pt does simple meal prep. COA cleans thursdays.  Saturday/Sunday assist for breakfast and laundry)  Ambulation Assistance: Independent(R knee hayden)  Transfer Assistance: Independent  Active : No  Additional Comments: pt slid out of bed 1/8/20 on B knees; had not had a fall for a year prior to this     Cognition   Cognition  Overall Cognitive Status: [facility]    Objective     Observation/Palpation  Posture: Poor(increased thoracic kyphosis)  Observation: swelling in B knees, pure wick present    AROM RLE (degrees)  RLE AROM: WF  RLE General AROM: slightly decreased due to soft tissue approximation  AROM LLE (degrees)  LLE AROM : [facility]  LLE General AROM: slightly decreased due to soft tissue approximation  Strength RLE  Strength RLE: WFL  Comment: per functional mobility  Strength LLE  Strength LLE: WFL  Comment: per functional mobility  Tone RLE  RLE Tone: Normotonic  Tone LLE  LLE Tone: Normotonic  Motor Control  Gross Motor?: WFL  Sensation  Overall Sensation Status: [facility]  Bed mobility  Rolling to Left: Moderate assistance  Rolling to Right: Moderate assistance  Supine to Sit: 2 Person assistance(mod A + min A)  Sit to Supine: 2 Person assistance;Dependent/Total(dependent x 2)  Scooting: Maximal assistance  Transfers  Sit to Stand: 2 Person Assistance(min A x 2 to RW; facilitation at gluts and

## 2021-01-09 NOTE — PLAN OF CARE
Problem: Falls - Risk of:  Goal: Will remain free from falls  Description: Will remain free from falls  1/9/2021 0950 by Benedicto Reagan RN  Outcome: Met This Shift  1/9/2021 0149 by Irasema Rodriguez RN  Outcome: Met This Shift  1/8/2021 2351 by Irasema Rodriguez RN  Outcome: Met This Shift  Goal: Absence of physical injury  Description: Absence of physical injury  1/9/2021 0950 by Benedicto Reagan RN  Outcome: Met This Shift  1/9/2021 0149 by Irasema Rodriguez RN  Outcome: Met This Shift  1/8/2021 2351 by Irasema Rodriguez RN  Outcome: Met This Shift     Problem: Pain:  Goal: Pain level will decrease  Description: Pain level will decrease  1/9/2021 0950 by Benedicto Reagan RN  Outcome: Met This Shift  1/9/2021 0149 by Irasema Rodriguez RN  Outcome: Ongoing  1/8/2021 2351 by Irasema Rodriguez RN  Outcome: Ongoing     Problem: Skin Integrity:  Goal: Will show no infection signs and symptoms  Description: Will show no infection signs and symptoms  1/9/2021 0950 by Benedicto Reagan RN  Outcome: Met This Shift  1/9/2021 0149 by Irasema Rodriguez RN  Outcome: Ongoing  1/8/2021 2351 by Irasema Rodriguez RN  Outcome: Ongoing  Goal: Absence of new skin breakdown  Description: Absence of new skin breakdown  1/9/2021 0950 by Benedicto Reagan RN  Outcome: Met This Shift  1/9/2021 0149 by Irasema Rodriguez RN  Outcome: Ongoing  1/8/2021 2351 by Irasema Rodriguez RN  Outcome: Ongoing

## 2021-01-09 NOTE — ED NOTES
Bed: 19  Expected date:   Expected time:   Means of arrival:   Comments:  [facility]  01/08/21 1141

## 2021-01-09 NOTE — CONSULTS
Clinical Pharmacy Note: Pharmacy to Dose Warfarin    Pharmacy consulted by Dr. Shwetha Carr to dose warfarin. Freeman Partida is a 82 y.o. female  is receiving warfarin for indication: PE.    INR Goal Range: 2-3  Prior to admission warfarin dosing regimen: 2.5 mg on Wed and 5 mg all other days  INR today:   Lab Results   Component Value Date    INR 3.03 01/08/2021       Assessment/Plan:  INR is supratherapeutic on prior to admission dosing regimen. Based on today's assessment, dose warfarin at home dose of 5 mg tonight. Daily INR is ordered. Pharmacy will continue to monitor and make adjustments to regimen as necessary.      Thank you for the consult,     Eli Watt, PharmD, [address]   [id number]

## 2021-01-09 NOTE — PROGRESS NOTES
Patient up to 4T. Vital signs stable. A&O x4. Limited movement and reports of 7/10 BLE/Knee pain. External catheter in place due to limited mobility. External catheter (purwick) in place. Oriented to room and use of call light system. The care plan and education has been reviewed and mutually agreed upon with the patient. Fall precautions in place and call light within reach. Will continue to monitor.

## 2021-01-09 NOTE — ED NOTES
Report given to [facility] RN. V/u and all questions answered.       Ashlee Andre RN  01/08/21 1193

## 2021-01-10 LAB
ALBUMIN SERPL-MCNC: 2.3 G/DL (ref 3.4–5)
ANION GAP SERPL CALCULATED.3IONS-SCNC: 10 MMOL/L (ref 3–16)
BASOPHILS ABSOLUTE: 0 K/UL (ref 0–0.2)
BASOPHILS RELATIVE PERCENT: 0.9 %
BUN BLDV-MCNC: 23 MG/DL (ref 7–20)
CALCIUM SERPL-MCNC: 7.5 MG/DL (ref 8.3–10.6)
CHLORIDE BLD-SCNC: 105 MMOL/L (ref 99–110)
CO2: 24 MMOL/L (ref 21–32)
CREAT SERPL-MCNC: 1.2 MG/DL (ref 0.6–1.2)
EOSINOPHILS ABSOLUTE: 0.1 K/UL (ref 0–0.6)
EOSINOPHILS RELATIVE PERCENT: 3.4 %
GFR AFRICAN AMERICAN: 52
GFR NON-AFRICAN AMERICAN: 43
GLUCOSE BLD-MCNC: 131 MG/DL (ref 70–99)
GLUCOSE BLD-MCNC: 133 MG/DL (ref 70–99)
GLUCOSE BLD-MCNC: 165 MG/DL (ref 70–99)
GLUCOSE BLD-MCNC: 187 MG/DL (ref 70–99)
GLUCOSE BLD-MCNC: 195 MG/DL (ref 70–99)
HCT VFR BLD CALC: 24.4 % (ref 36–48)
HEMOGLOBIN: 7.7 G/DL (ref 12–16)
INR BLD: 1.66 (ref 0.86–1.14)
LYMPHOCYTES ABSOLUTE: 1.2 K/UL (ref 1–5.1)
LYMPHOCYTES RELATIVE PERCENT: 29.3 %
MCH RBC QN AUTO: 28 PG (ref 26–34)
MCHC RBC AUTO-ENTMCNC: 31.5 G/DL (ref 31–36)
MCV RBC AUTO: 89 FL (ref 80–100)
MONOCYTES ABSOLUTE: 0.6 K/UL (ref 0–1.3)
MONOCYTES RELATIVE PERCENT: 16.1 %
NEUTROPHILS ABSOLUTE: 2 K/UL (ref 1.7–7.7)
NEUTROPHILS RELATIVE PERCENT: 50.3 %
ORGANISM: ABNORMAL
PDW BLD-RTO: 13.9 % (ref 12.4–15.4)
PERFORMED ON: ABNORMAL
PHOSPHORUS: 4.3 MG/DL (ref 2.5–4.9)
PLATELET # BLD: 210 K/UL (ref 135–450)
PMV BLD AUTO: 10.3 FL (ref 5–10.5)
POTASSIUM SERPL-SCNC: 4 MMOL/L (ref 3.5–5.1)
PROTHROMBIN TIME: 19.3 SEC (ref 10–13.2)
RBC # BLD: 2.74 M/UL (ref 4–5.2)
SODIUM BLD-SCNC: 139 MMOL/L (ref 136–145)
URINE CULTURE, ROUTINE: ABNORMAL
WBC # BLD: 4 K/UL (ref 4–11)

## 2021-01-10 PROCEDURE — U0002 COVID-19 LAB TEST NON-CDC: HCPCS

## 2021-01-10 PROCEDURE — 36415 COLL VENOUS BLD VENIPUNCTURE: CPT

## 2021-01-10 PROCEDURE — 96376 TX/PRO/DX INJ SAME DRUG ADON: CPT

## 2021-01-10 PROCEDURE — 6370000000 HC RX 637 (ALT 250 FOR IP): Performed by: NURSE PRACTITIONER

## 2021-01-10 PROCEDURE — 6370000000 HC RX 637 (ALT 250 FOR IP): Performed by: INTERNAL MEDICINE

## 2021-01-10 PROCEDURE — 85025 COMPLETE CBC W/AUTO DIFF WBC: CPT

## 2021-01-10 PROCEDURE — U0003 INFECTIOUS AGENT DETECTION BY NUCLEIC ACID (DNA OR RNA); SEVERE ACUTE RESPIRATORY SYNDROME CORONAVIRUS 2 (SARS-COV-2) (CORONAVIRUS DISEASE [COVID-19]), AMPLIFIED PROBE TECHNIQUE, MAKING USE OF HIGH THROUGHPUT TECHNOLOGIES AS DESCRIBED BY CMS-2020-01-R: HCPCS

## 2021-01-10 PROCEDURE — 80069 RENAL FUNCTION PANEL: CPT

## 2021-01-10 PROCEDURE — 6360000002 HC RX W HCPCS: Performed by: NURSE PRACTITIONER

## 2021-01-10 PROCEDURE — 85610 PROTHROMBIN TIME: CPT

## 2021-01-10 PROCEDURE — G0378 HOSPITAL OBSERVATION PER HR: HCPCS

## 2021-01-10 RX ADMIN — INSULIN LISPRO 12 UNITS: 100 INJECTION, SUSPENSION SUBCUTANEOUS at 08:24

## 2021-01-10 RX ADMIN — SUCRALFATE 1 G: 1 TABLET ORAL at 12:19

## 2021-01-10 RX ADMIN — SUCRALFATE 1 G: 1 TABLET ORAL at 17:45

## 2021-01-10 RX ADMIN — HYDROCODONE BITARTRATE AND ACETAMINOPHEN 1 TABLET: 5; 325 TABLET ORAL at 18:53

## 2021-01-10 RX ADMIN — WARFARIN SODIUM 5 MG: 5 TABLET ORAL at 17:45

## 2021-01-10 RX ADMIN — HYDROCODONE BITARTRATE AND ACETAMINOPHEN 1 TABLET: 5; 325 TABLET ORAL at 12:18

## 2021-01-10 RX ADMIN — INSULIN LISPRO 12 UNITS: 100 INJECTION, SUSPENSION SUBCUTANEOUS at 17:45

## 2021-01-10 RX ADMIN — PANTOPRAZOLE SODIUM 40 MG: 40 TABLET, DELAYED RELEASE ORAL at 05:13

## 2021-01-10 RX ADMIN — DICLOFENAC 2 G: 10 GEL TOPICAL at 21:48

## 2021-01-10 RX ADMIN — INSULIN LISPRO 1 UNITS: 100 INJECTION, SOLUTION INTRAVENOUS; SUBCUTANEOUS at 12:19

## 2021-01-10 RX ADMIN — CLONIDINE HYDROCHLORIDE 0.2 MG: 0.1 TABLET ORAL at 21:48

## 2021-01-10 RX ADMIN — CLONIDINE HYDROCHLORIDE 0.2 MG: 0.1 TABLET ORAL at 08:23

## 2021-01-10 RX ADMIN — INSULIN LISPRO 1 UNITS: 100 INJECTION, SOLUTION INTRAVENOUS; SUBCUTANEOUS at 17:45

## 2021-01-10 RX ADMIN — HYDROCODONE BITARTRATE AND ACETAMINOPHEN 1 TABLET: 5; 325 TABLET ORAL at 04:10

## 2021-01-10 RX ADMIN — ATORVASTATIN CALCIUM 20 MG: 20 TABLET, FILM COATED ORAL at 21:48

## 2021-01-10 RX ADMIN — NIFEDIPINE 30 MG: 30 TABLET, FILM COATED, EXTENDED RELEASE ORAL at 08:23

## 2021-01-10 RX ADMIN — SUCRALFATE 1 G: 1 TABLET ORAL at 05:13

## 2021-01-10 RX ADMIN — Medication 1 G: at 16:04

## 2021-01-10 RX ADMIN — DICLOFENAC 2 G: 10 GEL TOPICAL at 08:25

## 2021-01-10 RX ADMIN — INSULIN LISPRO 1 UNITS: 100 INJECTION, SOLUTION INTRAVENOUS; SUBCUTANEOUS at 21:55

## 2021-01-10 RX ADMIN — ASPIRIN 81 MG: 81 TABLET, CHEWABLE ORAL at 08:23

## 2021-01-10 ASSESSMENT — PAIN SCALES - GENERAL: PAINLEVEL_OUTOF10: 3

## 2021-01-10 NOTE — PROGRESS NOTES
Patient c/o sore throat and productive cough. Also, last temperature 99.4. Message sent to hospitalist to see about ordering COVID swab and isolation.

## 2021-01-10 NOTE — PROGRESS NOTES
Shift assessment completed. Routine vitals stable. Scheduled medications given. Patient c/o pain 6/10, will give PRN norco when able to. Patient is awake, alert and oriented. Purewick in place. Respirations are easy and unlabored. Patient does not appear to be in distress. Call light within reach.

## 2021-01-10 NOTE — PROGRESS NOTES
Pharmacy to Dose Warfarin    Pharmacy consulted to dose warfarin for PE. INR Goal: 2-3    INR today: 1.66    Assessment/Plan:  - Warfarin was held last night per hospitalist.   - Plan to resume warfarin at 5 mg tonight. Pharmacy will continue to follow.     Stephanie Morgan, PharmD, [facility].   [id number]

## 2021-01-10 NOTE — PROGRESS NOTES
[facility] PROGRESS NOTE    1/10/2021 8:06 AM        Name: Stacey Recinos . Admitted: 1/8/2021  Primary Care Provider: Benjamin Patel (Tel: [phone])      Subjective:  . Seen this am  Reports uncontrolled knee pain last evening. States she slept poorly      Sharlyne Debar while getting out of bed  \"I slid out of the bed and fell on both of my knees. \"  Lives alone ,uses her walker 100% of the time  Gets meals on wheels with COA   Daughter help out at home     Reviewed interval ancillary notes    Current Medications      insulin lispro (1 Unit Dial) 0-6 Units, TID WC      glucose (GLUTOSE) 40 % oral gel 15 g, PRN      dextrose 50 % IV solution, PRN      glucagon (rDNA) injection 1 mg, PRN      dextrose 5 % solution, PRN      insulin lispro (1 Unit Dial) 0-3 Units, QPM      sucralfate (CARAFATE) tablet 1 g, 4 times per day      diclofenac sodium (VOLTAREN) 1 % gel 2 g, BID      pantoprazole (PROTONIX) tablet 40 mg, QAM AC      HYDROcodone-acetaminophen (NORCO) 5-325 MG per tablet 1 tablet, Q6H PRN      cefTRIAXone (ROCEPHIN) 1 g in sterile water 10 mL IV syringe, Q24H      warfarin (COUMADIN) tablet 5 mg, Daily      insulin lispro protamine & lispro (HUMALOG MIX) (75-25) 100 UNIT per ML injection pen SUPN 12 Units, BID WC      cloNIDine (CATAPRES) tablet 0.2 mg, BID      aspirin chewable tablet 81 mg, Daily      NIFEdipine (PROCARDIA XL) extended release tablet 30 mg, Daily      atorvastatin (LIPITOR) tablet 20 mg, Nightly        Objective:  BP (!) 160/74   Pulse 70   Temp 98.5 °F (36.9 °C) (Oral)   Resp 16   Ht 5' (1.524 m)   Wt 250 lb (113.4 kg)   SpO2 91%   BMI 48.82 kg/m²     Intake/Output Summary (Last 24 hours) at 1/10/2021 0806  Last data filed at 1/10/2021 0419  Gross per 24 hour   Intake --   Output 1350 ml   Net -1350 ml      Wt Readings from Last 3 Encounters:   01/08/21 250 lb Detail Level: Zone (113.4 kg)   12/24/19 256 lb (116.1 kg)   12/17/19 256 lb (116.1 kg)       General appearance:  Appears comfortable in bed. Looks tired today. She is Alert and pleasant. She is obese  Eyes: Sclera clear. Pupils equal.  ENT: Moist oral mucosa. Trachea midline, no adenopathy. Cardiovascular: Regular rhythm, normal S1, S2. No murmur. + edema over the left knee which is very tender to touch. Respiratory: Not using accessory muscles. Good inspiratory effort. Clear to auscultation bilaterally, no wheeze or crackles. GI: Abdomen soft and obese. no tenderness, not distended, normal bowel sounds  Musculoskeletal: No cyanosis in digits, neck supple, left knee with 3 inch area of redness noted. This region is very tender with limited mobility. Right knee is unremarkable except for pain with movement ( chronic)  Neurology: CN 2-12 grossly intact. No speech or motor deficits  Psych: Normal affect. Alert and oriented in time, place and person  Skin: Warm, dry, normal turgor    Labs and Tests:  CBC:   Recent Labs     01/08/21  1851 01/10/21  0636   WBC 6.0 4.0   HGB 8.6* 7.7*    210     BMP:    Recent Labs     01/08/21  1851 01/09/21  0845 01/10/21  0636   * 140 139   K 4.3 3.9 4.0    106 105   CO2 27 24 24   BUN 28* 24* 23*   CREATININE 1.4* 1.4* 1.2   GLUCOSE 179* 105* 133*     Hepatic:   Recent Labs     01/08/21  1851   AST 26   ALT 11   BILITOT <0.2   ALKPHOS 97     CT knee 1/9/21    Within the limitations of diffuse osteopenia and beam hardening artifact from   the left total knee prosthesis, no acute bony abnormality is seen.  No joint   effusion. The anterior tibial screw projects into the soft tissues medial to the tibial   tuberosity.  This is similar to the recent radiograph.  Recommend correlation   with prior postoperative imaging. CT head   No acute intracranial abnormality. CT C spine:     Moderate degenerative disc changes throughout the lower cervical spine which   is most severe at C6-7 with no acute abnormality seen.       Moderate disc osteophyte complexes at C4-5 and C5-6.  Recommend clinical   follow-up.       Moderate osteoarthritic changes of the facets throughout         CT abd pelvis    Impression   Previous cholecystectomy which is unchanged with no acute abnormality seen.       Moderate hiatal hernia which is unchanged and hazy bibasilar opacities which   could represent early infiltrates or atelectasis.  Recommend follow-up       Perirenal scarring around both kidneys with no hydronephrosis or urinary   obstruction       Extensive diverticulosis throughout the sigmoid colon with no pericolonic   inflammation.       Atrophic uterus with no pelvic mass.       Moderate calcified plaque throughout the aorta which is normal caliber and   unchanged. Left Knee  Status post total left knee replacement in good position.       Diffuse osteopenia limiting the exam with some questionable mild irregular   linear lucency along the distal femoral condyle extending to the prosthesis   centrally which is only seen on lateral view and may just be artifactual or   related to the prior surgery.  If there is  clinical concern for a   nondisplaced fracture in the area.  Recommend orthopedic follow-up.       Postop changes posterior to the patella and moderate soft tissue swelling   anterior to the knee with no obvious joint effusion. Xray right knee  Moderately severe osteoarthritic changes in the knee which is more prominent   laterally with no obvious acute fracture.       Diffuse osteopenia limiting the exam.       Moderate patellofemoral degenerative changes and a small suprapatellar   effusion. Pelvic xray: Moderate osteoarthritic changes in both hips and diffuse osteopenia with no   acute bony abnormality. Results for [facility] (MRN [id number]) as of 1/10/2021 13:35   Ref.  Range 1/9/2021 10:43 1/9/2021 17:26 1/9/2021 19:40 1/10/2021 07:15 1/10/2021 10:59   POC Glucose Latest Ref Range: 70 - 99 mg/dl 185 (H) 167 (H) 263 (H) 131 (H) 165 (H)     Escherichia coli (1)    Antibiotic Interpretation ROBYN Status    ampicillin Sensitive <=8 mcg/mL     ceFAZolin Sensitive <=2 mcg/mL      NOTE: Cefazolin should only be used for uncomplicated UTI         for E.coli or Klebsiella pneumoniae. cefepime Sensitive <=2 mcg/mL     cefTRIAXone Sensitive <=1 mcg/mL     cefuroxime Sensitive 8 mcg/mL     ciprofloxacin Sensitive <=1 mcg/mL     ertapenem Sensitive <=0.5 mcg/mL     gentamicin Sensitive <=4 mcg/mL     meropenem Sensitive <=1 mcg/mL     nitrofurantoin Sensitive <=32 mcg/mL     piperacillin-tazobactam Sensitive <=16 mcg/mL     trimethoprim-sulfamethoxazole Sensitive <=2/38 mcg/mL           Problem List  Active Problems:    Frequent falls  Resolved Problems:    * No resolved hospital problems. *       Assessment & Plan:   1. Fall with subsequent knee injury:  Has been evaluated by ortho,  No fracture on CT imaging. Likely a contusion. norco and voltaren gel are available. 2. Bacteria noted in U/A. Pt did NOT have any sx of UTI. Will treat for only 3 days and d/c the rocephin. Culture with E coli that is pansensitive. 3. Mild KIKO:  Improved with IV hydration. ACE and metformin on hold   4. Hx of ulcers: On carafate and PPI therapy   5. HTN;  I increased clonidine dose on 1/9, also on CCB,  Can increase CCB if not improved in the next 24 hours. Elevations likely due to pain   6. 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 INR  1.66  7. T2DM: Bg values improved on bid 75/25 mix at 12 units with low dose correction. Follow closely , AIC pending     PT and OT to evaluate.   She will likely need ECF placement       Diet: DIET CARB CONTROL; Carb Control: 4 carb choices (60 gms)/meal  Code:Full Code  DVT PPX      SHALINI Jaimes - CNP   1/10/2021 8:06 AM Detail Level: Simple

## 2021-01-10 NOTE — CONSULTS
[facility] Orthopedic Surgery  Consult Note    Patient: Chato Elizondo  Admit Date: 1/8/2021  Requesting Physician: Jose L Gu MD  Room: [address] [place], [place] [id number]    Chief complaint: Left knee pain    HPI: Chato Elizondo is a 82 y.o. female who presented to [facility] ED following a fall. She reports acute onset of left knee pain. Two days ago, she was getting out of bed when she fell directly onto both her knees. She has had left knee pain since that time. This has slightly improved since admission. Her history is significant for left total knee arthroplasty about 15 years ago. She has been trying to pursue right total knee arthroplasty due to significant osteoarthritis on the side but has not done this due to recurrent blood clots. She is currently on Coumadin due to recurrent blood clots. She is not having any increased pain in the right knee. She does have anterior knee pain in the left knee that is new. She reports that she was doing very well with her left knee replacement up until this fall. She denies hip or groin pain on the side. There is no numbness or tingling or weakness. 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 N/A 3/19/2019    RIGHT LOWER QUADRANT ABDOMINAL DEBRIDEMENT performed by Wild Batista MD at [address] [place] [address] History:    reports that she has never smoked. She has never used smokeless tobacco.    Family History:  History reviewed. No pertinent family history.     Medications:  ALL MEDICATIONS HAVE BEEN REVIEWED:  Scheduled:   insulin lispro  0-6 Units Subcutaneous TID WC    insulin lispro 0-3 Units Subcutaneous QPM    sucralfate  1 g Oral 4 times per day    diclofenac sodium  2 g Topical BID    pantoprazole  40 mg Oral QAM AC    cefTRIAXone (ROCEPHIN) IV  1 g Intravenous Q24H    warfarin  5 mg Oral Daily    insulin lispro prot & lispro  12 Units Subcutaneous BID WC    cloNIDine  0.2 mg Oral BID    aspirin  81 mg Oral Daily    NIFEdipine  30 mg Oral Daily    atorvastatin  20 mg Oral Nightly     Continuous:   dextrose       PRN:glucose, dextrose, glucagon (rDNA), dextrose, HYDROcodone-acetaminophen    Allergies: Allergies   Allergen Reactions    Bactrim Ds [Sulfamethoxazole-Trimethoprim] Diarrhea     Abdominal pain & Diarrhea       Review of Systems:  Constitutional: Negative for fever, chills, fatigue. Skin:  Negative for pruritis, rash  Eyes: Negative for photophobia and visual disturbance. ENT:  Negative for rhinorrhea, epistaxis, sore throat  Respiratory:  Negative for cough and shortness of breath. Cardiovascular: Negative for chest pain. Gastrointestinal: Negative for nausea, vomiting, diarrhea. Genitourinary: Negative for dysuria and difficulty urinating. Neurological: Negative for confusion, dysarthria, tremors, seizures. Psychiatric:  Negative for depression or anxiety  Musculoskeletal: Positive for left knee pain      Objective:  Vitals:    01/10/21 0815   BP: (!) 186/74   Pulse: 69   Resp: 18   Temp: 97.8 °F (36.6 °C)   SpO2: 93%      Physical Examination:  GENERAL: No apparent distress, obese, lying in bed  SKIN:  Warm and dry  EYES: Nonicteric. ENT: Mucous membranes moist  HEAD: Normocephalic, atraumatic  RESPIRATORY: Resp easy and unlabored  NEURO: Awake and alert. No speech defect  PSYCHIATRIC: Appropriate affect; not agitated  MUSCULOSKELETAL:   Left knee-abrasion anteromedially with subcutaneous swelling. This area is very tender. No pain with logroll. She is able to perform straight leg raise.   She is able to flex the knee to about 60 degrees before Atrophic uterus with no pelvic mass. Moderate calcified plaque throughout the aorta which is normal caliber and   unchanged. CT Cervical Spine WO Contrast   Final Result   Moderate degenerative disc changes throughout the lower cervical spine which   is most severe at C6-7 with no acute abnormality seen. Moderate disc osteophyte complexes at C4-5 and C5-6. Recommend clinical   follow-up. Moderate osteoarthritic changes of the facets throughout         CT Head WO Contrast   Final Result   No acute intracranial abnormality. XR CHEST PORTABLE   Final Result   Overlying EKG lead artifact limiting the exam with no obvious acute   abnormality seen. XR PELVIS (1-2 VIEWS)   Final Result   Moderate osteoarthritic changes in both hips and diffuse osteopenia with no   acute bony abnormality. XR KNEE LEFT (3 VIEWS)   Final Result   Status post total left knee replacement in good position. Diffuse osteopenia limiting the exam with some questionable mild irregular   linear lucency along the distal femoral condyle extending to the prosthesis   centrally which is only seen on lateral view and may just be artifactual or   related to the prior surgery. If there is  clinical concern for a   nondisplaced fracture in the area. Recommend orthopedic follow-up. Postop changes posterior to the patella and moderate soft tissue swelling   anterior to the knee with no obvious joint effusion. XR KNEE RIGHT (3 VIEWS)   Final Result   Moderately severe osteoarthritic changes in the knee which is more prominent   laterally with no obvious acute fracture. Diffuse osteopenia limiting the exam.      Moderate patellofemoral degenerative changes and a small suprapatellar   effusion. IMPRESSION:  History of left total knee arthroplasty with contusion  Right knee osteoarthritis    RECOMMENDATIONS:  There are no fractures visible on x-ray or CT of the left knee.   She does have a soft tissue and likely bone contusion of the patella due to a direct blow in this area. There is also possibility of PCL insufficiency given that her tibia is posteriorly translated on the lateral x-ray of her left knee. However, ligamentous exam is not able to be performed today. Her pain is already improving and I discussed that it will continue to improve if this is indeed only a soft tissue and bone contusion. Bone contusions can take many weeks to improve, however. Continue symptomatic treatment of right knee osteoarthritis. She may continue to weight-bear as tolerated.   She may follow-up with her [facility] orthopedic surgeon or Dr. Rose Cardenas if she would like to be seen in [place] or myself at [id number] MD  1/10/2021

## 2021-01-10 NOTE — PROGRESS NOTES
Morning med pass and assessment completed. Pt afebrile at this time. States pain is controlled at this time. Purewick in place. Care plan and education were reviewed with patient and mutually agreed upon. Reviewed potential for falls and safety measures. Patient verbalized understanding and denies any need at this time. Will continue to monitor.

## 2021-01-11 LAB
GLUCOSE BLD-MCNC: 115 MG/DL (ref 70–99)
GLUCOSE BLD-MCNC: 154 MG/DL (ref 70–99)
GLUCOSE BLD-MCNC: 177 MG/DL (ref 70–99)
GLUCOSE BLD-MCNC: 192 MG/DL (ref 70–99)
INR BLD: 1.27 (ref 0.86–1.14)
PERFORMED ON: ABNORMAL
PROTHROMBIN TIME: 14.8 SEC (ref 10–13.2)
SARS-COV-2, PCR: DETECTED

## 2021-01-11 PROCEDURE — 6370000000 HC RX 637 (ALT 250 FOR IP): Performed by: PHYSICIAN ASSISTANT

## 2021-01-11 PROCEDURE — 6370000000 HC RX 637 (ALT 250 FOR IP): Performed by: NURSE PRACTITIONER

## 2021-01-11 PROCEDURE — 6370000000 HC RX 637 (ALT 250 FOR IP): Performed by: INTERNAL MEDICINE

## 2021-01-11 PROCEDURE — 6360000002 HC RX W HCPCS: Performed by: NURSE PRACTITIONER

## 2021-01-11 PROCEDURE — 96376 TX/PRO/DX INJ SAME DRUG ADON: CPT

## 2021-01-11 PROCEDURE — G0378 HOSPITAL OBSERVATION PER HR: HCPCS

## 2021-01-11 PROCEDURE — 85610 PROTHROMBIN TIME: CPT

## 2021-01-11 PROCEDURE — 36415 COLL VENOUS BLD VENIPUNCTURE: CPT

## 2021-01-11 RX ORDER — SENNA AND DOCUSATE SODIUM 50; 8.6 MG/1; MG/1
2 TABLET, FILM COATED ORAL DAILY PRN
Status: DISCONTINUED | OUTPATIENT
Start: 2021-01-11 | End: 2021-01-12 | Stop reason: HOSPADM

## 2021-01-11 RX ADMIN — DICLOFENAC 2 G: 10 GEL TOPICAL at 21:25

## 2021-01-11 RX ADMIN — INSULIN LISPRO 1 UNITS: 100 INJECTION, SOLUTION INTRAVENOUS; SUBCUTANEOUS at 09:18

## 2021-01-11 RX ADMIN — INSULIN LISPRO 1 UNITS: 100 INJECTION, SOLUTION INTRAVENOUS; SUBCUTANEOUS at 21:25

## 2021-01-11 RX ADMIN — SUCRALFATE 1 G: 1 TABLET ORAL at 06:53

## 2021-01-11 RX ADMIN — DICLOFENAC 2 G: 10 GEL TOPICAL at 09:12

## 2021-01-11 RX ADMIN — INSULIN LISPRO 12 UNITS: 100 INJECTION, SUSPENSION SUBCUTANEOUS at 16:55

## 2021-01-11 RX ADMIN — NIFEDIPINE 30 MG: 30 TABLET, FILM COATED, EXTENDED RELEASE ORAL at 09:11

## 2021-01-11 RX ADMIN — ATORVASTATIN CALCIUM 20 MG: 20 TABLET, FILM COATED ORAL at 21:21

## 2021-01-11 RX ADMIN — HYDROCODONE BITARTRATE AND ACETAMINOPHEN 1 TABLET: 5; 325 TABLET ORAL at 14:30

## 2021-01-11 RX ADMIN — INSULIN LISPRO 12 UNITS: 100 INJECTION, SUSPENSION SUBCUTANEOUS at 09:18

## 2021-01-11 RX ADMIN — CLONIDINE HYDROCHLORIDE 0.2 MG: 0.1 TABLET ORAL at 21:24

## 2021-01-11 RX ADMIN — STANDARDIZED SENNA CONCENTRATE AND DOCUSATE SODIUM 2 TABLET: 8.6; 5 TABLET ORAL at 23:29

## 2021-01-11 RX ADMIN — PANTOPRAZOLE SODIUM 40 MG: 40 TABLET, DELAYED RELEASE ORAL at 06:53

## 2021-01-11 RX ADMIN — SUCRALFATE 1 G: 1 TABLET ORAL at 11:38

## 2021-01-11 RX ADMIN — CLONIDINE HYDROCHLORIDE 0.2 MG: 0.1 TABLET ORAL at 09:11

## 2021-01-11 RX ADMIN — HYDROCODONE BITARTRATE AND ACETAMINOPHEN 1 TABLET: 5; 325 TABLET ORAL at 21:21

## 2021-01-11 RX ADMIN — HYDROCODONE BITARTRATE AND ACETAMINOPHEN 1 TABLET: 5; 325 TABLET ORAL at 00:53

## 2021-01-11 RX ADMIN — SUCRALFATE 1 G: 1 TABLET ORAL at 16:55

## 2021-01-11 RX ADMIN — Medication 1 G: at 14:30

## 2021-01-11 RX ADMIN — HYDROCODONE BITARTRATE AND ACETAMINOPHEN 1 TABLET: 5; 325 TABLET ORAL at 06:52

## 2021-01-11 RX ADMIN — ASPIRIN 81 MG: 81 TABLET, CHEWABLE ORAL at 09:11

## 2021-01-11 RX ADMIN — INSULIN LISPRO 1 UNITS: 100 INJECTION, SOLUTION INTRAVENOUS; SUBCUTANEOUS at 11:39

## 2021-01-11 RX ADMIN — SUCRALFATE 1 G: 1 TABLET ORAL at 00:52

## 2021-01-11 RX ADMIN — SUCRALFATE 1 G: 1 TABLET ORAL at 23:29

## 2021-01-11 RX ADMIN — WARFARIN SODIUM 5 MG: 5 TABLET ORAL at 16:55

## 2021-01-11 ASSESSMENT — PAIN SCALES - GENERAL
PAINLEVEL_OUTOF10: 6
PAINLEVEL_OUTOF10: 6

## 2021-01-11 ASSESSMENT — PAIN DESCRIPTION - DESCRIPTORS
DESCRIPTORS: ACHING
DESCRIPTORS: ACHING

## 2021-01-11 ASSESSMENT — PAIN DESCRIPTION - ORIENTATION
ORIENTATION: RIGHT;LEFT
ORIENTATION: RIGHT;LEFT

## 2021-01-11 ASSESSMENT — PAIN DESCRIPTION - PROGRESSION
CLINICAL_PROGRESSION: NOT CHANGED
CLINICAL_PROGRESSION: NOT CHANGED

## 2021-01-11 ASSESSMENT — PAIN DESCRIPTION - FREQUENCY: FREQUENCY: CONTINUOUS

## 2021-01-11 ASSESSMENT — PAIN DESCRIPTION - PAIN TYPE
TYPE: ACUTE PAIN
TYPE: ACUTE PAIN

## 2021-01-11 ASSESSMENT — PAIN DESCRIPTION - LOCATION: LOCATION: KNEE

## 2021-01-11 NOTE — PROGRESS NOTES
Morning assessment complete. Neuro check completed, nothing remarkable. Lung sounds diminished. Low grade temp in place, pt complaining of sore throat. Waiting for COVID results. New purewick in place, new brief and bed pad. Repositioned in bed to prevent skin breakdown. Denies need for pain medication. Pt demonstrated how to reach nurse with call light. Call light in reach. Will continue to monitor. The care plan and education has been reviewed and mutually agreed upon with the patient. [phone] pt refused to get out of bed, stated it would take 3 people to get her up. Explained to patient we have lift equipment to get her into chair, pt declined.      [phone] page to Dr. Manjula Espino that patient is covid +

## 2021-01-11 NOTE — CARE COORDINATION
Zayda in admissions at [facility] able to accept as long as COVID-. Also faxed 2 referrals to facilities accepting COVID+ pts-[facility] able to accept if COVID+.   Electronically signed by BEREKET Gonzales on 1/11/2021 at 11:01 AM

## 2021-01-11 NOTE — CARE COORDINATION
Spoke to pt regarding dc planning for SNF. Pt is agreeable to SNF on d/c-prefers facility near [place] [address]. [place] she lives. Referral faxed to [facility]. Awaiting COVID result for placement.   Electronically signed by BERKEET Chávez on 1/11/2021 at 9:48 AM

## 2021-01-11 NOTE — PROGRESS NOTES
Shift assessment complete see flow sheets, meds per orders see eMAR. Patient alert and oriented x4, c/o bilateral knee pain during assessment. Voltaren gel to bilateral knees, patient stated it helped. PRN pain meds given when available. Snacks and drinks offered at bedtime. Patient remains in contact plus isolation awaiting Covid swab results. The care plan and education has been reviewed and mutually agreed upon with the patient. Patient remains free from falls. All fall precautions in place. Yellow blanket at bedside, yellow bracelet on patient. SAFE sign on door. Bed and chair alarms being used. Bed in lowest position. Will monitor.      Electronically signed by Luis Mendoza RN on 1/11/2021 at 1:29 AM

## 2021-01-11 NOTE — PLAN OF CARE
Problem: Falls - Risk of:  Goal: Will remain free from falls  Description: Will remain free from falls  1/11/2021 0711 by Rosa Barry RN  Outcome: Ongoing  1/11/2021 0129 by Lashawn Mccollum RN  Outcome: Ongoing  Goal: Absence of physical injury  Description: Absence of physical injury  1/11/2021 0711 by Rosa Barry RN  Outcome: Ongoing  1/11/2021 0129 by Lashawn Mccollum RN  Outcome: Ongoing     Problem: Pain:  Goal: Pain level will decrease  Description: Pain level will decrease  1/11/2021 0711 by Rosa Barry RN  Outcome: Ongoing  1/11/2021 0129 by Lashawn Mccollum RN  Outcome: Ongoing  Goal: Control of acute pain  Description: Control of acute pain  1/11/2021 0711 by Rosa Barry RN  Outcome: Ongoing  1/11/2021 0129 by Lashawn Mccollum RN  Outcome: Ongoing  Goal: Control of chronic pain  Description: Control of chronic pain  1/11/2021 0711 by Rosa Barry RN  Outcome: Ongoing  1/11/2021 0129 by Lashawn Mccollum RN  Outcome: Ongoing     Problem: Skin Integrity:  Goal: Will show no infection signs and symptoms  Description: Will show no infection signs and symptoms  1/11/2021 0711 by Rosa Barry RN  Outcome: Ongoing  1/11/2021 0129 by Lashawn Mccollum RN  Outcome: Ongoing  Goal: Absence of new skin breakdown  Description: Absence of new skin breakdown  1/11/2021 0711 by Rosa Barry RN  Outcome: Ongoing  1/11/2021 0129 by Lashawn Mccollum RN  Outcome: Ongoing     Problem: Musculor/Skeletal Functional Status  Goal: Highest potential functional level  1/11/2021 0711 by Rosa Barry RN  Outcome: Ongoing  1/11/2021 0129 by Lashawn Mccollum RN  Outcome: Ongoing  Goal: Absence of falls  1/11/2021 0711 by Rosa Barry RN  Outcome: Ongoing  1/11/2021 0129 by Lashawn Mccollum RN  Outcome: Ongoing

## 2021-01-11 NOTE — DISCHARGE SUMMARY
Dr Brissa Quinones made aware of bleeding from pts left groin site, soaking through dsg, site repacked by primary nurse and large pressure dsg applied with abd and tape, will cont to monitor Hospital Medicine Discharge Summary    Patient: Lorena Gracia     Gender: female  : 1935   Age: 82 y.o. MRN: [id number]    Admitting Physician: Braxton Osorio MD  Discharge Physician: Catherine Joshi MD     Code Status: Full Code     Admit Date: 2021   Discharge Date:   21, discharge delayed due to placement  Disposition: To a non-[facility] facility  Time spent arranging discharge: 35 minutes    Discharge Diagnoses: Active Hospital Problems    Diagnosis Date Noted    Frequent falls [R29.6] 2021   left knee contusion  UTI  KIKO      Condition at Discharge:  [facility] AT [place] Course:   Patient admitted to hospital with fall with left knee injury. Patient was seen by Ortho likely contusion will follow up with Ortho as outpatient. Patient limited mobility possible discharge to [facility] for further therapy. Patient had UTI was treated with 3 days of Rocephin for E. coli. KIKO improved IV fluids. I would monitor patient on nursing home if still high risk for falls would need to reconsider anticoagulation with for which she is on for due to chronic PE and DVT    Discharge Exam:    BP (!) 166/66   Pulse 75   Temp 99.8 °F (37.7 °C) (Oral)   Resp 16   Ht 5' (1.524 m)   Wt 250 lb (113.4 kg)   SpO2 92%   BMI 48.82 kg/m²   General appearance:  Appears comfortable. AAOx3  HEENT: atraumatic, Pupils equal, muscous membranes moist, no masses appreciated  Cardiovascular: Regular rate and rhythm no murmurs appreciated  Respiratory: CTAB no wheezing  Gastrointestinal: Abdomen soft, non-tender, BS+  EXT: no edema, left knee tender to palpation  Neurology: no gross focal deficts  Psychiatry: Appropriate affect.  Not agitated  Skin: Warm, dry, no rashes appreciated    Discharge Medications:   Current Discharge Medication List        Current Discharge Medication List      CONTINUE these medications which have CHANGED    Details   metFORMIN (GLUCOPHAGE) 1000 MG tablet Take 0.5 tablets by mouth 2 times daily (with meals)  Qty: 60 tablet, Refills: 3           Current Discharge Medication List      CONTINUE these medications which have NOT CHANGED    Details   HYDROcodone-acetaminophen (NORCO) 5-325 MG per tablet Take 1 tablet by mouth every 6 hours as needed for Pain. IRON PO Take 65 mg by mouth three times a week Indications: mon wed fri      vitamin B-12 (CYANOCOBALAMIN) 1000 MCG tablet Take 1,000 mcg by mouth three times a week Indications: mon wed fri      aspirin 81 MG tablet Take by mouth daily      lovastatin (MEVACOR) 40 MG tablet Take 40 mg by mouth nightly      Insulin NPH Isophane & Regular (HUMULIN 70/30 SC) Inject into the skin Indications: 15 units in am, 14 units at suppertime       Cholecalciferol (VITAMIN D3) 5000 units TABS Take by mouth three times a week Indications: mon, wed frid       glimepiride (AMARYL) 4 MG tablet Take 4 mg by mouth every morning (before breakfast)      warfarin (COUMADIN) 5 MG tablet Take 5 mg by mouth Indications: 5 mg daily, except Wed take 2.5 mg       cloNIDine (CATAPRES) 0.1 MG tablet Take 0.1 mg by mouth 2 times daily      lisinopril (PRINIVIL;ZESTRIL) 20 MG tablet Take 20 mg by mouth daily      NIFEdipine (ADALAT CC) 30 MG extended release tablet Take 30 mg by mouth daily      gentamicin (GARAMYCIN) 0.1 % cream Apply topically daily. Right foot  Qty: 15 g, Refills: 0           Current Discharge Medication List      STOP taking these medications       amLODIPine (NORVASC) 5 MG tablet Comments:   Reason for Stopping:         sodium hypochlorite (DAKINS) 0.125 % SOLN external solution Comments:   Reason for Stopping:               Labs:  For convenience and continuity at follow-up the following most recent labs are provided:    Lab Results   Component Value Date    WBC 4.0 01/10/2021    HGB 7.7 01/10/2021    HCT 24.4 01/10/2021    MCV 89.0 01/10/2021     01/10/2021     01/10/2021    K 4.0 01/10/2021    K 4.3 01/08/2021     01/10/2021 CO2 24 01/10/2021    BUN 23 01/10/2021    CREATININE 1.2 01/10/2021    CALCIUM 7.5 01/10/2021    PHOS 4.3 01/10/2021    ALKPHOS 97 01/08/2021    ALT 11 01/08/2021    AST 26 01/08/2021    BILITOT <0.2 01/08/2021    LABALBU 2.3 01/10/2021     Lab Results   Component Value Date    INR 1.27 (H) 01/11/2021    INR 1.66 (H) 01/10/2021    INR 3.03 (H) 01/08/2021       Radiology:  Ct Abdomen Pelvis Wo Contrast Additional Contrast? None    Result Date: 1/8/2021  EXAMINATION: CT OF THE ABDOMEN AND PELVIS WITHOUT CONTRAST 1/8/2021 7:36 pm TECHNIQUE: CT of the abdomen and pelvis was performed without the administration of intravenous contrast. Multiplanar reformatted images are provided for review. Dose modulation, iterative reconstruction, and/or weight based adjustment of the mA/kV was utilized to reduce the radiation dose to as low as reasonably achievable. COMPARISON: 03/16/2019 HISTORY: ORDERING SYSTEM PROVIDED HISTORY: Abdominal pain TECHNOLOGIST PROVIDED HISTORY: Reason for exam:->Abdominal pain Additional Contrast?->None Reason for Exam: Abdominal pain Acuity: Acute Type of Exam: Initial Relevant Medical/Surgical History: Fall (Nola Diego this morning, brought in via squad from home after initial refusal. Hit both knees. Unable to walk since.) FINDINGS: Lower Chest: There is minimal subsegmental opacity scattered along the lung bases posterolaterally which is more prominent. The liver and spleen are unremarkable. There is a moderate hiatal hernia along the lower mediastinum which is unchanged. Organs: The liver and spleen are normal size and density. No focal lesion is seen. The gallbladder has been removed with mild dilatation of the common duct extending distally which is unchanged. The pancreas and adrenals are normal.  The spleen is unremarkable GI/Bowel: There is no bowel obstruction. There are extensive diverticula throughout the colon with severe diverticula and muscular hypertrophy throughout the sigmoid region. There is no pericolonic inflammation and no bowel obstruction is seen. Pelvis: The bladder is unremarkable. The uterus is atrophic with no adnexal mass or free fluid. The appendix is not well visualized with no pericecal inflammation bones are intact Peritoneum/Retroperitoneum: The adrenals are normal.  The kidneys are normal size with perirenal stranding around both kidneys. No hydronephrosis, renal stone or mass. The ureters are normal caliber Bones/Soft Tissues: Moderate degenerative changes are seen in the spine with no aggressive osseous lesion. Previous cholecystectomy which is unchanged with no acute abnormality seen. Moderate hiatal hernia which is unchanged and hazy bibasilar opacities which could represent early infiltrates or atelectasis. Recommend follow-up Perirenal scarring around both kidneys with no hydronephrosis or urinary obstruction Extensive diverticulosis throughout the sigmoid colon with no pericolonic inflammation. Atrophic uterus with no pelvic mass. Moderate calcified plaque throughout the aorta which is normal caliber and unchanged. Xr Pelvis (1-2 Views)    Result Date: 1/8/2021  EXAMINATION: ONE XRAY VIEW OF THE PELVIS 1/8/2021 6:21 pm COMPARISON: None. HISTORY: ORDERING SYSTEM PROVIDED HISTORY: Eval for fx TECHNOLOGIST PROVIDED HISTORY: Reason for exam:->Eval for fx Reason for Exam: Fall Aliya Nakul this morning, brought in via squad from home after initial refusal. Hit both knees. Unable to walk since.) Acuity: Unknown Type of Exam: Unknown FINDINGS: There is moderate joint space narrowing specially in both hips. No fracture or dislocation is seen. The bones are osteopenic. The pelvis is intact. Moderate osteoarthritic changes in both hips and diffuse osteopenia with no acute bony abnormality. Xr Knee Left (3 Views)    Result Date: 1/8/2021  EXAMINATION: THREE XRAY VIEWS OF THE LEFT KNEE 1/8/2021 6:21 pm COMPARISON: None.  HISTORY: ORDERING SYSTEM PROVIDED HISTORY: Eval for fx TECHNOLOGIST PROVIDED HISTORY: Reason for exam:->Eval for fx Reason for Exam: Fall Lisa Mills this morning, brought in via squad from home after initial refusal. Hit both knees. Unable to walk since.) Acuity: Unknown Type of Exam: Unknown FINDINGS: There is a metallic prosthesis in the femoral condyle and tibial plateau which are held in place with methylmethacrylate and appear in good position. The bones are osteopenic. No displaced fracture or dislocation is seen. There are postop changes along the patella with no significant joint effusion. There is ill-defined mild irregular linear lucency along the central aspect of the femoral condyle which is only seen on the lateral view extends to the prosthesis. There is mild stranding and edema in the soft tissues anteriorly. Status post total left knee replacement in good position. Diffuse osteopenia limiting the exam with some questionable mild irregular linear lucency along the distal femoral condyle extending to the prosthesis centrally which is only seen on lateral view and may just be artifactual or related to the prior surgery. If there is  clinical concern for a nondisplaced fracture in the area. Recommend orthopedic follow-up. Postop changes posterior to the patella and moderate soft tissue swelling anterior to the knee with no obvious joint effusion. Xr Knee Right (3 Views)    Result Date: 1/8/2021  EXAMINATION: THREE XRAY VIEWS OF THE RIGHT KNEE 1/8/2021 6:21 pm COMPARISON: None. HISTORY: ORDERING SYSTEM PROVIDED HISTORY: Eval for fx TECHNOLOGIST PROVIDED HISTORY: Reason for exam:->Eval for fx Reason for Exam: Fall Lisa Mills this morning, brought in via squad from home after initial refusal. Hit both knees. Unable to walk since.) Acuity: Unknown Type of Exam: Unknown FINDINGS: There is moderate to severe joint space narrowing in the knee which is more prominent laterally with prominent osteophytes throughout. No fracture or dislocation is seen.   The bones are osteopenic. The patella is intact and there is moderate narrowing of the patellofemoral joint. There is a small suprapatellar effusion. Moderately severe osteoarthritic changes in the knee which is more prominent laterally with no obvious acute fracture. Diffuse osteopenia limiting the exam. Moderate patellofemoral degenerative changes and a small suprapatellar effusion. Ct Head Wo Contrast    Result Date: 1/8/2021  EXAMINATION: CT OF THE HEAD WITHOUT CONTRAST  1/8/2021 7:35 pm TECHNIQUE: CT of the head was performed without the administration of intravenous contrast. Dose modulation, iterative reconstruction, and/or weight based adjustment of the mA/kV was utilized to reduce the radiation dose to as low as reasonably achievable. COMPARISON: None. HISTORY: ORDERING SYSTEM PROVIDED HISTORY: Head injury on Coumadin TECHNOLOGIST PROVIDED HISTORY: Reason for exam:->Head injury on Coumadin Has a \"code stroke\" or \"stroke alert\" been called? ->No Reason for Exam: Head injury on Coumadin Acuity: Acute Type of Exam: Initial Relevant Medical/Surgical History: Fall ([employer] this morning, brought in via squad from home after initial refusal. Hit both knees. Unable to walk since.) FINDINGS: BRAIN/VENTRICLES: The ventricles and sulci are diffusely enlarged. Low attenuation is seen in the periventricular and subcortical white matter. No acute intracranial hemorrhage or acute infarct is identified. ORBITS: The visualized portion of the orbits demonstrate no acute abnormality. SINUSES: The visualized paranasal sinuses and mastoid air cells demonstrate no acute abnormality. SOFT TISSUES/SKULL:  No acute abnormality of the visualized skull or soft tissues. No acute intracranial abnormality.      Ct Cervical Spine Wo Contrast    Result Date: 1/8/2021  EXAMINATION: CT OF THE CERVICAL SPINE WITHOUT CONTRAST 1/8/2021 7:35 pm TECHNIQUE: CT of the cervical spine was performed without the administration of intravenous contrast. Multiplanar reformatted images are provided for review. Dose modulation, iterative reconstruction, and/or weight based adjustment of the mA/kV was utilized to reduce the radiation dose to as low as reasonably achievable. COMPARISON: None. HISTORY: ORDERING SYSTEM PROVIDED HISTORY: Head injury, on Coumadin TECHNOLOGIST PROVIDED HISTORY: Reason for exam:->Head injury, on Coumadin Reason for Exam: Head injury, on Coumadin Acuity: Acute Type of Exam: Initial Relevant Medical/Surgical History: Fall (Cele Eldridge this morning, brought in via squad from home after initial refusal. Hit both knees. Unable to walk since.) FINDINGS: BONES/ALIGNMENT: The cervical vertebra are well aligned. No fracture or subluxation is seen. DEGENERATIVE CHANGES: There is moderate disc space narrowing throughout the lower cervical spine which is most severe at C6-7. There is small disc osteophyte complex at this level. There moderate hypertrophic changes of the facets throughout which are otherwise intact. The atlantoaxial joint is intact. There are moderate disc osteophyte complexes at C4-5 and C5-6 causing moderate central dural sac effacement. The lung apices are clear. SOFT TISSUES: There is no prevertebral soft tissue swelling. Moderate degenerative disc changes throughout the lower cervical spine which is most severe at C6-7 with no acute abnormality seen. Moderate disc osteophyte complexes at C4-5 and C5-6. Recommend clinical follow-up. Moderate osteoarthritic changes of the facets throughout     Ct Knee Left Wo Contrast    Result Date: 1/10/2021  EXAMINATION: CT OF THE LEFT KNEE WITHOUT CONTRAST 1/9/2021 4:34 pm TECHNIQUE: CT of the left knee was performed without the administration of intravenous contrast.  Multiplanar reformatted images are provided for review.  Dose modulation, iterative reconstruction, and/or weight based adjustment of the mA/kV was utilized to reduce the radiation dose to as low as reasonably achievable. COMPARISON: Radiographs 01/08/2021. HISTORY ORDERING SYSTEM PROVIDED HISTORY: eval for fracture TECHNOLOGIST PROVIDED HISTORY: Reason for exam:->eval for fracture Reason for Exam: eval for fracture Acuity: Acute Type of Exam: Initial Fall. FINDINGS: Diffuse osteopenia limits evaluation for nondisplaced fractures. In addition, there is a left total knee prosthesis with associated beam hardening artifact which limits assessment of the adjacent structures. Within this limitation, no acute fracture is seen. No evidence of dislocation. Small volume knee joint fluid. The anterior tibial screw projects into the soft tissues medial to the tibial tuberosity by approximately 1.5 cm. There are prominent vascular calcifications. Nonspecific mild subcutaneous edema is seen at the dorsal aspect of the proximal tibia/fibula. Within the limitations of diffuse osteopenia and beam hardening artifact from the left total knee prosthesis, no acute bony abnormality is seen. No joint effusion. The anterior tibial screw projects into the soft tissues medial to the tibial tuberosity. This is similar to the recent radiograph. Recommend correlation with prior postoperative imaging. Xr Chest Portable    Result Date: 1/8/2021  EXAMINATION: ONE XRAY VIEW OF THE CHEST 1/8/2021 6:21 pm COMPARISON: None. HISTORY: ORDERING SYSTEM PROVIDED HISTORY: Eval for infiltrate TECHNOLOGIST PROVIDED HISTORY: Reason for exam:->Eval for infiltrate Reason for Exam: Fall Giovanny Stalling this morning, brought in via squad from home after initial refusal. Hit both knees. Unable to walk since.) Acuity: Unknown Type of Exam: Unknown FINDINGS: The heart is normal.  The pulmonary vessels are normal.  There is overlying EKG lead artifact. No consolidation or effusion is seen. Overlying EKG lead artifact limiting the exam with no obvious acute abnormality seen.          Signed:    Crisotbal Engel MD   1/11/2021

## 2021-01-11 NOTE — DISCHARGE INSTR - COC
Diagnosis Code    Abdominal wall cellulitis L03.311    Poorly controlled type 2 diabetes mellitus ([facility] [address] E11.65    HTN (hypertension) I10    CKD (chronic kidney disease) stage 3, GFR 30-59 ml/min N18.30    History of pulmonary embolism Z86.711    Chronic abdominal wound infection, subsequent encounter S31.109D, L08.9    Open abdominal wall wound, subsequent encounter S31.109D    Severe infection B99.9    Acute kidney injury superimposed on chronic kidney disease (HCC) N17.9, N18.9    Diverticulosis of colon without diverticulitis K57.30    Morbid obesity due to excess calories ([facility]) E66.01    Open wound of abdomen S31.109A    Diabetic ulcer of right midfoot associated with type 2 diabetes mellitus, with fat layer exposed ([facility] [address] E11.621, L97.412    Chronic wound infection of abdomen S31.109A, L08.9    History of arterial ischemic stroke Z86.73    Anticoagulated on Coumadin Z79.01    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    Acinetobacter lwoffi infection A49.8    Citrobacter infection A49.8    Weight loss counseling, encounter for Z71.3    Proteus infection A49.8    Frequent falls R29.6       Isolation/Infection:   Isolation            Droplet Plus          Patient Infection Status       Infection Onset Added Last Indicated Last Indicated By Review Planned Expiration Resolved Resolved By    COVID-19 Rule Out 01/10/21 01/10/21 01/10/21 COVID-19 (Ordered) 01/17/21 01/24/21      MRSA 03/14/19 03/15/19 05/28/19 Wound Culture                Nurse Assessment:  Last Vital Signs: BP (!) 166/66   Pulse 75   Temp 99.8 °F (37.7 °C) (Oral)   Resp 16   Ht 5' (1.524 m)   Wt 250 lb (113.4 kg)   SpO2 92%   BMI 48.82 kg/m²     Last documented pain score (0-10 scale): Pain Level: 6  Last Weight:   Wt Readings from Last 1 Encounters:   01/08/21 250 lb (113.4 kg)     Mental Status:  oriented and alert    IV Access:  - None    Nursing Mobility/ADLs:  Walking   Dependent  Transfer  Dependent  Bathing  Assisted  Dressing Assisted  Toileting  Assisted  Feeding  Independent  Med Admin  Independent  Med Delivery   whole    Wound Care Documentation and Therapy:  Wound 01/08/21 Other (Comment) Mid (Active)   Wound Etiology Other 01/09/21 0800   Dressing Status Clean;Dry; Intact 01/10/21 2148   Wound Cleansed Cleansed with saline 01/09/21 0800   Dressing/Treatment Open to air 01/10/21 2148   Wound Length (cm) 0.5 cm 01/08/21 2341   Wound Width (cm) 0.3 cm 01/08/21 2341   Wound Depth (cm) 0.2 cm 01/08/21 2341   Wound Surface Area (cm^2) 0.15 cm^2 01/08/21 2341   Wound Volume (cm^3) 0.03 cm^3 01/08/21 2341   Wound Assessment Pink/red 01/10/21 2148   Drainage Amount Scant 01/10/21 2148   Drainage Description Serous 01/10/21 2148   Odor None 01/10/21 2148   [place]-wound Assessment Intact 01/10/21 2148   Number of days: 2        Elimination:  Continence:   · Bowel: Yes  · Bladder: No  Urinary Catheter: None   Colostomy/Ileostomy/Ileal Conduit: No       Date of Last BM: 01/12/2021    Intake/Output Summary (Last 24 hours) at 1/11/2021 0919  Last data filed at 1/11/2021 0906  Gross per 24 hour   Intake 480 ml   Output 1700 ml   Net -1220 ml     I/O last 3 completed shifts: In: 600 [P.O.:600]  Out: 1700 [Urine:1700]    Safety Concerns:     History of Falls (last 30 days) and At Risk for Falls    Impairments/Disabilities:      None    Nutrition Therapy:  Current Nutrition Therapy:   - Oral Diet:  General    Routes of Feeding: Oral  Liquids: Thin Liquids  Daily Fluid Restriction: no  Last Modified Barium Swallow with Video (Video Swallowing Test): not done    Treatments at the Time of Hospital Discharge:   Respiratory Treatments: none  Oxygen Therapy:  is not on home oxygen therapy.   Ventilator:    - No ventilator support    Rehab Therapies: Physical Therapy and Occupational Therapy  Weight Bearing Status/Restrictions: No weight bearing restirctions  Other Medical Equipment (for information only, NOT a DME order):  bath bench and bedside commode  Other Treatments: unknown    Patient's personal belongings (please select all that are sent with patient):  None    RN SIGNATURE:  Electronically signed by Winsome Graves RN on 1/12/21 at 8:59 AM EST    CASE MANAGEMENT/SOCIAL WORK SECTION    Inpatient Status Date: 01/08/21    Readmission Risk Assessment Score:  Readmission Risk              Risk of Unplanned Readmission:        0         Discharging to Facility/ Agency   · Name: [facility]  · Address:  · Phone:[phone]  · Fax: [phone]    / signature: Electronically signed by BEREKET Demarco on 1/12/2021 at 7:44 AM      PHYSICIAN SECTION    Prognosis: Fair    Condition at Discharge: Stable    Rehab Potential (if transferring to Rehab): Fair    Recommended Labs or Other Treatments After Discharge:     Physician Certification: I certify the above information and transfer of Timur Solomon  is necessary for the continuing treatment of the diagnosis listed and that she requires [place] for less 30 days.      Update Admission H&P: No change in H&P    PHYSICIAN SIGNATURE:  Electronically signed by Fatmata Betancourt MD on 1/11/21 at 9:20 AM EST

## 2021-01-11 NOTE — PLAN OF CARE
Problem: Falls - Risk of:  Goal: Will remain free from falls  Description: Will remain free from falls  Outcome: Ongoing  Goal: Absence of physical injury  Description: Absence of physical injury  Outcome: Ongoing     Problem: Pain:  Description: Pain management should include both nonpharmacologic and pharmacologic interventions.   Goal: Pain level will decrease  Description: Pain level will decrease  Outcome: Ongoing  Goal: Control of acute pain  Description: Control of acute pain  Outcome: Ongoing  Goal: Control of chronic pain  Description: Control of chronic pain  Outcome: Ongoing     Problem: Skin Integrity:  Goal: Will show no infection signs and symptoms  Description: Will show no infection signs and symptoms  Outcome: Ongoing  Goal: Absence of new skin breakdown  Description: Absence of new skin breakdown  Outcome: Ongoing     Problem: Musculor/Skeletal Functional Status  Goal: Highest potential functional level  Outcome: Ongoing  Goal: Absence of falls  Outcome: Ongoing

## 2021-01-12 VITALS
BODY MASS INDEX: 49.08 KG/M2 | WEIGHT: 250 LBS | TEMPERATURE: 99.3 F | SYSTOLIC BLOOD PRESSURE: 176 MMHG | HEART RATE: 72 BPM | DIASTOLIC BLOOD PRESSURE: 62 MMHG | OXYGEN SATURATION: 92 % | HEIGHT: 60 IN | RESPIRATION RATE: 16 BRPM

## 2021-01-12 LAB
GLUCOSE BLD-MCNC: 154 MG/DL (ref 70–99)
INR BLD: 1.3 (ref 0.86–1.14)
PERFORMED ON: ABNORMAL
PROTHROMBIN TIME: 15.1 SEC (ref 10–13.2)

## 2021-01-12 PROCEDURE — 36415 COLL VENOUS BLD VENIPUNCTURE: CPT

## 2021-01-12 PROCEDURE — 6370000000 HC RX 637 (ALT 250 FOR IP): Performed by: INTERNAL MEDICINE

## 2021-01-12 PROCEDURE — 6370000000 HC RX 637 (ALT 250 FOR IP): Performed by: NURSE PRACTITIONER

## 2021-01-12 PROCEDURE — G0378 HOSPITAL OBSERVATION PER HR: HCPCS

## 2021-01-12 PROCEDURE — 85610 PROTHROMBIN TIME: CPT

## 2021-01-12 RX ORDER — HYDROCODONE BITARTRATE AND ACETAMINOPHEN 5; 325 MG/1; MG/1
1 TABLET ORAL EVERY 6 HOURS PRN
Qty: 12 TABLET | Refills: 0 | Status: SHIPPED | OUTPATIENT
Start: 2021-01-12 | End: 2021-01-15

## 2021-01-12 RX ADMIN — DICLOFENAC 2 G: 10 GEL TOPICAL at 08:39

## 2021-01-12 RX ADMIN — NIFEDIPINE 30 MG: 30 TABLET, FILM COATED, EXTENDED RELEASE ORAL at 08:38

## 2021-01-12 RX ADMIN — SUCRALFATE 1 G: 1 TABLET ORAL at 05:25

## 2021-01-12 RX ADMIN — HYDROCODONE BITARTRATE AND ACETAMINOPHEN 1 TABLET: 5; 325 TABLET ORAL at 05:25

## 2021-01-12 RX ADMIN — PANTOPRAZOLE SODIUM 40 MG: 40 TABLET, DELAYED RELEASE ORAL at 05:25

## 2021-01-12 RX ADMIN — INSULIN LISPRO 1 UNITS: 100 INJECTION, SOLUTION INTRAVENOUS; SUBCUTANEOUS at 08:39

## 2021-01-12 RX ADMIN — CLONIDINE HYDROCHLORIDE 0.2 MG: 0.1 TABLET ORAL at 08:38

## 2021-01-12 RX ADMIN — ASPIRIN 81 MG: 81 TABLET, CHEWABLE ORAL at 08:38

## 2021-01-12 RX ADMIN — INSULIN LISPRO 12 UNITS: 100 INJECTION, SUSPENSION SUBCUTANEOUS at 08:39

## 2021-01-12 ASSESSMENT — PAIN DESCRIPTION - LOCATION: LOCATION: KNEE

## 2021-01-12 ASSESSMENT — PAIN SCALES - GENERAL: PAINLEVEL_OUTOF10: 6

## 2021-01-12 ASSESSMENT — PAIN DESCRIPTION - DESCRIPTORS: DESCRIPTORS: ACHING

## 2021-01-12 ASSESSMENT — PAIN DESCRIPTION - PAIN TYPE: TYPE: ACUTE PAIN

## 2021-01-12 NOTE — PROGRESS NOTES
Morning assessment complete. VSS, cream placed to bilateral knees for comfort. Repositioned in bed, beny care performed. New purewick in place, brief and bed pad changed. IV removed for discharged. New mepilex in place to coccyx, no skin breakdown. Only redness present. Belongings packed up, pickup at 1100. Will call report. Denies needs. Pt demonstrated how to reach nurse with call light. Call light in reach. Will continue to monitor. The care plan and education has been reviewed and mutually agreed upon with the patient. [id number] report called and given to Claudy Quintero at [facility], has unit phone number if any further questions arise.

## 2021-01-12 NOTE — PROGRESS NOTES
Shift assessment complete, patient alert and oriented x4, PRN pain medication for knee pain effective. Patient complaining of constipation, obtained PRN order for stool softener per patient request.  Patient resting in bed with eyes closed at this time. Contact plus isolation continues. The care plan and education has been reviewed and mutually agreed upon with the patient. Patient remains free from falls. All fall precautions in place. Yellow blanket at bedside, yellow bracelet on patient. SAFE sign on door. Bed and chair alarms being used. Bed in lowest position. Will monitor.      Electronically signed by Loli Dumont RN on 1/12/2021 at 3:02 AM

## 2021-01-12 NOTE — PLAN OF CARE
Problem: Falls - Risk of:  Goal: Will remain free from falls  Description: Will remain free from falls  1/12/2021 0711 by Caroline Vazquez RN  Outcome: Ongoing  1/12/2021 0303 by Monik Harris RN  Outcome: Ongoing  Goal: Absence of physical injury  Description: Absence of physical injury  1/12/2021 0711 by Caroline Vazquez RN  Outcome: Ongoing  1/12/2021 0303 by Monik Harris RN  Outcome: Ongoing     Problem: Pain:  Goal: Pain level will decrease  Description: Pain level will decrease  1/12/2021 0711 by Caroline Vazquez RN  Outcome: Ongoing  1/12/2021 0303 by Monik Harris RN  Outcome: Ongoing  Goal: Control of acute pain  Description: Control of acute pain  1/12/2021 0711 by Caroline Vazquez RN  Outcome: Ongoing  1/12/2021 0303 by Monik Harris RN  Outcome: Ongoing  Goal: Control of chronic pain  Description: Control of chronic pain  1/12/2021 0711 by Caroline Vazquez RN  Outcome: Ongoing  1/12/2021 0303 by Monik Harris RN  Outcome: Ongoing     Problem: Skin Integrity:  Goal: Will show no infection signs and symptoms  Description: Will show no infection signs and symptoms  1/12/2021 0711 by Caroline Vazquez RN  Outcome: Ongoing  1/12/2021 0303 by Monik Harris RN  Outcome: Ongoing  Goal: Absence of new skin breakdown  Description: Absence of new skin breakdown  1/12/2021 0711 by Caroline Vazquez RN  Outcome: Ongoing  1/12/2021 0303 by Monik Harris RN  Outcome: Ongoing     Problem: Musculor/Skeletal Functional Status  Goal: Highest potential functional level  1/12/2021 0711 by Caroline Vazquez RN  Outcome: Ongoing  1/12/2021 0303 by Monik Harris RN  Outcome: Ongoing  Goal: Absence of falls  1/12/2021 0711 by Caroline Vazquez RN  Outcome: Ongoing  1/12/2021 0303 by Monik Harris RN  Outcome: Ongoing     Problem: Airway Clearance - Ineffective  Goal: Achieve or maintain patent airway  1/12/2021 0711 by Caroline Vazquez RN  Outcome: Ongoing  1/12/2021 0303 by Monik Harris RN  Outcome: Ongoing     Problem: Gas Exchange - Impaired  Goal: Absence of hypoxia  1/12/2021 0711 by Mariluz Negro RN  Outcome: Ongoing  1/12/2021 0303 by Charleen Epley, RN  Outcome: Ongoing  Goal: Promote optimal lung function  1/12/2021 0711 by Mariluz Negro RN  Outcome: Ongoing  1/12/2021 0303 by Charleen Epley, RN  Outcome: Ongoing     Problem: Breathing Pattern - Ineffective  Goal: Ability to achieve and maintain a regular respiratory rate  1/12/2021 0711 by Mariluz Negro RN  Outcome: Ongoing  1/12/2021 0303 by Charleen Epley, RN  Outcome: Ongoing     Problem:  Body Temperature -  Risk of, Imbalanced  Goal: Ability to maintain a body temperature within defined limits  1/12/2021 0711 by Mariluz Negro RN  Outcome: Ongoing  1/12/2021 0303 by Charleen Epley, RN  Outcome: Ongoing  Goal: Will regain or maintain usual level of consciousness  1/12/2021 0711 by Mariluz Negro RN  Outcome: Ongoing  1/12/2021 0303 by Charleen Epley, RN  Outcome: Ongoing  Goal: Complications related to the disease process, condition or treatment will be avoided or minimized  1/12/2021 0711 by Mariluz Negro RN  Outcome: Ongoing  1/12/2021 0303 by Charleen Epley, RN  Outcome: Ongoing     Problem: Isolation Precautions - Risk of Spread of Infection  Goal: Prevent transmission of infection  1/12/2021 0711 by Mariluz Negro RN  Outcome: Ongoing  1/12/2021 0303 by Charleen Epley, RN  Outcome: Ongoing     Problem: Nutrition Deficits  Goal: Optimize nutritional status  1/12/2021 0711 by Mariluz Negro RN  Outcome: Ongoing  1/12/2021 0303 by Charleen Epley, RN  Outcome: Ongoing     Problem: Risk for Fluid Volume Deficit  Goal: Maintain normal heart rhythm  1/12/2021 0711 by Mariluz Negro RN  Outcome: Ongoing  1/12/2021 0303 by Charleen Epley, RN  Outcome: Ongoing  Goal: Maintain absence of muscle cramping  1/12/2021 0711 by Mariluz Negro RN  Outcome: Ongoing  1/12/2021 0303 by Charleen Epley, RN  Outcome: Ongoing  Goal: Maintain normal serum potassium, sodium, calcium, phosphorus, and pH  1/12/2021 0711 by Mariluz Negro, RN  Outcome: Ongoing  1/12/2021 0303 by Mitzi Johnson RN  Outcome: Ongoing     Problem: Loneliness or Risk for Loneliness  Goal: Demonstrate positive use of time alone when socialization is not possible  1/12/2021 0711 by Fletcher Calderon RN  Outcome: Ongoing  1/12/2021 0303 by Mitzi Johnson RN  Outcome: Ongoing     Problem: Fatigue  Goal: Verbalize increase energy and improved vitality  1/12/2021 0711 by Fletcher Calderon RN  Outcome: Ongoing  1/12/2021 0303 by Mitzi Johnson RN  Outcome: Ongoing     Problem: Patient Education: Go to Patient Education Activity  Goal: Patient/Family Education  1/12/2021 1064 by Fletcher Calderon RN  Outcome: Ongoing  1/12/2021 0303 by Mitzi Johnson RN  Outcome: Ongoing

## 2021-01-12 NOTE — PLAN OF CARE
Deficit  Goal: Maintain normal heart rhythm  Outcome: Ongoing  Goal: Maintain absence of muscle cramping  Outcome: Ongoing  Goal: Maintain normal serum potassium, sodium, calcium, phosphorus, and pH  Outcome: Ongoing     Problem: Loneliness or Risk for Loneliness  Goal: Demonstrate positive use of time alone when socialization is not possible  Outcome: Ongoing     Problem: Fatigue  Goal: Verbalize increase energy and improved vitality  Outcome: Ongoing     Problem: Patient Education: Go to Patient Education Activity  Goal: Patient/Family Education  Outcome: Ongoing

## 2021-01-12 NOTE — CARE COORDINATION
DISCHARGE SUMMARY     DATE OF DISCHARGE: 01/12/21    DISCHARGE DESTINATION: [facility]    FACILITY    Level of Care: Skilled    Report Number: [phone]    Fax Number:  [phone]    Precert Obtained: N/A    Hens Completed: yes    PASARR: N/A    Notified: RN, Family and Facility/Agency-facility aware of transport time, spoke to pt to inform. Pt in agreement w/dc plan.     Prescriptions Faxed:N/A    TRANSP[place]: [place] [id number] Name: [address] [place] [address] up Time: 11AM    Phone Number: [phone]    Electronically signed by BEREKET Marcus on 1/12/2021 at 7:48 AM

## 2021-01-23 ENCOUNTER — HOSPITAL ENCOUNTER (INPATIENT)
Age: 86
LOS: 10 days | Discharge: NURSING, REHAB OR LONG-TERM CARE | DRG: 871 | End: 2021-02-02
Attending: EMERGENCY MEDICINE | Admitting: INTERNAL MEDICINE
Payer: MEDICARE

## 2021-01-23 ENCOUNTER — APPOINTMENT (OUTPATIENT)
Dept: GENERAL RADIOLOGY | Age: 86
DRG: 871 | End: 2021-01-23
Payer: MEDICARE

## 2021-01-23 DIAGNOSIS — U07.1 COVID-19: ICD-10-CM

## 2021-01-23 DIAGNOSIS — A04.72 C. DIFFICILE DIARRHEA: ICD-10-CM

## 2021-01-23 DIAGNOSIS — E86.0 DEHYDRATION: ICD-10-CM

## 2021-01-23 DIAGNOSIS — N17.9 AKI (ACUTE KIDNEY INJURY) (HCC): Primary | ICD-10-CM

## 2021-01-23 LAB
A/G RATIO: 0.6 (ref 1.1–2.2)
ACANTHOCYTES: ABNORMAL
ALBUMIN SERPL-MCNC: 2.4 G/DL (ref 3.4–5)
ALP BLD-CCNC: 86 U/L (ref 40–129)
ALT SERPL-CCNC: 13 U/L (ref 10–40)
ANION GAP SERPL CALCULATED.3IONS-SCNC: 15 MMOL/L (ref 3–16)
AST SERPL-CCNC: 17 U/L (ref 15–37)
BANDED NEUTROPHILS RELATIVE PERCENT: 5 % (ref 0–7)
BASOPHILS ABSOLUTE: 0 K/UL (ref 0–0.2)
BASOPHILS RELATIVE PERCENT: 0 %
BILIRUB SERPL-MCNC: 0.3 MG/DL (ref 0–1)
BILIRUBIN URINE: NEGATIVE
BLOOD, URINE: NEGATIVE
BUN BLDV-MCNC: 86 MG/DL (ref 7–20)
CALCIUM SERPL-MCNC: 8.7 MG/DL (ref 8.3–10.6)
CHLORIDE BLD-SCNC: 102 MMOL/L (ref 99–110)
CLARITY: ABNORMAL
CO2: 18 MMOL/L (ref 21–32)
COLOR: YELLOW
COMMENT UA: ABNORMAL
CREAT SERPL-MCNC: 2.2 MG/DL (ref 0.6–1.2)
CRENATED RBC'S: ABNORMAL
EOSINOPHILS ABSOLUTE: 0 K/UL (ref 0–0.6)
EOSINOPHILS RELATIVE PERCENT: 0 %
EPITHELIAL CELLS, UA: 2 /HPF (ref 0–5)
GFR AFRICAN AMERICAN: 26
GFR NON-AFRICAN AMERICAN: 21
GLOBULIN: 4.2 G/DL
GLUCOSE BLD-MCNC: 366 MG/DL (ref 70–99)
GLUCOSE BLD-MCNC: 419 MG/DL (ref 70–99)
GLUCOSE BLD-MCNC: 452 MG/DL (ref 70–99)
GLUCOSE URINE: NEGATIVE MG/DL
HCT VFR BLD CALC: 26.2 % (ref 36–48)
HEMOGLOBIN: 8.2 G/DL (ref 12–16)
INR BLD: 3.89 (ref 0.86–1.14)
KETONES, URINE: NEGATIVE MG/DL
LEUKOCYTE ESTERASE, URINE: ABNORMAL
LYMPHOCYTES ABSOLUTE: 0.6 K/UL (ref 1–5.1)
LYMPHOCYTES RELATIVE PERCENT: 4 %
MCH RBC QN AUTO: 26.8 PG (ref 26–34)
MCHC RBC AUTO-ENTMCNC: 31.4 G/DL (ref 31–36)
MCV RBC AUTO: 85.2 FL (ref 80–100)
METAMYELOCYTES RELATIVE PERCENT: 2 %
MICROSCOPIC EXAMINATION: YES
MONOCYTES ABSOLUTE: 0.5 K/UL (ref 0–1.3)
MONOCYTES RELATIVE PERCENT: 3 %
NEUTROPHILS ABSOLUTE: 14 K/UL (ref 1.7–7.7)
NEUTROPHILS RELATIVE PERCENT: 86 %
NITRITE, URINE: NEGATIVE
NUCLEATED RED BLOOD CELLS: 1 /100 WBC
PDW BLD-RTO: 14.4 % (ref 12.4–15.4)
PERFORMED ON: ABNORMAL
PERFORMED ON: ABNORMAL
PH UA: 5 (ref 5–8)
PLATELET # BLD: 725 K/UL (ref 135–450)
PMV BLD AUTO: 9.1 FL (ref 5–10.5)
POTASSIUM REFLEX MAGNESIUM: 4.6 MMOL/L (ref 3.5–5.1)
PROCALCITONIN: 0.38 NG/ML (ref 0–0.15)
PROTEIN UA: NEGATIVE MG/DL
PROTHROMBIN TIME: 45.7 SEC (ref 10–13.2)
RBC # BLD: 3.07 M/UL (ref 4–5.2)
RBC UA: ABNORMAL /HPF (ref 0–4)
SODIUM BLD-SCNC: 135 MMOL/L (ref 136–145)
SPECIFIC GRAVITY UA: 1.02 (ref 1–1.03)
TOTAL PROTEIN: 6.6 G/DL (ref 6.4–8.2)
URINE REFLEX TO CULTURE: ABNORMAL
URINE TYPE: ABNORMAL
UROBILINOGEN, URINE: 0.2 E.U./DL
WBC # BLD: 15 K/UL (ref 4–11)
WBC UA: 6 /HPF (ref 0–5)
YEAST: PRESENT /HPF

## 2021-01-23 PROCEDURE — 6360000002 HC RX W HCPCS: Performed by: EMERGENCY MEDICINE

## 2021-01-23 PROCEDURE — 84145 PROCALCITONIN (PCT): CPT

## 2021-01-23 PROCEDURE — 85025 COMPLETE CBC W/AUTO DIFF WBC: CPT

## 2021-01-23 PROCEDURE — 87449 NOS EACH ORGANISM AG IA: CPT

## 2021-01-23 PROCEDURE — 99285 EMERGENCY DEPT VISIT HI MDM: CPT

## 2021-01-23 PROCEDURE — 6360000002 HC RX W HCPCS: Performed by: INTERNAL MEDICINE

## 2021-01-23 PROCEDURE — 80053 COMPREHEN METABOLIC PANEL: CPT

## 2021-01-23 PROCEDURE — 83036 HEMOGLOBIN GLYCOSYLATED A1C: CPT

## 2021-01-23 PROCEDURE — 6370000000 HC RX 637 (ALT 250 FOR IP): Performed by: INTERNAL MEDICINE

## 2021-01-23 PROCEDURE — 2060000000 HC ICU INTERMEDIATE R&B

## 2021-01-23 PROCEDURE — XW13325 TRANSFUSION OF CONVALESCENT PLASMA (NONAUTOLOGOUS) INTO PERIPHERAL VEIN, PERCUTANEOUS APPROACH, NEW TECHNOLOGY GROUP 5: ICD-10-PCS | Performed by: INTERNAL MEDICINE

## 2021-01-23 PROCEDURE — 81001 URINALYSIS AUTO W/SCOPE: CPT

## 2021-01-23 PROCEDURE — 2580000003 HC RX 258: Performed by: INTERNAL MEDICINE

## 2021-01-23 PROCEDURE — 71045 X-RAY EXAM CHEST 1 VIEW: CPT

## 2021-01-23 PROCEDURE — 85610 PROTHROMBIN TIME: CPT

## 2021-01-23 PROCEDURE — 2580000003 HC RX 258: Performed by: EMERGENCY MEDICINE

## 2021-01-23 RX ORDER — DEXAMETHASONE 4 MG/1
4 TABLET ORAL DAILY
Status: DISCONTINUED | OUTPATIENT
Start: 2021-01-23 | End: 2021-01-24

## 2021-01-23 RX ORDER — GUAIFENESIN/DEXTROMETHORPHAN 100-10MG/5
5 SYRUP ORAL EVERY 4 HOURS PRN
Status: DISCONTINUED | OUTPATIENT
Start: 2021-01-23 | End: 2021-02-02 | Stop reason: HOSPADM

## 2021-01-23 RX ORDER — ONDANSETRON 2 MG/ML
4 INJECTION INTRAMUSCULAR; INTRAVENOUS EVERY 6 HOURS PRN
Status: DISCONTINUED | OUTPATIENT
Start: 2021-01-23 | End: 2021-02-02 | Stop reason: HOSPADM

## 2021-01-23 RX ORDER — ATORVASTATIN CALCIUM 20 MG/1
20 TABLET, FILM COATED ORAL DAILY
Status: DISCONTINUED | OUTPATIENT
Start: 2021-01-24 | End: 2021-02-02 | Stop reason: HOSPADM

## 2021-01-23 RX ORDER — CLONIDINE HYDROCHLORIDE 0.1 MG/1
0.1 TABLET ORAL 2 TIMES DAILY
Status: DISCONTINUED | OUTPATIENT
Start: 2021-01-23 | End: 2021-02-02 | Stop reason: HOSPADM

## 2021-01-23 RX ORDER — SODIUM CHLORIDE 0.9 % (FLUSH) 0.9 %
10 SYRINGE (ML) INJECTION EVERY 12 HOURS SCHEDULED
Status: DISCONTINUED | OUTPATIENT
Start: 2021-01-23 | End: 2021-02-02 | Stop reason: HOSPADM

## 2021-01-23 RX ORDER — NIFEDIPINE 30 MG/1
30 TABLET, EXTENDED RELEASE ORAL DAILY
Status: DISCONTINUED | OUTPATIENT
Start: 2021-01-24 | End: 2021-02-02 | Stop reason: HOSPADM

## 2021-01-23 RX ORDER — ACETAMINOPHEN 650 MG/1
650 SUPPOSITORY RECTAL EVERY 6 HOURS PRN
Status: DISCONTINUED | OUTPATIENT
Start: 2021-01-23 | End: 2021-02-02 | Stop reason: HOSPADM

## 2021-01-23 RX ORDER — ACETAMINOPHEN 325 MG/1
650 TABLET ORAL EVERY 6 HOURS PRN
Status: DISCONTINUED | OUTPATIENT
Start: 2021-01-23 | End: 2021-01-24 | Stop reason: ALTCHOICE

## 2021-01-23 RX ORDER — SODIUM CHLORIDE 0.9 % (FLUSH) 0.9 %
10 SYRINGE (ML) INJECTION PRN
Status: DISCONTINUED | OUTPATIENT
Start: 2021-01-23 | End: 2021-02-02 | Stop reason: HOSPADM

## 2021-01-23 RX ORDER — DEXTROSE MONOHYDRATE 50 MG/ML
100 INJECTION, SOLUTION INTRAVENOUS PRN
Status: DISCONTINUED | OUTPATIENT
Start: 2021-01-23 | End: 2021-02-02 | Stop reason: HOSPADM

## 2021-01-23 RX ORDER — DEXTROSE MONOHYDRATE 25 G/50ML
12.5 INJECTION, SOLUTION INTRAVENOUS PRN
Status: DISCONTINUED | OUTPATIENT
Start: 2021-01-23 | End: 2021-02-02 | Stop reason: HOSPADM

## 2021-01-23 RX ORDER — NICOTINE POLACRILEX 4 MG
15 LOZENGE BUCCAL PRN
Status: DISCONTINUED | OUTPATIENT
Start: 2021-01-23 | End: 2021-02-02 | Stop reason: HOSPADM

## 2021-01-23 RX ORDER — ASPIRIN 81 MG/1
81 TABLET, CHEWABLE ORAL DAILY
Status: DISCONTINUED | OUTPATIENT
Start: 2021-01-24 | End: 2021-02-02 | Stop reason: HOSPADM

## 2021-01-23 RX ORDER — ACETAMINOPHEN 650 MG/1
650 SUPPOSITORY RECTAL EVERY 6 HOURS PRN
Status: DISCONTINUED | OUTPATIENT
Start: 2021-01-23 | End: 2021-01-24 | Stop reason: ALTCHOICE

## 2021-01-23 RX ORDER — INSULIN GLARGINE 100 [IU]/ML
10 INJECTION, SOLUTION SUBCUTANEOUS NIGHTLY
Status: DISCONTINUED | OUTPATIENT
Start: 2021-01-23 | End: 2021-01-24

## 2021-01-23 RX ORDER — SODIUM CHLORIDE 9 MG/ML
INJECTION, SOLUTION INTRAVENOUS CONTINUOUS
Status: DISCONTINUED | OUTPATIENT
Start: 2021-01-23 | End: 2021-01-25

## 2021-01-23 RX ORDER — LANOLIN ALCOHOL/MO/W.PET/CERES
1000 CREAM (GRAM) TOPICAL
Status: DISCONTINUED | OUTPATIENT
Start: 2021-01-25 | End: 2021-02-02 | Stop reason: HOSPADM

## 2021-01-23 RX ORDER — PROMETHAZINE HYDROCHLORIDE 25 MG/1
12.5 TABLET ORAL EVERY 6 HOURS PRN
Status: DISCONTINUED | OUTPATIENT
Start: 2021-01-23 | End: 2021-02-02 | Stop reason: HOSPADM

## 2021-01-23 RX ORDER — DEXAMETHASONE 4 MG/1
2 TABLET ORAL DAILY
Status: DISCONTINUED | OUTPATIENT
Start: 2021-01-23 | End: 2021-01-24

## 2021-01-23 RX ORDER — ACETAMINOPHEN 325 MG/1
650 TABLET ORAL EVERY 6 HOURS PRN
Status: DISCONTINUED | OUTPATIENT
Start: 2021-01-23 | End: 2021-02-02 | Stop reason: HOSPADM

## 2021-01-23 RX ORDER — POLYETHYLENE GLYCOL 3350 17 G/17G
17 POWDER, FOR SOLUTION ORAL DAILY PRN
Status: DISCONTINUED | OUTPATIENT
Start: 2021-01-23 | End: 2021-02-02 | Stop reason: HOSPADM

## 2021-01-23 RX ORDER — DEXAMETHASONE 6 MG/1
6 TABLET ORAL DAILY
Status: DISCONTINUED | OUTPATIENT
Start: 2021-01-23 | End: 2021-01-23

## 2021-01-23 RX ADMIN — INSULIN GLARGINE 10 UNITS: 100 INJECTION, SOLUTION SUBCUTANEOUS at 22:47

## 2021-01-23 RX ADMIN — INSULIN LISPRO 7 UNITS: 100 INJECTION, SOLUTION INTRAVENOUS; SUBCUTANEOUS at 22:48

## 2021-01-23 RX ADMIN — DEXAMETHASONE 4 MG: 4 TABLET ORAL at 17:58

## 2021-01-23 RX ADMIN — SODIUM CHLORIDE: 9 INJECTION, SOLUTION INTRAVENOUS at 17:51

## 2021-01-23 RX ADMIN — INSULIN LISPRO 12 UNITS: 100 INJECTION, SOLUTION INTRAVENOUS; SUBCUTANEOUS at 18:36

## 2021-01-23 RX ADMIN — CLONIDINE HYDROCHLORIDE 0.1 MG: 0.1 TABLET ORAL at 22:47

## 2021-01-23 RX ADMIN — DEXAMETHASONE 2 MG: 4 TABLET ORAL at 17:59

## 2021-01-23 RX ADMIN — CEFTRIAXONE 1000 MG: 1 INJECTION, POWDER, FOR SOLUTION INTRAMUSCULAR; INTRAVENOUS at 17:49

## 2021-01-23 ASSESSMENT — PAIN DESCRIPTION - DESCRIPTORS: DESCRIPTORS: ACHING

## 2021-01-23 ASSESSMENT — PAIN DESCRIPTION - LOCATION: LOCATION: ABDOMEN

## 2021-01-23 NOTE — ED NOTES
Bed: C-27  Expected date:   Expected time:   Means of arrival: [facility]  Comments:  olvin GARCIA RN  01/23/21 8301

## 2021-01-23 NOTE — ED NOTES
Patients , ED provider notified patient given her 12 units of Humalog SQ. Hospitalist was also notified and order an additional 8. Gadiel Alvarado RN notified.       [facility] [address] [place] [address]  01/23/21 8495

## 2021-01-23 NOTE — ED TRIAGE NOTES
Pt brought in [facility] with complaints of a BUN in the 80's. Pt is COVID + and being treated for covid pneumonia she is also on 02  facility for COVID    She is also being treated with Vancomycin for Cdiff.     She has a picc line

## 2021-01-23 NOTE — ED PROVIDER NOTES
CHIEF COMPLAINT  Other (elevated BUN)      HISTORY OF PRESENT ILLNESS  Elieser Martinez is a 82 y.o. female who presents to the ED from [address] for elevated BUN on routine lab work today. Patient has known diagnosis of Covid as well as C. difficile and has a PICC line in her right arm. Patient states that she has been having diarrhea still. Also states she has not been drinking much at the facility as they have not been providing her water. States her shortness of breath is at baseline since she was diagnosed with Covid. Patient is on 6 L nasal cannula O2 at this time. Patient is unsure of how much oxygen she was on at [facility]. Patient denies any abdominal pain, nausea or vomiting. States she does still have a dry cough. Denies any home oxygen use prior to diagnosis of Covid. Denies any fevers or chills. Denies any associated chest pain. No other complaints, modifying factors or associated symptoms. Nursing notes reviewed. Past Medical History:   Diagnosis Date    Acute kidney injury ([address]     Arthritis     Cerebral artery occlusion with cerebral infarction ([address]     COVID-19     Diabetes mellitus ([address]     Hypertension     Pulmonary embolism ([facility]     On warfarin     Past Surgical History:   Procedure Laterality Date    ABDOMEN SURGERY N/A 2/15/2019    DEBRIDEMENT OF OPEN ABDOMINAL WOUND performed by Tameka Gunn MD at [facility] N/A 3/19/2019    RIGHT LOWER QUADRANT ABDOMINAL DEBRIDEMENT performed by Tamkea Gunn MD at [address] [place] [address]     History reviewed. No pertinent family history. Social History     Socioeconomic History    Marital status:       Spouse name: Not on file    Number of children: Not on file    Years of education: Not on file    Highest education level: Not on file   Occupational History    Not on file   Social Needs    Financial resource strain: Not on file    Food insecurity     Worry: Not on file Inability: Not on file    Transportation needs     Medical: Not on file     Non-medical: Not on file   Tobacco Use    Smoking status: Never Smoker    Smokeless tobacco: Never Used   Substance and Sexual Activity    Alcohol use: No    Drug use: Never    Sexual activity: Not Currently   Lifestyle    Physical activity     Days per week: Not on file     Minutes per session: Not on file    Stress: Not on file   Relationships    Social connections     Talks on phone: Not on file     Gets together: Not on file     Attends Restorationism service: Not on file     Active member of club or organization: Not on file     Attends meetings of clubs or organizations: Not on file     Relationship status: Not on file    Intimate partner violence     Fear of current or ex partner: Not on file     Emotionally abused: Not on file     Physically abused: Not on file     Forced sexual activity: Not on file   Other Topics Concern    Not on file   Social History Narrative    Not on file     Current Facility-Administered Medications   Medication Dose Route Frequency Provider Last Rate Last Denver Patches ON 1/24/2021] aspirin chewable tablet 81 mg  81 mg Oral Daily [facility] ASHLEE BURNETT MD        [START ON 1/24/2021] atorvastatin (LIPITOR) tablet 20 mg  20 mg Oral Daily [facility] ASHLEE BURNETT MD        [START ON 1/25/2021] vitamin D (CHOLECALCIFEROL) capsule 5,000 Units  5,000 Units Oral Once per day on Mon Wed Fri [facility] ASHLEE BURNETT MD        cloNIDine (CATAPRES) tablet 0.1 mg  0.1 mg Oral BID KENDALL FertileHYUN BURNETT MD        [START ON 1/25/2021] vitamin B-12 (CYANOCOBALAMIN) tablet 1,000 mcg  1,000 mcg Oral Once per day on Mon Wed Fri KENDALL FertileHYUN BURNETT MD        [START ON 1/24/2021] NIFEdipine (PROCARDIA XL) extended release tablet 30 mg  30 mg Oral Daily [facility] ASHLEE BURNETT MD        sodium chloride flush 0.9 % injection 10 mL  10 mL Intravenous 2 times per day ARGUETA Shirley ASHLEE BURNETT MD        sodium chloride flush 0.9 % injection 10 mL  10 mL Intravenous PRN [facility] MD        promethazine (PHENERGAN) tablet 12.5 mg  12.5 mg Oral Q6H PRN Jacqueline Delgadillo MD        Or    ondansetron [facility]) injection 4 mg  4 mg Intravenous Q6H PRN Jacqueline Delgadillo MD        polyethylene glycol [facility]) packet 17 g  17 g Oral Daily PRN Jacqueline Delgadillo MD        acetaminophen (TYLENOL) tablet 650 mg  650 mg Oral Q6H PRN Jacqueline Delgadillo MD        Or    acetaminophen (TYLENOL) suppository 650 mg  650 mg Rectal Q6H PRN Jacqueline Delgadillo MD        acetaminophen (TYLENOL) tablet 650 mg  650 mg Oral Q6H PRN Jacqueline Delgadillo MD        Or    acetaminophen (TYLENOL) suppository 650 mg  650 mg Rectal Q6H PRN Jacqueline Delgadillo MD        guaiFENesin-dextromethorphan (ROBITUSSIN DM) 100-10 MG/5ML syrup 5 mL  5 mL Oral Q4H PRN Jacqueline Delgadillo MD        0.9 % sodium chloride infusion   Intravenous Continuous Jacqueline Delgadillo MD 75 mL/hr at 01/23/21 1751 New Bag at 01/23/21 1751    glucose (GLUTOSE) 40 % oral gel 15 g  15 g Oral PRN Jacqueline Delgadillo MD        dextrose 50 % IV solution  12.5 g Intravenous PRN Jacqueline Delgadillo MD        glucagon (rDNA) injection 1 mg  1 mg Intramuscular PRN Jacqueline Delgadillo MD        dextrose 5 % solution  100 mL/hr Intravenous PRN Jacqueline Delgadillo MD        insulin glargine (LANTUS) injection vial 10 Units  10 Units Subcutaneous Nightly Jacqueline Delgadillo MD        dexamethasone (DECADRON) tablet 4 mg  4 mg Oral Daily Jacqueline Delgadillo MD   4 mg at 01/23/21 1758    And    dexamethasone (DECADRON) tablet 2 mg  2 mg Oral Daily Jacqueline Delgadillo MD   2 mg at 01/23/21 1759    warfarin (COUMADIN) daily dosing (placeholder)   Other  [address], MD        insulin lispro (HUMALOG) injection vial 15 Units  15 Units Subcutaneous Once Jacqueline Delgadillo MD        insulin lispro (HUMALOG) injection vial 0-18 Units  0-18 Units Subcutaneous TID WC Jacqueline Delgadillo MD        insulin lispro (HUMALOG) injection vial 0-9 Units  0-9 Units Subcutaneous Nightly Jacqueline Delgadillo MD         Allergies   Allergen Reactions    Bactrim Ds [Sulfamethoxazole-Trimethoprim] Diarrhea     Abdominal pain & Diarrhea         REVIEW OF SYSTEMS  10 systems reviewed, pertinent positives per HPI otherwise noted to be negative    PHYSICAL EXAM  BP (!) 144/67   Pulse 93   Temp 98 °F (36.7 °C) (Oral)   Resp 30   Ht 5' (1.524 m)   Wt 232 lb 5.8 oz (105.4 kg)   SpO2 (!) 88%   BMI 45.38 kg/m²      CONSTITUTIONAL: AOx4, cooperative with exam, afebrile   HEAD: normocephalic, atraumatic   EYES: PERRL, EOMI, anicteric sclera   ENT: Moist mucous membranes, uvula midline   LUNGS: Bilateral breath sounds, coarse breath sounds lung bases bilaterally, tachypnea present, speaking in short phrases   CARDIOVASCULAR: RRR, normal S1/S2, no m/r/g, 2+ pulses throughout   ABDOMEN: Soft, non-tender, non-distended, +BS   NEUROLOGIC:  MAEx4, 5/5 strength throughout; fine touch sensation intact throughout; GCS 15   MUSCULOSKELETAL: No clubbing, cyanosis or edema, no calf tenderness or swelling bilaterally, PICC line in the right bicep with no surrounding erythema, drainage or tenderness   SKIN: No rash, pallor or wounds on exposed surfaces         RADIOLOGY  X-RAYS:  I have reviewed radiologic plain film image(s). ALL OTHER NON-PLAIN FILM IMAGES SUCH AS CT, ULTRASOUND AND MRI HAVE BEEN READ BY THE RADIOLOGIST. XR CHEST PORTABLE   Final Result   Right PICC line with catheter tip upper SVC. Suspected multifocal pneumonia. EKG INTERPRETATION  None    PROCEDURES    ED COURSE/MDM  Dehydration, UTI, COVID-19, pneumonia, C. Difficile    Patient seen and evaluated. History and physical as above. Nontoxic, afebrile. Patient with history of Covid and C. difficile presents for elevated BUN at outlying facility. Does have PICC line in her right bicep which appears to be functioning appropriately. No surrounding erythema or drainage.   Concern is that patient is getting dehydrated possibly from C. difficile as well as poor oral intake since being at the nursing facility. Routine labs do show KIKO with creatinine at 2.2 and BUN of 86. Patient also has elevated white count of 15. She is tachypneic and has evidence of multifocal pneumonia on her chest x-ray although she does have positive COVID-19 test on 1/10/2021. Tolerating 6 L nasal cannula with O2 saturation of 92 to 95%. ED Course as of Jan 23 2310   Sat Jan 23, 2021   1711 Patient does have KIKO with creatinine of 2.2 and BUN of 86. Patient does have COVID-19 currently and chest x-ray shows bilateral multifocal pneumonia consistent with COVID-19. Patient is on 6 L nasal cannula at this time. Also has diagnosis of C. difficile recently and has PICC line in place. With patient C. difficile, dehydration today, COVID-19 infection, UTI and KIKO, discussed admission for monitoring, rehydration and antibiotics. Patient agreeable. Consult placed to hospitalist for admission. Patient started on Rocephin for UTI. Already was receiving antibiotics outpatient for C. difficile therefore will allow inpatient team to continue patient's outpatient antibiotics. Patient has no tachycardia or fever to suggest sepsis. Admission accepted by Dr. Kathe May. [DS]      ED Course User Index  [DS] Irish Dandy, MD     Patient was given scripts for the following medications. I counseled patient how to take these medications. Current Discharge Medication List              CLINICAL IMPRESSION  1. KIKO (acute kidney injury) ([facility]    2. C. difficile diarrhea    3. Dehydration    4. COVID-19        Blood pressure (!) 144/67, pulse 93, temperature 98 °F (36.7 °C), temperature source Oral, resp. rate 30, height 5' (1.524 m), weight 232 lb 5.8 oz (105.4 kg), SpO2 (!) 88 %. DISPOSITION  Admitted     Disclaimer: All medical record entries made by [address] [place] [address] [address] [facility].       (Please note that this note was completed with a voice recognition program. Every attempt was made to edit the dictations, but inevitably there remain words that are mis-transcribed.)            Karen Clay MD  01/23/21 4107

## 2021-01-23 NOTE — ED NOTES
Patients daughter called and was updated with plan and room number.  She will call again in a few hours     Jethro Schultz RN  01/23/21 4896

## 2021-01-24 ENCOUNTER — APPOINTMENT (OUTPATIENT)
Dept: GENERAL RADIOLOGY | Age: 86
DRG: 871 | End: 2021-01-24
Payer: MEDICARE

## 2021-01-24 PROBLEM — U07.1 PNEUMONIA DUE TO COVID-19 VIRUS: Status: ACTIVE | Noted: 2021-01-24

## 2021-01-24 PROBLEM — J96.01 ACUTE RESPIRATORY FAILURE WITH HYPOXIA (HCC): Status: ACTIVE | Noted: 2021-01-24

## 2021-01-24 PROBLEM — J12.82 PNEUMONIA DUE TO COVID-19 VIRUS: Status: ACTIVE | Noted: 2021-01-24

## 2021-01-24 PROBLEM — A41.9 SEPSIS (HCC): Status: ACTIVE | Noted: 2021-01-24

## 2021-01-24 LAB
ACANTHOCYTES: ABNORMAL
ANION GAP SERPL CALCULATED.3IONS-SCNC: 18 MMOL/L (ref 3–16)
BANDED NEUTROPHILS RELATIVE PERCENT: 7 % (ref 0–7)
BASOPHILS ABSOLUTE: 0 K/UL (ref 0–0.2)
BASOPHILS RELATIVE PERCENT: 0 %
BUN BLDV-MCNC: 84 MG/DL (ref 7–20)
CALCIUM SERPL-MCNC: 8.4 MG/DL (ref 8.3–10.6)
CHLORIDE BLD-SCNC: 103 MMOL/L (ref 99–110)
CO2: 15 MMOL/L (ref 21–32)
CREAT SERPL-MCNC: 2 MG/DL (ref 0.6–1.2)
CRENATED RBC'S: ABNORMAL
EOSINOPHILS ABSOLUTE: 0 K/UL (ref 0–0.6)
EOSINOPHILS RELATIVE PERCENT: 0 %
ESTIMATED AVERAGE GLUCOSE: 171.4 MG/DL
GFR AFRICAN AMERICAN: 29
GFR NON-AFRICAN AMERICAN: 24
GLUCOSE BLD-MCNC: 174 MG/DL (ref 70–99)
GLUCOSE BLD-MCNC: 296 MG/DL (ref 70–99)
GLUCOSE BLD-MCNC: 304 MG/DL (ref 70–99)
GLUCOSE BLD-MCNC: 350 MG/DL (ref 70–99)
GLUCOSE BLD-MCNC: 385 MG/DL (ref 70–99)
HBA1C MFR BLD: 7.6 %
HCT VFR BLD CALC: 23.7 % (ref 36–48)
HEMOGLOBIN: 7.4 G/DL (ref 12–16)
INR BLD: 3.57 (ref 0.86–1.14)
LYMPHOCYTES ABSOLUTE: 0.4 K/UL (ref 1–5.1)
LYMPHOCYTES RELATIVE PERCENT: 3 %
MCH RBC QN AUTO: 26.8 PG (ref 26–34)
MCHC RBC AUTO-ENTMCNC: 31.4 G/DL (ref 31–36)
MCV RBC AUTO: 85.5 FL (ref 80–100)
MONOCYTES ABSOLUTE: 0.5 K/UL (ref 0–1.3)
MONOCYTES RELATIVE PERCENT: 4 %
NEUTROPHILS ABSOLUTE: 12.6 K/UL (ref 1.7–7.7)
NEUTROPHILS RELATIVE PERCENT: 86 %
NUCLEATED RED BLOOD CELLS: 1 /100 WBC
PDW BLD-RTO: 14.1 % (ref 12.4–15.4)
PERFORMED ON: ABNORMAL
PLATELET # BLD: 694 K/UL (ref 135–450)
PLATELET SLIDE REVIEW: ABNORMAL
PMV BLD AUTO: 9.1 FL (ref 5–10.5)
POIKILOCYTES: ABNORMAL
POTASSIUM REFLEX MAGNESIUM: 3.9 MMOL/L (ref 3.5–5.1)
PROTHROMBIN TIME: 41.9 SEC (ref 10–13.2)
RBC # BLD: 2.78 M/UL (ref 4–5.2)
SLIDE REVIEW: ABNORMAL
SODIUM BLD-SCNC: 136 MMOL/L (ref 136–145)
TOXIC GRANULATION: PRESENT
WBC # BLD: 13.5 K/UL (ref 4–11)

## 2021-01-24 PROCEDURE — 94761 N-INVAS EAR/PLS OXIMETRY MLT: CPT

## 2021-01-24 PROCEDURE — 36415 COLL VENOUS BLD VENIPUNCTURE: CPT

## 2021-01-24 PROCEDURE — 6370000000 HC RX 637 (ALT 250 FOR IP): Performed by: INTERNAL MEDICINE

## 2021-01-24 PROCEDURE — 2060000000 HC ICU INTERMEDIATE R&B

## 2021-01-24 PROCEDURE — 85025 COMPLETE CBC W/AUTO DIFF WBC: CPT

## 2021-01-24 PROCEDURE — 97530 THERAPEUTIC ACTIVITIES: CPT

## 2021-01-24 PROCEDURE — 71045 X-RAY EXAM CHEST 1 VIEW: CPT

## 2021-01-24 PROCEDURE — 97162 PT EVAL MOD COMPLEX 30 MIN: CPT

## 2021-01-24 PROCEDURE — 6360000002 HC RX W HCPCS: Performed by: INTERNAL MEDICINE

## 2021-01-24 PROCEDURE — 97110 THERAPEUTIC EXERCISES: CPT

## 2021-01-24 PROCEDURE — 2700000000 HC OXYGEN THERAPY PER DAY

## 2021-01-24 PROCEDURE — 2580000003 HC RX 258: Performed by: INTERNAL MEDICINE

## 2021-01-24 PROCEDURE — 80048 BASIC METABOLIC PNL TOTAL CA: CPT

## 2021-01-24 PROCEDURE — 85610 PROTHROMBIN TIME: CPT

## 2021-01-24 RX ORDER — AZITHROMYCIN 250 MG/1
250 TABLET, FILM COATED ORAL DAILY
Status: ON HOLD | COMMUNITY
Start: 2021-01-22 | End: 2021-01-31 | Stop reason: HOSPADM

## 2021-01-24 RX ORDER — ASCORBIC ACID 500 MG
500 TABLET ORAL DAILY
COMMUNITY

## 2021-01-24 RX ORDER — VANCOMYCIN HYDROCHLORIDE 125 MG/1
125 CAPSULE ORAL 4 TIMES DAILY
Status: DISCONTINUED | OUTPATIENT
Start: 2021-01-24 | End: 2021-02-02 | Stop reason: HOSPADM

## 2021-01-24 RX ORDER — SUCRALFATE 1 G/1
1 TABLET ORAL
COMMUNITY

## 2021-01-24 RX ORDER — INSULIN GLARGINE 100 [IU]/ML
20 INJECTION, SOLUTION SUBCUTANEOUS NIGHTLY
Status: DISCONTINUED | OUTPATIENT
Start: 2021-01-24 | End: 2021-01-28

## 2021-01-24 RX ORDER — DEXAMETHASONE SODIUM PHOSPHATE 10 MG/ML
6 INJECTION, SOLUTION INTRAMUSCULAR; INTRAVENOUS EVERY 24 HOURS
Status: DISCONTINUED | OUTPATIENT
Start: 2021-01-24 | End: 2021-02-01

## 2021-01-24 RX ORDER — HYDROCODONE BITARTRATE AND ACETAMINOPHEN 5; 325 MG/1; MG/1
1 TABLET ORAL EVERY 6 HOURS PRN
Status: ON HOLD | COMMUNITY
End: 2021-01-31 | Stop reason: HOSPADM

## 2021-01-24 RX ORDER — FAMOTIDINE 20 MG/1
10 TABLET, FILM COATED ORAL DAILY
Status: DISCONTINUED | OUTPATIENT
Start: 2021-01-24 | End: 2021-02-02 | Stop reason: HOSPADM

## 2021-01-24 RX ORDER — HYDROCODONE BITARTRATE AND ACETAMINOPHEN 5; 325 MG/1; MG/1
1 TABLET ORAL 2 TIMES DAILY
Status: ON HOLD | COMMUNITY
End: 2021-01-31 | Stop reason: HOSPADM

## 2021-01-24 RX ADMIN — FAMOTIDINE 10 MG: 20 TABLET, FILM COATED ORAL at 12:23

## 2021-01-24 RX ADMIN — INSULIN GLARGINE 20 UNITS: 100 INJECTION, SOLUTION SUBCUTANEOUS at 22:12

## 2021-01-24 RX ADMIN — VANCOMYCIN HYDROCHLORIDE 125 MG: 125 CAPSULE ORAL at 16:34

## 2021-01-24 RX ADMIN — DEXAMETHASONE 2 MG: 4 TABLET ORAL at 07:52

## 2021-01-24 RX ADMIN — NIFEDIPINE 30 MG: 30 TABLET, FILM COATED, EXTENDED RELEASE ORAL at 07:51

## 2021-01-24 RX ADMIN — DEXAMETHASONE 4 MG: 4 TABLET ORAL at 07:51

## 2021-01-24 RX ADMIN — INSULIN LISPRO 15 UNITS: 100 INJECTION, SOLUTION INTRAVENOUS; SUBCUTANEOUS at 16:50

## 2021-01-24 RX ADMIN — ASPIRIN 81 MG: 81 TABLET, CHEWABLE ORAL at 07:50

## 2021-01-24 RX ADMIN — VANCOMYCIN HYDROCHLORIDE 125 MG: 125 CAPSULE ORAL at 12:23

## 2021-01-24 RX ADMIN — SODIUM CHLORIDE: 9 INJECTION, SOLUTION INTRAVENOUS at 06:01

## 2021-01-24 RX ADMIN — INSULIN LISPRO 2 UNITS: 100 INJECTION, SOLUTION INTRAVENOUS; SUBCUTANEOUS at 22:12

## 2021-01-24 RX ADMIN — ATORVASTATIN CALCIUM 20 MG: 20 TABLET, FILM COATED ORAL at 07:51

## 2021-01-24 RX ADMIN — INSULIN LISPRO 15 UNITS: 100 INJECTION, SOLUTION INTRAVENOUS; SUBCUTANEOUS at 12:27

## 2021-01-24 RX ADMIN — CLONIDINE HYDROCHLORIDE 0.1 MG: 0.1 TABLET ORAL at 22:12

## 2021-01-24 RX ADMIN — INSULIN LISPRO 5 UNITS: 100 INJECTION, SOLUTION INTRAVENOUS; SUBCUTANEOUS at 16:50

## 2021-01-24 RX ADMIN — INSULIN LISPRO 15 UNITS: 100 INJECTION, SOLUTION INTRAVENOUS; SUBCUTANEOUS at 07:58

## 2021-01-24 RX ADMIN — CLONIDINE HYDROCHLORIDE 0.1 MG: 0.1 TABLET ORAL at 07:50

## 2021-01-24 RX ADMIN — VANCOMYCIN HYDROCHLORIDE 125 MG: 125 CAPSULE ORAL at 22:11

## 2021-01-24 RX ADMIN — INSULIN LISPRO 5 UNITS: 100 INJECTION, SOLUTION INTRAVENOUS; SUBCUTANEOUS at 12:27

## 2021-01-24 NOTE — PROGRESS NOTES
Pt oxygen saturation keeps dropping in low 80's with 5 L NC oxygen supply. Pt requires 7L HF oxygen to keep oxygen saturation above 90%. Pt is now on 7L HF oxygen and O2 stats 92% to 94%. Will continue to monitor.

## 2021-01-24 NOTE — PLAN OF CARE
Patient had 3 bouts of diarrhea during shift. Patient doesn't understand why the diarrhea is coming back after it had stopped. Problem: Falls - Risk of:  Goal: Will remain free from falls  Description: Will remain free from falls  1/24/2021 1713 by Pedrito Gu RN  Outcome: Ongoing  1/24/2021 0325 by Anselmo Cai RN  Outcome: Ongoing  Goal: Absence of physical injury  Description: Absence of physical injury  1/24/2021 1713 by Pedrito Gu RN  Outcome: Ongoing  1/24/2021 0325 by Anselmo Cai RN  Outcome: Ongoing     Problem: Airway Clearance - Ineffective  Goal: Achieve or maintain patent airway  1/24/2021 1713 by Pedrito Gu RN  Outcome: Ongoing  1/24/2021 0325 by Anselmo Cai RN  Outcome: Ongoing     Problem: Gas Exchange - Impaired  Goal: Absence of hypoxia  1/24/2021 1713 by Pedrito Gu RN  Outcome: Ongoing  1/24/2021 0325 by Anselmo Cai RN  Outcome: Ongoing  Goal: Promote optimal lung function  1/24/2021 1713 by Pedrito Gu RN  Outcome: Ongoing  1/24/2021 0325 by Anselmo Cai RN  Outcome: Ongoing     Problem: Breathing Pattern - Ineffective  Goal: Ability to achieve and maintain a regular respiratory rate  1/24/2021 1713 by Pedrito Gu RN  Outcome: Ongoing  1/24/2021 0325 by Anselmo Cai RN  Outcome: Ongoing     Problem:  Body Temperature -  Risk of, Imbalanced  Goal: Ability to maintain a body temperature within defined limits  1/24/2021 1713 by Pedrito Gu RN  Outcome: Ongoing  1/24/2021 0325 by Anselmo Cai RN  Outcome: Ongoing  Goal: Will regain or maintain usual level of consciousness  1/24/2021 1713 by Pedrito Gu RN  Outcome: Ongoing  1/24/2021 0325 by Anselmo Cai RN  Outcome: Ongoing  Goal: Complications related to the disease process, condition or treatment will be avoided or minimized  1/24/2021 1713 by Pedrito Gu RN  Outcome: Ongoing  1/24/2021 0325 by Anselmo Cai RN  Outcome: Ongoing     Problem: Isolation Precautions - Risk of Spread of Infection  Goal: Prevent transmission of infection  1/24/2021 1713 by Yenni Lott RN  Outcome: Ongoing  1/24/2021 0325 by Charmayne Baseman, RN  Outcome: Ongoing     Problem: Nutrition Deficits  Goal: Optimize nutritional status  1/24/2021 1713 by Yenni Lott RN  Outcome: Ongoing  1/24/2021 0325 by Charmayne Baseman, RN  Outcome: Ongoing     Problem: Risk for Fluid Volume Deficit  Goal: Maintain normal heart rhythm  1/24/2021 1713 by Yenni Lott RN  Outcome: Ongoing  1/24/2021 0325 by Charmayne Baseman, RN  Outcome: Ongoing  Goal: Maintain absence of muscle cramping  1/24/2021 1713 by Yenni Lott RN  Outcome: Ongoing  1/24/2021 0325 by Charmayne Baseman, RN  Outcome: Ongoing  Goal: Maintain normal serum potassium, sodium, calcium, phosphorus, and pH  1/24/2021 1713 by Yenni Lott RN  Outcome: Ongoing  1/24/2021 0325 by Charmayne Baseman, RN  Outcome: Ongoing     Problem: Loneliness or Risk for Loneliness  Goal: Demonstrate positive use of time alone when socialization is not possible  1/24/2021 1713 by Yenni Lott RN  Outcome: Ongoing  1/24/2021 0325 by Charmayne Baseman, RN  Outcome: Ongoing     Problem: Fatigue  Goal: Verbalize increase energy and improved vitality  1/24/2021 1713 by Yenni Lott RN  Outcome: Ongoing  1/24/2021 0325 by Charmayne Baseman, RN  Outcome: Ongoing     Problem: Patient Education: Go to Patient Education Activity  Goal: Patient/Family Education  1/24/2021 1713 by Yenni Lott RN  Outcome: Ongoing  1/24/2021 0325 by Charmayne Baseman, RN  Outcome: Ongoing     Problem: Skin Integrity:  Goal: Will show no infection signs and symptoms  Description: Will show no infection signs and symptoms  1/24/2021 1713 by Yenni Lott RN  Outcome: Ongoing  1/24/2021 0325 by Charmayne Baseman, RN  Outcome: Ongoing  Goal: Absence of new skin breakdown  Description: Absence of new skin breakdown  1/24/2021 1713 by Yenni Lott RN  Outcome: Ongoing  1/24/2021 0325 by Charmayne Baseman, RN  Outcome: Ongoing

## 2021-01-24 NOTE — PROGRESS NOTES
4 Eyes Skin Assessment     NAME:  Doreen Li  YOB: 1935  MEDICAL RECORD NUMBER:  [id number]    The patient is being assess for  Admission    I agree that 2 RN's have performed a thorough Head to Toe Skin Assessment on the patient. ALL assessment sites listed below have been assessed. Areas assessed by both nurses:    Head, Face, Ears, Shoulders, Back, Chest, Arms, Elbows, Hands, Sacrum. Buttock, Coccyx, Ischium and Legs. Feet and Heels        Does the Patient have a Wound? Yes wound(s) were present on assessment. LDA wound assessment was Initiated and completed        Lauro Prevention initiated:  Yes   Wound Care Orders initiated:  Yes    Pressure Injury (Stage 3,4, Unstageable, DTI, NWPT, and Complex wounds) if present place consult order under [de-identified] Yes  Pt has stage 2 pressure injury and DTI on L buttock.      New and Established Ostomies if present place consult order under : No      Nurse 1 eSignature: Electronically signed by Haylee Gould RN on 1/24/21 at 6:14 AM EST    **SHARE this note so that the co-signing nurse is able to place an eSignature**    Nurse 2 eSignature: Electronically signed by Courtney Dangelo RN on 1/24/21 at 6:59 AM EST

## 2021-01-24 NOTE — PLAN OF CARE
Problem: Falls - Risk of:  Goal: Will remain free from falls  Description: Will remain free from falls  Outcome: Ongoing  Goal: Absence of physical injury  Description: Absence of physical injury  Outcome: Ongoing     Problem: Airway Clearance - Ineffective  Goal: Achieve or maintain patent airway  Outcome: Ongoing     Problem: Gas Exchange - Impaired  Goal: Absence of hypoxia  Outcome: Ongoing  Goal: Promote optimal lung function  Outcome: Ongoing     Problem: Breathing Pattern - Ineffective  Goal: Ability to achieve and maintain a regular respiratory rate  Outcome: Ongoing     Problem: Isolation Precautions - Risk of Spread of Infection  Goal: Prevent transmission of infection  Outcome: Ongoing     Problem: Nutrition Deficits  Goal: Optimize nutritional status  Outcome: Ongoing     Problem: Risk for Fluid Volume Deficit  Goal: Maintain normal heart rhythm  Outcome: Ongoing  Goal: Maintain absence of muscle cramping  Outcome: Ongoing  Goal: Maintain normal serum potassium, sodium, calcium, phosphorus, and pH  Outcome: Ongoing     Problem: Loneliness or Risk for Loneliness  Goal: Demonstrate positive use of time alone when socialization is not possible  Outcome: Ongoing     Problem: Patient Education: Go to Patient Education Activity  Goal: Patient/Family Education  Outcome: Ongoing     Problem: Skin Integrity:  Goal: Will show no infection signs and symptoms  Description: Will show no infection signs and symptoms  Outcome: Ongoing  Goal: Absence of new skin breakdown  Description: Absence of new skin breakdown  Outcome: Ongoing

## 2021-01-24 NOTE — PROGRESS NOTES
Hospitalist Progress Note    CC: Pneumonia due to COVID-19 virus    Hospital course:  81yo WF with PMhx sig for DMII insulin dependent, HTN, hx of PE on warfarin presents with abnormal lab work. Pt has had diarrhea for past few days, decreased PO intake and was dx with COVID19 infection recently. She was started on 6L oxygen in ED - unclear how much oxygen she was requiring at SNF. She was discharged from [facility] 2 weeks ago for knee contusion. Pt is requiring increasing amounts of oxygen but she is requiring IVF for acute renal failure and ongoing diarrhea. Baseline creatinine is 1.2. Admit date: 1/23/2021  Days in hospital:  1    24 Hour Events: oxygen needs worsening    Subjective: pt has profuse diarrhea, acute renal failure requiring IV hydration, oxygen needs worsening, not candidate for remdesivir due to renal failure - she feels miserable - she has stage II on sacrum, deep tissue injury to R buttock, skin injury to skin below pannus all POA - up to 9L oxygen at present time and tachypneic    ROS:   A comprehensive review of systems was negative except for: diarrhea, shortness of breath , diarrhea, weakness, aches. Objective:    /68   Pulse 111   Temp 98.2 °F (36.8 °C) (Oral)   Resp 24   Ht 5' (1.524 m)   Wt 238 lb 15.7 oz (108.4 kg)   SpO2 90%   BMI 46.67 kg/m²     Gen: ill appearing, obese  HEENT: NC/AT, moist mucous membranes, no oropharyngeal erythema or exudate  Neck: supple, trachea midline, no anterior cervical or SC LAD  Heart:  Normal s1/s2, RRR, no murmurs, gallops, or rubs.  1+ pitting leg edema  Lungs:  diminished bilaterally, no wheeze, no rales, no rhonchi, no crackles, pos use of accessory muscles  Abd: bowel sounds present, soft, nontender, nondistended, no masses  Extrem:  No clubbing, cyanosis,  1+ pitting leg edema  Skin: no rashes or lesions, she has stage II on sacrum, deep tissue injury to R buttock, skin injury to skin below pannus all POA  Psych: A & O x3  Neuro: grossly intact, moves all four extremities. Assessment:    Principal Problem:    Pneumonia due to COVID-19 virus  Active Problems:    Poorly controlled type 2 diabetes mellitus (HCC)    HTN (hypertension)    Acute kidney injury superimposed on chronic kidney disease ([address]    Morbid obesity due to excess calories ([address]    Chronic wound infection of abdomen    Anticoagulated on Coumadin    KIKO (acute kidney injury) ([address]    Acute respiratory failure with hypoxia ([address]    Sepsis ([address]  Resolved Problems:    * No resolved hospital problems. *      Plan:  1 . COVID19 pneumonia - on decadron - currently cannot take remdesivir due to renal failure - with high oxygen needs, I think the fluid shifts from plasma will complicate further  2. Acute renal failure on CKDII - slightly improved - not sure how well pt is tolerating extra fluids - nephrology consulted  3.  C diff diarrhea - starting oral vancomycin  4. Acute resp failure with hypoxia - increasing oxygen up to 9L - will get CXR  5 . Sepsis POA based on tachypnea, acute resp failure with hypoxia, COVID19 pneumonia, C diff diarrhea, leukocytosis,   6. Morbid Obesity, Body mass index is Body mass index is 46.67 kg/m². .  [] Class I - 30.0 to 34.9 kg/m2  [] Class II - 35.0 to 39.9 kg/m2  [x] Class III - ?40 kg/m2 (also referred to as severe, extreme, morbid or massive obesity)   [] Super Obesity  - > 50% kg/m2  [] Super Super Obesity  - > 70% kg/m2  Complicating assessment and treatment. Obesity Places the patient at risk for multiple co-morbidities as well as early death and may be contributing to the patient's presentation. Supportive care. Encourage therapeutic lifestyle changes. Continue current regimen/therapies. Monitor. Adjust medical regimen as appropriate.   7 . DMII uncontrolled - due to steroids - increase insulin and lantus dosing      Prognosis:  Guarded    Code status:  Full code    DVT prophylaxis: NOAC/Warfarin  GI prophylaxis: H2 blocker  Antibiotic prophylaxis indicated:   No - likely needs lactobacillus soon  Diet:  DIET CARB CONTROL;    Disposition:  Long term nursing facility or group home    Medications:  Scheduled Meds:   dexamethasone  6 mg Intravenous Q24H    famotidine  10 mg Oral Daily    vancomycin  125 mg Oral 4x Daily    insulin glargine  20 Units Subcutaneous Nightly    insulin lispro  5 Units Subcutaneous TID     aspirin  81 mg Oral Daily    atorvastatin  20 mg Oral Daily    [START ON 1/25/2021] vitamin D  5,000 Units Oral Once per day on Mon Wed Fri    cloNIDine  0.1 mg Oral BID    [START ON 1/25/2021] vitamin B-12  1,000 mcg Oral Once per day on Mon Wed Fri    NIFEdipine  30 mg Oral Daily    sodium chloride flush  10 mL Intravenous 2 times per day    warfarin (COUMADIN) daily dosing (placeholder)   Other RX Placeholder    insulin lispro  0-18 Units Subcutaneous TID     insulin lispro  0-9 Units Subcutaneous Nightly       PRN Meds:  sodium chloride flush, promethazine **OR** ondansetron, polyethylene glycol, acetaminophen **OR** acetaminophen, acetaminophen **OR** acetaminophen, guaiFENesin-dextromethorphan, glucose, dextrose, glucagon (rDNA), dextrose    IV:   sodium chloride 75 mL/hr at 01/24/21 0601    dextrose           Intake/Output Summary (Last 24 hours) at 1/24/2021 0956  Last data filed at 1/24/2021 0537  Gross per 24 hour   Intake 530 ml   Output --   Net 530 ml       Results:  CBC:   Recent Labs     01/23/21  1534 01/24/21  0712   WBC 15.0* 13.5*   HGB 8.2* 7.4*   HCT 26.2* 23.7*   MCV 85.2 85.5   * 694*     BMP:   Recent Labs     01/23/21  1534 01/24/21  0712   * 136   K 4.6 3.9    103   CO2 18* 15*   BUN 86* 84*   CREATININE 2.2* 2.0*     Mag: No results for input(s): MAG in the last 72 hours.   Phos:   Lab Results   Component Value Date    PHOS 4.3 01/10/2021     No components found for: GLU    LIVER PROFILE: Recent Labs     01/23/21  1534   AST 17   ALT 13   BILITOT 0.3   ALKPHOS 86     PT/INR:   Recent Labs     01/23/21  2115 01/24/21  0712   PROTIME 45.7* 41.9*   INR 3.89* 3.57*     APTT: No results for input(s): APTT in the last 72 hours.   UA:  Recent Labs     01/23/21  1629   COLORU YELLOW   PHUR 5.0   WBCUA 6*   RBCUA 0-2   YEAST Present*   CLARITYU CLOUDY*   SPECGRAV 1.019   LEUKOCYTESUR TRACE*   UROBILINOGEN 0.2   BILIRUBINUR Negative   BLOODU Negative   GLUCOSEU Negative       Invalid input(s): ABG  Lab Results   Component Value Date    CALCIUM 8.4 01/24/2021    PHOS 4.3 01/10/2021               Electronically signed by Blu Michel MD on 1/24/2021 at 9:56 AM

## 2021-01-24 NOTE — PROGRESS NOTES
Pt admitted to room 4275. Pt alert and oriented x 4. Vital signs stable. Pt denies any pain at this time. Fall risks screening completed. Bed is on the lowest position; bed wheels are locked, side rails up x2 and bed alarm on. Orientation to room performed and instructions were provided for call light system. Call light and bedside table within pt reach. Will continue to  monitor.

## 2021-01-24 NOTE — PROGRESS NOTES
Physical Therapy    [facility]  Initial Assessment    NAME: Burgess Whitmore  : 1935  MRN: [id number]    Date of Service: 2021    Discharge Recommendations:  Continue to assess pending progress   PT Equipment Recommendations  Other: Defer to next level of care. Burgess Whitmore scored a 6/ on the AM-PAC short mobility form. Current research shows that an AM-PAC score of 17 or less is typically not associated with a discharge to the patient's home setting. Based on the patient's AM-PAC score and their current functional mobility deficits, it is recommended that the patient have 3-5 sessions per week of Physical Therapy at d/c to increase the patient's independence. Please see assessment section for further patient specific details. If patient discharges prior to next session this note will serve as a discharge summary. Please see below for the latest assessment towards goals. Assessment   Body structures, Functions, Activity limitations: Decreased functional mobility ; Decreased ROM; Decreased strength;Decreased endurance;Decreased balance  Assessment: 81 y/o female admit 2021 with KIKO, Diarrhea, COVID + (1/10/2021). Recent admit /2021 following fall at home (d/c to SNF setting). PMH as noted including CVA, PE, Abd Wound/Debride, OA, L TKR. Pt admit from SNF setting. This date; dependent for eob/oob activities via [place]. Very weak/debilitated. Anticipate return to SNF setting with cont PT Services upon d/c. Prognosis: Fair  Decision Making: Medium Complexity  History: 81 y/o female admit 2021 with KIKO, Diarrhea, COVID + (1/10/2021). Recent admit /2021 following fall at home (d/c to SNF setting). PMH as noted including CVA, PE, Abd Wound/Debride, OA, L TKR. Exam: See above. Clinical Presentation: See above. Patient Education: Role of PT, POC, Need to call for assist, OOB via Stedy.   Barriers to Learning: Endurance/Debility. REQUIRES PT FOLLOW UP: Yes  Activity Tolerance  Activity Tolerance: Patient limited by endurance; Patient limited by fatigue  Activity Tolerance: Pt currently requiring O2 7L; very debilitated. O2 sats brief decrease mid 80's although recovers following activity. Cues for slow/more optimal respiratory rate. Patient Diagnosis(es): The primary encounter diagnosis was KIKO (acute kidney injury) ([address] Diagnoses of C. difficile diarrhea, Dehydration, and COVID-19 were also pertinent to this visit. has a past medical history of Acute kidney injury ([address] Arthritis, Cerebral artery occlusion with cerebral infarction ([address] COVID-19, Diabetes mellitus ([address] Hypertension, and Pulmonary embolism ([address] has a past surgical history that includes Abdomen surgery (N/A, 2/15/2019) and Abdomen surgery (N/A, 3/19/2019). Restrictions  Restrictions/Precautions  Restrictions/Precautions: Fall Risk, Isolation, Contact Precautions  Position Activity Restriction  Other position/activity restrictions: Droplet Plus :  COVID +. O2 7L via [place]. Contact : C-Diff. Vision/Hearing  Vision: Impaired  Vision Exceptions: Wears glasses for reading  Hearing: (Appears alittle [place].)     Subjective  General  Chart Reviewed: Yes  Patient assessed for rehabilitation services?: Yes  Additional Pertinent Hx: 81 y/o female admit 1/23/2021 with KIKO, Diarrhea, COVID + (1/10/2021). Recent admit 1/8/1/11/2021 following fall at home (d/c to SNF setting). PMH as noted including CVA, PE, Abd Wound/Debride, OA, L TKR. Family / Caregiver Present: No  Referring Practitioner: Dr. Latanya Atkinson  Diagnosis: KIKO, Diarrhea, COVID +. Follows Commands: Within Functional Limits  Subjective  Subjective: Pt agreeable to PT Eval/Rx.           Orientation  Orientation  Overall Orientation Status: Within Functional Limits  Social/Functional History  Social/Functional History  Lives With: Alone  Type of Home: House  Home Layout: Able to Live on Main level with bedroom/bathroom  Home Access: Stairs to enter with rails(4-5 steps to enter.)  Bathroom Shower/Tub: Walk-in shower  Bathroom Toilet: Handicap height  Bathroom Equipment: Grab bars in shower, Built-in shower seat, Hand-held shower  Bathroom Accessibility: Accessible  Home Equipment: 4 wheeled walker, Cane, Alert Button  ADL Assistance: Independent  Homemaking Assistance: Needs assistance((COA provides meals-7 meals to pt. Pt does simple meal prep. COA cleans Thursdays. Saturday/Sunday assist for breakfast and laundry))  Ambulation Assistance: Independent(With Walker. LE weak, recent fall.)  Transfer Assistance: Independent  Active : No  Additional Comments: Information pertains to prior acute care admit 1/8/2021. D/C 1/11/2021 to SNF setting [facility]). H/O fall 1/8/2021 (slid oob onto knees). Cognition   Cognition  Overall Cognitive Status: WFL    Objective          AROM RLE (degrees)  RLE AROM: WFL  AROM LLE (degrees)  LLE AROM : WFL  AROM RUE (degrees)  RUE General AROM: Limited mid/endrange overhead. AROM LUE (degrees)  LUE General AROM: Limited endrange overhead. Strength RLE  Comment: Grossly 3- 3/5; functionally very weak. Strength LLE  Comment: Grossly 3- 3/5; functionally very weak. Strength RUE  Comment: Grossly 3 3+/5; available range. Strength LUE  Comment: Grossly 3 3+/5; available range. Bed mobility  Rolling to Left: Dependent/Total  Rolling to Right: Dependent/Total  Supine to Sit: Maximum assistance;2 Person assistance(HOB elevated.)  Sit to Supine: Dependent/Total(Via Maxi-Move.)  Comment: Following brief oob to chair via Stedy; immediate return to bed via Maxi-Move due to large bowel incontinence. Dependent bed mob for clean-up and change linens. Defer return oob to chair due to fatigue following care. Advise use of Maxi-Move.   Transfers  Sit to Stand: 2 Person Assistance;Maximum Assistance;Dependent/Total(Via Stedy from EOB (elevated surface, R UE handhold on sidebar of Stedy due to ROM limit). )  Stand to sit: 2 Person Assistance;Maximum Assistance;Dependent/Total(Via Stedy.)  Bed to Chair: Dependent/Total(Via [place].)  Comment: Difficulty obtain upright for placement of Stedy pads (due to weak/girth and soft tissue of pelvis). Balance  Comments: EOB Static Sitting CGA. Dependent scooting to EOB. Static Sitting during Transfer via Stedy Min/Mod assist.        Plan   Plan  Times per week: 3-5x week while in acute care setting. Current Treatment Recommendations: Strengthening, Functional Mobility Training, Transfer Training, Endurance Training, Safety Education & Training, Patient/Caregiver Education & Training  Safety Devices  Type of devices: Bed alarm in place, Call light within reach, Left in bed, Nurse notified         AM-PAC Score  AM-PAC Inpatient Mobility Raw Score : 6 (01/24/21 0911)  AM-PAC Inpatient T-Scale Score : 23.55 (01/24/21 0911)  Mobility Inpatient CMS 0-100% Score: 100 (01/24/21 0911)  Mobility Inpatient CMS G-Code Modifier : CN (01/24/21 0911)          Goals  Short term goals  Time Frame for Short term goals: Upon d/c acute care  Short term goal 1: Bed Mob Mod assist x 2. Short term goal 2: EOB SBA; Min assist scooting EOB. Short term goal 3: Transfer via Stedy Mod/Max assist x 2. Short term goal 4: Pt participating approp Strength Exs. Patient Goals   Patient goals : Be able to return home eventually.        Therapy Time   Individual Concurrent Group Co-treatment   Time In 0745         Time Out 0840         Minutes 216 Pat Hough PT

## 2021-01-25 LAB
ABO/RH: NORMAL
ANION GAP SERPL CALCULATED.3IONS-SCNC: 16 MMOL/L (ref 3–16)
ANISOCYTOSIS: ABNORMAL
ANTIBODY SCREEN: NORMAL
BANDED NEUTROPHILS RELATIVE PERCENT: 4 % (ref 0–7)
BASOPHILS ABSOLUTE: 0 K/UL (ref 0–0.2)
BASOPHILS RELATIVE PERCENT: 0 %
BLOOD BANK DISPENSE STATUS: NORMAL
BLOOD BANK PRODUCT CODE: NORMAL
BPU ID: NORMAL
BUN BLDV-MCNC: 71 MG/DL (ref 7–20)
CALCIUM SERPL-MCNC: 8.5 MG/DL (ref 8.3–10.6)
CHLORIDE BLD-SCNC: 112 MMOL/L (ref 99–110)
CO2: 16 MMOL/L (ref 21–32)
CREAT SERPL-MCNC: 1.6 MG/DL (ref 0.6–1.2)
DESCRIPTION BLOOD BANK: NORMAL
EOSINOPHILS ABSOLUTE: 0 K/UL (ref 0–0.6)
EOSINOPHILS RELATIVE PERCENT: 0 %
GFR AFRICAN AMERICAN: 37
GFR NON-AFRICAN AMERICAN: 31
GLUCOSE BLD-MCNC: 128 MG/DL (ref 70–99)
GLUCOSE BLD-MCNC: 173 MG/DL (ref 70–99)
GLUCOSE BLD-MCNC: 176 MG/DL (ref 70–99)
GLUCOSE BLD-MCNC: 230 MG/DL (ref 70–99)
GLUCOSE BLD-MCNC: 234 MG/DL (ref 70–99)
HCT VFR BLD CALC: 24.8 % (ref 36–48)
HEMOGLOBIN: 7.7 G/DL (ref 12–16)
HYPOCHROMIA: ABNORMAL
INR BLD: 2.43 (ref 0.86–1.14)
L. PNEUMOPHILA SEROGP 1 UR AG: NORMAL
LYMPHOCYTES ABSOLUTE: 0.6 K/UL (ref 1–5.1)
LYMPHOCYTES RELATIVE PERCENT: 4 %
MAGNESIUM: 2.4 MG/DL (ref 1.8–2.4)
MCH RBC QN AUTO: 26.5 PG (ref 26–34)
MCHC RBC AUTO-ENTMCNC: 31 G/DL (ref 31–36)
MCV RBC AUTO: 85.7 FL (ref 80–100)
METAMYELOCYTES RELATIVE PERCENT: 1 %
MONOCYTES ABSOLUTE: 1.2 K/UL (ref 0–1.3)
MONOCYTES RELATIVE PERCENT: 8 %
MYELOCYTE PERCENT: 1 %
NEUTROPHILS ABSOLUTE: 12.9 K/UL (ref 1.7–7.7)
NEUTROPHILS RELATIVE PERCENT: 81 %
OVALOCYTES: ABNORMAL
PDW BLD-RTO: 14.3 % (ref 12.4–15.4)
PERFORMED ON: ABNORMAL
PLATELET # BLD: 752 K/UL (ref 135–450)
PLATELET SLIDE REVIEW: ABNORMAL
PMV BLD AUTO: 9 FL (ref 5–10.5)
POIKILOCYTES: ABNORMAL
POLYCHROMASIA: ABNORMAL
POTASSIUM SERPL-SCNC: 4.2 MMOL/L (ref 3.5–5.1)
PROMYELOCYTES PERCENT: 1 %
PROTHROMBIN TIME: 28.4 SEC (ref 10–13.2)
RBC # BLD: 2.89 M/UL (ref 4–5.2)
SCHISTOCYTES: ABNORMAL
SODIUM BLD-SCNC: 144 MMOL/L (ref 136–145)
STREP PNEUMONIAE ANTIGEN, URINE: NORMAL
TOXIC GRANULATION: PRESENT
WBC # BLD: 14.7 K/UL (ref 4–11)

## 2021-01-25 PROCEDURE — 94761 N-INVAS EAR/PLS OXIMETRY MLT: CPT

## 2021-01-25 PROCEDURE — 6370000000 HC RX 637 (ALT 250 FOR IP): Performed by: INTERNAL MEDICINE

## 2021-01-25 PROCEDURE — 99223 1ST HOSP IP/OBS HIGH 75: CPT | Performed by: INTERNAL MEDICINE

## 2021-01-25 PROCEDURE — 83735 ASSAY OF MAGNESIUM: CPT

## 2021-01-25 PROCEDURE — 2060000000 HC ICU INTERMEDIATE R&B

## 2021-01-25 PROCEDURE — 97530 THERAPEUTIC ACTIVITIES: CPT

## 2021-01-25 PROCEDURE — 86850 RBC ANTIBODY SCREEN: CPT

## 2021-01-25 PROCEDURE — 2580000003 HC RX 258: Performed by: INTERNAL MEDICINE

## 2021-01-25 PROCEDURE — 2700000000 HC OXYGEN THERAPY PER DAY

## 2021-01-25 PROCEDURE — 2500000003 HC RX 250 WO HCPCS: Performed by: INTERNAL MEDICINE

## 2021-01-25 PROCEDURE — 86900 BLOOD TYPING SEROLOGIC ABO: CPT

## 2021-01-25 PROCEDURE — 97166 OT EVAL MOD COMPLEX 45 MIN: CPT

## 2021-01-25 PROCEDURE — 85610 PROTHROMBIN TIME: CPT

## 2021-01-25 PROCEDURE — 80048 BASIC METABOLIC PNL TOTAL CA: CPT

## 2021-01-25 PROCEDURE — 99221 1ST HOSP IP/OBS SF/LOW 40: CPT | Performed by: SURGERY

## 2021-01-25 PROCEDURE — 86901 BLOOD TYPING SEROLOGIC RH(D): CPT

## 2021-01-25 PROCEDURE — APPNB180 APP NON BILLABLE TIME > 60 MINS: Performed by: PHYSICIAN ASSISTANT

## 2021-01-25 PROCEDURE — 6360000002 HC RX W HCPCS: Performed by: INTERNAL MEDICINE

## 2021-01-25 PROCEDURE — APPSS180 APP SPLIT SHARED TIME > 60 MINUTES: Performed by: PHYSICIAN ASSISTANT

## 2021-01-25 PROCEDURE — 85025 COMPLETE CBC W/AUTO DIFF WBC: CPT

## 2021-01-25 RX ORDER — SODIUM CHLORIDE 9 MG/ML
INJECTION, SOLUTION INTRAVENOUS PRN
Status: DISCONTINUED | OUTPATIENT
Start: 2021-01-25 | End: 2021-02-02 | Stop reason: HOSPADM

## 2021-01-25 RX ORDER — WARFARIN SODIUM 5 MG/1
5 TABLET ORAL
Status: COMPLETED | OUTPATIENT
Start: 2021-01-25 | End: 2021-01-25

## 2021-01-25 RX ORDER — CASTOR OIL AND BALSAM, PERU 788; 87 MG/G; MG/G
OINTMENT TOPICAL 2 TIMES DAILY
Status: DISCONTINUED | OUTPATIENT
Start: 2021-01-25 | End: 2021-02-02 | Stop reason: HOSPADM

## 2021-01-25 RX ADMIN — INSULIN GLARGINE 20 UNITS: 100 INJECTION, SOLUTION SUBCUTANEOUS at 22:13

## 2021-01-25 RX ADMIN — DEXAMETHASONE SODIUM PHOSPHATE 6 MG: 10 INJECTION, SOLUTION INTRAMUSCULAR; INTRAVENOUS at 09:09

## 2021-01-25 RX ADMIN — INSULIN LISPRO 3 UNITS: 100 INJECTION, SOLUTION INTRAVENOUS; SUBCUTANEOUS at 09:19

## 2021-01-25 RX ADMIN — ATORVASTATIN CALCIUM 20 MG: 20 TABLET, FILM COATED ORAL at 09:09

## 2021-01-25 RX ADMIN — VANCOMYCIN HYDROCHLORIDE 125 MG: 125 CAPSULE ORAL at 09:09

## 2021-01-25 RX ADMIN — INSULIN LISPRO 3 UNITS: 100 INJECTION, SOLUTION INTRAVENOUS; SUBCUTANEOUS at 12:23

## 2021-01-25 RX ADMIN — CASTOR OIL AND BALSAM, PERU: 788; 87 OINTMENT TOPICAL at 22:05

## 2021-01-25 RX ADMIN — VANCOMYCIN HYDROCHLORIDE 125 MG: 125 CAPSULE ORAL at 22:12

## 2021-01-25 RX ADMIN — Medication 5000 UNITS: at 09:19

## 2021-01-25 RX ADMIN — CYANOCOBALAMIN TAB 1000 MCG 1000 MCG: 1000 TAB at 09:08

## 2021-01-25 RX ADMIN — CLONIDINE HYDROCHLORIDE 0.1 MG: 0.1 TABLET ORAL at 09:08

## 2021-01-25 RX ADMIN — INSULIN LISPRO 5 UNITS: 100 INJECTION, SOLUTION INTRAVENOUS; SUBCUTANEOUS at 12:23

## 2021-01-25 RX ADMIN — VANCOMYCIN HYDROCHLORIDE 125 MG: 125 CAPSULE ORAL at 17:06

## 2021-01-25 RX ADMIN — INSULIN LISPRO 6 UNITS: 100 INJECTION, SOLUTION INTRAVENOUS; SUBCUTANEOUS at 17:06

## 2021-01-25 RX ADMIN — ASPIRIN 81 MG: 81 TABLET, CHEWABLE ORAL at 09:08

## 2021-01-25 RX ADMIN — NIFEDIPINE 30 MG: 30 TABLET, FILM COATED, EXTENDED RELEASE ORAL at 09:08

## 2021-01-25 RX ADMIN — CLONIDINE HYDROCHLORIDE 0.1 MG: 0.1 TABLET ORAL at 22:08

## 2021-01-25 RX ADMIN — WARFARIN SODIUM 5 MG: 5 TABLET ORAL at 17:06

## 2021-01-25 RX ADMIN — VANCOMYCIN HYDROCHLORIDE 125 MG: 125 CAPSULE ORAL at 12:23

## 2021-01-25 RX ADMIN — INSULIN LISPRO 5 UNITS: 100 INJECTION, SOLUTION INTRAVENOUS; SUBCUTANEOUS at 17:06

## 2021-01-25 RX ADMIN — CASTOR OIL AND BALSAM, PERU: 788; 87 OINTMENT TOPICAL at 13:54

## 2021-01-25 RX ADMIN — POTASSIUM CHLORIDE: 2 INJECTION, SOLUTION, CONCENTRATE INTRAVENOUS at 17:08

## 2021-01-25 RX ADMIN — INSULIN LISPRO 5 UNITS: 100 INJECTION, SOLUTION INTRAVENOUS; SUBCUTANEOUS at 09:19

## 2021-01-25 RX ADMIN — FAMOTIDINE 10 MG: 20 TABLET, FILM COATED ORAL at 09:08

## 2021-01-25 RX ADMIN — INSULIN LISPRO 3 UNITS: 100 INJECTION, SOLUTION INTRAVENOUS; SUBCUTANEOUS at 22:12

## 2021-01-25 ASSESSMENT — PAIN SCALES - GENERAL: PAINLEVEL_OUTOF10: 0

## 2021-01-25 NOTE — CARE COORDINATION
[address] [id number] Continence Nurse  Consult Note       NAME:  Rose Nelson  MEDICAL RECORD NUMBER:  [id number]  AGE: 82 y.o. GENDER: female  : 1935  TODAY'S DATE:  2021    Subjective   Reason for WOCN Evaluation and Assessment: Stage 2 to L buttock, however unstageable on assessment. Areas of DTI's to L thigh and R buttock. Rose Nelson is a 82 y.o. female referred by:   [] Physician  [x] Nursing  [] Other:     Wound Identification:  Wound Type: pressure  Contributing Factors: chronic pressure, decreased mobility, incontinence of stool and incontinence of urine    Wound History: [facility]. Admitted with COVID and + for CDIF. Having diarrhea. Current Wound Care Treatment:  Mepilex border    Patient Goal of Care:  [x] Wound Healing  [] Odor Control  [] Palliative Care  [] Pain Control   [] Other:         PAST MEDICAL HISTORY        Diagnosis Date    [facility] kidney [facility] ([facility]     Arthritis     Cerebral artery occlusion with cerebral infarction ([facility]     COVID-19     Diabetes mellitus ([facility]     Hypertension     Pulmonary embolism ([facility]     On warfarin       PAST SURGICAL HISTORY    Past Surgical History:   Procedure Laterality Date    ABDOMEN SURGERY N/A 2/15/2019    DEBRIDEMENT OF OPEN ABDOMINAL WOUND performed by Mora Scherer MD at [facility] N/A 3/19/2019    RIGHT LOWER QUADRANT ABDOMINAL DEBRIDEMENT performed by Mora Scherer MD at [address] [place] [address].    History reviewed. No pertinent family history. SOCIAL HISTORY    Social History     Tobacco Use    Smoking status: Never Smoker    Smokeless tobacco: Never Used   Substance Use Topics    Alcohol use: No    Drug use: Never       ALLERGIES    Allergies   Allergen Reactions    Bactrim Ds [Sulfamethoxazole-Trimethoprim] Diarrhea     Abdominal pain & Diarrhea       MEDICATIONS    No current facility-administered medications on file prior to encounter.       Current Outpatient Medications on File Prior to Encounter   Medication Sig Dispense Refill    ascorbic acid (VITAMIN C) 500 MG tablet Take 500 mg by mouth daily      HYDROcodone-acetaminophen (NORCO) 5-325 MG per tablet Take 1 tablet by mouth 2 times daily.  HYDROcodone-acetaminophen (NORCO) 5-325 MG per tablet Take 1 tablet by mouth every 6 hours as needed for Pain.  azithromycin (ZITHROMAX) 250 MG tablet Take 250 mg by mouth daily      vancomycin (VANCOCIN) 50 mg/mL oral solution Take 125 mg by mouth See Admin Instructions 125 mg QID x 14 days then 125 mg TID x 14 days then 125 mg BID x 14 days then 125 mg daily x 14 days      Probiotic Product (ACIDOPHILUS/BIFIDUS PO) Take 1 tablet by mouth daily      sucralfate (CARAFATE) 1 GM tablet Take 1 g by mouth 4 times daily (before meals and nightly)      metFORMIN (GLUCOPHAGE) 1000 MG tablet Take 0.5 tablets by mouth 2 times daily (with meals) 60 tablet 3    NIFEdipine (ADALAT CC) 30 MG extended release tablet Take 30 mg by mouth daily      gentamicin (GARAMYCIN) 0.1 % cream Apply topically daily.  Right foot 15 g 0    IRON PO Take 65 mg by mouth three times a week Indications: mon wed fri      vitamin B-12 (CYANOCOBALAMIN) 1000 MCG tablet Take 1,000 mcg by mouth three times a week Indications: mon wed fri      aspirin 81 MG tablet Take by mouth daily      lovastatin (MEVACOR) 40 MG tablet Take 40 mg by mouth nightly      Insulin NPH Isophane & Regular (HUMULIN 70/30 SC) Inject into the skin Indications: 15 units in am, 14 units at suppertime       Cholecalciferol (VITAMIN D3) 5000 units TABS Take 5,000 Units by mouth three times a week Indications: mon, wed frid       glimepiride (AMARYL) 4 MG tablet Take 4 mg by mouth every morning (before breakfast)      warfarin (COUMADIN) 5 MG tablet Take 2.5-5 mg by mouth daily Indications: 5 mg daily, except Wed take 2.5 mg 2.5 mg TUE/SAT and 5 mg ALL OTHER DAYS      cloNIDine (CATAPRES) 0.1 MG tablet Take 0.1 mg by mouth 2 times daily      lisinopril (PRINIVIL;ZESTRIL) 20 MG tablet Take 20 mg by mouth daily         Objective    /72   Pulse 102   Temp 97.4 °F (36.3 °C) (Oral)   Resp 20   Ht 5' (1.524 m)   Wt 244 lb 0.8 oz (110.7 kg)   SpO2 94%   BMI 47.66 kg/m²     LABS:  WBC:    Lab Results   Component Value Date    WBC 14.7 01/25/2021     H/H:    Lab Results   Component Value Date    HGB 7.7 01/25/2021    HCT 24.8 01/25/2021     PTT:  No results found for: APTT, PTT[APTT}  PT/INR:    Lab Results   Component Value Date    PROTIME 28.4 01/25/2021    INR 2.43 01/25/2021     HgBA1c:    Lab Results   Component Value Date    LABA1C 7.6 01/23/2021       Assessment   Lauro Risk Score: Lauro Scale Score: 13    Patient Active Problem List   Diagnosis Code    Abdominal wall cellulitis L03.311    Poorly controlled type 2 diabetes mellitus ([address] E11.65    HTN (hypertension) I10    CKD (chronic kidney disease) stage 3, GFR 30-59 ml/min N18.30    History of pulmonary embolism Z86.711    Chronic abdominal wound infection, subsequent encounter S31.109D, L08.9    Open abdominal wall wound, subsequent encounter S31.109D    Severe infection B99.9    Acute kidney injury superimposed on chronic kidney disease ([address] N17.9, N18.9    Diverticulosis of colon without diverticulitis K57.30    Morbid obesity due to excess calories ([address] E66.01    Open wound of abdomen S31.109A    Diabetic ulcer of right midfoot associated with type 2 diabetes mellitus, with fat layer exposed ([address] E11.621, L97.412    Chronic wound infection of abdomen S31.109A, L08.9    History of arterial ischemic stroke Z86.73    Anticoagulated on Coumadin Z79.01    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 J96.01    Sepsis ([facility] A41.9    Pneumonia due to COVID-19 virus U07.1, J12.82       Measurements:  Wound 01/08/21 Other (Comment) Mid (Active)   Wound Etiology Other 01/24/21 0750   Dressing Status Clean;Dry; Intact 01/24/21 2210   Wound Cleansed Cleansed with saline 01/24/21 2210   Dressing/Treatment Protective barrier 01/24/21 2210   Wound Length (cm) 1.5 cm 01/24/21 0750   Wound Width (cm) 2.5 cm 01/24/21 0750   Wound Depth (cm) 0.2 cm 01/08/21 2341   Wound Surface Area (cm^2) 3.75 cm^2 01/24/21 0750   Change in Wound Size % (l*w) -2400 01/24/21 0750   Wound Volume (cm^3) 0.03 cm^3 01/08/21 2341   Wound Assessment Pink/red 01/24/21 2210   Drainage Amount None 01/24/21 2210   Drainage Description Yellow 01/24/21 0548   Odor Mild 01/24/21 2210   Sheridan-wound Assessment Intact 01/24/21 2210   Number of days: 16       Wound 01/24/21 Buttocks Left presure injury (Active)   Wound Image   01/25/21 0949   Wound Etiology Pressure Unstageable 01/25/21 0949   Dressing Status Clean;Dry; Intact 01/24/21 2210   Wound Cleansed Not Cleansed 01/24/21 0548   Dressing/Treatment Pressure dressing 01/24/21 2210   Wound Length (cm) 4 cm 01/25/21 0949   Wound Width (cm) 5 cm 01/25/21 0949   Wound Surface Area (cm^2) 20 cm^2 01/25/21 0949   Wound Assessment Eschar moist;Purple/maroon 01/25/21 0949   Drainage Amount Other (Comment) 01/24/21 2210   Drainage Description Clear 01/24/21 0750   Odor None 01/24/21 0750   Sheridan-wound Assessment Intact; Non-blanchable erythema 01/25/21 0949   Margins Undefined edges 01/24/21 0750   Wound Thickness Description not for Pressure Injury Full thickness 01/25/21 0949   Number of days: 1       Wound 01/25/21 Buttocks Right (Active)   Wound Etiology Deep tissue/Injury 01/25/21 0949   Wound Length (cm) 6 cm 01/25/21 0949   Wound Width (cm) 6 cm 01/25/21 0949   Wound Surface Area (cm^2) 36 cm^2 01/25/21 0949   Wound Assessment Purple/maroon;Non-blanchable erythema 01/25/21 0949   Sheridan-wound Assessment Intact 01/25/21 0949   Number of days: 0       Wound 01/25/21 Thigh Left; Inner (Active)   Wound Image   01/25/21 0949   Wound Etiology Deep tissue/Injury 01/25/21 0949   Wound Assessment Purple/maroon;Non-blanchable erythema 01/25/21 0949   Sheridan-wound Assessment Intact 01/25/21 0949   Number of days: 0     Pt in COVID isolation. Bedside RN obtained images. Pt has an unstageable to her L buttock. Large area, would recommend surgical consult. Pt has DTI to her R buttock and L inner thing possibly from a brief. Wounds POA. Would recommend venelex to periwound skin. Pt has redness and wound to her mid pannus. Continue with inter dry. Pt placed on specialty bed. Will continue to follow. Plan   Plan of Care: Wound 01/08/21 Other (Comment) Mid-Dressing/Treatment: Protective barrier  Wound 01/24/21 Buttocks Left presure injury-Dressing/Treatment: Pressure dressing  Unstageable to L buttock: surgical consult; notified MD  R buttock/L hip (areas of DTI (purple maroon)- venelex barrier  -turn and reposition every 2 hours  -chair cushion, encourage to reposition self frequently while in chair  -elevate heels, apply liquid barrier film twice daily; heel protectors    Specialty Bed Required : Yes   [x] Low Air Loss   [] Pressure Redistribution  [] Fluid Immersion  [] Bariatric  [] Total Pressure Relief  [] Other:     Current Diet: DIET CARB CONTROL;   Dietician consult:  Yes    Discharge Plan:  Placement for patient upon discharge: skilled nursing    Patient appropriate for Outpatient [address] [facility] [address]: N/A       Electronically signed by Bobo Valentine RN, on 1/25/2021 at 9:59 AM

## 2021-01-25 NOTE — CARE COORDINATION
Per chart review, patient is from [facility]. She was recently sent there from [facility]. Patient's nurse reports that the family voiced they may not want to have her return to [facility] due to some concerns with wounds. Call to patient's daughter, Nellie Lane, left HIPPA compliant message requesting a call back. Call to Jacqueline Walker at [facility], [phone], she advised patient is Skilled and able to return. Acacia Metz RN, BSN, Case Management  Phone: [phone]  Electronically signed by Acacia Metz RN on 1/25/2021 at 4:38 PM     Spoke with the patient's daughter, she reports that they have concerns about the care at [facility] and would like to inquire on her possibly going to [facility]. Call to [facility] [phone], left message requesting a call back to find out their Covid + policy for accepting patients. E-mailed Covid SNF list to her daughter at [email or web address]. The Plan for Transition of Care is related to the following treatment goals: strengthening    The patient representative, daughter, was provided with a choice of provider and agrees   with the discharge plan. [x] Yes [] No    Freedom of choice list was provided with basic dialogue that supports the patient's individualized plan of care/goals, treatment preferences and shares the quality data associated with the providers.  [x] Yes [] No    Acacia Metz RN, BSN, Case Management  Phone: [phone]  Electronically signed by Acacia Metz RN on 1/25/2021 at 4:56 PM

## 2021-01-25 NOTE — PLAN OF CARE
Problem: Falls - Risk of:  Goal: Will remain free from falls  Description: Will remain free from falls  Outcome: Ongoing  Goal: Absence of physical injury  Description: Absence of physical injury  Outcome: Ongoing     Problem: Airway Clearance - Ineffective  Goal: Achieve or maintain patent airway  Outcome: Ongoing     Problem: Gas Exchange - Impaired  Goal: Absence of hypoxia  Outcome: Ongoing  Goal: Promote optimal lung function  Outcome: Ongoing     Problem: Breathing Pattern - Ineffective  Goal: Ability to achieve and maintain a regular respiratory rate  Outcome: Ongoing     Problem:  Body Temperature -  Risk of, Imbalanced  Goal: Ability to maintain a body temperature within defined limits  Outcome: Ongoing  Goal: Will regain or maintain usual level of consciousness  Outcome: Ongoing  Goal: Complications related to the disease process, condition or treatment will be avoided or minimized  Outcome: Ongoing     Problem: Isolation Precautions - Risk of Spread of Infection  Goal: Prevent transmission of infection  Outcome: Ongoing     Problem: Nutrition Deficits  Goal: Optimize nutritional status  Outcome: Ongoing     Problem: Risk for Fluid Volume Deficit  Goal: Maintain normal heart rhythm  Outcome: Ongoing  Goal: Maintain absence of muscle cramping  Outcome: Ongoing  Goal: Maintain normal serum potassium, sodium, calcium, phosphorus, and pH  Outcome: Ongoing     Problem: Loneliness or Risk for Loneliness  Goal: Demonstrate positive use of time alone when socialization is not possible  Outcome: Ongoing     Problem: Fatigue  Goal: Verbalize increase energy and improved vitality  Outcome: Ongoing     Problem: Patient Education: Go to Patient Education Activity  Goal: Patient/Family Education  Outcome: Ongoing     Problem: Skin Integrity:  Goal: Will show no infection signs and symptoms  Description: Will show no infection signs and symptoms  Outcome: Ongoing  Goal: Absence of new skin breakdown  Description: Absence of new skin breakdown  Outcome: Ongoing

## 2021-01-25 NOTE — ACP (ADVANCE CARE PLANNING)
Advance Care Planning     Advance Care Planning Activator (Inpatient)  Conversation Note      Date of ACP Conversation: 1/25/2021    Conversation Conducted with: Patient with Decision Making Capacity    ACP Activator: Karli Summers Decision Maker:     Current Designated Health Care Decision Maker:   Primary Decision MakeNew Tang Child - [phone]    Secondary Decision Maker: Patrizia Iverson - Child    Care Preferences    Ventilation: \"If you were in your present state of health and suddenly became very ill and were unable to breathe on your own, what would your preference be about the use of a ventilator (breathing machine) if it were available to you? \"      Would the patient desire the use of ventilator (breathing machine)?: yes    \"If your health worsens and it becomes clear that your chance of recovery is unlikely, what would your preference be about the use of a ventilator (breathing machine) if it were available to you? \"     Would the patient desire the use of ventilator (breathing machine)?: Yes      Resuscitation  \"CPR works best to restart the heart when there is a sudden event, like a heart attack, in someone who is otherwise healthy. Unfortunately, CPR does not typically restart the heart for people who have serious health conditions or who are very sick. \"    \"In the event your heart stopped as a result of an underlying serious health condition, would you want attempts to be made to restart your heart (answer \"yes\" for attempt to resuscitate) or would you prefer a natural death (answer \"no\" for do not attempt to resuscitate)? \" yes     [] Yes   [x] No   Educated Patient / [employer] regarding differences between Advance Directives and portable DNR orders.     Length of ACP Conversation in minutes:  5    Conversation Outcomes:  [x] ACP discussion completed  [] Existing advance directive reviewed with patient; no changes to patient's previously recorded wishes  [] New Advance Directive completed  [] Portable Do Not Rescitate prepared for Provider review and signature  [] POLST/POST/MOLST/MOST prepared for Provider review and signature      Follow-up plan:    [] Schedule follow-up conversation to continue planning  [] Referred individual to Provider for additional questions/concerns   [] Advised patient/agent/surrogate to review completed ACP document and update if needed with changes in condition, patient preferences or care setting    [x] This note routed to one or more involved healthcare providers       Sebastian Calixto RN, BSN, Case Management  Phone: [phone]  Electronically signed by Sebastian Calixto RN on 1/25/2021 at 5:00 PM

## 2021-01-25 NOTE — CONSULTS
Clinical Pharmacy Note  Warfarin Consult    Burgess Whitmore is a 82 y.o. female receiving warfarin managed by pharmacy. Patient being bridged with: none    Warfarin Indication: Hx VTE  Target INR range: 2-3  Dose prior to admission:   Verified dose with ECF - Warfarin has been on hold for several days due to supratherapeutic INR - received a couple doses of phytonadione at the facility. Dose prior to INR being significantly elevated was 2.5 mg TUE/SAT and 5 mg ALL OTHER DAYS    Current warfarin drug-drug interactions: Dexamethasone    Recent Labs     01/23/21  1534 01/23/21  2115 01/24/21  0712 01/25/21  0700   HGB 8.2*  --  7.4* 7.7*   HCT 26.2*  --  23.7* 24.8*   INR  --  3.89* 3.57* 2.43*       Assessment/Plan:      Warfarin 5 mg tonight. Daily PT/INR until stable within therapeutic range. Thank you for the consult. Will continue to follow.     Rosa Lazcano Rph 1/25/2021 11:28 AM

## 2021-01-25 NOTE — PROGRESS NOTES
Physical Therapy  Facility/Department: [address] MED SURG  Daily Treatment Note  NAME: Ayanna Villasenor  : 1935  MRN: [id number]    Date of Service: 2021    Discharge Recommendations:  Patient would benefit from continued therapy after discharge, 3-5 sessions per week   PT Equipment Recommendations  Other: Defer to next level of care. Ayanna Villasenor scored a 7/24 on the AM-PAC short mobility form. Current research shows that an AM-PAC score of 17 or less is typically not associated with a discharge to the patient's home setting. Based on the patient's AM-PAC score and their current functional mobility deficits, it is recommended that the patient have 3-5 sessions per week of Physical Therapy at d/c to increase the patient's independence. Please see assessment section for further patient specific details. If patient discharges prior to next session this note will serve as a discharge summary. Please see below for the latest assessment towards goals. Assessment   Body structures, Functions, Activity limitations: Decreased functional mobility ; Decreased ROM; Decreased strength;Decreased endurance;Decreased balance  Assessment: 81 y/o female admit from [facility] on 2021 with KIKO, Diarrhea, COVID + (1/10/2021). PMH as noted including CVA, PE, Abd Wound/Debride, OA, L TKR. Recent admit 21 - 21 following fall at home - discharge to [facility]. Pt reports she was independent with functional mobility prior to 21 admission, using RW for ambulation - does have [facility] aide 7 days/week for homemaking and meals. Pt currently functioning well below baseline. Recommend continued skilled therapy 3-5x/week to maximize independence and safety with functional mobility. Treatment Diagnosis: impaired mobility  Prognosis: Fair  Decision Making: Medium Complexity  History: 81 y/o female admit 2021 with KIKO, Diarrhea, COVID + (1/10/2021).   Recent admit /2021 following fall at home

## 2021-01-25 NOTE — CARE COORDINATION
Chart reviewed. Recently discharged to [facility] on 01/12/2021. Patient does have COVID-19 infection. Currently on oxygen.      Electronically signed by Tj Dalal RN MSN on 1/25/2021 at 8:25 AM

## 2021-01-25 NOTE — CONSULTS
Surgery Consult Note     Daja Carias PA-C  Pt Name: Radha Oropeza  MRN: [id number]  [place]: 1935  Date of evaluation: 1/25/2021  Primary Care Physician: Marsha Records  IMPRESSIONS:   Stage II left buttock ulcer. No signs of infection, induration, or purulent drainage  Leukocytosis: 15.0-->13.5-->14.7  Pneumonia secondary to COVID 19  Diarrhea  ARF- improving with hydration  On Coumadin: Hx of PE: INR: 2.43  DM  PLANS:   Keep pressure off ulcer as able  Monitor and control any pain  Apply santyl to ulcer BID  Need to monitor and control Glucose to allow for healing. Antibiotics  No general surgery needs for treatment of pressure ulcer. Will follow peripherally. Wound care is also seeing and evaluating. SUBJECTIVE:   History of Chief Complaint:    Radha Oropeza is a 82 y.o. female who we have been asked to evaluate for a sacral pressure ulcer. She was recently treated at [facility] 2 weeks ago for a knee contusion. Since being at the [facility] she had had diarrhea, decreased PO intake and has tested positive for COVID. She was admitted to [facility] yesterday for requiring Oxygen and IVF to help treat her ARF. She was found to have a sacral ulcer and we have been consulted to help treat. Upon evaluation the patient was noted to have a superficial stage II  Buttock ulcers without any signs of infection. She admits to having pain in this area only with dressing changes. Past Medical History  Reviewed  has a past medical history of Acute kidney injury ([facility] [address] Arthritis, Cerebral artery occlusion with cerebral infarction ([facility] [address] COVID-19, Diabetes mellitus ([facility] [address] Hypertension, and Pulmonary embolism ([facility] [address] Past Surgical History  Reviewed has a past surgical history that includes Abdomen surgery (N/A, 2/15/2019) and Abdomen surgery (N/A, 3/19/2019). Medications  Prior to Admission medications    Medication Sig Start Date End Date Taking?  Authorizing Provider   ascorbic acid (VITAMIN C) 500 MG tablet Take 500 mg by mouth daily   Yes Historical Provider, MD   HYDROcodone-acetaminophen (NORCO) 5-325 MG per tablet Take 1 tablet by mouth 2 times daily. Yes Historical Provider, MD   HYDROcodone-acetaminophen (NORCO) 5-325 MG per tablet Take 1 tablet by mouth every 6 hours as needed for Pain. Yes Historical Provider, MD   azithromycin (ZITHROMAX) 250 MG tablet Take 250 mg by mouth daily 1/22/21 1/26/21 Yes Historical Provider, MD   vancomycin (VANCOCIN) 50 mg/mL oral solution Take 125 mg by mouth See Admin Instructions 125 mg QID x 14 days then 125 mg TID x 14 days then 125 mg BID x 14 days then 125 mg daily x 14 days 1/20/21 3/12/21 Yes Historical Provider, MD   Probiotic Product (ACIDOPHILUS/BIFIDUS PO) Take 1 tablet by mouth daily   Yes Historical Provider, MD   sucralfate (CARAFATE) 1 GM tablet Take 1 g by mouth 4 times daily (before meals and nightly)   Yes Historical Provider, MD   metFORMIN (GLUCOPHAGE) 1000 MG tablet Take 0.5 tablets by mouth 2 times daily (with meals) 1/11/21  Yes Wade Gordillo MD   NIFEdipine (ADALAT CC) 30 MG extended release tablet Take 30 mg by mouth daily   Yes Historical Provider, MD   gentamicin (GARAMYCIN) 0.1 % cream Apply topically daily.  Right foot 1/29/20  Yes Marky Aguirre DPM   IRON PO Take 65 mg by mouth three times a week Indications: mon wed fri   Yes Historical Provider, MD   vitamin B-12 (CYANOCOBALAMIN) 1000 MCG tablet Take 1,000 mcg by mouth three times a week Indications: mon wed fri   Yes Historical Provider, MD   aspirin 81 MG tablet Take by mouth daily   Yes Historical Provider, MD   lovastatin (MEVACOR) 40 MG tablet Take 40 mg by mouth nightly   Yes Historical Provider, MD   Insulin NPH Isophane & Regular (HUMULIN 70/30 SC) Inject into the skin Indications: 15 units in am, 14 units at suppertime    Yes Historical Provider, MD   Cholecalciferol (VITAMIN D3) 5000 units TABS Take 5,000 Units by mouth three times a week Indications: mon, wed frid    Yes Historical Provider, MD   glimepiride (AMARYL) 4 MG tablet Take 4 mg by mouth every morning (before breakfast)   Yes Historical Provider, MD   warfarin (COUMADIN) 5 MG tablet Take 2.5-5 mg by mouth daily Indications: 5 mg daily, except Wed take 2.5 mg 2.5 mg TUE/SAT and 5 mg ALL OTHER DAYS   Yes Historical Provider, MD   cloNIDine (CATAPRES) 0.1 MG tablet Take 0.1 mg by mouth 2 times daily   Yes Historical Provider, MD   lisinopril (PRINIVIL;ZESTRIL) 20 MG tablet Take 20 mg by mouth daily   Yes Historical Provider, MD    Scheduled Meds:   Venelex   Topical BID    warfarin  5 mg Oral Once    dexamethasone  6 mg Intravenous Q24H    famotidine  10 mg Oral Daily    vancomycin  125 mg Oral 4x Daily    insulin glargine  20 Units Subcutaneous Nightly    insulin lispro  5 Units Subcutaneous TID WC    aspirin  81 mg Oral Daily    atorvastatin  20 mg Oral Daily    vitamin D  5,000 Units Oral Once per day on Mon Wed Fri    cloNIDine  0.1 mg Oral BID    vitamin B-12  1,000 mcg Oral Once per day on Mon Wed Fri    NIFEdipine  30 mg Oral Daily    sodium chloride flush  10 mL Intravenous 2 times per day    warfarin (COUMADIN) daily dosing (placeholder)   Other RX Placeholder    insulin lispro  0-18 Units Subcutaneous TID     insulin lispro  0-9 Units Subcutaneous Nightly     Continuous Infusions:   sodium chloride 75 mL/hr at 01/24/21 0601    dextrose       PRN Meds:.sodium chloride flush, promethazine **OR** ondansetron, polyethylene glycol, acetaminophen **OR** acetaminophen, guaiFENesin-dextromethorphan, glucose, dextrose, glucagon (rDNA), dextrose  Allergies  is allergic to bactrim ds [sulfamethoxazole-trimethoprim]. Family History  Reviewedfamily history is not on file. Social History   reports that she has never smoked. She has never used smokeless tobacco. She reports that she does not drink alcohol or use drugs.   EDUCATION  Patient educated about their illness/diagnosis, stated above, and all questions answered. We discussed the importance of nutrition, medications they are taking, and healthy lifestyle. Review of Systems:  General Denies any fever or chills  HEENT Denies any diplopia, tinnitus or vertigo  Resp:  +shortness of breath, and cough   Cardiac Denies any chest pain, palpitations, claudication or edema  GI Denies any melena, hematochezia, hematemesis or pyrosis   Denies any frequency, urgency, hesitancy or incontinence  Heme Denies bruising or bleeding easily  Neuro Denies any focal motor or sensory deficits  OBJECTIVE:   VITALS:  height is 5' (1.524 m) and weight is 244 lb 0.8 oz (110.7 kg). Her oral temperature is 97.4 °F (36.3 °C). Her blood pressure is 136/72 and her pulse is 102. Her respiration is 20 and oxygen saturation is 93%. CONSTITUTIONAL: Alert and oriented times 3, no acute distress and cooperative to examination with proper mood and affect. SKIN: stage II left buttock pressure ulcer  HEENT: Head is normocephalic, atraumatic. EOMI, PERRLA. NECK: Supple, symmetrical, trachea midline, no adenopathy, thyroid symmetric, not enlarged and no tenderness, skin normal.  CHEST/LUNGS: chest symmetric with normal A/P diameter, normal respiratory rate  CARDIOVASCULAR: Tachycardia  ABDOMEN: Normal shape. Tenderness: absent. RECTAL/BUTTOCK: left stage II buttock ulcer. No induration, purulent drainage, fluctuence   NEUROLOGIC: There are no focalizing motor or sensory deficits. CN II-XII are grossly intact. Delsa Severance EXTREMITIES: no cyanosis, no clubbing and no edema.   LABS:     Recent Labs     01/23/21  1534 01/23/21  1629 01/23/21  2115 01/24/21  0712 01/25/21  0700   WBC 15.0*  --   --  13.5* 14.7*   HGB 8.2*  --   --  7.4* 7.7*   HCT 26.2*  --   --  23.7* 24.8*   *  --   --  694* 752*   *  --   --  136 144   K 4.6  --   --  3.9 4.2     --   --  103 112*   CO2 18*  --   --  15* 16*   BUN 86*  --   --  84* 71*   CREATININE 2.2*  --   --  2.0* 1.6*   MG  --   --   -- --  2.40   CALCIUM 8.7  --   --  8.4 8.5   INR  --   --  3.89* 3.57* 2.43*   AST 17  --   --   --   --    ALT 13  --   --   --   --    BILITOT 0.3  --   --   --   --    NITRU  --  Negative  --   --   --    COLORU  --  YELLOW  --   --   --      Recent Labs     01/23/21  1534   ALKPHOS 86   ALT 13   AST 17   BILITOT 0.3   LABALBU 2.4*     CBC:   Lab Results   Component Value Date    WBC 14.7 01/25/2021    RBC 2.89 01/25/2021    HGB 7.7 01/25/2021    HCT 24.8 01/25/2021    MCV 85.7 01/25/2021    MCH 26.5 01/25/2021    MCHC 31.0 01/25/2021    RDW 14.3 01/25/2021     01/25/2021    MPV 9.0 01/25/2021     CMP:    Lab Results   Component Value Date     01/25/2021    K 4.2 01/25/2021    K 3.9 01/24/2021     01/25/2021    CO2 16 01/25/2021    BUN 71 01/25/2021    CREATININE 1.6 01/25/2021    GFRAA 37 01/25/2021    GFRAA 56 06/07/2012    AGRATIO 0.6 01/23/2021    LABGLOM 31 01/25/2021    GLUCOSE 128 01/25/2021    PROT 6.6 01/23/2021    LABALBU 2.4 01/23/2021    CALCIUM 8.5 01/25/2021    BILITOT 0.3 01/23/2021    ALKPHOS 86 01/23/2021    AST 17 01/23/2021    ALT 13 01/23/2021     Urine Culture:  No components found for: NADIRAINE  Blood Culture:  No components found for: CBLOOD, CFUNGUSBL     Thank you for the interesting evaluation. Further recommendations to follow. Heena Chadwick  General and Vascular Surgery [phone]  Electronically signed by Cordie Cheadle, PA on 1/25/2021 at 2:07 PM      Agree with above note. The patient was personally seen and examined. Stacey Recinos is an 81 yo female with history of DM, PE on coumadin, acute renal failure and c diff diarrhea who presented with decreased po intake and covid +. She has been on supplemental oxygen in 6-8 L range. She states she has had some ulcers \"on her backside\" since the nursing home. They hurt with dressing changes when either the scab or surrounding skin gets removed.     Left buttock with stage II superficial ulcer approximately 4 cm in diameter, no fluctuance or erythema, small 1 cm sacral ulcer without erythema or drainage    WBC 14.7    A/P: sacral ulcer, covid +, hx of PE on coumadin    Local wound care to sacral ulcer with santyl BID. No plans for surgical debridement given superficial appearance and with being therapeutic on coumadin with INR of 2.43. Will defer management to wound care team.    Will sign off, please call with questions.     Leroy Cam MD

## 2021-01-25 NOTE — CONSULTS
Infectious Diseases Inpatient Consult Note      Reason for Consult:  COVID 19 PNA and resp failure    Requesting Physician:  Dr. Jace Pineda     Primary Care Physician:  Angelic Kramer    History Obtained From:  [facility] and patient     CHIEF COMPLAINT:     Chief Complaint   Patient presents with    Other     elevated BUN       COVID 19 PNA and resp failure    HISTORY OF PRESENT ILLNESS:  82 y.o. woman with a past medical history of arthritis, CVA, diabetes on insulin, hypertension, history of pulmonary embolism on Coumadin, history of abdominal surgery in 2019 now admitted with acute shortness of breath and worsening kidney function. She was recently hospitalized at [facility] from 1/8/2021 to 1/11/2021 due to a fall before admission and also noted to have urinary tract infection and she had a COVID-19 testing before discharge was positive on 1/10/2021 she was discharged to [facility] now admitted from there with worsening kidney function also ongoing diarrhea and shortness of breath. She was on oral vancomycin on admission is not clear when she was tested for C. Difficile. Patient also complained of increasing shortness of breath with some cough since the Covid diagnosis. Labs indicate creatinine elevated 2.2 glucose at 419 procalcitonin 0.38 hemoglobin A1c 7.6, WBC elevation at 15. Urine pneumococcal antigen and Legionella antigen negative. She has no fever since admission, but currently requiring 6 L of nasal cannula for hypoxic respiratory failure. Chest x-ray with bilateral changes consistent with COVID-19 infection. She had a PICC line at presentation not sure when it this was placed.         Past Medical History:    Past Medical History:   Diagnosis Date    Acute kidney injury ([address]     Arthritis     Cerebral artery occlusion with cerebral infarction ([address]     COVID-19     Diabetes mellitus ([address]     Hypertension     Pulmonary embolism ([address]     On warfarin       Past Surgical History:    Past Surgical History:   Procedure Laterality Date    ABDOMEN SURGERY N/A 2/15/2019    DEBRIDEMENT OF OPEN ABDOMINAL WOUND performed by Homer aTng MD at [facility] N/A 3/19/2019    RIGHT LOWER QUADRANT ABDOMINAL DEBRIDEMENT performed by Homer Tang MD at [address] [place] [address]       Current Medications:    Outpatient Medications Marked as Taking for the 1/23/21 encounter [facility] Encounter)   Medication Sig Dispense Refill    ascorbic acid (VITAMIN C) 500 MG tablet Take 500 mg by mouth daily      HYDROcodone-acetaminophen (NORCO) 5-325 MG per tablet Take 1 tablet by mouth 2 times daily.  HYDROcodone-acetaminophen (NORCO) 5-325 MG per tablet Take 1 tablet by mouth every 6 hours as needed for Pain.  azithromycin (ZITHROMAX) 250 MG tablet Take 250 mg by mouth daily      vancomycin (VANCOCIN) 50 mg/mL oral solution Take 125 mg by mouth See Admin Instructions 125 mg QID x 14 days then 125 mg TID x 14 days then 125 mg BID x 14 days then 125 mg daily x 14 days      Probiotic Product (ACIDOPHILUS/BIFIDUS PO) Take 1 tablet by mouth daily      sucralfate (CARAFATE) 1 GM tablet Take 1 g by mouth 4 times daily (before meals and nightly)      metFORMIN (GLUCOPHAGE) 1000 MG tablet Take 0.5 tablets by mouth 2 times daily (with meals) 60 tablet 3    NIFEdipine (ADALAT CC) 30 MG extended release tablet Take 30 mg by mouth daily      gentamicin (GARAMYCIN) 0.1 % cream Apply topically daily.  Right foot 15 g 0    IRON PO Take 65 mg by mouth three times a week Indications: mon wed fri      vitamin B-12 (CYANOCOBALAMIN) 1000 MCG tablet Take 1,000 mcg by mouth three times a week Indications: mon wed fri      aspirin 81 MG tablet Take by mouth daily      lovastatin (MEVACOR) 40 MG tablet Take 40 mg by mouth nightly      Insulin NPH Isophane & Regular (HUMULIN 70/30 SC) Inject into the skin Indications: 15 units in am, 14 units at suppertime       Cholecalciferol (VITAMIN D3) 5000 units TABS Take 5,000 Units by mouth three times a week Indications: mon, wed frid       glimepiride (AMARYL) 4 MG tablet Take 4 mg by mouth every morning (before breakfast)      warfarin (COUMADIN) 5 MG tablet Take 2.5-5 mg by mouth daily Indications: 5 mg daily, except Wed take 2.5 mg 2.5 mg TUE/SAT and 5 mg ALL OTHER DAYS      cloNIDine (CATAPRES) 0.1 MG tablet Take 0.1 mg by mouth 2 times daily      lisinopril (PRINIVIL;ZESTRIL) 20 MG tablet Take 20 mg by mouth daily         Allergies:  Bactrim ds [sulfamethoxazole-trimethoprim]    Immunizations :   Immunization History   Administered Date(s) Administered    Influenza Vaccine, unspecified formulation 10/06/2003, 10/08/2005, 10/09/2006, 10/04/2007, 09/11/2009, 10/20/2011    Influenza, High Dose (Fluzone 65 yrs and older) 10/29/2012, 10/01/2014, 10/13/2015, 10/03/2016, 10/16/2017, 11/01/2018    Pneumococcal Conjugate 13-valent ([id number]) 04/14/2015    Pneumococcal Polysaccharide ([id number]) 01/29/2003    Tdap (Boostrix, Adacel) 04/12/2013, 11/21/2018         Social History:    Social History     Tobacco Use    Smoking status: Never Smoker    Smokeless tobacco: Never Used   Substance Use Topics    Alcohol use: No    Drug use: Never     Social History     Tobacco Use   Smoking Status Never Smoker   Smokeless Tobacco Never Used      Family History : no DVT no COPD        REVIEW OF SYSTEMS:     Constitutional:  negative for fevers, chills, night sweats  Eyes:  negative for blurred vision, eye discharge, visual disturbance   HEENT:  negative for hearing loss, ear drainage,nasal congestion  Respiratory:   cough+ , shortness of breath +  or hemoptysis   Cardiovascular:  negative for chest pain, palpitations, syncope  Gastrointestinal:  negative for nausea, vomiting, diarrhea + , constipation, abdominal pain  Genitourinary:  negative for frequency, dysuria, urinary incontinence, hematuria  Hematologic/Lymphatic:  negative for easy bruising, bleeding and lymphadenopathy  Allergic/Immunologic:  negative for recurrent infections, angioedema, anaphylaxis   Endocrine:  negative for weight changes, polyuria, polydipsia and polyphagia  Musculoskeletal:  negative for joint  pain, swelling, decreased range of motion  Integumentary: No rashes, skin lesions  Neurological:  negative for headaches, slurred speech, unilateral weakness  Psychiatric: negative for hallucinations,confusion,agitation.      PHYSICAL EXAM:      Vitals:    /72   Pulse 102   Temp 97.4 °F (36.3 °C) (Oral)   Resp 20   Ht 5' (1.524 m)   Wt 244 lb 0.8 oz (110.7 kg)   SpO2 93%   BMI 47.66 kg/m²     General Appearance: alert,in some acute distress, + pallor, no icterus on nasal cannula+   Skin: warm and dry, no rash or erythema  Head: normocephalic and atraumatic  Eyes: pupils equal, round, and reactive to light, conjunctivae normal  ENT: tympanic membrane, external ear and ear canal normal bilaterally, nose without deformity, nasal mucosa and turbinates normal without polyps  Neck: supple and non-tender without mass, no thyromegaly  no cervical lymphadenopathy  Pulmonary/Chest: clear to auscultation bilaterally- no wheezes, rales or rhonchi, normal air movement, no respiratory distress  Cardiovascular: normal rate, regular rhythm, normal S1 and S2, no murmurs, rubs, clicks, or gallops, no carotid bruits  Abdomen: soft, non-tender, non-distended, normal bowel sounds, no masses or organomegaly  Extremities: no cyanosis, clubbing or edema  Musculoskeletal: normal range of motion, no joint swelling, deformity or tenderness  Integumentary: No rashes, no abnormal skin lesions, no petechiae  Neurologic: reflexes normal and symmetric, no cranial nerve deficit  Psych:  Orientation, sensorium, mood normal   Lines: PICC      DATA:    CBC:   Lab Results   Component Value Date    WBC 14.7 (H) 01/25/2021    HGB 7.7 (L) 01/25/2021    HCT 24.8 (L) 01/25/2021    MCV 85.7 01/25/2021     (H) 01/25/2021     RENAL:   Lab Results   Component Value Date    CREATININE 1.6 (H) 01/25/2021    BUN 71 (H) 01/25/2021     01/25/2021    K 4.2 01/25/2021     (H) 01/25/2021    CO2 16 (L) 01/25/2021     SED RATE:   Lab Results   Component Value Date    SEDRATE 116 03/14/2019     CK:   Lab Results   Component Value Date    CKTOTAL 328 01/08/2021     CRP:   Lab Results   Component Value Date    CRP 41.4 03/14/2019     Hepatic Function Panel:   Lab Results   Component Value Date    ALKPHOS 86 01/23/2021    ALT 13 01/23/2021    AST 17 01/23/2021    PROT 6.6 01/23/2021    BILITOT 0.3 01/23/2021    LABALBU 2.4 01/23/2021     UA:  Lab Results   Component Value Date    COLORU YELLOW 01/23/2021    CLARITYU CLOUDY 01/23/2021    GLUCOSEU Negative 01/23/2021    BILIRUBINUR Negative 01/23/2021    KETUA Negative 01/23/2021    SPECGRAV 1.019 01/23/2021    BLOODU Negative 01/23/2021    PHUR 5.0 01/23/2021    PROTEINU Negative 01/23/2021    UROBILINOGEN 0.2 01/23/2021    NITRU Negative 01/23/2021    LEUKOCYTESUR TRACE 01/23/2021    LABMICR YES 01/23/2021    URINETYPE Cleancatch 01/23/2021      Urine Microscopic:   Lab Results   Component Value Date    BACTERIA 1+ 01/08/2021    COMU see below 01/23/2021    HYALCAST 3 01/08/2021    WBCUA 6 01/23/2021    RBCUA 0-2 01/23/2021    EPIU 2 01/23/2021     Urine Reflex to Culture:   Lab Results   Component Value Date    URRFLXCULT Not Indicated 01/23/2021       Creat  2.2  Down to  1.6     Procal 0.38     WBC  15       MICRO: cultures reviewed and updated by me            Strep Pneumoniae Antigen [[id number]] Collected: 01/23/21 1629   Order Status: Completed Specimen: Urine, clean catch Updated: 01/25/21 0857    STREP PNEUMONIAE ANTIGEN, URINE --    Presumptive Negative   Presumptive negative suggests no current or recent   pneumococcal infection.  Infection due to Strep pneumoniae   cannot be ruled out since the antigen present in the sample   may be below the detection limit of the test.   Normal Range:Presumptive Negative    Narrative:     ORDER#: [id number]                          ORDERED BY: Chica Clifton   SOURCE: Urine Clean Catch                  COLLECTED:  01/23/21 16:29   ANTIBIOTICS AT [place]. :                      RECEIVED :  01/25/21 08:02   Performed at:   [address], [place] [address]   Phone [phone]   Legionella antigen, urine [[id number]] Collected: 01/23/21 1629   Order Status: Completed Specimen: Urine, clean catch Updated: 01/25/21 0843    L. pneumophila Serogp 1 Ur Ag --    Presumptive Negative   No Legionella pneumophila serogroup 1 antigens detected. A negative result does not exclude infection with   Legionella pneumophila serogroup 1 nor does it rule out   other microbial-caused respiratory infections or   disease caused by other serogroups of   Legionella pneumophila. Normal Range: Presumptive Negative    Narrative:     ORDER#: [id number]                          ORDERED BY: Chica Clifton   SOURCE: Urine Clean Catch                  COLLECTED:  01/23/21 16:29   ANTIBIOTICS [facility] [place] :                      RECEIVED :  01/25/21 08:02   Performed at:   [address], [place] [address]   Phone [phone]   Culture, Respiratory, Sputum [[id number]]    Order Status: Sent Specimen: Sputum Expectorated        Blood Culture:   Lab Results   Component Value Date    BC No growth after 5 days of incubation. 03/14/2019    BLOODCULT2 No growth after 5 days of incubation. 03/14/2019 1/11/2021  4:16 PM - Nilson Jacques Incoming Lab Results From Soft (Epic Adt)         Component Value Ref Range & Units Status Collected Lab   SARS-CoV-2, PCR DETECTEDAbnormal   Not Detected Final 01/10/2021  2:20 AM  - [facility]   This test has been authorized by FDA under an   Emergency Use Authorization (EUA).    This test is only authorized for the duration of the   time of declaration that circumstances exist justifying the     Narrative  Performed by: [facility]  ORDER#: [id number]                          ORDERED BY: AUDREY Youssef   SOURCE: Urine Clean Catch  Urine Straight CCOLLECTED:  01/08/21 18:51   ANTIBIOTICS [facility] [place] :                      RECEIVED :  01/09/21 04:04   Performed at:   [facility]   [address] [facility] [place] [facility] [address]   Phone [phone]   Susceptibility    Escherichia coli (1)    Antibiotic Interpretation ROBYN Status    ampicillin Sensitive <=8 mcg/mL     ceFAZolin Sensitive <=2 mcg/mL      NOTE: Cefazolin should only be used for uncomplicated UTI         for E.coli or Klebsiella pneumoniae. cefepime Sensitive <=2 mcg/mL     cefTRIAXone Sensitive <=1 mcg/mL     cefuroxime Sensitive 8 mcg/mL     ciprofloxacin Sensitive <=1 mcg/mL     ertapenem Sensitive <=0.5 mcg/mL     gentamicin Sensitive <=4 mcg/mL     meropenem Sensitive <=1 mcg/mL     nitrofurantoin Sensitive <=32 mcg/mL     piperacillin-tazobactam Sensitive <=16 mcg/mL     trimethoprim-sulfamethoxazole Sensitive <=2/38 mcg/mL           Viral Culture:    Lab Results   Component Value Date    COVID19 DETECTED 01/10/2021     Urine Culture: No results for input(s): LABURIN in the last 72 hours.     Scheduled Meds:   Venelex   Topical BID    warfarin  5 mg Oral Once    dexamethasone  6 mg Intravenous Q24H    famotidine  10 mg Oral Daily    vancomycin  125 mg Oral 4x Daily    insulin glargine  20 Units Subcutaneous Nightly    insulin lispro  5 Units Subcutaneous TID     aspirin  81 mg Oral Daily    atorvastatin  20 mg Oral Daily    vitamin D  5,000 Units Oral Once per day on Mon Wed Fri    cloNIDine  0.1 mg Oral BID    vitamin B-12  1,000 mcg Oral Once per day on Mon Wed Fri    NIFEdipine  30 mg Oral Daily    sodium chloride flush  10 mL Intravenous 2 times per day    warfarin (COUMADIN) daily dosing (placeholder)   Other RX Placeholder    insulin lispro  0-18 Units Subcutaneous TID     insulin lispro  0-9 Units Subcutaneous Nightly       Continuous Infusions:   sodium chloride 75 mL/hr at 01/24/21 0601    dextrose         PRN Meds:  sodium chloride flush, promethazine **OR** ondansetron, polyethylene glycol, acetaminophen **OR** acetaminophen, guaiFENesin-dextromethorphan, glucose, dextrose, glucagon (rDNA), dextrose    Imaging:   XR CHEST PORTABLE   Final Result   Vascular congestion/mild pulmonary edema. Pattern is stable from prior exam.         XR CHEST PORTABLE   Final Result   Right PICC line with catheter tip upper SVC. Suspected multifocal pneumonia. All pertinent images and reports for the current Hospitalization were reviewed by me.     IMPRESSION:    Patient Active Problem List   Diagnosis    Abdominal wall cellulitis    Poorly controlled type 2 diabetes mellitus ([address]    HTN (hypertension)    CKD (chronic kidney disease) stage 3, GFR 30-59 ml/min    History of pulmonary embolism    Chronic abdominal wound infection, subsequent encounter    Open abdominal wall wound, subsequent encounter    Severe infection    Acute kidney injury superimposed on chronic kidney disease ([address]    Diverticulosis of colon without diverticulitis    Morbid obesity due to excess calories ([address]    Open wound of abdomen    Diabetic ulcer of right midfoot associated with type 2 diabetes mellitus, with fat layer exposed ([address]    Chronic wound infection of abdomen    History of arterial ischemic stroke    Anticoagulated on Coumadin    MRSA colonization    Acinetobacter lwoffi infection    Citrobacter infection    Weight loss counseling, encounter for    Proteus infection    Frequent falls    KIKO (acute kidney injury) ([address]    Acute respiratory failure with hypoxia ([address]    Sepsis ([address]    Pneumonia due to COVID-19 virus     COVID-19 pneumonia  Acute hypoxic respiratory failure currently requiring 6 L nasal cannula  Acute kidney injury  WBC elevation  C. difficile diarrhea she was already on oral vancomycin on admission not sure when and where  she was tested for C. Difficile ? ? Admitted from [address] [place] [address]   She had a PICC line at presentation  She was recently hospitalized [facility] with a fall and urinary tract infection on 1/8/2020 121/11/21. She was tested for +Ve  COVID-19 on 1/10/2021  CXR WITH CHANGES from COVID 19  ON Coumadin for h/o PE  Morbid obesity BMI at  47       Worsening respiratory status with acute kidney injury in the setting of COVID-19 pneumonia. She was on oral vancomycin before admission from nursing home not sure when she was tested for CT we do not have the records. She also came in with a PICC line in place was given IV fluids at the nursing home. Pathology 2 weeks since the time of diagnosis for COVID-19 hence would not be a candidate for IV remdesivir but given her acute hypoxic respiratory failure will try if she can  get convalescent plasma      Labs, Microbiology, Radiology and pertinent results from current hospitalization and care every where were reviewed by me as a part of the consultation. PLAN :  1. Cont supportive care  2. Cont Dexamethasone   3. Out of Window for IV Remdesivir >  2 weeks since diagnosis  4. Type and screen   5. Convalescent plasma  6. Check D dimer, CRP, Ferritin once  7. Needs records from [id number] about C diff testing     Discussed with patient/Family and Nursing   Risk of Complications/Morbidity: High      · Illness(es)/ Infection present that pose threat to bodily function. · There is potential for severe exacerbation of infection/side effects of treatment. · Therapy requires intensive monitoring for antimicrobial agent toxicity. Thanks for allowing me to participate in your patient's care please call me with any questions or concerns.     Dr. Natacha Ham MD  [facility] Physician  Phone: [phone]   Fax :

## 2021-01-25 NOTE — CARE COORDINATION
01/25/21 1641   Readmission Assessment   Number of Days since last admission? 8-30 days   Previous Disposition SNF   Who is being Vikki Uriartenaveed Méndezreena Francois at [facility] and also patient's daughter)   What was the patient's/caregiver's perception as to why they think they needed to return back to the hospital? Other (Comment)  (elevated BUN/Cr)   Did you visit your Primary Care Physician after you left the hospital, before you returned this time? Yes  (medical director at facility)   Who advised the patient to return to the hospital? Physician   Does the patient report anything that got in the way of taking their medications? Yes  (daughter states she did not get her Carafate for 5 days)   What reasons did they give? Other (Comment)   In our efforts to provide the best possible care to you and others like you, can you think of anything that we could have done to help you after you left the hospital the first time, so that you might not have needed to return so soon?  Other (Comment)  (no)

## 2021-01-25 NOTE — PROGRESS NOTES
Hospitalist Progress Note      PCP: Ramin Torres    Date of Admission: 1/23/2021      Hospital Course: \"  81yo WF with PMhx sig for DMII insulin dependent, HTN, hx of PE on warfarin presents with abnormal lab work. Pt has had diarrhea for past few days, decreased PO intake and was dx with COVID19 infection recently. She was started on 6L oxygen in ED - unclear how much oxygen she was requiring at SNF. She was discharged from [facility] 2 weeks ago for knee contusion. Pt is requiring increasing amounts of oxygen but she is requiring IVF for acute renal failure and ongoing diarrhea. Baseline creatinine is 1.2. \"    Subjective: no abdominal pain, nausea or vomiting, still having diarrhea, still feeling weak.         Medications:  Reviewed    Infusion Medications    sodium chloride 75 mL/hr at 01/24/21 0601    dextrose       Scheduled Medications    Venelex   Topical BID    warfarin  5 mg Oral Once    dexamethasone  6 mg Intravenous Q24H    famotidine  10 mg Oral Daily    vancomycin  125 mg Oral 4x Daily    insulin glargine  20 Units Subcutaneous Nightly    insulin lispro  5 Units Subcutaneous TID     aspirin  81 mg Oral Daily    atorvastatin  20 mg Oral Daily    vitamin D  5,000 Units Oral Once per day on Mon Wed Fri    cloNIDine  0.1 mg Oral BID    vitamin B-12  1,000 mcg Oral Once per day on Mon Wed Fri    NIFEdipine  30 mg Oral Daily    sodium chloride flush  10 mL Intravenous 2 times per day    warfarin (COUMADIN) daily dosing (placeholder)   Other RX Placeholder    insulin lispro  0-18 Units Subcutaneous TID     insulin lispro  0-9 Units Subcutaneous Nightly     PRN Meds: sodium chloride flush, promethazine **OR** ondansetron, polyethylene glycol, acetaminophen **OR** acetaminophen, guaiFENesin-dextromethorphan, glucose, dextrose, glucagon (rDNA), dextrose      Intake/Output Summary (Last 24 hours) at 1/25/2021 1136  Last data filed at 1/24/2021 1737  Gross per 24 Hospital Problems    Diagnosis    Acute respiratory failure with hypoxia (HCC) [J96.01]    Sepsis ([facility] [A41.9]    Pneumonia due to COVID-19 virus [U07.1, J12.82]    KIKO (acute kidney injury) ([facility] [N17.9]    Anticoagulated on Coumadin [Z79.01]    Chronic wound infection of abdomen [S31.109A, L08.9]    Poorly controlled type 2 diabetes mellitus ([facility] [E11.65]    HTN (hypertension) [I10]    Morbid obesity due to excess calories [facility] [E66.01]    Acute kidney injury superimposed on chronic kidney disease ([facility] [N17.9, N18.9]     1. COVID19 pneumonia, started on decadron, oxygen and supportive measures. 2. Acute renal failure on CKDII,  Improving. 3. C diff diarrhea, started on oral vancomycin  4. Acute resp failure with hypoxia, on 8 L currently   5. Sepsis POA, due to COVID 19 and cdif diarrhea, nor sure if a candidate of ivermectin, also steroids not very optimal for c dif, will consult ID for recommendations. 6. Morbid Obesity, Body mass index is Body mass index is 46.67 kg/m². 7. DMII uncontrolled, due to steroids, better controlled now. 8. Unstagable buttock wound, GS consulted. 9. Anemia, appears chronic, monitor. 10. Thrombocytosis, likely reactive, monitor.      DVT Prophylaxis: coumadin  Diet: DIET CARB CONTROL;  Code Status: Full Code      Dispo - Ongoing management     Alexia Dale MD

## 2021-01-25 NOTE — CONSULTS
[address] [place] [address] Nephrology   [facility]  [phone]  Nephrology Consult Note          Patient ID: Lyssa Reyes  Referring/ Physician: Owen Garay MD      Assessment/Plan:   Lyssa Reyes is being seen by nephrology for KIKO. 83 yo female with COVID pneumonia and diarrhea. /72  UO 7 x  Na 144  K 4.2  Bicarb 16   AG 16   BUN 71  Cr 2.2 >2 >1.6    Plan:   - will give some hypotonic bicarb fluids. Should help with acidosis and hypernatremia. K as well as anticipated to drop with correcting acidosis. - strict IO  - encourage PO hydration   - noted that she is on steroids which is contributing to persistent azotemia. [facility] would like to thank you for the opportunity to serve this patient. Please call with any questions or concerns. Alice Munguia MD  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 Cr peaked at 2.2  UA showed 6 WBC  No RBC no protein, overall bland. # NAGMA  Due to diarrhea. Bicarb deficit due to above   Bicarb 16 day of consult. # electrolytes:   K 4.2  Mag 2.4  Ca and phos wnl. Na 144    # BP  Acceptable  On clonidine and procardia. Holding lisinopril     # Volume:   Appears euvolemic. Last EF 55% in 2011? #  Full code. CC/Reason for consult:   Reason for consult: KIKO  Chief Complaint   Patient presents with    Other     elevated BUN         HPI/Hospital Course:   Lyssa Reyes is a 82 y.o. female presented to the hospital on   1/23/2021 with abnormal labs. Has a h/o  has a past medical history of Acute kidney injury ([address] Arthritis, Cerebral artery occlusion with cerebral infarction ([address] COVID-19, Diabetes mellitus ([address] Hypertension, and Pulmonary embolism ([address] . Patient had a recent covid infection.  She has been having issues with ongoing diarrhea. Says today was the first day she did not have it. She has been eating and drinking but she feels like coughing everytime she eats. She has no chest pain or edema. No SOB. Review of Systems:   [address] All other remaining systems are negative. Constitutional:  fever, chills, weakness, weight change, fatigue,      Skin:  rash, pruritus, hair loss, bruising, dry skin, petechiae. Head, Face, Neck   headaches, swelling,  cervical adenopathy. Respiratory: shortness of breath, cough, or wheezing  Cardiovascular: chest pain, palpitations, dizzy, edema  Gastrointestinal: nausea, vomiting, diarrhea, constipation,belly pain    Yellow skin, blood in stool  Musculoskeletal:  back pain, muscle weakness, gait problems,       joint pain or swelling. Genitourinary:  dysuria, poor urine flow, flank pain, blood in urine  Neurologic:  vertigo, TIA'S, syncope, seizures, focal weakness  Psychosocial:  insomnia, anxiety, or depression. Additional positive findings: -     PMH/SH/FH:    Medical Hx: reviewed and updated as appropriate  Past Medical History:   Diagnosis Date    Acute kidney injury ([address]     Arthritis     Cerebral artery occlusion with cerebral infarction ([address]     COVID-19     Diabetes mellitus ([address]     Hypertension     Pulmonary embolism ([address]     On warfarin         Surgical Hx: reviewed and updated as appropriate   has a past surgical history that includes Abdomen surgery (N/A, 2/15/2019) and Abdomen surgery (N/A, 3/19/2019). Social Hx: reviewed and updated as appropriate  Social History     Tobacco Use    Smoking status: Never Smoker    Smokeless tobacco: Never Used   Substance Use Topics    Alcohol use: No        Family hx: reviewed and updated as appropriate  family history is not on file.     Medications:       Venelex, , BID      warfarin, 5 mg, Once      dexamethasone, 6 mg, Q24H      famotidine, 10 mg, Daily      vancomycin, 125 mg, 4x Daily      insulin glargine, 20 Units, Nightly      insulin lispro, 5 Units, TID WC      aspirin, 81 mg, Daily      atorvastatin, 20 mg, Daily      vitamin D, 5,000 Units, Once per day on Mon Wed Fri      cloNIDine, 0.1 mg, BID      vitamin B-12, 1,000 mcg, Once per day on Mon Wed Fri      NIFEdipine, 30 mg, Daily      sodium chloride flush, 10 mL, 2 times per day      warfarin (COUMADIN) daily dosing (placeholder), , RX Placeholder      insulin lispro, 0-18 Units, TID WC      insulin lispro, 0-9 Units, Nightly       Bactrim ds [sulfamethoxazole-trimethoprim]    Allergies: Allergies   Allergen Reactions    Bactrim Ds [Sulfamethoxazole-Trimethoprim] Diarrhea     Abdominal pain & Diarrhea         Physical Exam/Objective:   Vitals:    01/25/21 1208   BP:    Pulse:    Resp: 20   Temp:    SpO2: 93%       Intake/Output Summary (Last 24 hours) at 1/25/2021 1440  Last data filed at 1/24/2021 1737  Gross per 24 hour   Intake 360 ml   Output --   Net 360 ml         General appearance: white female in no acute distress, comfortable, communicative and fully alert and oriented x 3. HEENT: Lips and teeth normal, no icterus, EOM intact, trachea midline. Neck : no masses, appears symmetrical and no JVD appreciated. Respiratory: Respiratory effort normal, bilateral equal chest rise. No wheeze, no crackles   Cardiovascular: Ausculation shows RR. no edema   Abdomen: abdomen is soft, non distended, no masses, no pain with palpation.   Musculoskeletal:  no joint swelling, no deformity, strength grossly normal.   Skin: no rashes, no induration, no tightening, no jaundice   Neuro/psychiatric:  Judgement and insight is normal. Follows commands, moves all extremities spontaneously       Data:   CBC:   Recent Labs     01/23/21  1534 01/24/21  0712 01/25/21  0700   WBC 15.0* 13.5* 14.7*   HGB 8.2* 7.4* 7.7*   HCT 26.2* 23.7* 24.8*   * 694* 752*     BMP:    Recent Labs     01/23/21  1534 01/24/21  0712 01/25/21  0700 * 136 144   K 4.6 3.9 4.2    103 112*   CO2 18* 15* 16*   BUN 86* 84* 71*   CREATININE 2.2* 2.0* 1.6*   GLUCOSE 419* 296* 128*   MG  --   --  2.40

## 2021-01-25 NOTE — PLAN OF CARE
Problem: Falls - Risk of:  Goal: Will remain free from falls  Description: Will remain free from falls  1/25/2021 0051 by Delfina Velazquez RN  Outcome: Ongoing  1/24/2021 1713 by Marshall Desir RN  Outcome: Ongoing  Goal: Absence of physical injury  Description: Absence of physical injury  1/25/2021 0051 by Delfina Velazquez RN  Outcome: Ongoing  1/24/2021 1713 by Marshall Desir RN  Outcome: Ongoing     Problem: Airway Clearance - Ineffective  Goal: Achieve or maintain patent airway  1/25/2021 0051 by Delfina Velazquez RN  Outcome: Ongoing  1/24/2021 1713 by Marshall Desir RN  Outcome: Ongoing     Problem: Gas Exchange - Impaired  Goal: Absence of hypoxia  1/25/2021 0051 by Delfina Velazquez RN  Outcome: Ongoing  1/24/2021 1713 by Marshall Desir RN  Outcome: Ongoing  Goal: Promote optimal lung function  1/25/2021 0051 by Delfina Velazquez RN  Outcome: Ongoing  1/24/2021 1713 by Marshall Desir RN  Outcome: Ongoing     Problem: Isolation Precautions - Risk of Spread of Infection  Goal: Prevent transmission of infection  1/25/2021 0051 by Delfina Velazquez RN  Outcome: Ongoing  1/24/2021 1713 by Marshall Desir RN  Outcome: Ongoing     Problem: Nutrition Deficits  Goal: Optimize nutritional status  1/25/2021 0051 by Delfina Velazquez RN  Outcome: Ongoing  1/24/2021 1713 by Marshall Desir RN  Outcome: Ongoing     Problem: Loneliness or Risk for Loneliness  Goal: Demonstrate positive use of time alone when socialization is not possible  1/25/2021 0051 by Delfina Velazquez RN  Outcome: Ongoing  1/24/2021 1713 by Marshall Desir RN  Outcome: Ongoing     Problem: Fatigue  Goal: Verbalize increase energy and improved vitality  1/25/2021 0051 by Delfina Velazquez RN  Outcome: Ongoing  1/24/2021 1713 by Marshall Desir RN  Outcome: Ongoing     Problem: Skin Integrity:  Goal: Will show no infection signs and symptoms  Description: Will show no infection signs and symptoms  1/25/2021 0051 by Delfina Velazquez RN  Outcome: Ongoing  1/24/2021 1713 by Karis Bangura CARMEL Turner  Outcome: Ongoing  Goal: Absence of new skin breakdown  Description: Absence of new skin breakdown  1/25/2021 0051 by Deirdre Barrientos RN  Outcome: Ongoing  1/24/2021 1713 by Jeremiah Tena RN  Outcome: Ongoing

## 2021-01-25 NOTE — CARE COORDINATION
01/25/21 1641   Readmission Assessment   Number of Days since last admission? 8-30 days   Previous Disposition SNF   Who is being Interviewed Caregiver   What was the patient's/caregiver's perception as to why they think they needed to return back to the hospital?   (elevated BUN/Cr)   Did you visit your Primary Care Physician after you left the hospital, before you returned this time? Yes  (medical director at facility)   Who advised the patient to return to the hospital? Physician   Does the patient report anything that got in the way of taking their medications? No   In our efforts to provide the best possible care to you and others like you, can you think of anything that we could have done to help you after you left the hospital the first time, so that you might not have needed to return so soon?  Other (Comment)  (no)

## 2021-01-25 NOTE — PROGRESS NOTES
home alone completing self-care tasks independnetly, has assist from aide for IADLs, and uses walker for mobility. Pt d/c from hospital mid January to SNF for therapy and medication. Currently, pt presents with ROM/strength in [facility] for self-care and transfers. Pt completed bed mobility with mod/max Ax2 and tolerated sitting EOB for ~5 minutes with SBA. Anticipate pt to require max A for ADL needs. Pt remains unsafe to d/c home and would benefit from continued therapy to increase mobility, safety, and independence. Prognosis: Fair  Decision Making: Medium Complexity  History: PMH: CVA, DM  Exam: ADLs, bed mob  Assistance / Modification: mod/max A x2 for bed mobility  OT Education: Transfer Training;OT Role;Plan of Care;Precautions; ADL Adaptive Strategies  REQUIRES OT FOLLOW UP: Yes  Activity Tolerance  Activity Tolerance: Patient limited by fatigue  Activity Tolerance: Pt on 7L of O2, O2 sats dropping to mid 80s with bed mobility. Majority of session remained in upper 80s. Once recliner in bed recovered to low 90s. Safety Devices  Safety Devices in place: Yes(CARMEL Ma) in room upon exit)  Type of devices: Nurse notified;Call light within reach; Left in bed;Bed alarm in place           Patient Diagnosis(es): The primary encounter diagnosis was KIKO (acute kidney injury) ([address] Diagnoses of C. difficile diarrhea, Dehydration, and COVID-19 were also pertinent to this visit. has a past medical history of Acute kidney injury ([address] Arthritis, Cerebral artery occlusion with cerebral infarction ([address] COVID-19, Diabetes mellitus ([address] Hypertension, and Pulmonary embolism ([address] has a past surgical history that includes Abdomen surgery (N/A, 2/15/2019) and Abdomen surgery (N/A, 3/19/2019). Restrictions  Restrictions/Precautions  Restrictions/Precautions: Fall Risk, Isolation, Contact Precautions  Position Activity Restriction  Other position/activity restrictions: Droplet Plus :  COVID +.     O2 7L via High Flow. Contact : C-Diff. Subjective   General  Chart Reviewed: Yes  Patient assessed for rehabilitation services?: Yes  Additional Pertinent Hx: Per Sammi Dance, MD: Pt is \"82year-old female with past medical history of diabetes mellitus, hypertension, history of pulmonary embolism on Coumadin presented to the hospital with abnormal lab work. Patient lives abdomen [facility] and they had done routine lab work which showed elevated BUN and creatinine and she was sent to the hospital.  Patient denies any specific complaints. She said she has been having diarrhea for a few days and has been having decreased p.o. intake. She states that her shortness of breath is at her baseline after being diagnosed with a COVID-19 infection. Patient was placed on 6 L of oxygen in the ER. Is unknown how much oxygen patient was on at the nursing facility. On arrival to the hospital she was noted to be hemodynamically stable. Lab work showed KIKO, hyperglycemia. Patient will be admitted to the hospital for the management\"  Family / Caregiver Present: No  Referring Practitioner: Sammi Dance, MD  Diagnosis: Pneumonia d/t COVID19  Subjective  Subjective: Pt met bedside, reporting \"I feel terrible, why do I feel so sick\". Educated pt on symptoms of COVID - this is what she is experiencing. Pt agreeable to sit to EOB however declines OOB activity.   Patient Currently in Pain: (No complaints of pain at this time)  Vital Signs  Temp: 97.4 °F (36.3 °C)  Temp Source: Oral  Pulse: 102  Heart Rate Source: Monitor  Resp: 20  BP: 136/72  BP Location: Left Arm  Patient Currently in Pain: (No complaints of pain at this time)  Oxygen Therapy  SpO2: 94 %  Pulse Oximeter Device Mode: Continuous  Pulse Oximeter Device Location: Finger  O2 Device: High flow nasal cannula  O2 Flow Rate (L/min): (S) 8 L/min(decreased to 7 lpm)     Social/Functional History  Social/Functional History  Lives With: Alone  Type of Home: [facility] Layout: Able to Live on Main level with bedroom/bathroom  Home Access: Stairs to enter with rails(4-5 steps to enter.)  Bathroom Shower/Tub: Walk-in shower  Bathroom Toilet: Handicap height  Bathroom Equipment: Grab bars in shower, Built-in shower seat, Hand-held shower  Bathroom Accessibility: Accessible  Home Equipment: 4 wheeled walker, Cane, Alert Button  ADL Assistance: Independent  Homemaking Assistance: Needs assistance(([employer] provides meals-7 meals to pt. Pt does simple meal prep. [employer] cleans Thursdays. Saturday/Sunday assist for breakfast and laundry))  Ambulation Assistance: Independent(With Walker. LE weak, recent fall.)  Transfer Assistance: Independent  Active : No  Additional Comments: Information pertains to prior acute care admit 1/8/2021. D/C 1/11/2021 to SNF setting [facility]). H/O fall 1/8/2021 (slid oob onto knees). Objective   Vision: Impaired  Vision Exceptions: Wears glasses for reading  Hearing: (Appears alittle Jena.)      Orientation  Orientation Level: Oriented to place;Oriented to person;Disoriented to situation     Balance  Sitting Balance: (Pt tolerated sitting EOB for ~5 minutes with SBA. Required frequent cueing.)  Functional Mobility  Functional Mobility Comments: Anticipate need for lift equipment     ADL  Additional Comments: Prior to previous hospitalization pt was independent in self-care tasks, since last hospital stay anticipate staff at [facility] were assisting with self-care tasks. Anticipate pt now requiring mod/max A for bathing/dressing/toileting needs. Pt declined completing self-care tasks at this time. Tone RUE  RUE Tone: Normotonic  Tone LUE  LUE Tone: Normotonic  Coordination  Movements Are Fluid And Coordinated: Yes     Bed mobility  Rolling to Right: Minimal assistance  Supine to Sit: Moderate assistance;Maximum assistance;2 Person assistance  Sit to Supine:  Moderate assistance;Maximum assistance;2 Person assistance  Scooting: Dependent/Total  Comment: Pt sitting EOB with SBA x 5 minutes. SpO2 briefly drops to mid 80's while sitting EOB - mostly maintained at 89% while on 7L O2. Pt transferred to specialty bed via maxi slide. Transfers  Transfer Comments: Declined OOB activity - assisted RN in sliding pt over with maxi slide to specialty mattress     Cognition  Overall Cognitive Status: [facility]  Cognition Comment: Continue to assess        Sensation  Overall Sensation Status: (Not formally assessed - appeared WFL to light touch)        LUE AROM (degrees)  LUE AROM : WFL  Left Hand AROM (degrees)  Left Hand AROM: WFL  RUE AROM (degrees)  RUE AROM : WFL  Right Hand AROM (degrees)  Right Hand AROM: WFL     LUE Strength  Gross LUE Strength: WFL  RUE Strength  Gross RUE Strength: WFL          Plan   Plan  Times per week: 3-5  Current Treatment Recommendations: Strengthening, Endurance Training, Balance Training, Functional Mobility Training, Safety Education & Training, Equipment Evaluation, Education, & procurement, Patient/Caregiver Education & Training, Self-Care / ADL, Home Management Training      AM-PAC Score    Dominic Kirk scored a 13/24 on the AM-PAC ADL Inpatient form. Current research shows that an AM-PAC score of 17 or less is typically not associated with a discharge to the patient's home setting. Based on the patient's AM-PAC score and their current ADL deficits, it is recommended that the patient have 3-5 sessions per week of Occupational Therapy at d/c to increase the patient's independence. Please see assessment section for further patient specific details. If patient discharges prior to next session this note will serve as a discharge summary. Please see below for the latest assessment towards goals.         AM-PAC Inpatient Daily Activity Raw Score: 13 (01/25/21 1000)  AM-PAC Inpatient ADL T-Scale Score : 32.03 (01/25/21 1000)  ADL Inpatient CMS 0-100% Score: 63.03 (01/25/21 1000)  ADL Inpatient CMS G-Code Modifier : CL (01/25/21 1000)    Goals  Short term goals  Time Frame for Short term goals: prior to d/c  Short term goal 1: Pt will complete bed mobility with min A  Short term goal 2: Pt will complete sit <> stand transfers with min A  Short term goal 3: Pt will complete stand pivot transfer with min A  Short term goal 4: Pt will tolerate 1+ minute of standing activity to increase strength and activity tolerance for self-care and transfers with O2 sats WFL. Patient Goals   Patient goals : \"to feel better\"       Therapy Time   Individual Concurrent Group Co-treatment   Time In       0910   Time Out       0950   Minutes       40   Timed Code Treatment Minutes: 25 Minutes(15 min eval)     If pt is discharged prior to next OT session, this note will serve as the discharge summary.     Maricruz Boyle, NEIL/X#[id number]

## 2021-01-26 LAB
ANION GAP SERPL CALCULATED.3IONS-SCNC: 13 MMOL/L (ref 3–16)
ANISOCYTOSIS: ABNORMAL
BANDED NEUTROPHILS RELATIVE PERCENT: 2 % (ref 0–7)
BASOPHILS ABSOLUTE: 0 K/UL (ref 0–0.2)
BASOPHILS RELATIVE PERCENT: 0 %
BUN BLDV-MCNC: 56 MG/DL (ref 7–20)
C-REACTIVE PROTEIN: 22.1 MG/L (ref 0–5.1)
CALCIUM SERPL-MCNC: 8.3 MG/DL (ref 8.3–10.6)
CHLORIDE BLD-SCNC: 111 MMOL/L (ref 99–110)
CO2: 21 MMOL/L (ref 21–32)
CREAT SERPL-MCNC: 1.3 MG/DL (ref 0.6–1.2)
D DIMER: 883 NG/ML DDU (ref 0–229)
EOSINOPHILS ABSOLUTE: 0 K/UL (ref 0–0.6)
EOSINOPHILS RELATIVE PERCENT: 0 %
FERRITIN: 145.6 NG/ML (ref 15–150)
GFR AFRICAN AMERICAN: 47
GFR NON-AFRICAN AMERICAN: 39
GLUCOSE BLD-MCNC: 126 MG/DL (ref 70–99)
GLUCOSE BLD-MCNC: 147 MG/DL (ref 70–99)
GLUCOSE BLD-MCNC: 154 MG/DL (ref 70–99)
GLUCOSE BLD-MCNC: 161 MG/DL (ref 70–99)
GLUCOSE BLD-MCNC: 190 MG/DL (ref 70–99)
GLUCOSE BLD-MCNC: 282 MG/DL (ref 70–99)
GLUCOSE BLD-MCNC: 307 MG/DL (ref 70–99)
HCT VFR BLD CALC: 25.3 % (ref 36–48)
HEMOGLOBIN: 8.2 G/DL (ref 12–16)
INR BLD: 1.9 (ref 0.86–1.14)
LYMPHOCYTES ABSOLUTE: 0.9 K/UL (ref 1–5.1)
LYMPHOCYTES RELATIVE PERCENT: 6 %
MAGNESIUM: 2.1 MG/DL (ref 1.8–2.4)
MCH RBC QN AUTO: 27.5 PG (ref 26–34)
MCHC RBC AUTO-ENTMCNC: 32.5 G/DL (ref 31–36)
MCV RBC AUTO: 84.7 FL (ref 80–100)
METAMYELOCYTES RELATIVE PERCENT: 4 %
MONOCYTES ABSOLUTE: 0.5 K/UL (ref 0–1.3)
MONOCYTES RELATIVE PERCENT: 3 %
MYELOCYTE PERCENT: 2 %
NEUTROPHILS ABSOLUTE: 14.2 K/UL (ref 1.7–7.7)
NEUTROPHILS RELATIVE PERCENT: 83 %
NUCLEATED RED BLOOD CELLS: 4 /100 WBC
PDW BLD-RTO: 14.6 % (ref 12.4–15.4)
PERFORMED ON: ABNORMAL
PLATELET # BLD: 719 K/UL (ref 135–450)
PMV BLD AUTO: 8.6 FL (ref 5–10.5)
POIKILOCYTES: ABNORMAL
POTASSIUM SERPL-SCNC: 3.8 MMOL/L (ref 3.5–5.1)
PROTHROMBIN TIME: 22.2 SEC (ref 10–13.2)
RBC # BLD: 2.99 M/UL (ref 4–5.2)
SODIUM BLD-SCNC: 145 MMOL/L (ref 136–145)
WBC # BLD: 15.6 K/UL (ref 4–11)

## 2021-01-26 PROCEDURE — 97530 THERAPEUTIC ACTIVITIES: CPT

## 2021-01-26 PROCEDURE — 80048 BASIC METABOLIC PNL TOTAL CA: CPT

## 2021-01-26 PROCEDURE — 86140 C-REACTIVE PROTEIN: CPT

## 2021-01-26 PROCEDURE — 2500000003 HC RX 250 WO HCPCS: Performed by: INTERNAL MEDICINE

## 2021-01-26 PROCEDURE — 2580000003 HC RX 258: Performed by: INTERNAL MEDICINE

## 2021-01-26 PROCEDURE — 85379 FIBRIN DEGRADATION QUANT: CPT

## 2021-01-26 PROCEDURE — 6370000000 HC RX 637 (ALT 250 FOR IP): Performed by: INTERNAL MEDICINE

## 2021-01-26 PROCEDURE — 92526 ORAL FUNCTION THERAPY: CPT

## 2021-01-26 PROCEDURE — 85610 PROTHROMBIN TIME: CPT

## 2021-01-26 PROCEDURE — 94761 N-INVAS EAR/PLS OXIMETRY MLT: CPT

## 2021-01-26 PROCEDURE — 2700000000 HC OXYGEN THERAPY PER DAY

## 2021-01-26 PROCEDURE — 82728 ASSAY OF FERRITIN: CPT

## 2021-01-26 PROCEDURE — 85025 COMPLETE CBC W/AUTO DIFF WBC: CPT

## 2021-01-26 PROCEDURE — 92610 EVALUATE SWALLOWING FUNCTION: CPT

## 2021-01-26 PROCEDURE — 6360000002 HC RX W HCPCS: Performed by: INTERNAL MEDICINE

## 2021-01-26 PROCEDURE — 6370000000 HC RX 637 (ALT 250 FOR IP): Performed by: SURGERY

## 2021-01-26 PROCEDURE — 99233 SBSQ HOSP IP/OBS HIGH 50: CPT | Performed by: INTERNAL MEDICINE

## 2021-01-26 PROCEDURE — 97535 SELF CARE MNGMENT TRAINING: CPT

## 2021-01-26 PROCEDURE — 2060000000 HC ICU INTERMEDIATE R&B

## 2021-01-26 PROCEDURE — 83735 ASSAY OF MAGNESIUM: CPT

## 2021-01-26 RX ORDER — WARFARIN SODIUM 5 MG/1
5 TABLET ORAL
Status: COMPLETED | OUTPATIENT
Start: 2021-01-26 | End: 2021-01-26

## 2021-01-26 RX ADMIN — Medication: at 21:33

## 2021-01-26 RX ADMIN — INSULIN LISPRO 5 UNITS: 100 INJECTION, SOLUTION INTRAVENOUS; SUBCUTANEOUS at 08:03

## 2021-01-26 RX ADMIN — CASTOR OIL AND BALSAM, PERU: 788; 87 OINTMENT TOPICAL at 21:33

## 2021-01-26 RX ADMIN — VANCOMYCIN HYDROCHLORIDE 125 MG: 125 CAPSULE ORAL at 12:38

## 2021-01-26 RX ADMIN — VANCOMYCIN HYDROCHLORIDE 125 MG: 125 CAPSULE ORAL at 07:54

## 2021-01-26 RX ADMIN — SODIUM CHLORIDE, PRESERVATIVE FREE 10 ML: 5 INJECTION INTRAVENOUS at 21:34

## 2021-01-26 RX ADMIN — INSULIN LISPRO 3 UNITS: 100 INJECTION, SOLUTION INTRAVENOUS; SUBCUTANEOUS at 08:03

## 2021-01-26 RX ADMIN — POTASSIUM CHLORIDE: 2 INJECTION, SOLUTION, CONCENTRATE INTRAVENOUS at 09:15

## 2021-01-26 RX ADMIN — CLONIDINE HYDROCHLORIDE 0.1 MG: 0.1 TABLET ORAL at 07:54

## 2021-01-26 RX ADMIN — CLONIDINE HYDROCHLORIDE 0.1 MG: 0.1 TABLET ORAL at 21:33

## 2021-01-26 RX ADMIN — Medication: at 17:13

## 2021-01-26 RX ADMIN — VANCOMYCIN HYDROCHLORIDE 125 MG: 125 CAPSULE ORAL at 17:12

## 2021-01-26 RX ADMIN — FAMOTIDINE 10 MG: 20 TABLET, FILM COATED ORAL at 07:55

## 2021-01-26 RX ADMIN — ASPIRIN 81 MG: 81 TABLET, CHEWABLE ORAL at 07:55

## 2021-01-26 RX ADMIN — DEXAMETHASONE SODIUM PHOSPHATE 6 MG: 10 INJECTION, SOLUTION INTRAMUSCULAR; INTRAVENOUS at 09:13

## 2021-01-26 RX ADMIN — INSULIN LISPRO 5 UNITS: 100 INJECTION, SOLUTION INTRAVENOUS; SUBCUTANEOUS at 21:36

## 2021-01-26 RX ADMIN — VANCOMYCIN HYDROCHLORIDE 125 MG: 125 CAPSULE ORAL at 21:35

## 2021-01-26 RX ADMIN — WARFARIN SODIUM 5 MG: 5 TABLET ORAL at 09:13

## 2021-01-26 RX ADMIN — INSULIN LISPRO 5 UNITS: 100 INJECTION, SOLUTION INTRAVENOUS; SUBCUTANEOUS at 17:19

## 2021-01-26 RX ADMIN — INSULIN GLARGINE 20 UNITS: 100 INJECTION, SOLUTION SUBCUTANEOUS at 21:36

## 2021-01-26 RX ADMIN — SODIUM CHLORIDE, PRESERVATIVE FREE 10 ML: 5 INJECTION INTRAVENOUS at 07:55

## 2021-01-26 RX ADMIN — CASTOR OIL AND BALSAM, PERU: 788; 87 OINTMENT TOPICAL at 07:55

## 2021-01-26 RX ADMIN — ATORVASTATIN CALCIUM 20 MG: 20 TABLET, FILM COATED ORAL at 07:54

## 2021-01-26 RX ADMIN — NIFEDIPINE 30 MG: 30 TABLET, FILM COATED, EXTENDED RELEASE ORAL at 07:55

## 2021-01-26 RX ADMIN — INSULIN LISPRO 12 UNITS: 100 INJECTION, SOLUTION INTRAVENOUS; SUBCUTANEOUS at 17:19

## 2021-01-26 ASSESSMENT — PAIN SCALES - GENERAL: PAINLEVEL_OUTOF10: 0

## 2021-01-26 NOTE — PROGRESS NOTES
Hospitalist Progress Note      PCP: Anastacia Jay    Date of Admission: 1/23/2021      Hospital Course:  \"  81yo WF with PMhx sig for DMII insulin dependent, HTN, hx of PE on warfarin presents with abnormal lab work. Brenda Montgomery has had diarrhea for past few days, decreased PO intake and was dx with COVID19 infection recently. Suki Tavares was started on 6L oxygen in ED - unclear how much oxygen she was requiring at SNF. Suki Tavares was discharged from [facility] 2 weeks ago for knee contusion.  Pt is requiring increasing amounts of oxygen but she is requiring IVF for acute renal failure and ongoing diarrhea.  Baseline creatinine is 1.2.  \"    Subjective: NO NAUSEA OR VOMITING, NO ABDOMINAL PAIN.         Medications:  Reviewed    Infusion Medications    sodium chloride Stopped (01/25/21 1708)    dextrose       Scheduled Medications    Venelex   Topical BID    collagenase   Topical BID    dexamethasone  6 mg Intravenous Q24H    famotidine  10 mg Oral Daily    vancomycin  125 mg Oral 4x Daily    insulin glargine  20 Units Subcutaneous Nightly    insulin lispro  5 Units Subcutaneous TID     aspirin  81 mg Oral Daily    atorvastatin  20 mg Oral Daily    vitamin D  5,000 Units Oral Once per day on Mon Wed Fri    cloNIDine  0.1 mg Oral BID    vitamin B-12  1,000 mcg Oral Once per day on Mon Wed Fri    NIFEdipine  30 mg Oral Daily    sodium chloride flush  10 mL Intravenous 2 times per day    warfarin (COUMADIN) daily dosing (placeholder)   Other RX Placeholder    insulin lispro  0-18 Units Subcutaneous TID     insulin lispro  0-9 Units Subcutaneous Nightly     PRN Meds: sodium chloride, sodium chloride flush, promethazine **OR** ondansetron, polyethylene glycol, acetaminophen **OR** acetaminophen, guaiFENesin-dextromethorphan, glucose, dextrose, glucagon (rDNA), dextrose      Intake/Output Summary (Last 24 hours) at 1/26/2021 0950  Last data filed at 1/26/2021 6243  Gross per 24 hour   Intake 868.75 ml Output 200 ml   Net 668.75 ml       Physical Exam Performed:    /74   Pulse 105   Temp 98.2 °F (36.8 °C) (Oral)   Resp 18   Ht 5' (1.524 m)   Wt 250 lb 3.6 oz (113.5 kg)   SpO2 92%   BMI 48.87 kg/m²     General appearance: tired  Neck: Supple  Respiratory:  Normal respiratory effort. Clear to auscultation, bilaterally without Rales/Wheezes/Rhonchi. Cardiovascular: Regular rate and rhythm with normal S1/S2 without murmurs, rubs or gallops. Abdomen: Soft, non-tender, non-distended with normal bowel sounds. Musculoskeletal: No clubbing, cyanosis  Neurologic:  No focal weakness   Psychiatric: Alert   Capillary Refill: Brisk,< 3 seconds   Peripheral Pulses: +2 palpable, equal bilaterally       Labs:   Recent Labs     01/24/21  0712 01/25/21  0700 01/26/21  0555   WBC 13.5* 14.7* 15.6*   HGB 7.4* 7.7* 8.2*   HCT 23.7* 24.8* 25.3*   * 752* 719*     Recent Labs     01/24/21  0712 01/25/21  0700 01/26/21  0555    144 145   K 3.9 4.2 3.8    112* 111*   CO2 15* 16* 21   BUN 84* 71* 56*   CREATININE 2.0* 1.6* 1.3*   CALCIUM 8.4 8.5 8.3     Recent Labs     01/23/21  1534   AST 17   ALT 13   BILITOT 0.3   ALKPHOS 86     Recent Labs     01/24/21  0712 01/25/21  0700 01/26/21  0555   INR 3.57* 2.43* 1.90*     No results for input(s): CKTOTAL, TROPONINI in the last 72 hours. Urinalysis:      Lab Results   Component Value Date    NITRU Negative 01/23/2021    WBCUA 6 01/23/2021    BACTERIA 1+ 01/08/2021    RBCUA 0-2 01/23/2021    BLOODU Negative 01/23/2021    SPECGRAV 1.019 01/23/2021    GLUCOSEU Negative 01/23/2021       Radiology:  XR CHEST PORTABLE   Final Result   Vascular congestion/mild pulmonary edema. Pattern is stable from prior exam.         XR CHEST PORTABLE   Final Result   Right PICC line with catheter tip upper SVC. Suspected multifocal pneumonia.                  Assessment/Plan:    Active Hospital Problems    Diagnosis    Acute respiratory failure with hypoxia ([address] [J96.01]    Sepsis ([facility] [A41.9]    Pneumonia due to COVID-19 virus [U07.1, J12.82]    KIKO (acute kidney injury) ([facility] [N17.9]    Anticoagulated on Coumadin [Z79.01]    Chronic wound infection of abdomen [S31.109A, L08.9]    Poorly controlled type 2 diabetes mellitus ([facility] [E11.65]    HTN (hypertension) [I10]    Morbid obesity due to excess calories ([facility] [E66.01]    Acute kidney injury superimposed on chronic kidney disease ([facility] [N17.9, N18.9]     1. COVID19 pneumonia, started on decadron, oxygen and supportive measures. consulted ID, plasma ordered. 2. Acute renal failure on CKDII, continue to improve. 3. C diff diarrhea, started on oral vancomycin  4. Acute resp failure with hypoxia, on 8 L currently   5. Sepsis POA, due to COVID 19 and cdif diarrhea, ID consulted, recommendations appreciated. 6. Morbid Obesity, Body mass index is Body mass index is 46.67 kg/m².   7. DMII uncontrolled, due to steroids, better controlled now. 8. Buttock wound, GS consulted based on wound care recommendations, supportive measures. 9. Anemia, appears chronic, monitor. 10. Thrombocytosis, likely reactive, monitor.  Minimally decreased this am.      DVT Prophylaxis: coumadin  Diet: DIET CARB CONTROL;  Code Status: Full Code        Dispo - ongoing management     Christian Rees MD

## 2021-01-26 NOTE — PROGRESS NOTES
Occupational Therapy  Facility/Department: [address] MED SURG  Daily Treatment Note  NAME: Ayanna Villasenor  : 1935  MRN: [id number]    Date of Service: 2021    Assessment: Pt tolerated session fair this date - reports feeling better than previous date however continues to require 6L of O2 initially and had to be increase to 8L of O2 by end of session. Pt required up to max Ax2 for bed mobility with several rolls in bed for toileting needs. Pt completed toileting with total A and use of bed pan. Attempted stand to delano cabrales however minimal effort by patient d/t increased HR and O2 dropping into upper 70s. Deferred OOB activity and returned to bed. Anticipate pt to require maxi move for mobility and max Ax-1-2 for ADLs. Pt remains unsafe to d/c home and would benefit from continued therapy to increase mobility, safety, and independence. Discharge Recommendations:  3-5 sessions per week     Ayanna Villasenor scored a 13/24 on the AM-PAC ADL Inpatient form. Current research shows that an AM-PAC score of 17 or less is typically not associated with a discharge to the patient's home setting. Based on the patient's AM-PAC score and their current ADL deficits, it is recommended that the patient have 3-5 sessions per week of Occupational Therapy at d/c to increase the patient's independence. Please see assessment section for further patient specific details. If patient discharges prior to next session this note will serve as a discharge summary. Please see below for the latest assessment towards goals. Assessment   Performance deficits / Impairments: Decreased functional mobility ; Decreased balance;Decreased ADL status; Decreased endurance;Decreased strength  Assessment: Pt tolerated session fair this date - reports feeling better than previous date however continues to require 6L of O2 initially and had to be increase to 8L of O2 by end of session.  Pt required up to max Ax2 for bed mobility with several rolls in bed for toileting needs. Pt completed toileting with total A and use of bed pan. Attempted stand to delano cabrales however minimal effort by patient d/t increased HR and O2 dropping into upper 70s. Deferred OOB activity and returned to bed. Anticipate pt to require maxi move for mobility and max Ax-1-2 for ADLs. Pt remains unsafe to d/c home and would benefit from continued therapy to increase mobility, safety, and independence. OT Education: Transfer Training;OT Role;Plan of Care;Precautions; ADL Adaptive Strategies  REQUIRES OT FOLLOW UP: Yes  Activity Tolerance  Activity Tolerance: Patient limited by fatigue  Activity Tolerance: Pt on 6L of O2 upon entry with O2 sats in low 90s. With bed mobility - several rolls and sit <> supine dropped to upper 70s requiring 8L of O2 by end of session to maintain low 90s. Pt HR elevated to 130-140s, recoved back to 100s. Safety Devices  Safety Devices in place: Yes(CARMEL Miller) notified)  Type of devices: Nurse notified;Call light within reach; Left in bed;Bed alarm in place         Patient Diagnosis(es): The primary encounter diagnosis was KIKO (acute kidney injury) ([address] Diagnoses of C. difficile diarrhea, Dehydration, and COVID-19 were also pertinent to this visit. has a past medical history of Acute kidney injury ([address] Arthritis, Cerebral artery occlusion with cerebral infarction ([address] COVID-19, Diabetes mellitus ([address] Hypertension, and Pulmonary embolism ([address] has a past surgical history that includes Abdomen surgery (N/A, 2/15/2019) and Abdomen surgery (N/A, 3/19/2019). Restrictions  Restrictions/Precautions  Restrictions/Precautions: Fall Risk, Isolation, Contact Precautions  Position Activity Restriction  Other position/activity restrictions: Droplet Plus :  COVID +. O2 6L via High Flow (required increase to 8L by end of session). Contact : C-Diff.      Subjective   General  Chart Reviewed: Yes  Patient assessed for rehabilitation services?: Yes  Additional Pertinent Hx: Per Jazmin Castorena MD: Pt is \"82year-old female with past medical history of diabetes mellitus, hypertension, history of pulmonary embolism on Coumadin presented to the hospital with abnormal lab work. Patient lives abdomen [facility] nursing facility and they had done routine lab work which showed elevated BUN and creatinine and she was sent to the hospital.  Patient denies any specific complaints. She said she has been having diarrhea for a few days and has been having decreased p.o. intake. She states that her shortness of breath is at her baseline after being diagnosed with a COVID-19 infection. Patient was placed on 6 L of oxygen in the ER. Is unknown how much oxygen patient was on at the nursing facility. On arrival to the hospital she was noted to be hemodynamically stable. Lab work showed KIKO, hyperglycemia. Patient will be admitted to the hospital for the management\"  Family / Caregiver Present: No  Referring Practitioner: Jazmin Castorena MD  Diagnosis: Pneumonia d/t COVID19  Subjective  Subjective: Pt met bedside, reports she is feeling better however continues to have SOB and desats with bed mobility. Vital Signs  Patient Currently in Pain: (No complaints of pain)        Objective    ADL  Toileting: (Total A to place bed pan, manage gown and complete pericare. Required increased time for clean up and full bed change two times. Pt required use of bed pan two times. Assist to don cream to buttocks)  Additional Comments: Prior to previous hospitalization pt was independent in self-care tasks, since last hospital stay anticipate staff at [facility] were assisting with self-care tasks. Anticipate pt now requiring mod/max A for bathing/dressing/toileting needs. Pt declined completing further self-care tasks at this time. Balance  Sitting Balance: Contact guard assistance(Pt tolerated sitting EOB for ~5 minutes with SBA/CGA.  Required frequent cueing.)  Functional form. Current research shows that an AM-PAC score of 17 or less is typically not associated with a discharge to the patient's home setting. Based on the patient's AM-PAC score and their current ADL deficits, it is recommended that the patient have 3-5 sessions per week of Occupational Therapy at d/c to increase the patient's independence. Please see assessment section for further patient specific details. If patient discharges prior to next session this note will serve as a discharge summary. Please see below for the latest assessment towards goals. AM-PAC Inpatient Daily Activity Raw Score: 13 (01/26/21 1009)  AM-PAC Inpatient ADL T-Scale Score : 32.03 (01/26/21 1009)  ADL Inpatient CMS 0-100% Score: 63.03 (01/26/21 1009)  ADL Inpatient CMS G-Code Modifier : CL (01/26/21 1009)    Goals  Short term goals  Time Frame for Short term goals: prior to d/c: status of goals ongoing as of 1/26/21  Short term goal 1: Pt will complete bed mobility with min A  Short term goal 2: Pt will complete sit <> stand transfers with min A  Short term goal 3: Pt will complete stand pivot transfer with min A  Short term goal 4: Pt will tolerate 1+ minute of standing activity to increase strength and activity tolerance for self-care and transfers with O2 sats WFL. Patient Goals   Patient goals : \"to feel better\"       Therapy Time   Individual Concurrent Group Co-treatment   Time In       8241   Time Out       1005   Minutes       60   Timed Code Treatment Minutes: 61 Minutes     If pt is discharged prior to next OT session, this note will serve as the discharge summary.     JANI EspinozaU/O#[id number]

## 2021-01-26 NOTE — PROGRESS NOTES
Speech Language Pathology  Facility/Department: [address] MED SURG   CLINICAL BEDSIDE SWALLOW EVALUATION    NAME: Burgess Whitmore  : 1935  MRN: [id number]    ADMISSION DATE: 2021  ADMITTING DIAGNOSIS: The primary encounter diagnosis was KIKO (acute kidney injury) ([address] Diagnoses of C. difficile diarrhea, Dehydration, and COVID-19 were also pertinent to this visit. has Abdominal wall cellulitis; Poorly controlled type 2 diabetes mellitus (HCC); HTN (hypertension); CKD (chronic kidney disease) stage 3, GFR 30-59 ml/min; History of pulmonary embolism; Chronic abdominal wound infection, subsequent encounter; Open abdominal wall wound, subsequent encounter; Severe infection; Acute kidney injury superimposed on chronic kidney disease ([address] Diverticulosis of colon without diverticulitis; Morbid obesity due to excess calories ([address] Open wound of abdomen; Diabetic ulcer of right midfoot associated with type 2 diabetes mellitus, with fat layer exposed ([address] Chronic wound infection of abdomen; History of arterial ischemic stroke; Anticoagulated on Coumadin; MRSA colonization; Acinetobacter lwoffi infection; Citrobacter infection; Weight loss counseling, encounter for; Proteus infection; Frequent falls; KIKO (acute kidney injury) ([address] Acute respiratory failure with hypoxia ([address] Sepsis ([address] and Pneumonia due to COVID-19 virus on their problem list.   has a past medical history of Acute kidney injury ([address] Arthritis, Cerebral artery occlusion with cerebral infarction ([address] COVID-19, Diabetes mellitus ([address] Hypertension, and Pulmonary embolism ([address] History of a  EGD with findings of small hiatal hernia and erosive esophagitis grade A and erosive gastritis  Chart review indicates pt lives alone and is active with [facility].  Pt gets meals on wheels  ONSET DATE: 2021    Date of Eval: 2021  Evaluating Therapist: Patience Alberto    Chart Review  MD History and Physical Documentation revealed: History Of Present Illness:   80-year-old female with past medical history of diabetes mellitus, hypertension, history of pulmonary embolism on Coumadin presented to the hospital with abnormal lab work. [facility] and they had done routine lab work which showed elevated BUN and creatinine and she was sent to the hospital.  Patient denies any specific complaints. She said she has been having diarrhea for a few days and has been having decreased p.o. intake. She states that her shortness of breath is at her baseline after being diagnosed with a COVID-19 infection. Patient was placed on 6 L of oxygen in the ER. Is unknown how much oxygen patient was on at the nursing facility. On arrival to the hospital she was noted to be hemodynamically stable. Lab work showed KIKO, hyperglycemia. Patient will be admitted to the hospital for the management    1/24/2021 Chest XR  Impression   Vascular congestion/mild pulmonary edema.  Pattern is stable from prior exam.         Current Diet level:  Current Diet : Regular  Current Liquid Diet : Thin      Primary Complaint   Therapy staff noted problems with am meal today      Reason for Referral  Doreen Li was referred for a bedside swallow evaluation to assess the efficiency of her swallow function, identify signs and symptoms of aspiration and make recommendations regarding safe dietary consistencies, effective compensatory strategies, and safe eating environment. Assessment Impression  1. Pt was awake in bed. Pt was oriented to self; and partially to place / date and situation. Pt was dependent on repositioning in bed. Pt was verbally responsive but at times a conflicting historian. Pt was able to follow one step commands. 2. Dysphagia Diagnosis: Mild oral stage dysphagia;Mild pharyngeal stage dysphagia.  Dysphagia characterized by prolonged; at times incomplete mastication; disorganized bolus formation/cohesion; disorganized A-P oral transit; delayed initiation of swallow; potential reduced laryngeal elevation. Pt with post swallow oral residue which she is able to clear with 1-2 clearance swallows. No overt clinical s/s of aspiration post swallow;no voice quality changespost swallow; no pt comlaints post swallow. Would suggest trial downgrade to dysphagia soft/bite size; positioning upright for allmeals and SLP f/u x 3-4  Discussed with RN    Dysphagia Outcome Severity Scale: Level 5: Mild dysphagia- Distant supervision. May need one diet consistency restricted     Treatment Plan  Requires SLP Intervention: Yes  Duration/Frequency of Treatment: 3-4 times  D/C Recommendations: To be determined       Recommended Diet and Intervention  Diet Solids Recommendation: Dysphagia Soft and Bite-Sized (Dysphagia III)  Liquid Consistency Recommendation: Thin  Recommended Form of Meds: Meds in puree     Therapeutic Interventions: Diet tolerance monitoring;Patient/Family education; Therapeutic PO trials with SLP    Compensatory Swallowing Strategies  Compensatory Swallowing Strategies: Upright as possible for all oral intake;Small bites/sips;Swallow 2 times per bite/sip; Remain upright for 30-45 minutes after meals    Treatment/Goals   Pt identified a goal if getting foods i like  Dysphagia Goals: The patient will tolerate dysphagia soft/bite size food consistencies with thin diet without observed clinical signs of aspiration; The patient/caregiver will demonstrate understanding of compensatory strategies for improved swallowing safety. General  Chart Reviewed: Yes  Behavior/Cognition: Alert; Cooperative;Pleasant mood(oreitned to self; place; DX of covid and partiallyto date. Pt at times was a conflicting historian. Pt was dependent for all positioning.  Pt was able to follow one step simple commands with intermittent cues)  Respiratory Status: O2 via nasual cannula(6L/min O2 via nasal canula)  Communication Observation: Functional(for basic wants/needs and surface exchange. Conflicting historian)  Follows Directions: Simple(intermittent cues;howver was dependent on positioning in bed)  Dentition: Edentulous(ptreports dentures are at home)  Prior Dysphagia History: PT denies history of problems. Pt does report hard to chew certain foods due to not having teeth    Patient Positioning: Upright in bed(80 + degrees upright)    Consistencies Administered: Dysphagia Soft and Bite-Sized (Dysphagia III); Dysphagia Minced and Moist (Dysphagia II); Dysphagia Pureed (Dysphagia I); Honey - cup;Honey - straw;Nectar - cup;Nectar - straw; Thin - cup; Thin - straw;Mixed Consistencies    Pain : denied    Vision/Hearing    WFL for procedure    Oral Motor Deficits  Oral/Motor  Oral Motor: Exceptions to [facility]  Labial ROM: (good symmetrical rom/strength)  Lingual ROM: (good rom; midline protrusion/elevation)  Lingual Coordination: Reduced  Velum: (fairly symmetrical during phonatoin of /a/)  Gag: (diminished)  Vocal Quality: (unremarkable)  Volitional Cough: Weak(dry in quality)  Volitional Swallow: Delayed    Oral Phase Dysfunction  Oral Phase  Oral Phase: Exceptions  Oral Phase Dysfunction  Impaired Mastication: Soft Solid(at times incomplete mastication; prolonged. Did not administer regular hard solid due to reports \"that is too hard\")  Decreased Anterior to Posterior Transit: Soft solid;Mechanical soft(disorganized)  Suspected Premature Bolus Loss: (potential)  Lingual/Palatal Residue: Soft solid;Mechanical soft     Indicators of Pharyngeal Phase Dysfunction   Pharyngeal Phase  Pharyngeal Phase: Exceptions  Indicators of Pharyngeal Phase Dysfunction  Delayed Swallow: All  Pharyngeal Phase   Pharyngeal: No immediate overt clinical s/s of aspiration post swallow; no voice quality changes post swallow; no pt complaints.     Prognosis  Prognosis  Prognosis for safe diet advancement: good(guarded medical DX; reduced mobility)  Individuals consulted  Consulted and agree with results and recommendations: Patient;RN    Education  Patient Education Response: Verbalizes understanding;Needs reinforcement  Safety Devices in place: Yes  Type of devices: Bed alarm in place;Call light within reach;Nurse notified       Therapy Time  SLP Individual Minutes  Time In: 5865  Time Out: 0500  Minutes: 40   coded treatment time: 0       Signed   Stephanie Tripp MS,CCC,SLP [id number]  Speech and Language Pathologist  1/26/2021 1:42 PM

## 2021-01-26 NOTE — FLOWSHEET NOTE
Pt received one unit of convalescent plasma per orders and is now finished. VSS. Pt tolerated well. Pt resting comfortably.

## 2021-01-26 NOTE — PLAN OF CARE
Problem: Falls - Risk of:  Goal: Will remain free from falls  Description: Will remain free from falls  1/26/2021 0924 by Faisal Craig RN  Outcome: Ongoing  Note: Fall risk preventions in place. Bed in lowest position, call light within reach, non-skid socks on when ambulating, bed alarm on, bed wheels locked. Pt. Educated and agreeable on usage of call light before ambulation. Problem: Falls - Risk of:  Goal: Absence of physical injury  Description: Absence of physical injury  1/26/2021 0924 by Faisal Craig RN  Outcome: Ongoing     Problem: Airway Clearance - Ineffective  Goal: Achieve or maintain patent airway  1/26/2021 0924 by Faisal Craig RN  Outcome: Ongoing     Problem: Gas Exchange - Impaired  Goal: Absence of hypoxia  1/26/2021 0924 by Faisal Craig RN  Outcome: Ongoing     Problem: Gas Exchange - Impaired  Goal: Promote optimal lung function  1/26/2021 0924 by Faisal Craig RN  Outcome: Ongoing     Problem: Breathing Pattern - Ineffective  Goal: Ability to achieve and maintain a regular respiratory rate  1/26/2021 0924 by Faisal Craig RN  Outcome: Ongoing     Problem: Body Temperature -  Risk of, Imbalanced  Goal: Will regain or maintain usual level of consciousness  1/26/2021 0924 by Faisal Craig RN  Outcome: Ongoing     Problem:  Body Temperature -  Risk of, Imbalanced  Goal: Complications related to the disease process, condition or treatment will be avoided or minimized  1/26/2021 0924 by Faisal Craig RN  Outcome: Ongoing     Problem: Isolation Precautions - Risk of Spread of Infection  Goal: Prevent transmission of infection  1/26/2021 0924 by Faisal Craig RN  Outcome: Ongoing     Problem: Risk for Fluid Volume Deficit  Goal: Maintain normal serum potassium, sodium, calcium, phosphorus, and pH  1/26/2021 0924 by Faisal Craig RN  Outcome: Ongoing     Problem: Loneliness or Risk for Loneliness  Goal: Demonstrate positive use of time alone when socialization is not possible  1/26/2021 0924 by Thalia Bang RN  Outcome: Ongoing     Problem: Skin Integrity:  Goal: Will show no infection signs and symptoms  Description: Will show no infection signs and symptoms  1/26/2021 0924 by Thalia Bang RN  Outcome: Ongoing  Note:   Pt will continue daily activities as tolerated and alert RN to any pain and skin changes. RN will continue to assess skin condition, note changes, and take preventative measures against skin breakdown such as movement, foam/silicone dressings on bony prominences, and making sure pt has adequate nutrition.           Problem: Skin Integrity:  Goal: Absence of new skin breakdown  Description: Absence of new skin breakdown  1/26/2021 0924 by Thalia Bang RN  Outcome: Ongoing

## 2021-01-26 NOTE — CONSULTS
Clinical Pharmacy Note  Warfarin Consult    Lyssa Reyes is a 82 y.o. female receiving warfarin managed by pharmacy. Patient being bridged with: none    Warfarin Indication: Hx VTE  Target INR range: 2-3  Dose prior to admission:   Verified dose with [facility] - Warfarin has been on hold for several days due to supratherapeutic INR - received a couple doses of phytonadione at the facility. Dose prior to INR being significantly elevated was 2.5 mg TUE/SAT and 5 mg ALL OTHER DAYS    Current warfarin drug-drug interactions: Dexamethasone    Recent Labs     01/24/21  0712 01/25/21  0700 01/26/21  0555   HGB 7.4* 7.7* 8.2*   HCT 23.7* 24.8* 25.3*   INR 3.57* 2.43* 1.90*       Assessment/Plan:      Warfarin 5 mg now. May need to bridge with Lovenox or Heparin if INR drops lower tomorrow. Dealing with the Vit K given at [facility] and addition of Decadron here may be challenging. Daily PT/INR until stable within therapeutic range. Thank you for the consult. Will continue to follow.     Cong España Rph 1/26/2021 8:50 AM

## 2021-01-26 NOTE — PLAN OF CARE
Problem: Falls - Risk of:  Goal: Will remain free from falls  Description: Will remain free from falls  1/25/2021 2315 by Johnnie Castro RN  Outcome: Ongoing  1/25/2021 1804 by Barb Tena RN  Outcome: Ongoing  Goal: Absence of physical injury  Description: Absence of physical injury  1/25/2021 2315 by Johnnie Castro RN  Outcome: Ongoing  1/25/2021 1804 by Barb Tena RN  Outcome: Ongoing   Pt free from falls this shift. Fall precautions in place at all times. Call light always within reach. Pt able and agreeable to contact for safety appropriately. Problem: Airway Clearance - Ineffective  Goal: Achieve or maintain patent airway  1/25/2021 2315 by Johnnie Castro RN  Outcome: Ongoing  1/25/2021 1804 by Barb Tena RN  Outcome: Ongoing     Problem: Gas Exchange - Impaired  Goal: Absence of hypoxia  1/25/2021 2315 by Johnnie Castro RN  Outcome: Ongoing  1/25/2021 1804 by Barb Tena RN  Outcome: Ongoing  Goal: Promote optimal lung function  1/25/2021 2315 by Johnnie Castro RN  Outcome: Ongoing  1/25/2021 1804 by Barb Tena RN  Outcome: Ongoing     Problem: Breathing Pattern - Ineffective  Goal: Ability to achieve and maintain a regular respiratory rate  1/25/2021 2315 by Johnnie Castro RN  Outcome: Ongoing  1/25/2021 1804 by Barb Tena RN  Outcome: Ongoing     Problem:  Body Temperature -  Risk of, Imbalanced  Goal: Ability to maintain a body temperature within defined limits  1/25/2021 2315 by Johnnie Castro RN  Outcome: Ongoing  1/25/2021 1804 by Barb Tena RN  Outcome: Ongoing  Goal: Will regain or maintain usual level of consciousness  1/25/2021 2315 by Johnnie Castro RN  Outcome: Ongoing  1/25/2021 1804 by Barb Tena RN  Outcome: Ongoing  Goal: Complications related to the disease process, condition or treatment will be avoided or minimized  1/25/2021 2315 by Johnnie Castro RN  Outcome: Ongoing  1/25/2021 1804 by Barb Tena RN  Outcome: Ongoing     Problem: Isolation Precautions - Risk of Spread of Infection  Goal: Prevent transmission of infection  1/25/2021 2315 by Francisco Javier Sanders RN  Outcome: Ongoing  1/25/2021 1804 by Samara Green RN  Outcome: Ongoing     Problem: Nutrition Deficits  Goal: Optimize nutritional status  1/25/2021 2315 by Francisco Javier Sanders RN  Outcome: Ongoing  1/25/2021 1804 by Samara Green RN  Outcome: Ongoing     Problem: Loneliness or Risk for Loneliness  Goal: Demonstrate positive use of time alone when socialization is not possible  1/25/2021 2315 by Francisco Javier Sanders RN  Outcome: Ongoing  1/25/2021 1804 by Samara Green RN  Outcome: Ongoing     Problem: Fatigue  Goal: Verbalize increase energy and improved vitality  1/25/2021 2315 by Francisco Javier Sanders RN  Outcome: Ongoing  1/25/2021 1804 by Samara Green RN  Outcome: Ongoing     Problem: Patient Education: Go to Patient Education Activity  Goal: Patient/Family Education  1/25/2021 2315 by Francisco Javier Sanders RN  Outcome: Ongoing  1/25/2021 1804 by Samara Green RN  Outcome: Ongoing     Problem: Skin Integrity:  Goal: Will show no infection signs and symptoms  Description: Will show no infection signs and symptoms  1/25/2021 2315 by Francisco Javier Sanders RN  Outcome: Ongoing  1/25/2021 1804 by Samara Green RN  Outcome: Ongoing  Goal: Absence of new skin breakdown  Description: Absence of new skin breakdown  1/25/2021 2315 by Francisco Javier Sanders RN  Outcome: Ongoing  1/25/2021 1804 by Samara Green RN  Outcome: Ongoing

## 2021-01-26 NOTE — PROGRESS NOTES
Nurse called [facility] requesting C-diff test results requested by Dr. Paramjit Martinez. Nurse at [facility] took fax number and stated would send ASAP.      Electronically signed by Thony Sewell RN on 1/26/2021 at 1:00 PM

## 2021-01-26 NOTE — CARE COORDINATION
Call to [facility] [phone], left message for admissions to check Covid policy for admissions. Also faxed referral via Epic. Ne Schroeder RN, BSN, Case Management  Phone: [phone]  Electronically signed by Ne Schroeder RN on 1/26/2021 at 10:13 AM     Spoke with [facility], they cannot accept until 60-90 days after Covid. Spoke with daughter, Rhoda Evans, she will review the list of Covid facilities and contact  with choices.   Ne Schroeder RN, BSN, Case Management  Phone: [phone]  Electronically signed by Ne Schroeder RN on 1/26/2021 at 4:24 PM

## 2021-01-26 NOTE — PROGRESS NOTES
[facility] NEPHROLOG[facility]                                                 [phone]                                      [facility]                                        Inpatient Progress note    Summary:   Lizeth Garrido is being seen by nephrology for KIKO. 81 yo female with COVID pneumonia and diarrhea. Interval History  Seen from doorway. She is working with therapy. Spoke with RN, PO intake better today   Still fatigued. /74  Afebrile,  and 92% on 6 L    cc, not all recorded. Na 145  k 3.8  Bicarb 16 > 21  BUN 80s > 56  Cr 2.2 >1.6 >1.3    Plan:   - renal function at baseline and acidosis improved   - encourage PO hydration and avoid diuretics. - BP acceptable on current regimen, no change. [facility] thanks you for the opportunity to serve this patient. Please call with any questions of concerns. Althea Pastor MD  [facility]  [address], [address]  Fax: [phone]  Office: [phone]    Assessment:   # KIKO  Related poor PO and ongoing diarrhea after COVID infection  Baseline Cr 1.2-1.4  CKD due to DM and HTN presumably. Cr peaked at 2.2  UA showed 6 WBC  No RBC no protein, overall bland. # NAGMA  Due to diarrhea. Bicarb deficit due to above   Bicarb 16 day of consult. Had bicarb fluids and improved. # electrolytes:   Mild hypernatremia. # BP  Acceptable  On clonidine and procardia. Holding lisinopril     # Volume:   Appears euvolemic. Last EF 55% in 2011? #  Full code. ROS:   Positives Listed Bold. All other remaining systems are negative. Constitutional:  fever, chills, weakness, weight change, fatigue,      Skin:  rash, pruritus, hair loss, bruising, dry skin, petechiae. Head, Face, Neck   headaches, swelling,  cervical adenopathy.      Respiratory: shortness of breath, cough, or wheezing  Cardiovascular: chest pain, palpitations, dizzy, edema  Gastrointestinal: nausea, vomiting, diarrhea, constipation,belly pain    Yellow skin, blood in stool  Musculoskeletal:  back pain, muscle weakness, gait problems,       joint pain or swelling. Genitourinary:  dysuria, poor urine flow, flank pain, blood in urine  Neurologic:  vertigo, TIA'S, syncope, seizures, focal weakness  Psychosocial:  insomnia, anxiety, or depression. Additional positive findings: -     PMH:   Past medical history, surgical history, social history, family history are reviewed and updated as appropriate. Reviewed current medication list.   Allergies reviewed and updated as needed. PE:   Vitals:    01/26/21 0753   BP: 139/74   Pulse: 105   Resp: 18   Temp: 98.2 °F (36.8 °C)   SpO2: 92%       Due to the current efforts to prevent transmission of COVID-19 and also the need to preserve PPE for other caregivers, a face-to-face encounter with the patient was not performed. That being said, all relevant records and diagnostic tests were reviewed, including laboratory results and imaging. Please reference any relevant documentation elsewhere. Care will be coordinated with the primary service.           Lab Results   Component Value Date    CREATININE 1.3 (H) 01/26/2021    BUN 56 (H) 01/26/2021     01/26/2021    K 3.8 01/26/2021     (H) 01/26/2021    CO2 21 01/26/2021      Lab Results   Component Value Date    WBC 15.6 (H) 01/26/2021    HGB 8.2 (L) 01/26/2021    HCT 25.3 (L) 01/26/2021    MCV 84.7 01/26/2021     (H) 01/26/2021     Lab Results   Component Value Date    PTH 44.9 03/21/2019    CALCIUM 8.3 01/26/2021    PHOS 4.3 01/10/2021

## 2021-01-26 NOTE — PROGRESS NOTES
Physical Therapy  Facility/Department: [address] MED SURG  Daily Treatment Note  NAME: Boaz Llanos  : 1935  MRN: [id number]    Date of Service: 2021    Discharge Recommendations:  Patient would benefit from continued therapy after discharge, 3-5 sessions per week   PT Equipment Recommendations  Other: Defer to next level of care. Boaz Llanos scored a 6/24 on the AM-PAC short mobility form. Current research shows that an AM-PAC score of 17 or less is typically not associated with a discharge to the patient's home setting. Based on the patient's AM-PAC score and their current functional mobility deficits, it is recommended that the patient have 3-5 sessions per week of Physical Therapy at d/c to increase the patient's independence. Please see assessment section for further patient specific details. If patient discharges prior to next session this note will serve as a discharge summary. Please see below for the latest assessment towards goals. Assessment   Body structures, Functions, Activity limitations: Decreased functional mobility ; Decreased ROM; Decreased strength;Decreased endurance;Decreased balance  Assessment: 81 y/o female admit from [facility] on 2021 with KIKO, Diarrhea, COVID + (1/10/2021). PMH as noted including CVA, PE, Abd Wound/Debride, OA, L TKR. Recent admit 21 - 21 following fall at home - discharge to [facility]. Pt reports she was independent with functional mobility prior to 21 admission, using RW for ambulation - [facility] and [facility]. Pt currently functioning well below baseline - requiring max A of 2 for all bed mobility - dependent for sit<>stand and bed<>chair transfers. Recommend continued skilled therapy 3-5x/week to maximize independence and safety with functional mobility.   Treatment Diagnosis: impaired mobility  Prognosis: Fair  Decision Making: Medium Complexity  History: 81 y/o female admit 1/23/2021 with KIKO, Diarrhea, COVID + (1/10/2021). Recent admit 1/8/1/11/2021 following fall at home (d/c to SNF setting). PMH as noted including CVA, PE, Abd Wound/Debride, OA, L TKR. Exam: See above. Clinical Presentation: See above. PT Education: PT Role;Plan of Care;General Safety;Pressure Relief; Functional Mobility Training  Barriers to Learning: Endurance/Debility. REQUIRES PT FOLLOW UP: Yes  Activity Tolerance  Activity Tolerance: Patient limited by endurance; Patient limited by fatigue;Treatment limited secondary to medical complications (free text)     Patient Diagnosis(es): The primary encounter diagnosis was KIKO (acute kidney injury) ([facility] [address] Diagnoses of C. difficile diarrhea, Dehydration, and COVID-19 were also pertinent to this visit. has a past medical history of Acute kidney injury ([address] Arthritis, Cerebral artery occlusion with cerebral infarction ([address] COVID-19, Diabetes mellitus ([address] Hypertension, and Pulmonary embolism ([address] has a past surgical history that includes Abdomen surgery (N/A, 2/15/2019) and Abdomen surgery (N/A, 3/19/2019). Restrictions  Restrictions/Precautions  Restrictions/Precautions: Fall Risk, Isolation, Contact Precautions  Position Activity Restriction  Other position/activity restrictions: Droplet Plus :  COVID +. O2 6L via High Flow (required increase to 8L by end of session). Contact : C-Diff. Subjective   General  Chart Reviewed: Yes  Additional Pertinent Hx: 83 y/o female admit 1/23/2021 with KIKO, Diarrhea, COVID + (1/10/2021). Recent admit 1/8/1/11/2021 following fall at home (d/c to SNF setting). PMH as noted including CVA, PE, Abd Wound/Debride, OA, L TKR. Response To Previous Treatment: Patient with no complaints from previous session.   Family / Caregiver Present: No  Referring Practitioner: Dr. Carolina Castorena: Pt is agreeable to PT          Orientation  Orientation  Overall Orientation Status: Within Functional Limits Cognition   Cognition  Overall Cognitive Status: [facility]  Cognition Comment: More alert and engaged today. Reports she is feeling better and willing to try all suggestions however d/t extreme weakness/fatigue unsuccessful in OOB activity. Pt reported she felt like she was going to die yesterday. Objective   Bed mobility  Rolling to Left: Maximum assistance;2 Person assistance  Rolling to Right: Maximum assistance;2 Person assistance  Supine to Sit: Maximum assistance;2 Person assistance  Sit to Supine: Maximum assistance;2 Person assistance  Scooting: Dependent/Total  Comment: Pt requesting to use bed pan - states she cannot wait long enough to use stedy and get to toilet. Pt requiring max A of 2 person for rolling, bed pan placement, pericare, application of medicated wound cream. Pt requested the bed pan twice. Pt also required max A of 2 person for supine>sit - set up to stedy - pt with decreased responsiveness, HR to 140's, O2 dropping into upper 70's - unable to put forth any effort into standing. Pt brought back into supine, O2 increased to 8L, pt becoming more responsive, linens and colton pad changed, set up in bed to eat with HOB ~40*. Pt coughing while eating - states it's been going on for some time - nsg aware. Transfers  Sit to Stand: Unable to assess  Stand to sit: Unable to assess  Comment: Attempted to stand but pt unable to provide any effort  Ambulation  Ambulation?: No     Balance  Posture: Fair  Sitting - Static: Fair  Sitting - Dynamic: Fair;-              AM-PAC Score  AM-PAC Inpatient Mobility Raw Score : 6 (01/26/21 1008)  AM-PAC Inpatient T-Scale Score : 23.55 (01/26/21 1008)  Mobility Inpatient CMS 0-100% Score: 100 (01/26/21 1008)  Mobility Inpatient CMS G-Code Modifier : CN (01/26/21 1008)          Goals  Short term goals  Time Frame for Short term goals: Upon d/c acute care - all goals ongoing as of 1/26/21  Short term goal 1: Bed Mob Mod assist x 2. Short term goal 2: EOB SBA;  Bryon Eubanks assist scooting EOB. Short term goal 3: Transfer via Stedy Mod/Max assist x 2. Short term goal 4: Pt participating approp Strength Exs. Patient Goals   Patient goals : Be able to return home eventually. Plan    Plan  Times per week: 3-5x/week  Current Treatment Recommendations: Strengthening, Functional Mobility Training, Transfer Training, Endurance Training, Safety Education & Training, Patient/Caregiver Education & Training, Gait Training, Balance Training, Neuromuscular Re-education  Safety Devices  Type of devices:  All fall risk precautions in place, Call light within reach, Patient at risk for falls, Left in bed, Bed alarm in place, Nurse notified     Therapy Time   Individual Concurrent Group Co-treatment   Time In 0905         Time Out 1005         Minutes 60         Timed Code Treatment Minutes: [address]ley, PT

## 2021-01-26 NOTE — PROGRESS NOTES
Arthritis     Cerebral [facility] occlusion with [facility] infarction [facility]     COVID-19     Diabetes mellitus ([facility]     Hypertension     Pulmonary embolism ([facility]     On warfarin       Past Surgical History:    Past Surgical History:   Procedure Laterality Date    ABDOMEN SURGERY N/A 2/15/2019    DEBRIDEMENT OF OPEN ABDOMINAL WOUND performed by Estanislado Krabbe, MD at [facility] N/A 3/19/2019    RIGHT LOWER QUADRANT ABDOMINAL DEBRIDEMENT performed by Estanislado Krabbe, MD at [address] [place] [address]       Current Medications:    Outpatient Medications Marked as Taking for the 1/23/21 encounter [facility] Encounter)   Medication Sig Dispense Refill    ascorbic acid (VITAMIN C) 500 MG tablet Take 500 mg by mouth daily      HYDROcodone-acetaminophen (NORCO) 5-325 MG per tablet Take 1 tablet by mouth 2 times daily.  HYDROcodone-acetaminophen (NORCO) 5-325 MG per tablet Take 1 tablet by mouth every 6 hours as needed for Pain.  azithromycin (ZITHROMAX) 250 MG tablet Take 250 mg by mouth daily      vancomycin (VANCOCIN) 50 mg/mL oral solution Take 125 mg by mouth See Admin Instructions 125 mg QID x 14 days then 125 mg TID x 14 days then 125 mg BID x 14 days then 125 mg daily x 14 days      Probiotic Product (ACIDOPHILUS/BIFIDUS PO) Take 1 tablet by mouth daily      sucralfate (CARAFATE) 1 GM tablet Take 1 g by mouth 4 times daily (before meals and nightly)      metFORMIN (GLUCOPHAGE) 1000 MG tablet Take 0.5 tablets by mouth 2 times daily (with meals) 60 tablet 3    NIFEdipine (ADALAT CC) 30 MG extended release tablet Take 30 mg by mouth daily      gentamicin (GARAMYCIN) 0.1 % cream Apply topically daily.  Right foot 15 g 0    IRON PO Take 65 mg by mouth three times a week Indications: mon wed fri      vitamin B-12 (CYANOCOBALAMIN) 1000 MCG tablet Take 1,000 mcg by mouth three times a week Indications: mon wed fri      aspirin 81 MG tablet Take by mouth daily      lovastatin (MEVACOR) 40 MG tablet Take 40 mg by mouth nightly      Insulin NPH Isophane & Regular (HUMULIN 70/30 SC) Inject into the skin Indications: 15 units in am, 14 units at suppertime       Cholecalciferol (VITAMIN D3) 5000 units TABS Take 5,000 Units by mouth three times a week Indications: mon, wed frid       glimepiride (AMARYL) 4 MG tablet Take 4 mg by mouth every morning (before breakfast)      warfarin (COUMADIN) 5 MG tablet Take 2.5-5 mg by mouth daily Indications: 5 mg daily, except Wed take 2.5 mg 2.5 mg TUE/SAT and 5 mg ALL OTHER DAYS      cloNIDine (CATAPRES) 0.1 MG tablet Take 0.1 mg by mouth 2 times daily      lisinopril (PRINIVIL;ZESTRIL) 20 MG tablet Take 20 mg by mouth daily         Allergies:  Bactrim ds [sulfamethoxazole-trimethoprim]    Immunizations :   Immunization History   Administered Date(s) Administered    Influenza Vaccine, unspecified formulation 10/06/2003, 10/08/2005, 10/09/2006, 10/04/2007, 09/11/2009, 10/20/2011    Influenza, High Dose (Fluzone 65 yrs and older) 10/29/2012, 10/01/2014, 10/13/2015, 10/03/2016, 10/16/2017, 11/01/2018    Pneumococcal Conjugate 13-valent ([id number]) 04/14/2015    Pneumococcal Polysaccharide ([id number]) 01/29/2003    Tdap (Boostrix, Adacel) 04/12/2013, 11/21/2018       Social History:    Social History     Tobacco Use    Smoking status: Never Smoker    Smokeless tobacco: Never Used   Substance Use Topics    Alcohol use: No    Drug use: Never     Social History     Tobacco Use   Smoking Status Never Smoker   Smokeless Tobacco Never Used      FAMILY History : no DVT no COPD        REVIEW OF SYSTEMS:      Constitutional:  negative for fevers, chills, night sweats  Eyes:  negative for blurred vision, eye discharge, visual disturbance   HEENT:  negative for hearing loss, ear drainage,nasal congestion  Respiratory:   cough +  shortness of breath +  or hemoptysis   Cardiovascular:  negative for chest pain, palpitations, syncope  Gastrointestinal:  negative for nausea, vomiting, diarrhea + , constipation, abdominal pain  Genitourinary:  negative for frequency, dysuria, urinary incontinence, hematuria  Hematologic/Lymphatic:  negative for easy bruising, bleeding and lymphadenopathy  Allergic/Immunologic:  negative for recurrent infections, angioedema, anaphylaxis   Endocrine:  negative for weight changes, polyuria, polydipsia and polyphagia  Musculoskeletal:  negative for joint  pain, swelling, decreased range of motion  Integumentary: No rashes, skin lesions  Neurological:  negative for headaches, slurred speech, unilateral weakness  Psychiatric: negative for hallucinations,confusion,agitation. PHYSICAL EXAM:      Vitals:    /74   Pulse 105   Temp 98.2 °F (36.8 °C) (Oral)   Resp 18   Ht 5' (1.524 m)   Wt 250 lb 3.6 oz (113.5 kg)   SpO2 93%   BMI 48.87 kg/m²     PHYSICAL EXAM:     Pursuant to the emergency declaration under the [address] [place] [address] waiver authority and the [employer] and [employer] Act, this clinical encounter was conducted to provide necessary medical care. (Also consistent with new provisions and guidance offered by [facility] on March 18, 2020 in setting of COVID 19 outbreak and in order to minimize exposures in accordance with the flexibilities announced by [facility] on March 30, 2020). A detailed close physical examination was not conducted in this patient in COVID-19 isolation room to avoid unnecessary exposures, as it will not change my COVID-19 related management plan for this patient. References: https://[facility]. [email or web address].[email or web address]/Portals/0/Resources/COVID-19/3_18%20Telemed%20Guidance%20Updated%20March%2018. pdf?[email or web address]                      https://[facility]. [facility]/Portals/0/Resources/COVID-19/3_18%20Telemed%20Guidance%20Updated%20March%2018. pdf?[email or web address] pdf                            Pulmonary: deferred  Abdomen/GI: deferred  Neuro: deferred  Skin: deferred  Musculoskeletal:  deferred  Genitourinary: Deferred  Psych: deferred  Lymphatic/Immunologic: deferred       Data Review:    CBC:   Lab Results   Component Value Date    WBC 15.6 (H) 01/26/2021    HGB 8.2 (L) 01/26/2021    HCT 25.3 (L) 01/26/2021    MCV 84.7 01/26/2021     (H) 01/26/2021     RENAL:   Lab Results   Component Value Date    CREATININE 1.3 (H) 01/26/2021    BUN 56 (H) 01/26/2021     01/26/2021    K 3.8 01/26/2021     (H) 01/26/2021    CO2 21 01/26/2021     SED RATE:   Lab Results   Component Value Date    SEDRATE 116 03/14/2019     CK:   Lab Results   Component Value Date    CKTOTAL 328 01/08/2021     CRP:   Lab Results   Component Value Date    CRP 22.1 01/26/2021     Hepatic Function Panel:   Lab Results   Component Value Date    ALKPHOS 86 01/23/2021    ALT 13 01/23/2021    AST 17 01/23/2021    PROT 6.6 01/23/2021    BILITOT 0.3 01/23/2021    LABALBU 2.4 01/23/2021     UA:  Lab Results   Component Value Date    COLORU YELLOW 01/23/2021    CLARITYU CLOUDY 01/23/2021    GLUCOSEU Negative 01/23/2021    BILIRUBINUR Negative 01/23/2021    KETUA Negative 01/23/2021    SPECGRAV 1.019 01/23/2021    BLOODU Negative 01/23/2021    PHUR 5.0 01/23/2021    PROTEINU Negative 01/23/2021    UROBILINOGEN 0.2 01/23/2021    NITRU Negative 01/23/2021    LEUKOCYTESUR TRACE 01/23/2021    LABMICR YES 01/23/2021    URINETYPE Cleancatch 01/23/2021      Urine Microscopic:   Lab Results   Component Value Date    BACTERIA 1+ 01/08/2021    COMU see below 01/23/2021    HYALCAST 3 01/08/2021    WBCUA 6 01/23/2021    RBCUA 0-2 01/23/2021    EPIU 2 01/23/2021     Urine Reflex to Culture:   Lab Results   Component Value Date    URRFLXCULT Not Indicated 01/23/2021     crp  22.1     WBC  15.6       MICRO: cultures reviewed and updated by me growth after 5 days of incubation. 03/14/2019       Respiratory Culture:  Lab Results   Component Value Date    LABGRAM  07/30/2019     No WBC's seen  No Cells seen  1+ Gram positive cocci       AFB:  Lab Results   Component Value Date    AFBSMEAR No AFB observed by Fluorescent stain 03/14/2019     Viral Culture:  Lab Results   Component Value Date    COVID19 DETECTED 01/10/2021     Urine Culture: No results for input(s): Renee Elkins in the last 72 hours. IMAGING:    XR CHEST PORTABLE   Final Result   Vascular congestion/mild pulmonary edema. Pattern is stable from prior exam.         XR CHEST PORTABLE   Final Result   Right PICC line with catheter tip upper SVC. Suspected multifocal pneumonia.                All the pertinent images and reports for the current Hospitalization were reviewed by me     Scheduled Meds:   Venelex   Topical BID    collagenase   Topical BID    dexamethasone  6 mg Intravenous Q24H    famotidine  10 mg Oral Daily    vancomycin  125 mg Oral 4x Daily    insulin glargine  20 Units Subcutaneous Nightly    insulin lispro  5 Units Subcutaneous TID     aspirin  81 mg Oral Daily    atorvastatin  20 mg Oral Daily    vitamin D  5,000 Units Oral Once per day on Mon Wed Fri    cloNIDine  0.1 mg Oral BID    vitamin B-12  1,000 mcg Oral Once per day on Mon Wed Fri    NIFEdipine  30 mg Oral Daily    sodium chloride flush  10 mL Intravenous 2 times per day    warfarin (COUMADIN) daily dosing (placeholder)   Other RX Placeholder    insulin lispro  0-18 Units Subcutaneous TID     insulin lispro  0-9 Units Subcutaneous Nightly       Continuous Infusions:   sodium chloride Stopped (01/25/21 1708)    dextrose         PRN Meds:  sodium chloride, sodium chloride flush, promethazine **OR** ondansetron, polyethylene glycol, acetaminophen **OR** acetaminophen, guaiFENesin-dextromethorphan, glucose, dextrose, glucagon (rDNA), dextrose      Assessment:     Patient Active Problem List Diagnosis    Abdominal wall cellulitis    Poorly controlled type 2 diabetes mellitus (HCC)    HTN (hypertension)    CKD (chronic kidney disease) stage 3, GFR 30-59 ml/min    History of pulmonary embolism    Chronic abdominal wound infection, subsequent encounter    Open abdominal wall wound, subsequent encounter    Severe infection    Acute kidney injury superimposed on chronic kidney disease (HCC)    Diverticulosis of colon without diverticulitis    Morbid obesity due to excess calories ([address]    Open wound of abdomen    Diabetic ulcer of right midfoot associated with type 2 diabetes mellitus, with fat layer exposed ([address]    Chronic wound infection of abdomen    History of arterial ischemic stroke    Anticoagulated on Coumadin    MRSA colonization    Acinetobacter lwoffi infection    Citrobacter infection    Weight loss counseling, encounter for    Proteus infection    Frequent falls    KIKO (acute kidney injury) ([address]    Acute respiratory failure with hypoxia ([address]    Sepsis ([address]    Pneumonia due to COVID-19 virus     COVID-19 pneumonia  Acute hypoxic respiratory failure currently requiring 6 L nasal cannula  Acute kidney injury  WBC elevation  C. difficile diarrhea she was already on oral vancomycin on admission not sure when and where  she was tested for C. Difficile ? ? Admitted from [address] [place] [address]   She had a PICC line at presentation  She was recently hospitalized [facility] with a fall and urinary tract infection on 1/8/2020 121/11/21. She was tested for +Ve  COVID-19 on 1/10/2021  CXR WITH CHANGES from COVID 19  ON Coumadin for h/o PE  Morbid obesity BMI at  52         Worsening respiratory status with acute kidney injury in the setting of COVID-19 pneumonia. She was on oral vancomycin before admission from nursing home not sure when she was tested for CT we do not have the records.   She also came in with a PICC line in place was given IV fluids at the nursing home.     Pathology  Is  2 weeks since the time of diagnosis for COVID-19 hence would not be a candidate for IV remdesivir but given her acute hypoxic respiratory failure s/p Convalescent plasmatherapy       Labs, Microbiology, Radiology and all the pertinent results from current hospitalization and  care every where were reviewed  by me as a part of the evaluation   Plan:   1. Cont supportive care  2. Cont Dexamethasone   3. Out of Window for IV Remdesivir >  2 weeks since diagnosis  4. Type and screen done   5. Completed convalescent plasma  6. Check D dimer 883 , CRP 22.1 , Ferritin once  7. Needs records from [facility] about C diff testing d/w RN      Discussed with patient/Family and Nursing   Risk of Complications/Morbidity: High      · Illness(es)/ Infection present that pose threat to bodily function. · There is potential for severe exacerbation of infection/side effects of treatment. · Therapy requires intensive monitoring for antimicrobial agent toxicity. Discussed with patient/Family and Nursing staff     Thanks for allowing me to participate in your patient's care and please call me with any questions or concerns.     Lurlean Alpers MD  Infectious Disease  [facility]) Physician  Phone: [phone]   Fax : [phone]

## 2021-01-27 LAB
ANION GAP SERPL CALCULATED.3IONS-SCNC: 10 MMOL/L (ref 3–16)
BANDED NEUTROPHILS RELATIVE PERCENT: 8 % (ref 0–7)
BASOPHILS ABSOLUTE: 0 K/UL (ref 0–0.2)
BASOPHILS RELATIVE PERCENT: 0 %
BUN BLDV-MCNC: 41 MG/DL (ref 7–20)
CALCIUM SERPL-MCNC: 8 MG/DL (ref 8.3–10.6)
CHLORIDE BLD-SCNC: 111 MMOL/L (ref 99–110)
CO2: 22 MMOL/L (ref 21–32)
CREAT SERPL-MCNC: 1.3 MG/DL (ref 0.6–1.2)
EOSINOPHILS ABSOLUTE: 0 K/UL (ref 0–0.6)
EOSINOPHILS RELATIVE PERCENT: 0 %
GFR AFRICAN AMERICAN: 47
GFR NON-AFRICAN AMERICAN: 39
GLUCOSE BLD-MCNC: 102 MG/DL (ref 70–99)
GLUCOSE BLD-MCNC: 103 MG/DL (ref 70–99)
GLUCOSE BLD-MCNC: 112 MG/DL (ref 70–99)
GLUCOSE BLD-MCNC: 201 MG/DL (ref 70–99)
GLUCOSE BLD-MCNC: 212 MG/DL (ref 70–99)
GLUCOSE BLD-MCNC: 333 MG/DL (ref 70–99)
HCT VFR BLD CALC: 25.2 % (ref 36–48)
HEMOGLOBIN: 7.8 G/DL (ref 12–16)
HYPOCHROMIA: ABNORMAL
INR BLD: 2.12 (ref 0.86–1.14)
LYMPHOCYTES ABSOLUTE: 0.6 K/UL (ref 1–5.1)
LYMPHOCYTES RELATIVE PERCENT: 5 %
MAGNESIUM: 1.9 MG/DL (ref 1.8–2.4)
MCH RBC QN AUTO: 26.8 PG (ref 26–34)
MCHC RBC AUTO-ENTMCNC: 31.1 G/DL (ref 31–36)
MCV RBC AUTO: 86.2 FL (ref 80–100)
METAMYELOCYTES RELATIVE PERCENT: 1 %
MONOCYTES ABSOLUTE: 0.5 K/UL (ref 0–1.3)
MONOCYTES RELATIVE PERCENT: 4 %
NEUTROPHILS ABSOLUTE: 11 K/UL (ref 1.7–7.7)
NEUTROPHILS RELATIVE PERCENT: 82 %
NUCLEATED RED BLOOD CELLS: 1 /100 WBC
PDW BLD-RTO: 14.9 % (ref 12.4–15.4)
PERFORMED ON: ABNORMAL
PLATELET # BLD: 606 K/UL (ref 135–450)
PMV BLD AUTO: 8.6 FL (ref 5–10.5)
POLYCHROMASIA: ABNORMAL
POTASSIUM SERPL-SCNC: 3.9 MMOL/L (ref 3.5–5.1)
PROTHROMBIN TIME: 24.8 SEC (ref 10–13.2)
RBC # BLD: 2.92 M/UL (ref 4–5.2)
SODIUM BLD-SCNC: 143 MMOL/L (ref 136–145)
WBC # BLD: 12.1 K/UL (ref 4–11)

## 2021-01-27 PROCEDURE — 97530 THERAPEUTIC ACTIVITIES: CPT

## 2021-01-27 PROCEDURE — 6360000002 HC RX W HCPCS: Performed by: INTERNAL MEDICINE

## 2021-01-27 PROCEDURE — 2580000003 HC RX 258: Performed by: INTERNAL MEDICINE

## 2021-01-27 PROCEDURE — 97535 SELF CARE MNGMENT TRAINING: CPT

## 2021-01-27 PROCEDURE — 80048 BASIC METABOLIC PNL TOTAL CA: CPT

## 2021-01-27 PROCEDURE — 6370000000 HC RX 637 (ALT 250 FOR IP)

## 2021-01-27 PROCEDURE — 85610 PROTHROMBIN TIME: CPT

## 2021-01-27 PROCEDURE — 83735 ASSAY OF MAGNESIUM: CPT

## 2021-01-27 PROCEDURE — 2060000000 HC ICU INTERMEDIATE R&B

## 2021-01-27 PROCEDURE — 85025 COMPLETE CBC W/AUTO DIFF WBC: CPT

## 2021-01-27 PROCEDURE — 6370000000 HC RX 637 (ALT 250 FOR IP): Performed by: INTERNAL MEDICINE

## 2021-01-27 RX ORDER — WARFARIN SODIUM 5 MG/1
5 TABLET ORAL
Status: COMPLETED | OUTPATIENT
Start: 2021-01-27 | End: 2021-01-27

## 2021-01-27 RX ADMIN — INSULIN LISPRO 12 UNITS: 100 INJECTION, SOLUTION INTRAVENOUS; SUBCUTANEOUS at 12:13

## 2021-01-27 RX ADMIN — INSULIN LISPRO 5 UNITS: 100 INJECTION, SOLUTION INTRAVENOUS; SUBCUTANEOUS at 17:56

## 2021-01-27 RX ADMIN — INSULIN LISPRO 12 UNITS: 100 INJECTION, SOLUTION INTRAVENOUS; SUBCUTANEOUS at 17:56

## 2021-01-27 RX ADMIN — VANCOMYCIN HYDROCHLORIDE 125 MG: 125 CAPSULE ORAL at 21:03

## 2021-01-27 RX ADMIN — CASTOR OIL AND BALSAM, PERU: 788; 87 OINTMENT TOPICAL at 09:19

## 2021-01-27 RX ADMIN — SODIUM CHLORIDE, PRESERVATIVE FREE 10 ML: 5 INJECTION INTRAVENOUS at 09:20

## 2021-01-27 RX ADMIN — ASPIRIN 81 MG: 81 TABLET, CHEWABLE ORAL at 09:19

## 2021-01-27 RX ADMIN — SODIUM CHLORIDE, PRESERVATIVE FREE 10 ML: 5 INJECTION INTRAVENOUS at 21:03

## 2021-01-27 RX ADMIN — Medication: at 21:02

## 2021-01-27 RX ADMIN — VANCOMYCIN HYDROCHLORIDE 125 MG: 125 CAPSULE ORAL at 09:19

## 2021-01-27 RX ADMIN — ATORVASTATIN CALCIUM 20 MG: 20 TABLET, FILM COATED ORAL at 09:19

## 2021-01-27 RX ADMIN — DEXAMETHASONE SODIUM PHOSPHATE 6 MG: 10 INJECTION, SOLUTION INTRAMUSCULAR; INTRAVENOUS at 09:19

## 2021-01-27 RX ADMIN — INSULIN GLARGINE 20 UNITS: 100 INJECTION, SOLUTION SUBCUTANEOUS at 21:03

## 2021-01-27 RX ADMIN — WARFARIN SODIUM 5 MG: 5 TABLET ORAL at 17:24

## 2021-01-27 RX ADMIN — INSULIN LISPRO 3 UNITS: 100 INJECTION, SOLUTION INTRAVENOUS; SUBCUTANEOUS at 21:03

## 2021-01-27 RX ADMIN — FAMOTIDINE 10 MG: 20 TABLET, FILM COATED ORAL at 09:19

## 2021-01-27 RX ADMIN — CLONIDINE HYDROCHLORIDE 0.1 MG: 0.1 TABLET ORAL at 21:03

## 2021-01-27 RX ADMIN — Medication 5000 UNITS: at 09:19

## 2021-01-27 RX ADMIN — CASTOR OIL AND BALSAM, PERU: 788; 87 OINTMENT TOPICAL at 21:03

## 2021-01-27 RX ADMIN — NIFEDIPINE 30 MG: 30 TABLET, FILM COATED, EXTENDED RELEASE ORAL at 09:19

## 2021-01-27 RX ADMIN — CYANOCOBALAMIN TAB 1000 MCG 1000 MCG: 1000 TAB at 09:19

## 2021-01-27 RX ADMIN — INSULIN LISPRO 5 UNITS: 100 INJECTION, SOLUTION INTRAVENOUS; SUBCUTANEOUS at 12:13

## 2021-01-27 RX ADMIN — VANCOMYCIN HYDROCHLORIDE 125 MG: 125 CAPSULE ORAL at 12:04

## 2021-01-27 RX ADMIN — Medication: at 09:19

## 2021-01-27 RX ADMIN — VANCOMYCIN HYDROCHLORIDE 125 MG: 125 CAPSULE ORAL at 17:24

## 2021-01-27 RX ADMIN — CLONIDINE HYDROCHLORIDE 0.1 MG: 0.1 TABLET ORAL at 09:19

## 2021-01-27 NOTE — PLAN OF CARE
Problem: Falls - Risk of:  Goal: Will remain free from falls  Description: Will remain free from falls  1/27/2021 1036 by Glenna Espinosa RN  Outcome: Ongoing  Note: Fall risk preventions in place. Bed in lowest position, call light within reach, non-skid socks on when ambulating, bed alarm on, bed wheels locked. Pt. Educated and agreeable on usage of call light before ambulation. Problem: Falls - Risk of:  Goal: Absence of physical injury  Description: Absence of physical injury  1/27/2021 1036 by Glenna Espinosa RN  Outcome: Ongoing     Problem: Airway Clearance - Ineffective  Goal: Achieve or maintain patent airway  1/27/2021 1036 by Glenna Espinosa RN  Outcome: Ongoing     Problem: Gas Exchange - Impaired  Goal: Absence of hypoxia  1/27/2021 1036 by Glenna Espinosa RN  Outcome: Ongoing     Problem: Breathing Pattern - Ineffective  Goal: Ability to achieve and maintain a regular respiratory rate  1/27/2021 1036 by Glenna Espinosa RN  Outcome: Ongoing     Problem: Body Temperature -  Risk of, Imbalanced  Goal: Ability to maintain a body temperature within defined limits  1/27/2021 1036 by Glenna Espinosa RN  Outcome: Ongoing     Problem:  Body Temperature -  Risk of, Imbalanced  Goal: Will regain or maintain usual level of consciousness  1/27/2021 1036 by Glenna Espinosa RN  Outcome: Ongoing     Problem: Risk for Fluid Volume Deficit  Goal: Maintain normal heart rhythm  1/27/2021 0429 by Mariano Khalil RN  Outcome: Ongoing     Problem: Risk for Fluid Volume Deficit  Goal: Maintain absence of muscle cramping  1/27/2021 1036 by Glenna Espinosa RN  Outcome: Ongoing     Problem: Fatigue  Goal: Verbalize increase energy and improved vitality  1/27/2021 1036 by Glenna Espinosa RN  Outcome: Ongoing     Problem: Skin Integrity:  Goal: Will show no infection signs and symptoms  Description: Will show no infection signs and symptoms  1/27/2021 1036 by Glenna Espinosa RN  Outcome: Ongoing  Note:   Pt will continue daily activities as tolerated and alert RN to any pain and skin changes. RN will continue to assess skin condition, note changes, and take preventative measures against skin breakdown such as movement, foam/silicone dressings on bony prominences, and making sure pt has adequate nutrition.

## 2021-01-27 NOTE — PROGRESS NOTES
Encouraged pt to attempt to stay in recliner chair for ~1-2 hours this date and then changing position for skin care purposes. Pt remains unsafe to d/c home and would benefit from continued therapy to increase mobility, safety, and independence. OT Education: Transfer Training;OT Role;Plan of Care;Precautions; ADL Adaptive Strategies  REQUIRES OT FOLLOW UP: Yes  Activity Tolerance  Activity Tolerance: Patient limited by fatigue  Activity Tolerance: Pt on 8L of O2 upon entry with O2 sats in low 90s. Pt increased to 10L of O2 prior to mobility to prevent significant desat. Pt dropped to 84% once seated EOB and returned to upper 80s/low 90s. Pt returned to 8L at end of session with O2 sats in low 90s. BP taken in supine 135/57 and taken once seated at /103. Safety Devices  Safety Devices in place: Yes(CARMEL Vicente) notified)  Type of devices: Nurse notified;Call light within reach; Left in chair         Patient Diagnosis(es): The primary encounter diagnosis was KIKO (acute kidney injury) ([address] Diagnoses of C. difficile diarrhea, Dehydration, and COVID-19 were also pertinent to this visit. has a past medical history of Acute kidney injury ([address] Arthritis, Cerebral artery occlusion with cerebral infarction ([address] COVID-19, Diabetes mellitus ([address] Hypertension, and Pulmonary embolism ([address] has a past surgical history that includes Abdomen surgery (N/A, 2/15/2019) and Abdomen surgery (N/A, 3/19/2019). Restrictions  Restrictions/Precautions  Restrictions/Precautions: Fall Risk, Isolation, Contact Precautions  Position Activity Restriction  Other position/activity restrictions: Droplet Plus :  COVID +. O2 8L via High Flow (increased to 10L during session, back to 8L at end of session). Contact : C-Diff.  Supine /57; Seated /103     Subjective   General  Chart Reviewed: Yes  Patient assessed for rehabilitation services?: Yes  Additional Pertinent Hx: Amado Arellano MD: Pt is \"[id number] female with past medical history of diabetes mellitus, hypertension, history of pulmonary embolism on Coumadin presented to the hospital with abnormal lab work. Patient lives abdomen [facility] nursing facility and they had done routine lab work which showed elevated BUN and creatinine and she was sent to the hospital.  Patient denies any specific complaints. She said she has been having diarrhea for a few days and has been having decreased p.o. intake. She states that her shortness of breath is at her baseline after being diagnosed with a COVID-19 infection. Patient was placed on 6 L of oxygen in the ER. Is unknown how much oxygen patient was on at the nursing facility. On arrival to the hospital she was noted to be hemodynamically stable. Lab work showed KIKO, hyperglycemia. Patient will be admitted to the hospital for the management\"  Family / Caregiver Present: No  Referring Practitioner: Kathe Germain MD  Diagnosis: Pneumonia d/t COVID19  Subjective  Subjective: Pt met bedside, agreeable for treatment session. Continues to require encouragement to participate with therapy. Pt initially only agreeable to sit EOB however with encouragement was agreeable to maxi move to sit up to chair.   Vital Signs  Patient Currently in Pain: (No complaints of pain)     Objective    ADL  Toileting: Maximum assistance(Purwick in place however purwick leaked completed rolling in bed to change colton pad and complete pericare)        Balance  Sitting Balance: (CGA/min A at EOB - air mattress making sitting balance difficult)  Functional Mobility  Functional Mobility Comments: Use of maxi move this date to get OOB to recliner chair     Bed mobility  Rolling to Left: Maximum assistance;2 Person assistance  Rolling to Right: Maximum assistance;2 Person assistance  Supine to Sit: Maximum assistance;2 Person assistance  Sit to Supine: Maximum assistance;2 Person assistance  Scooting: Dependent/Total  Comment: Rolling in bed completed status of goals ongoing as of 1/27/21  Short term goal 1: Pt will complete bed mobility with min A  Short term goal 2: Pt will complete sit <> stand transfers with min A  Short term goal 3: Pt will complete stand pivot transfer with min A  Short term goal 4: Pt will tolerate 1+ minute of standing activity to increase strength and activity tolerance for self-care and transfers with O2 sats WFL. Patient Goals   Patient goals : \"to feel better\"       Therapy Time   Individual Concurrent Group Co-treatment   Time In       1300   Time Out       1055   Minutes       60   Timed Code Treatment Minutes: 61 Minutes     If pt is discharged prior to next OT session, this note will serve as the discharge summary.     JOSEFA Chapman/OPAL#[id number]

## 2021-01-27 NOTE — PROGRESS NOTES
Physical Therapy  Facility/Department: [address] MED SURG  Daily Treatment Note  NAME: Ayanna Villasenor  : 1935  MRN: [id number]    Date of Service: 2021    Discharge Recommendations:  Patient would benefit from continued therapy after discharge, 3-5 sessions per week   PT Equipment Recommendations  Other: Defer to next level of care. Ayanna Villasenor scored a / on the AM-PAC short mobility form. Current research shows that an AM-PAC score of 17 or less is typically not associated with a discharge to the patient's home setting. Based on the patient's AM-PAC score and their current functional mobility deficits, it is recommended that the patient have 3-5 sessions per week of Physical Therapy at d/c to increase the patient's independence. Please see assessment section for further patient specific details. If patient discharges prior to next session this note will serve as a discharge summary. Please see below for the latest assessment towards goals. Assessment   Body structures, Functions, Activity limitations: Decreased functional mobility ; Decreased ROM; Decreased strength;Decreased endurance;Decreased balance  Assessment: 83 y/o female admit from [facility] on 2021 with KIKO, Diarrhea, COVID + (1/10/2021). PMH as noted including CVA, PE, Abd Wound/Debride, OA, L TKR. Recent admit 21 - 21 following fall at home - discharge to [facility]. Pt reports she was independent with functional mobility prior to 21 admission, using RW for ambulation - [facility] 7 [facility]/week for homemaking and meals. Pt currently functioning well below baseline - requiring max A of 2 for all bed mobility - dependent for bed<>chair transfers via LIFT equipment. Recommend continued skilled therapy 3-5x/week to maximize independence and safety with functional mobility.   Treatment Diagnosis: impaired mobility  Prognosis: Fair  Decision Making: Medium Complexity  History: 83 y/o female admit Cognition  Overall Cognitive Status: [facility]  Cognition Comment: Pt requesting to remain in bed however with encouragement was agreeable to sit OOB to recliner chair with response \"I will do whatever to get better\". Pt with some STM deficits noted - repeating self throughout session. Objective   Bed mobility  Rolling to Left: Maximum assistance;2 Person assistance  Rolling to Right: Maximum assistance;2 Person assistance  Supine to Sit: Maximum assistance;2 Person assistance  Sit to Supine: Maximum assistance;2 Person assistance  Scooting: Dependent/Total  Comment: Rolling in bed completed for pericare following incontinence of urine. Pt tolerated sitting EOB for 5+ minutes with CGA/min A. Returned to bed to place maxi sling and use of maxi move for transfer. O2 sats dropping to 84% while seated at EOB on 10L - returned to 8L by end of session with O2 sats in upper 80s low 90s at end of session. Transfers  Bed to Chair: Dependent/Total(Maxi Move)  Ambulation  Ambulation?: No     Balance  Posture: Fair  Sitting - Static: Fair  Sitting - Dynamic: Fair;-         AM-PAC Score  AM-PAC Inpatient Mobility Raw Score : 6 (01/27/21 1059)  AM-PAC Inpatient T-Scale Score : 23.55 (01/27/21 1059)  Mobility Inpatient CMS 0-100% Score: 100 (01/27/21 1059)  Mobility Inpatient CMS G-Code Modifier : CN (01/27/21 1059)          Goals  Short term goals  Time Frame for Short term goals: Upon d/c acute care - all goals ongoing as of 1/27/21  Short term goal 1: Bed Mob Mod assist x 2. Short term goal 2: EOB SBA; Min assist scooting EOB. Short term goal 3: Transfer via Stedy Mod/Max assist x 2. Short term goal 4: Pt participating approp Strength Exs. Patient Goals   Patient goals : Be able to return home eventually.     Plan    Plan  Times per week: 3-5x/week  Current Treatment Recommendations: Strengthening, Functional Mobility Training, Transfer Training, Endurance Training, Safety Education & Training, Patient/Caregiver Education & Training, Gait Training, Balance Training, Neuromuscular Re-education  Safety Devices  Type of devices:  All fall risk precautions in place, Call light within reach, Gait belt, Patient at risk for falls, Left in chair, Nurse notified     Therapy Time   Individual Concurrent Group Co-treatment   Time In 0955         Time Out 1055         Minutes 60         Timed Code Treatment Minutes: 64 Vero Dudley, PT

## 2021-01-27 NOTE — PROGRESS NOTES
Infectious Disease Follow up Notes  Admit Date: 1/23/2021  Hospital Day: 6    Antibiotics :   IV Dexamethasone  S/p Convalescent plasma  Oral Vancomycin    CHIEF COMPLAINT:     COVID 19 PNA  WBC elevation   KIKO  C diff  Hypoxia      Subjective interval History :  82 y.o. woman with a past medical history of arthritis, CVA, diabetes on insulin, hypertension, history of pulmonary embolism on Coumadin, history of abdominal surgery in 2019 now admitted with acute shortness of breath and worsening kidney function. She was recently hospitalized at [facility] from 1/8/2021 to 1/11/2021 due to a fall before admission and also noted to have urinary tract infection and she had a COVID-19 testing before discharge was positive on 1/10/2021 she was discharged to [facility] now admitted from there with worsening kidney function also ongoing diarrhea and shortness of breath. She was on oral vancomycin on admission is not clear when she was tested for C. Difficile. Patient also complained of increasing shortness of breath with some cough since the Covid diagnosis. Labs indicate creatinine elevated 2.2 glucose at 419 procalcitonin 0.38 hemoglobin A1c 7.6, WBC elevation at 15. Urine pneumococcal antigen and Legionella antigen negative. She has no fever since admission, but currently requiring 6 L of nasal cannula for hypoxic respiratory failure. Chest x-ray with bilateral changes consistent with COVID-19 infection.   She had a PICC line at presentation not sure when it this was placed.     Interval History : Remains short of breath using 8 L of nasal cannula diarrhea seems to be slowing down - creat is better and WBC trend down and no abd no chills no fevers     Past Medical History:    Past Medical History:   Diagnosis Date    Acute kidney injury ([address]     Arthritis     [facility] with [facility]     COVID-19     Diabetes mellitus [facility]     Hypertension     Pulmonary embolism ([facility]     On warfarin       Past Surgical History:    Past Surgical History:   Procedure Laterality Date    ABDOMEN SURGERY N/A 2/15/2019    DEBRIDEMENT OF OPEN ABDOMINAL WOUND performed by Kaylee Keane MD at [facility] N/A 3/19/2019    RIGHT LOWER QUADRANT ABDOMINAL DEBRIDEMENT performed by Kaylee Keane MD at [address] [place] [address]       Current Medications:    Outpatient Medications Marked as Taking for the 21 encounter [facility] Encounter)   Medication Sig Dispense Refill    ascorbic acid (VITAMIN C) 500 MG tablet Take 500 mg by mouth daily      HYDROcodone-acetaminophen (NORCO) 5-325 MG per tablet Take 1 tablet by mouth 2 times daily.  HYDROcodone-acetaminophen (NORCO) 5-325 MG per tablet Take 1 tablet by mouth every 6 hours as needed for Pain.  [] azithromycin (ZITHROMAX) 250 MG tablet Take 250 mg by mouth daily      vancomycin (VANCOCIN) 50 mg/mL oral solution Take 125 mg by mouth See Admin Instructions 125 mg QID x 14 days then 125 mg TID x 14 days then 125 mg BID x 14 days then 125 mg daily x 14 days      Probiotic Product (ACIDOPHILUS/BIFIDUS PO) Take 1 tablet by mouth daily      sucralfate (CARAFATE) 1 GM tablet Take 1 g by mouth 4 times daily (before meals and nightly)      metFORMIN (GLUCOPHAGE) 1000 MG tablet Take 0.5 tablets by mouth 2 times daily (with meals) 60 tablet 3    NIFEdipine (ADALAT CC) 30 MG extended release tablet Take 30 mg by mouth daily      gentamicin (GARAMYCIN) 0.1 % cream Apply topically daily.  Right foot 15 g 0    IRON PO Take 65 mg by mouth three times a week Indications:       vitamin B-12 (CYANOCOBALAMIN) 1000 MCG tablet Take 1,000 mcg by mouth three times a week Indications:       aspirin 81 MG tablet Take by mouth daily      lovastatin (MEVACOR) 40 MG tablet Take 40 mg by mouth nightly      Insulin NPH Isophane & Regular (HUMULIN 70/30 SC) Inject into the skin Indications: 15 units in am, 14 units at suppertime       Cholecalciferol (VITAMIN D3) 5000 units TABS Take 5,000 Units by mouth three times a week Indications: mon, wed frid       glimepiride (AMARYL) 4 MG tablet Take 4 mg by mouth every morning (before breakfast)      warfarin (COUMADIN) 5 MG tablet Take 2.5-5 mg by mouth daily Indications: 5 mg daily, except Wed take 2.5 mg 2.5 mg TUE/SAT and 5 mg ALL OTHER DAYS      cloNIDine (CATAPRES) 0.1 MG tablet Take 0.1 mg by mouth 2 times daily      lisinopril (PRINIVIL;ZESTRIL) 20 MG tablet Take 20 mg by mouth daily         Allergies:  Bactrim ds [sulfamethoxazole-trimethoprim]    Immunizations :   Immunization History   Administered Date(s) Administered    Influenza Vaccine, unspecified formulation 10/06/2003, 10/08/2005, 10/09/2006, 10/04/2007, 09/11/2009, 10/20/2011    Influenza, High Dose (Fluzone 65 yrs and older) 10/29/2012, 10/01/2014, 10/13/2015, 10/03/2016, 10/16/2017, 11/01/2018    Pneumococcal Conjugate 13-valent ([id number]) 04/14/2015    Pneumococcal Polysaccharide ([id number]) 01/29/2003    Tdap (Boostrix, Adacel) 04/12/2013, 11/21/2018       Social History:    Social History     Tobacco Use    Smoking status: Never Smoker    Smokeless tobacco: Never Used   Substance Use Topics    Alcohol use: No    Drug use: Never     Social History     Tobacco Use   Smoking Status Never Smoker   Smokeless Tobacco Never Used      FAMILY History : no DVT no COPD        REVIEW OF SYSTEMS:      Constitutional:  negative for fevers, chills, night sweats  Eyes:  negative for blurred vision, eye discharge, visual disturbance   HEENT:  negative for hearing loss, ear drainage,nasal congestion  Respiratory:   cough +  shortness of breath +  or hemoptysis   Cardiovascular:  negative for chest pain, palpitations, syncope  Gastrointestinal:  negative for nausea, vomiting, diarrhea + , constipation, abdominal pain  Genitourinary:  negative for frequency, dysuria, urinary incontinence, hematuria  Hematologic/Lymphatic:  negative for easy bruising, bleeding and lymphadenopathy  Allergic/Immunologic:  negative for recurrent infections, angioedema, anaphylaxis   Endocrine:  negative for weight changes, polyuria, polydipsia and polyphagia  Musculoskeletal:  negative for joint  pain, swelling, decreased range of motion  Integumentary: No rashes, skin lesions  Neurological:  negative for headaches, slurred speech, unilateral weakness  Psychiatric: negative for hallucinations,confusion,agitation.                 PHYSICAL EXAM:      Vitals:    BP (!) 156/73   Pulse 93   Temp 98.5 °F (36.9 °C) (Oral)   Resp 24   Ht 5' (1.524 m)   Wt 240 lb 4.8 oz (109 kg)   SpO2 93%   BMI 46.93 kg/m²     PHYSICAL EXAM:   General Appearance: alert,in some acute distress, + pallor, no icterus on nasal cannula+   Skin: warm and dry, no rash or erythema  Head: normocephalic and atraumatic  Eyes: pupils equal, round, and reactive to light, conjunctivae normal  ENT: tympanic membrane, external ear and ear canal normal bilaterally, nose without deformity, nasal mucosa and turbinates normal without polyps  Neck: supple and non-tender without mass, no thyromegaly  no cervical lymphadenopathy  Pulmonary/Chest: clear to auscultation bilaterally- no wheezes, rales or rhonchi, normal air movement, no respiratory distress  Cardiovascular: normal rate, regular rhythm, normal S1 and S2, no murmurs, rubs, clicks, or gallops, no carotid bruits  Abdomen: soft, non-tender, non-distended, normal bowel sounds, no masses or organomegaly  Extremities: no cyanosis, clubbing or edema  Musculoskeletal: normal range of motion, no joint swelling, deformity or tenderness  Integumentary: No rashes, no abnormal skin lesions, no petechiae  Neurologic: reflexes normal and symmetric, no cranial nerve deficit  Psych:  Orientation, sensorium, mood normal            Lines: [facility]         Data Review:    CBC:   Lab Results   Component Value Date    WBC 12.5 (H) 01/28/2021    HGB 8.2 (L) 01/28/2021    HCT 26.1 (L) 01/28/2021    MCV 85.9 01/28/2021     (H) 01/28/2021     RENAL:   Lab Results   Component Value Date    CREATININE 1.2 01/28/2021    BUN 33 (H) 01/28/2021     01/28/2021    K 4.1 01/28/2021     01/28/2021    CO2 23 01/28/2021     SED RATE:   Lab Results   Component Value Date    SEDRATE 116 03/14/2019     CK:   Lab Results   Component Value Date    CKTOTAL 328 01/08/2021     CRP:   Lab Results   Component Value Date    CRP 22.1 01/26/2021     Hepatic Function Panel:   Lab Results   Component Value Date    ALKPHOS 86 01/23/2021    ALT 13 01/23/2021    AST 17 01/23/2021    PROT 6.6 01/23/2021    BILITOT 0.3 01/23/2021    LABALBU 2.4 01/23/2021     UA:  Lab Results   Component Value Date    COLORU YELLOW 01/23/2021    CLARITYU CLOUDY 01/23/2021    GLUCOSEU Negative 01/23/2021    BILIRUBINUR Negative 01/23/2021    KETUA Negative 01/23/2021    SPECGRAV 1.019 01/23/2021    BLOODU Negative 01/23/2021    PHUR 5.0 01/23/2021    PROTEINU Negative 01/23/2021    UROBILINOGEN 0.2 01/23/2021    NITRU Negative 01/23/2021    LEUKOCYTESUR TRACE 01/23/2021    LABMICR YES 01/23/2021    URINETYPE Cleancatch 01/23/2021      Urine Microscopic:   Lab Results   Component Value Date    BACTERIA 1+ 01/08/2021    COMU see below 01/23/2021    HYALCAST 3 01/08/2021    WBCUA 6 01/23/2021    RBCUA 0-2 01/23/2021    EPIU 2 01/23/2021     Urine Reflex to Culture:   Lab Results   Component Value Date    URRFLXCULT Not Indicated 01/23/2021     crp  22.1     WBC  15.6       MICRO: cultures reviewed and updated by me   Blood Culture:          Strep Pneumoniae Antigen [[id number]] Collected: 01/23/21 1629   Order Status: Completed Specimen: Urine, clean catch Updated: 01/25/21 0857    STREP PNEUMONIAE ANTIGEN, URINE --    Presumptive Negative   Presumptive negative suggests no current or recent pneumococcal infection. Infection due to Strep pneumoniae   cannot be ruled out since the antigen present in the sample   may be below the detection limit of the test.   Normal Range:Presumptive Negative    Narrative:     ORDER#: [id number]                          ORDERED BY: Hilton Caraballo   SOURCE: Urine Clean Catch                  COLLECTED:  01/23/21 16:29   ANTIBIOTICS AT [place]. :                      RECEIVED :  01/25/21 08:02   Performed at:   [address] [place] [address]   Phone [phone]   Legionella antigen, urine [[id number]] Collected: 01/23/21 1629   Order Status: Completed Specimen: Urine, clean catch Updated: 01/25/21 0843    L. pneumophila Serogp 1 Ur Ag --    Presumptive Negative   No Legionella pneumophila serogroup 1 antigens detected. A negative result does not exclude infection with   Legionella pneumophila serogroup 1 nor does it rule out   other microbial-caused respiratory infections or   disease caused by other serogroups of   Legionella pneumophila. Normal Range: Presumptive Negative    Narrative:     ORDER#: [id number]                          ORDERED BY: Hilton Caraballo   SOURCE: Urine Clean Catch                  COLLECTED:  01/23/21 16:29   ANTIBIOTICS AT [place]. :                      RECEIVED :  01/25/21 08:02   Performed at:   [address] [place] [address]   Phone [phone]   Culture, Respiratory, Sputum [[id number]]    Order Status: Sent Specimen: Sputum Expectorated        Lab Results   Component Value Date    BC No growth after 5 days of incubation. 03/14/2019    BLOODCULT2 No growth after 5 days of incubation.  03/14/2019       Respiratory Culture:  Lab Results   Component Value Date    LABGRAM  07/30/2019     No WBC's seen  No Cells seen  1+ Gram positive cocci       AFB:  Lab Results   Component Value Date    AFBSMEAR No AFB observed by Fluorescent stain 03/14/2019     Viral Culture:  Lab Results   Component Value Date    COVID19 DETECTED 01/10/2021     Urine Culture: No results for input(s): LABURIN in the last 72 hours. IMAGING:    XR CHEST PORTABLE   Final Result   Vascular congestion/mild pulmonary edema. Pattern is stable from prior exam.         XR CHEST PORTABLE   Final Result   Right PICC line with catheter tip upper SVC. Suspected multifocal pneumonia.                All the pertinent images and reports for the current Hospitalization were reviewed by me     Scheduled Meds:   insulin glargine  15 Units Subcutaneous Nightly    Venelex   Topical BID    collagenase   Topical BID    dexamethasone  6 mg Intravenous Q24H    famotidine  10 mg Oral Daily    vancomycin  125 mg Oral 4x Daily    insulin lispro  5 Units Subcutaneous TID WC    aspirin  81 mg Oral Daily    atorvastatin  20 mg Oral Daily    vitamin D  5,000 Units Oral Once per day on Mon Wed Fri    cloNIDine  0.1 mg Oral BID    vitamin B-12  1,000 mcg Oral Once per day on Mon Wed Fri    NIFEdipine  30 mg Oral Daily    sodium chloride flush  10 mL Intravenous 2 times per day    warfarin (COUMADIN) daily dosing (placeholder)   Other RX Placeholder    insulin lispro  0-18 Units Subcutaneous TID     insulin lispro  0-9 Units Subcutaneous Nightly       Continuous Infusions:   sodium chloride Stopped (01/25/21 1708)    dextrose         PRN Meds:  sodium chloride, sodium chloride flush, promethazine **OR** ondansetron, polyethylene glycol, acetaminophen **OR** acetaminophen, guaiFENesin-dextromethorphan, glucose, dextrose, glucagon (rDNA), dextrose      Assessment:     Patient Active Problem List   Diagnosis    Abdominal wall cellulitis    Poorly controlled type 2 diabetes mellitus (HCC)    HTN (hypertension)    CKD (chronic kidney disease) stage 3, GFR 30-59 ml/min    History of pulmonary embolism    Chronic abdominal wound infection, subsequent encounter    Open abdominal wall wound, subsequent encounter    Severe infection    Acute kidney injury superimposed on chronic kidney disease ([address]    Diverticulosis of colon without diverticulitis    Morbid obesity due to excess calories ([address]    Open wound of abdomen    Diabetic ulcer of right midfoot associated with type 2 diabetes mellitus, with fat layer exposed ([address]    Chronic wound infection of abdomen    History of arterial ischemic stroke    Anticoagulated on Coumadin    MRSA colonization    Acinetobacter lwoffi infection    Citrobacter infection    Weight loss counseling, encounter for    Proteus infection    Frequent falls    KIKO (acute kidney injury) ([address]    Acute respiratory failure with hypoxia ([address]    Sepsis ([address]    Pneumonia due to COVID-19 virus     COVID-19 pneumonia  Acute hypoxic respiratory failure currently requiring  nasal cannula  Acute kidney injury  WBC elevation  C. difficile diarrhea she was already on oral vancomycin on admission not sure when and where  she was tested for C. Difficile ? ? Admitted from [address] [place] [address]   She had a PICC line at presentation  She was recently hospitalized [facility] with a fall and urinary tract infection on 1/8/2020 121/11/21. She was tested for +Ve  COVID-19 on 1/10/2021  CXR WITH CHANGES from COVID 19  ON Coumadin for h/o PE  Morbid obesity BMI at  52         Worsening respiratory status with acute kidney injury in the setting of COVID-19 pneumonia. She was on oral vancomycin before admission from nursing home not sure when she was tested for C . Diff  we do not have the records.   She also came in with a PICC line in place was given IV fluids at the nursing home.     She had KIKO and now creat is improving and diarrhea is better on therapy and completed Convalescent plasma therapy and would complete the course with steroids as planned    Labs, Microbiology, Radiology and all the pertinent results from current hospitalization and  care every where were reviewed  by me as a part of the evaluation   Plan:   1. Cont supportive care  2. Cont Dexamethasone for 10 days total   3. Out of Window for IV Remdesivir >  2 weeks since diagnosis  4. Type and screen done   5. S/p  convalescent plasma  6. Check D dimer 883 , CRP 22.1 , Ferritin 145   7. Needs records from [address] [place] [address] about C diff testing d/w RN      Discussed with patient/Family and Nursing staff     Thanks for allowing me to participate in your patient's care and please call me with any questions or concerns.     David Ward MD  Infectious Disease  [facility] ([facility]) Physician  Phone: [phone]   Fax : [phone]

## 2021-01-27 NOTE — PROGRESS NOTES
Hospitalist Progress Note      PCP: Rah Bateman    Date of Admission: 1/23/2021        Hospital Course:   \"  65IX WF with PMhx sig for DMII insulin dependent, HTN, hx of PE on warfarin presents with abnormal lab work. Emerald Weiss has had diarrhea for past few days, decreased PO intake and was dx with COVID19 infection recently. Gigi Whiting was started on 6L oxygen in ED - unclear how much oxygen she was requiring at SNF. Gigi Whiting was discharged from [facility] 2 weeks ago for knee contusion.  Pt is requiring increasing amounts of oxygen but she is requiring IVF for acute renal failure and ongoing diarrhea.  Baseline creatinine is 1. 2.  \"    Subjective: feels weak, no abdominal pain or fever today.         Medications:  Reviewed    Infusion Medications    sodium chloride Stopped (01/25/21 1708)    dextrose       Scheduled Medications    Venelex   Topical BID    collagenase   Topical BID    dexamethasone  6 mg Intravenous Q24H    famotidine  10 mg Oral Daily    vancomycin  125 mg Oral 4x Daily    insulin glargine  20 Units Subcutaneous Nightly    insulin lispro  5 Units Subcutaneous TID     aspirin  81 mg Oral Daily    atorvastatin  20 mg Oral Daily    vitamin D  5,000 Units Oral Once per day on Mon Wed Fri    cloNIDine  0.1 mg Oral BID    vitamin B-12  1,000 mcg Oral Once per day on Mon Wed Fri    NIFEdipine  30 mg Oral Daily    sodium chloride flush  10 mL Intravenous 2 times per day    warfarin (COUMADIN) daily dosing (placeholder)   Other RX Placeholder    insulin lispro  0-18 Units Subcutaneous TID     insulin lispro  0-9 Units Subcutaneous Nightly     PRN Meds: sodium chloride, sodium chloride flush, promethazine **OR** ondansetron, polyethylene glycol, acetaminophen **OR** acetaminophen, guaiFENesin-dextromethorphan, glucose, dextrose, glucagon (rDNA), dextrose      Intake/Output Summary (Last 24 hours) at 1/27/2021 0956  Last data filed at 1/27/2021 1613  Gross per 24 hour   Intake 600 ml   Output 320 ml   Net 280 ml       Physical Exam Performed:    /60   Pulse 101   Temp 98.9 °F (37.2 °C) (Oral)   Resp 20   Ht 5' (1.524 m)   Wt 251 lb 1.7 oz (113.9 kg)   SpO2 92%   BMI 49.04 kg/m²     General appearance: No apparent distress  Neck: Supple  Respiratory:  Normal respiratory effort. Clear to auscultation, bilaterally without Rales/Wheezes/Rhonchi. Cardiovascular: Regular rate and rhythm with normal S1/S2 without murmurs, rubs or gallops. Abdomen: Soft, non-tender, non-distended. Musculoskeletal: No clubbing, cyanosis  Skin: Skin color, texture, turgor normal.  No rashes or lesions. Neurologic:  No focal weakness   Psychiatric: Alert and oriented  Capillary Refill: Brisk,< 3 seconds   Peripheral Pulses: +2 palpable, equal bilaterally       Labs:   Recent Labs     01/25/21  0700 01/26/21  0555 01/27/21  0600   WBC 14.7* 15.6* 12.1*   HGB 7.7* 8.2* 7.8*   HCT 24.8* 25.3* 25.2*   * 719* 606*     Recent Labs     01/25/21  0700 01/26/21  0555 01/27/21  0600    145 143   K 4.2 3.8 3.9   * 111* 111*   CO2 16* 21 22   BUN 71* 56* 41*   CREATININE 1.6* 1.3* 1.3*   CALCIUM 8.5 8.3 8.0*     No results for input(s): AST, ALT, BILIDIR, BILITOT, ALKPHOS in the last 72 hours. Recent Labs     01/25/21  0700 01/26/21  0555 01/27/21  0600   INR 2.43* 1.90* 2.12*     No results for input(s): Tse Scrivener in the last 72 hours. Urinalysis:      Lab Results   Component Value Date    NITRU Negative 01/23/2021    WBCUA 6 01/23/2021    BACTERIA 1+ 01/08/2021    RBCUA 0-2 01/23/2021    BLOODU Negative 01/23/2021    SPECGRAV 1.019 01/23/2021    GLUCOSEU Negative 01/23/2021       Radiology:  XR CHEST PORTABLE   Final Result   Vascular congestion/mild pulmonary edema. Pattern is stable from prior exam.         XR CHEST PORTABLE   Final Result   Right PICC line with catheter tip upper SVC. Suspected multifocal pneumonia.                  Assessment/Plan:    C/Stephanie Campbell 2322 Problems    Diagnosis    Acute respiratory failure with hypoxia (HCC) [J96.01]    Sepsis ([facility] [A41.9]    Pneumonia due to COVID-19 virus [U07.1, J12.82]    KIKO (acute kidney injury) ([facility] [N17.9]    Anticoagulated on Coumadin [Z79.01]    Chronic wound infection of abdomen [S31.109A, L08.9]    Poorly controlled type 2 diabetes mellitus ([facility] [E11.65]    HTN (hypertension) [I10]    Morbid obesity due to excess calories ([facility] [E66.01]    Acute kidney injury superimposed on chronic kidney disease ([facility] [N17.9, N18.9]     1. COVID19 pneumonia, started on decadron, oxygen and supportive measures. consulted ID, plasma ordered. 2. Acute renal failure on CKDII, creatinine flat this am.   3. C diff diarrhea, started on oral vancomycin, ID consulted. 4. Acute respiratory failure with hypoxia, down to 6 L currently   5. Sepsis POA, due to COVID 19 and cdif diarrhea, ID consulted, recommendations appreciated. 6. Morbid Obesity, Body mass index is Body mass index is 46.67 kg/m².   7. DMII uncontrolled, due to steroids, better controlled now. 8. Buttock wound, GS consulted based on wound care recommendations, supportive measures. 9. Anemia, appears chronic, monitor. 10. Thrombocytosis, likely reactive, monitor. improvement noted this am.     DVT Prophylaxis: coumadin   Diet: DIET CARB CONTROL;  Dental Soft  Code Status: Full Code        Dispo - ongoing management     Ronnie Sauzo MD

## 2021-01-27 NOTE — CONSULTS
Clinical Pharmacy Note  Warfarin Consult    Jami Powell is a 82 y.o. female receiving warfarin managed by pharmacy. Patient being bridged with: none    Warfarin Indication: Hx VTE  Target INR range: 2-3  Dose prior to admission:   Verified dose with ECF - Warfarin has been on hold for several days due to supratherapeutic INR - received a couple doses of phytonadione at the facility. Dose prior to INR being significantly elevated was 2.5 mg TUE/SAT and 5 mg ALL OTHER DAYS    Current warfarin drug-drug interactions: Dexamethasone    Recent Labs     01/25/21  0700 01/26/21  0555 01/27/21  0600   HGB 7.7* 8.2* 7.8*   HCT 24.8* 25.3* 25.2*   INR 2.43* 1.90* 2.12*       Assessment/Plan:      Patient continues with diarrhea, but it is improving per MD notes. She has received warfarin 5mg daily for the past two days. INR is starting to increase a bit, but it may take some time to overcome vitamin K given at [facility]. We will need to keep in mind that dexamethasone is on board as well. Give warfarin 5mg again today. We will watch INR closely. Daily PT/INR until stable within therapeutic range. Thank you for the consult. Will continue to follow.     Kaitlynn Vinson RPh 1/27/2021 10:21 AM

## 2021-01-27 NOTE — PROGRESS NOTES
[facility]                                                 [phone]                                      [facility]                                        Inpatient Progress note    Summary:   Sheldon Jasso is being seen by nephrology for KIKO. 81 yo female with COVID pneumonia and diarrhea. Interval History  Seen in her room. She is eating breakfast and says she has been drinking a lot of water. She has no chest pain or SOB    /60  92% on 6 L    cc today   Na 145 > 143  k 3.8 > 3.9  Bicarb 16 > 21 > 22  BUN 80s > 56 > 41  Cr 2.2 >1.6 >1.3 > 1.3    Plan:   - renal function at baseline and acidosis continues to improve. - encourage PO hydration and avoid diuretics. - BP acceptable on current regimen, no change. [facility] thanks you for the opportunity to serve this patient. Please call with any questions of concerns. Diaz White MD  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 Cr peaked at 2.2  UA showed 6 WBC  No RBC no protein, overall bland. # NAGMA  Due to diarrhea. Bicarb deficit due to above   Bicarb 16 day of consult. Had bicarb fluids and improved. # electrolytes:   Mild hypernatremia. # BP  Acceptable  On clonidine and procardia. Holding lisinopril     # Volume:   Appears euvolemic. Last EF 55% in 2011? #  Full code. ROS:   Positives Listed Bold. All other remaining systems are negative. Constitutional:  fever, chills, weakness, weight change, fatigue,      Skin:  rash, pruritus, hair loss, bruising, dry skin, petechiae. Head, Face, Neck   headaches, swelling,  cervical adenopathy.      Respiratory: shortness of breath, cough, or wheezing  Cardiovascular: chest pain, palpitations, dizzy, edema  Gastrointestinal: nausea, vomiting, diarrhea, constipation,belly pain    Yellow skin, blood in stool  Musculoskeletal:  back pain, muscle weakness, gait problems,       joint pain or swelling. Genitourinary:  dysuria, poor urine flow, flank pain, blood in urine  Neurologic:  vertigo, TIA'S, syncope, seizures, focal weakness  Psychosocial:  insomnia, anxiety, or depression. Additional positive findings: -     PMH:   Past medical history, surgical history, social history, family history are reviewed and updated as appropriate. Reviewed current medication list.   Allergies reviewed and updated as needed. PE:   Vitals:    01/27/21 0916   BP: 128/60   Pulse: 101   Resp: 20   Temp: 98.9 °F (37.2 °C)   SpO2: 92%       Due to the current efforts to prevent transmission of COVID-19 and also the need to preserve PPE for other caregivers, a face-to-face encounter with the patient was not performed. That being said, all relevant records and diagnostic tests were reviewed, including laboratory results and imaging. Please reference any relevant documentation elsewhere. Care will be coordinated with the primary service.           Lab Results   Component Value Date    CREATININE 1.3 (H) 01/27/2021    BUN 41 (H) 01/27/2021     01/27/2021    K 3.9 01/27/2021     (H) 01/27/2021    CO2 22 01/27/2021      Lab Results   Component Value Date    WBC 12.1 (H) 01/27/2021    HGB 7.8 (L) 01/27/2021    HCT 25.2 (L) 01/27/2021    MCV 86.2 01/27/2021     (H) 01/27/2021     Lab Results   Component Value Date    PTH 44.9 03/21/2019    CALCIUM 8.0 (L) 01/27/2021    PHOS 4.3 01/10/2021

## 2021-01-28 LAB
ACANTHOCYTES: ABNORMAL
ANION GAP SERPL CALCULATED.3IONS-SCNC: 9 MMOL/L (ref 3–16)
ANISOCYTOSIS: ABNORMAL
BASOPHILS ABSOLUTE: 0 K/UL (ref 0–0.2)
BASOPHILS RELATIVE PERCENT: 0 %
BUN BLDV-MCNC: 33 MG/DL (ref 7–20)
CALCIUM SERPL-MCNC: 8.1 MG/DL (ref 8.3–10.6)
CHLORIDE BLD-SCNC: 110 MMOL/L (ref 99–110)
CO2: 23 MMOL/L (ref 21–32)
CREAT SERPL-MCNC: 1.2 MG/DL (ref 0.6–1.2)
EKG ATRIAL RATE: 79 BPM
EKG DIAGNOSIS: NORMAL
EKG P AXIS: 103 DEGREES
EKG P-R INTERVAL: 136 MS
EKG Q-T INTERVAL: 426 MS
EKG QRS DURATION: 126 MS
EKG QTC CALCULATION (BAZETT): 488 MS
EKG R AXIS: -2 DEGREES
EKG T AXIS: -1 DEGREES
EKG VENTRICULAR RATE: 79 BPM
EOSINOPHILS ABSOLUTE: 0.1 K/UL (ref 0–0.6)
EOSINOPHILS RELATIVE PERCENT: 1 %
GFR AFRICAN AMERICAN: 52
GFR NON-AFRICAN AMERICAN: 43
GLUCOSE BLD-MCNC: 166 MG/DL (ref 70–99)
GLUCOSE BLD-MCNC: 203 MG/DL (ref 70–99)
GLUCOSE BLD-MCNC: 215 MG/DL (ref 70–99)
GLUCOSE BLD-MCNC: 64 MG/DL (ref 70–99)
GLUCOSE BLD-MCNC: 93 MG/DL (ref 70–99)
HCT VFR BLD CALC: 26.1 % (ref 36–48)
HEMOGLOBIN: 8.2 G/DL (ref 12–16)
INR BLD: 2.1 (ref 0.86–1.14)
LYMPHOCYTES ABSOLUTE: 0.6 K/UL (ref 1–5.1)
LYMPHOCYTES RELATIVE PERCENT: 5 %
MAGNESIUM: 1.9 MG/DL (ref 1.8–2.4)
MCH RBC QN AUTO: 27.1 PG (ref 26–34)
MCHC RBC AUTO-ENTMCNC: 31.5 G/DL (ref 31–36)
MCV RBC AUTO: 85.9 FL (ref 80–100)
MONOCYTES ABSOLUTE: 0.9 K/UL (ref 0–1.3)
MONOCYTES RELATIVE PERCENT: 7 %
MYELOCYTE PERCENT: 1 %
NEUTROPHILS ABSOLUTE: 10.9 K/UL (ref 1.7–7.7)
NEUTROPHILS RELATIVE PERCENT: 86 %
OVALOCYTES: ABNORMAL
PDW BLD-RTO: 14.8 % (ref 12.4–15.4)
PERFORMED ON: ABNORMAL
PERFORMED ON: NORMAL
PLATELET # BLD: 469 K/UL (ref 135–450)
PLATELET SLIDE REVIEW: ABNORMAL
PMV BLD AUTO: 8.6 FL (ref 5–10.5)
POLYCHROMASIA: ABNORMAL
POTASSIUM SERPL-SCNC: 4.1 MMOL/L (ref 3.5–5.1)
PROTHROMBIN TIME: 24.5 SEC (ref 10–13.2)
RBC # BLD: 3.04 M/UL (ref 4–5.2)
SLIDE REVIEW: ABNORMAL
SODIUM BLD-SCNC: 142 MMOL/L (ref 136–145)
TEAR DROP CELLS: ABNORMAL
TOXIC GRANULATION: PRESENT
TSH SERPL DL<=0.05 MIU/L-ACNC: 2.63 UIU/ML (ref 0.27–4.2)
WBC # BLD: 12.5 K/UL (ref 4–11)

## 2021-01-28 PROCEDURE — 6370000000 HC RX 637 (ALT 250 FOR IP): Performed by: INTERNAL MEDICINE

## 2021-01-28 PROCEDURE — 80048 BASIC METABOLIC PNL TOTAL CA: CPT

## 2021-01-28 PROCEDURE — 92526 ORAL FUNCTION THERAPY: CPT

## 2021-01-28 PROCEDURE — 85610 PROTHROMBIN TIME: CPT

## 2021-01-28 PROCEDURE — 2580000003 HC RX 258: Performed by: INTERNAL MEDICINE

## 2021-01-28 PROCEDURE — 85025 COMPLETE CBC W/AUTO DIFF WBC: CPT

## 2021-01-28 PROCEDURE — 99232 SBSQ HOSP IP/OBS MODERATE 35: CPT | Performed by: INTERNAL MEDICINE

## 2021-01-28 PROCEDURE — 6360000002 HC RX W HCPCS: Performed by: INTERNAL MEDICINE

## 2021-01-28 PROCEDURE — 93005 ELECTROCARDIOGRAM TRACING: CPT | Performed by: INTERNAL MEDICINE

## 2021-01-28 PROCEDURE — 93010 ELECTROCARDIOGRAM REPORT: CPT | Performed by: INTERNAL MEDICINE

## 2021-01-28 PROCEDURE — 97530 THERAPEUTIC ACTIVITIES: CPT

## 2021-01-28 PROCEDURE — 83735 ASSAY OF MAGNESIUM: CPT

## 2021-01-28 PROCEDURE — 84443 ASSAY THYROID STIM HORMONE: CPT

## 2021-01-28 PROCEDURE — 2060000000 HC ICU INTERMEDIATE R&B

## 2021-01-28 RX ORDER — WARFARIN SODIUM 5 MG/1
5 TABLET ORAL
Status: COMPLETED | OUTPATIENT
Start: 2021-01-28 | End: 2021-01-28

## 2021-01-28 RX ORDER — INSULIN GLARGINE 100 [IU]/ML
15 INJECTION, SOLUTION SUBCUTANEOUS NIGHTLY
Status: DISCONTINUED | OUTPATIENT
Start: 2021-01-28 | End: 2021-01-30

## 2021-01-28 RX ADMIN — INSULIN LISPRO 5 UNITS: 100 INJECTION, SOLUTION INTRAVENOUS; SUBCUTANEOUS at 17:13

## 2021-01-28 RX ADMIN — VANCOMYCIN HYDROCHLORIDE 125 MG: 125 CAPSULE ORAL at 21:47

## 2021-01-28 RX ADMIN — CASTOR OIL AND BALSAM, PERU: 788; 87 OINTMENT TOPICAL at 10:36

## 2021-01-28 RX ADMIN — Medication: at 10:36

## 2021-01-28 RX ADMIN — DEXAMETHASONE SODIUM PHOSPHATE 6 MG: 10 INJECTION, SOLUTION INTRAMUSCULAR; INTRAVENOUS at 10:34

## 2021-01-28 RX ADMIN — WARFARIN SODIUM 5 MG: 5 TABLET ORAL at 17:11

## 2021-01-28 RX ADMIN — INSULIN LISPRO 6 UNITS: 100 INJECTION, SOLUTION INTRAVENOUS; SUBCUTANEOUS at 17:12

## 2021-01-28 RX ADMIN — INSULIN LISPRO 2 UNITS: 100 INJECTION, SOLUTION INTRAVENOUS; SUBCUTANEOUS at 21:48

## 2021-01-28 RX ADMIN — SODIUM CHLORIDE, PRESERVATIVE FREE 10 ML: 5 INJECTION INTRAVENOUS at 10:37

## 2021-01-28 RX ADMIN — VANCOMYCIN HYDROCHLORIDE 125 MG: 125 CAPSULE ORAL at 10:37

## 2021-01-28 RX ADMIN — FAMOTIDINE 10 MG: 20 TABLET, FILM COATED ORAL at 10:34

## 2021-01-28 RX ADMIN — ATORVASTATIN CALCIUM 20 MG: 20 TABLET, FILM COATED ORAL at 10:38

## 2021-01-28 RX ADMIN — ATORVASTATIN CALCIUM 20 MG: 20 TABLET, FILM COATED ORAL at 10:37

## 2021-01-28 RX ADMIN — CLONIDINE HYDROCHLORIDE 0.1 MG: 0.1 TABLET ORAL at 21:47

## 2021-01-28 RX ADMIN — INSULIN LISPRO 6 UNITS: 100 INJECTION, SOLUTION INTRAVENOUS; SUBCUTANEOUS at 12:30

## 2021-01-28 RX ADMIN — Medication: at 21:47

## 2021-01-28 RX ADMIN — CASTOR OIL AND BALSAM, PERU: 788; 87 OINTMENT TOPICAL at 21:47

## 2021-01-28 RX ADMIN — NIFEDIPINE 30 MG: 30 TABLET, FILM COATED, EXTENDED RELEASE ORAL at 10:37

## 2021-01-28 RX ADMIN — ASPIRIN 81 MG: 81 TABLET, CHEWABLE ORAL at 10:35

## 2021-01-28 RX ADMIN — INSULIN LISPRO 5 UNITS: 100 INJECTION, SOLUTION INTRAVENOUS; SUBCUTANEOUS at 12:31

## 2021-01-28 RX ADMIN — VANCOMYCIN HYDROCHLORIDE 125 MG: 125 CAPSULE ORAL at 17:11

## 2021-01-28 RX ADMIN — INSULIN GLARGINE 15 UNITS: 100 INJECTION, SOLUTION SUBCUTANEOUS at 21:48

## 2021-01-28 RX ADMIN — SODIUM CHLORIDE, PRESERVATIVE FREE 10 ML: 5 INJECTION INTRAVENOUS at 21:47

## 2021-01-28 RX ADMIN — CLONIDINE HYDROCHLORIDE 0.1 MG: 0.1 TABLET ORAL at 10:36

## 2021-01-28 RX ADMIN — INSULIN LISPRO 5 UNITS: 100 INJECTION, SOLUTION INTRAVENOUS; SUBCUTANEOUS at 10:30

## 2021-01-28 RX ADMIN — VANCOMYCIN HYDROCHLORIDE 125 MG: 125 CAPSULE ORAL at 12:31

## 2021-01-28 ASSESSMENT — PAIN SCALES - GENERAL: PAINLEVEL_OUTOF10: 0

## 2021-01-28 NOTE — PROGRESS NOTES
Occupational Therapy  Facility/Department: [address] MED SURG  Daily Treatment Note  NAME: Timur Solomon  : 1935  MRN: [id number]    Date of Service: 2021    Assessment: Pt tolerated session well this date - engaged in session and making progress towards goals. Agreeable to OOB activity this date. Pt required max Ax2 for bed mobility and sit <> stand transfer from elevated EOB to delano stedy with max Ax2. Pt declined ADL needs - anticipate pt to continue to require max A for ADL needs. Pt on 8L of O2 and O2 sats maintained throughout. Pt remains unsafe to d/c home and would benefit from continued therapy to increase mobility, safety, and independence. Discharge Recommendations:  3-5 sessions per week     Timur Solomon scored a 13/24 on the AM-PAC ADL Inpatient form. Current research shows that an AM-PAC score of 17 or less is typically not associated with a discharge to the patient's home setting. Based on the patient's AM-PAC score and their current ADL deficits, it is recommended that the patient have 3-5 sessions per week of Occupational Therapy at d/c to increase the patient's independence. Please see assessment section for further patient specific details. If patient discharges prior to next session this note will serve as a discharge summary. Please see below for the latest assessment towards goals. Assessment   Performance deficits / Impairments: Decreased functional mobility ; Decreased balance;Decreased ADL status; Decreased endurance;Decreased strength  Assessment: Pt tolerated session well this date - engaged in session and making progress towards goals. Agreeable to OOB activity this date. Pt required max Ax2 for bed mobility and sit <> stand transfer from elevated EOB to delano stedy with max Ax2. Pt declined ADL needs - anticipate pt to continue to require max A for ADL needs. Pt on 8L of O2 and O2 sats maintained throughout.  Pt remains unsafe to d/c home and would benefit from continued therapy to increase mobility, safety, and independence. OT Education: Transfer Training;OT Role;Plan of Care;Precautions; ADL Adaptive Strategies  REQUIRES OT FOLLOW UP: Yes  Activity Tolerance  Activity Tolerance: Patient limited by fatigue  Activity Tolerance: Pt on 8L of O2 with O2 sats in upper 90s. Pt maintained upper 90s throughout session. HR elevated to 140 with activity. Safety Devices  Safety Devices in place: Yes(CARMEL Horn) notified)  Type of devices: Nurse notified;Call light within reach; Left in chair;Chair alarm in place;Gait belt         Patient Diagnosis(es): The primary encounter diagnosis was KIKO (acute kidney injury) ([facility] Diagnoses of C. difficile diarrhea, Dehydration, and COVID-19 were also pertinent to this visit. has a past medical history of Acute kidney injury ([facility] Arthritis, Cerebral artery occlusion with cerebral infarction ([facility] COVID-19, Diabetes mellitus ([facility] Hypertension, and Pulmonary embolism ([facility] has a past surgical history that includes Abdomen surgery (N/A, 2/15/2019) and Abdomen surgery (N/A, 3/19/2019). Restrictions  Restrictions/Precautions  Restrictions/Precautions: Fall Risk, Isolation, Contact Precautions  Position Activity Restriction  Other position/activity restrictions: Droplet Plus :  COVID +. O2 8L via High Flow. Contact : C-Diff. Subjective   General  Chart Reviewed: Yes  Patient assessed for rehabilitation services?: Yes  Additional Pertinent Hx: Per Keyla Rushing MD: Pt is \"82year-old female with past medical history of diabetes mellitus, hypertension, history of pulmonary embolism on Coumadin presented to the hospital with abnormal lab work. Patient lives abdomen [facility] nursing facility and they had done routine lab work which showed elevated BUN and creatinine and she was sent to the hospital.  Patient denies any specific complaints. She said she has been having diarrhea for a few days and has been having decreased p.o. intake. She states that her shortness of breath is at her baseline after being diagnosed with a COVID-19 infection. Patient was placed on 6 L of oxygen in the ER. Is unknown how much oxygen patient was on at the nursing facility. On arrival to the hospital she was noted to be hemodynamically stable. Lab work showed KIKO, hyperglycemia. Patient will be admitted to the hospital for the management\"  Family / Caregiver Present: No  Referring Practitioner: Hannah Grissom MD  Diagnosis: Pneumonia d/t COVID19  Subjective  Subjective: Pt met bedside, agreeable for therapy treatment session. Vital Signs  Patient Currently in Pain: (No complaints of pain)      Objective    ADL  Toileting: (Pt reporting \"I think I am all wet\". Pt with depends on and purwick on - not saturated.)  Additional Comments: Pt declined further ADL needs. Balance  Sitting Balance: Contact guard assistance(at EOB for 5+ minutes)  Standing Balance: Maximum assistance(in stedy for transfers - max A to remain upright)  Standing Balance  Time: ~10 seconds  Activity: Transfers in stedy    Functional Mobility  Functional Mobility Comments: Use of delano stedy for mobility.     Bed mobility  Supine to Sit: Maximum assistance;2 Person assistance  Sit to Supine: Unable to assess(in recliner at end of session)  Scooting: Dependent/Total  Comment: Pt sat on EOB x 5 minutes prior to attempt to stand at stedy     Transfers  Sit to stand: Maximum assistance;2 Person assistance  Stand to sit: Maximum assistance;2 Person assistance  Transfer Comments: Max Ax2 for sit <> stand from elevated EOB to delano stedy and sit to recliner chair     Cognition  Overall Cognitive Status: WFL  Cognition Comment: Pt engages well - continues to repeat self throughout session           Plan   Plan  Times per week: 3-5  Current Treatment Recommendations: Strengthening, Endurance Training, Balance Training, Functional Mobility Training, Safety Education & Training, Equipment Evaluation, Education, & procurement, Patient/Caregiver Education & Training, Self-Care / ADL, Home Management Training    AM-PAC Score    Antonioece Adjutant scored a 13/24 on the AM-PAC ADL Inpatient form. Current research shows that an AM-PAC score of 17 or less is typically not associated with a discharge to the patient's home setting. Based on the patient's AM-PAC score and their current ADL deficits, it is recommended that the patient have 3-5 sessions per week of Occupational Therapy at d/c to increase the patient's independence. Please see assessment section for further patient specific details. If patient discharges prior to next session this note will serve as a discharge summary. Please see below for the latest assessment towards goals. AM-PAC Inpatient Daily Activity Raw Score: 13 (01/28/21 1524)  AM-PAC Inpatient ADL T-Scale Score : 32.03 (01/28/21 1524)  ADL Inpatient CMS 0-100% Score: 63.03 (01/28/21 1524)  ADL Inpatient CMS G-Code Modifier : CL (01/28/21 1524)    Goals  Short term goals  Time Frame for Short term goals: prior to d/c: status of goals ongoing as of 1/28/21  Short term goal 1: Pt will complete bed mobility with min A  Short term goal 2: Pt will complete sit <> stand transfers with min A  Short term goal 3: Pt will complete stand pivot transfer with min A  Short term goal 4: Pt will tolerate 1+ minute of standing activity to increase strength and activity tolerance for self-care and transfers with O2 sats WFL. Patient Goals   Patient goals : \"to feel better\"       Therapy Time   Individual Concurrent Group Co-treatment   Time In       1430   Time Out       1515   Minutes       45   Timed Code Treatment Minutes: 39 Minutes     If pt is discharged prior to next OT session, this note will serve as the discharge summary.     Nikky Jones, HRF/M#[id number]

## 2021-01-28 NOTE — PROGRESS NOTES
[facility]                                                 [phone]                                      [facility] [place]                                        Inpatient Progress note    Summary:   Lizeth Garrido is being seen by nephrology for KIKO. 81 yo female with COVID pneumonia and diarrhea. Interval History  Seen from doorway. She is sitting in chair in no distress. /75  99% on 6 L - 8 L   UO 1200 cc  Na 145 > 143 > 142  k 3.8 > 3.9 > 4.1  Bicarb 16 > 21 > 22 > 23  BUN 80s > 56 > 41 > 33  Cr 2.2 >1.6 >1.3 > 1.3 > 1.2    Plan:   - renal function at baseline and acidosis resolved. - encourage PO hydration  - no acute electrolyte issues. - can resume home BP meds at discharge. [facility] thanks you for the opportunity to serve this patient. Please call with any questions of concerns. 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 Cr peaked at 2.2  UA showed 6 WBC  No RBC no protein, overall bland. # NAGMA  Due to diarrhea. Bicarb deficit due to above   Bicarb 16 day of consult. Had bicarb fluids and improved. # electrolytes:   Mild hypernatremia. # BP  Acceptable  On clonidine and procardia. Holding lisinopril     # Volume:   Appears euvolemic. Last EF 55% in 2011? #  Full code. ROS:   Positives Listed Bold. All other remaining systems are negative. Constitutional:  fever, chills, weakness, weight change, fatigue,      Skin:  rash, pruritus, hair loss, bruising, dry skin, petechiae. Head, Face, Neck   headaches, swelling,  cervical adenopathy.      Respiratory: shortness of breath, cough, or wheezing  Cardiovascular: chest pain, palpitations, dizzy, edema  Gastrointestinal: nausea, vomiting, diarrhea, constipation,belly pain    Yellow skin, blood in stool  Musculoskeletal:  back pain, muscle weakness, gait problems,       joint pain or swelling. Genitourinary:  dysuria, poor urine flow, flank pain, blood in urine  Neurologic:  vertigo, TIA'S, syncope, seizures, focal weakness  Psychosocial:  insomnia, anxiety, or depression. Additional positive findings: -     PMH:   Past medical history, surgical history, social history, family history are reviewed and updated as appropriate. Reviewed current medication list.   Allergies reviewed and updated as needed. PE:   Vitals:    01/28/21 1314   BP: (!) 156/75   Pulse: 86   Resp: 18   Temp: 97.9 °F (36.6 °C)   SpO2: 99%       Due to the current efforts to prevent transmission of COVID-19 and also the need to preserve PPE for other caregivers, a face-to-face encounter with the patient was not performed. That being said, all relevant records and diagnostic tests were reviewed, including laboratory results and imaging. Please reference any relevant documentation elsewhere. Care will be coordinated with the primary service.           Lab Results   Component Value Date    CREATININE 1.2 01/28/2021    BUN 33 (H) 01/28/2021     01/28/2021    K 4.1 01/28/2021     01/28/2021    CO2 23 01/28/2021      Lab Results   Component Value Date    WBC 12.5 (H) 01/28/2021    HGB 8.2 (L) 01/28/2021    HCT 26.1 (L) 01/28/2021    MCV 85.9 01/28/2021     (H) 01/28/2021     Lab Results   Component Value Date    PTH 44.9 03/21/2019    CALCIUM 8.1 (L) 01/28/2021    PHOS 4.3 01/10/2021

## 2021-01-28 NOTE — PLAN OF CARE
Problem: Falls - Risk of:  Goal: Will remain free from falls  Description: Will remain free from falls  Outcome: Ongoing  Goal: Absence of physical injury  Description: Absence of physical injury  Outcome: Ongoing  Fall risk assessed. Precautions in place. Bed low and locked with side rails up x2-3. Nonskid socks on when OOB. Bed/chair alarm utilized. Call light within reach. Frequent checks performed. No falls at this time. Problem: Airway Clearance - Ineffective  Goal: Achieve or maintain patent airway  Outcome: Ongoing     Problem: Gas Exchange - Impaired  Goal: Absence of hypoxia  Outcome: Ongoing  Goal: Promote optimal lung function  Outcome: Ongoing     Problem: Breathing Pattern - Ineffective  Goal: Ability to achieve and maintain a regular respiratory rate  Outcome: Ongoing  Pt respiratory status assessed. No s/s of respiratory complications. Oxygen saturations >90% at all times with supplemental O2 as needed. Encourage to cough and deep breath. Encourage IS. Will assess respiratory status every shift and PRN. Problem:  Body Temperature -  Risk of, Imbalanced  Goal: Ability to maintain a body temperature within defined limits  Outcome: Ongoing  Goal: Will regain or maintain usual level of consciousness  Outcome: Ongoing  Goal: Complications related to the disease process, condition or treatment will be avoided or minimized  Outcome: Ongoing     Problem: Isolation Precautions - Risk of Spread of Infection  Goal: Prevent transmission of infection  Outcome: Ongoing     Problem: Nutrition Deficits  Goal: Optimize nutritional status  Outcome: Ongoing     Problem: Risk for Fluid Volume Deficit  Goal: Maintain normal heart rhythm  Outcome: Ongoing  Goal: Maintain absence of muscle cramping  Outcome: Ongoing  Goal: Maintain normal serum potassium, sodium, calcium, phosphorus, and pH  Outcome: Ongoing     Problem: Loneliness or Risk for Loneliness  Goal: Demonstrate positive use of time alone when socialization is not possible  Outcome: Ongoing     Problem: Fatigue  Goal: Verbalize increase energy and improved vitality  Outcome: Ongoing     Problem: Patient Education: Go to Patient Education Activity  Goal: Patient/Family Education  Outcome: Ongoing     Problem: Skin Integrity:  Goal: Will show no infection signs and symptoms  Description: Will show no infection signs and symptoms  Outcome: Ongoing  Goal: Absence of new skin breakdown  Description: Absence of new skin breakdown  Outcome: Ongoing  Skin assessed per protocol. Lauro score obtained. Skin kept clean, dry and warm. Pure wick utilized. Ordered ointments applied. Encouraged pt to reposition to reduce the risk of PUs. Specialty mattress and pillow support in place. Will continue to monitor.

## 2021-01-28 NOTE — PROGRESS NOTES
Hospitalist Progress Note      PCP: Terrie Lane    Date of Admission: 1/23/2021        Hospital Course:   \"  84ZZ WF with PMhx sig for DMII insulin dependent, HTN, hx of PE on warfarin presents with abnormal lab work. Shana Dakins has had diarrhea for past few days, decreased PO intake and was dx with COVID19 infection recently. Charli Villafuerte was started on 6L oxygen in ED - unclear how much oxygen she was requiring at SNF. Charli Villafuerte was discharged from [facility] 2 weeks ago for knee contusion.  Pt is requiring increasing amounts of oxygen but she is requiring IVF for acute renal failure and ongoing diarrhea.  Baseline creatinine is 1. 2.  \"    Subjective: No abdominal pain, no nausea or vomiting, no fever or chills.         Medications:  Reviewed    Infusion Medications    sodium chloride Stopped (01/25/21 1708)    dextrose       Scheduled Medications    Venelex   Topical BID    collagenase   Topical BID    dexamethasone  6 mg Intravenous Q24H    famotidine  10 mg Oral Daily    vancomycin  125 mg Oral 4x Daily    insulin glargine  20 Units Subcutaneous Nightly    insulin lispro  5 Units Subcutaneous TID     aspirin  81 mg Oral Daily    atorvastatin  20 mg Oral Daily    vitamin D  5,000 Units Oral Once per day on Mon Wed Fri    cloNIDine  0.1 mg Oral BID    vitamin B-12  1,000 mcg Oral Once per day on Mon Wed Fri    NIFEdipine  30 mg Oral Daily    sodium chloride flush  10 mL Intravenous 2 times per day    warfarin (COUMADIN) daily dosing (placeholder)   Other RX Placeholder    insulin lispro  0-18 Units Subcutaneous TID     insulin lispro  0-9 Units Subcutaneous Nightly     PRN Meds: sodium chloride, sodium chloride flush, promethazine **OR** ondansetron, polyethylene glycol, acetaminophen **OR** acetaminophen, guaiFENesin-dextromethorphan, glucose, dextrose, glucagon (rDNA), dextrose      Intake/Output Summary (Last 24 hours) at 1/28/2021 0856  Last data filed at 1/28/2021 0600  Gross per 24 hour   Intake 480 ml   Output 550 ml   Net -70 ml       Physical Exam Performed:    BP (!) 156/73   Pulse 93   Temp 98.5 °F (36.9 °C) (Oral)   Resp 24   Ht 5' (1.524 m)   Wt 240 lb 4.8 oz (109 kg)   SpO2 93%   BMI 46.93 kg/m²     General appearance: No apparent distress  Neck: Supple  Respiratory:  Normal respiratory effort. Clear to auscultation, bilaterally without Rales/Wheezes/Rhonchi. Cardiovascular: Regular rate and rhythm with normal S1/S2 without murmurs, rubs or gallops. Abdomen: Soft, non-tender, non-distended   Musculoskeletal: No clubbing, cyanosis   Skin: Skin color, texture, turgor normal.  No rashes or lesions. Neurologic:  No focal weakness   Psychiatric: Alert and oriented  Capillary Refill: Brisk,< 3 seconds   Peripheral Pulses: +2 palpable, equal bilaterally       Labs:   Recent Labs     01/26/21  0555 01/27/21  0600 01/28/21  0600   WBC 15.6* 12.1* 12.5*   HGB 8.2* 7.8* 8.2*   HCT 25.3* 25.2* 26.1*   * 606* 469*     Recent Labs     01/26/21  0555 01/27/21  0600 01/28/21  0600    143 142   K 3.8 3.9 4.1   * 111* 110   CO2 21 22 23   BUN 56* 41* 33*   CREATININE 1.3* 1.3* 1.2   CALCIUM 8.3 8.0* 8.1*     No results for input(s): AST, ALT, BILIDIR, BILITOT, ALKPHOS in the last 72 hours. Recent Labs     01/26/21  0555 01/27/21  0600 01/28/21  0600   INR 1.90* 2.12* 2.10*     No results for input(s): CKTOTAL, TROPONINI in the last 72 hours. Urinalysis:      Lab Results   Component Value Date    NITRU Negative 01/23/2021    WBCUA 6 01/23/2021    BACTERIA 1+ 01/08/2021    RBCUA 0-2 01/23/2021    BLOODU Negative 01/23/2021    SPECGRAV 1.019 01/23/2021    GLUCOSEU Negative 01/23/2021       Radiology:  XR CHEST PORTABLE   Final Result   Vascular congestion/mild pulmonary edema. Pattern is stable from prior exam.         XR CHEST PORTABLE   Final Result   Right PICC line with catheter tip upper SVC. Suspected multifocal pneumonia. Assessment/Plan:    Active Hospital Problems    Diagnosis    Acute respiratory failure with hypoxia ([facility] [J96.01]    Sepsis ([facility] [A41.9]    Pneumonia due to COVID-19 virus [U07.1, J12.82]    KIKO (acute kidney injury) ([facility] [N17.9]    Anticoagulated on Coumadin [Z79.01]    Chronic wound infection of abdomen [S31.109A, L08.9]    Poorly controlled type 2 diabetes mellitus ([facility] [E11.65]    HTN (hypertension) [I10]    Morbid obesity due to excess calories ([facility] [E66.01]    Acute kidney injury superimposed on chronic kidney disease ([facility] [N17.9, N18.9]     1. COVID19 pneumonia, started on decadron, oxygen and supportive measures. consulted ID, plasma ordered.   2. Acute renal failure on CKDII, improved. 3. C diff diarrhea, started on oral vancomycin, ID consulted. 4. Acute respiratory failure with hypoxia, down to 6 L currently   5. Sepsis POA, due to COVID 19 and cdif diarrhea, ID consulted. 6. Morbid Obesity, Body mass index is Body mass index is 46.67 kg/m².   7. DMII uncontrolled, due to steroids, better controlled now. 8. Buttock wound, GS consulted based on wound care recommendations, supportive measures. 9. Anemia, appears chronic, monitor. 10. Thrombocytosis, likely reactive, monitor, continue to improve. DVT Prophylaxis: coumadin  Diet: DIET CARB CONTROL;  Dental Soft  Code Status: Full Code        Dispo - ongoing progress     Angela Saini MD

## 2021-01-28 NOTE — PROGRESS NOTES
[address] [place] [address]   Speech Therapy  Daily Dysphagia Treatment Note        [facility]  AGE: 82 y.o. GENDER: female  : 1935  [id number]  EPISODE DATE:  2021   ADMITTING DIAGNOSIS: The primary encounter diagnosis was KIKO (acute kidney injury) ([address] Diagnoses of C. difficile diarrhea, Dehydration, and COVID-19 were also pertinent to this visit. · has Abdominal wall cellulitis; Poorly controlled type 2 diabetes mellitus (HCC); HTN (hypertension); CKD (chronic kidney disease) stage 3, GFR 30-59 ml/min; History of pulmonary embolism; Chronic abdominal wound infection, subsequent encounter; Open abdominal wall wound, subsequent encounter; Severe infection; Acute kidney injury superimposed on chronic kidney disease ([address] Diverticulosis of colon without diverticulitis; Morbid obesity due to excess calories ([address] Open wound of abdomen; Diabetic ulcer of right midfoot associated with type 2 diabetes mellitus, with fat layer exposed ([address] Chronic wound infection of abdomen; History of arterial ischemic stroke; Anticoagulated on Coumadin; MRSA colonization; Acinetobacter lwoffi infection; Citrobacter infection; Weight loss counseling, encounter for; Proteus infection; Frequent falls; KIKO (acute kidney injury) ([address] Acute respiratory failure with hypoxia ([address] Sepsis ([address] and Pneumonia due to COVID-19 virus on their problem list.  ·  has a past medical history of Acute kidney injury ([address] Arthritis, Cerebral artery occlusion with cerebral infarction ([address] COVID-19, Diabetes mellitus ([address] Hypertension, and Pulmonary embolism ([address] · History of a  EGD with findings of small hiatal hernia and erosive esophagitis grade A and erosive gastritis  · Allergies; bactrim Dx  · Chart review indicates pt lives alone and is active with [facility].  Pt gets meals on wheels  ONSET DATE: 2021     Treatment Diagnosis: Dysphagia       Chart review:   · MD History and Physical Documentation revealed:   History Of Present Illness:   42-year-old female with past medical history of diabetes mellitus, hypertension, history of pulmonary embolism on Coumadin presented to the hospital with abnormal lab work. Savanna Ravi lives [facility] and they had done routine lab work which showed elevated BUN and creatinine and she was sent to the hospital.  Patient denies any specific complaints.  She said she has been having diarrhea for a few days and has been having decreased p.o. intake.  She states that her shortness of breath is at her baseline after being diagnosed with a COVID-19 infection.  Patient was placed on 6 L of oxygen in the ER.  Is unknown how much oxygen patient was on at the nursing facility.  On arrival to the hospital she was noted to be hemodynamically stable.  Lab work showed KIKO, hyperglycemia. Savanna Ravi will be admitted to the hospital for the management     · 1/24/2021 Chest XR  Impression   Vascular congestion/mild pulmonary edema. Cathy Collet is stable from prior exam.          1/26/2021 Clinical Evaluation of Swallowing was completed due to staff reporting some pt difficulties with regular consistency diet. Diet was downgraded to soft/bite size with thin liquids  Subjective:     Current diet   Soft/bite size food consistencies  Thin liquids   close monitor of meds if with H20;may need with puree    Comments regarding tolerating Current Diet:   No new complaints by staff  Pt reports \"soft food is easier for me\"    Objective:     Pain   Patient Currently in Pain: Denies    Cognitive/Behavior   Pt was asleep in bed but aroused easily to SLP entering the room. Pt was oriented to self; place and partially to date/situation. Pt was verbally responsive and able to follow simple commands. Pt does need continued assist with motor commands.     Positioning    Upright in Bed approximately 80 degree    PO Trials:  · Thin Liquids: no overt clinical s/s post swallow. All presentations taken via straw. No pt complaints post swallow. No voice quality changes post swallow  · Puree: small sample size; unremarkable   · Minced and moist food consistencies: Functional mastication and bolus manipulation A-P. no overt clinical s/s post swallow. All presentations taken via straw. No pt complaints post swallow. No voice quality changes post swallow  · Soft food:  Functional mastication and bolus manipulation A-P. no overt clinical s/s post swallow. All presentations taken via straw. No pt complaints post swallow. No voice quality changes post swallow  · Regular food: N/A    Dysphagia Tx:   Direct Dysphagia therapy targeting po presentations    Goals:   Dysphagia Goals: The patient will tolerate dysphagia soft/bite size with thin liquid diet without observed clinical signs of aspiration, continue/maintain   The patient/caregiver will demonstrate understanding of compensatory strategies for improved swallowing safety. continue    Assessment:   Impressions:   1/26/2021 Initial Clinical Evaluation of Swallowing Dysphagia Diagnosis: Mild oral stage dysphagia;Mild pharyngeal stage dysphagia. Dysphagia characterized by prolonged; at times incomplete mastication; disorganized bolus formation/cohesion; disorganized A-P oral transit; delayed initiation of swallow; potential reduced laryngeal elevation. Pt with post swallow oral residue which she is able to clear with 1-2 clearance swallows. No overt clinical s/s of aspiration post swallow;no voice quality changes post swallow; no pt complaints post swallow. Would suggest trial downgrade to dysphagia soft/bite size; positioning upright for all meals and SLP f/u x 3-4. Discussed with RN       1/28/2021 Direct Dysphagia therapy session impressions  · Pt was asleep in bed but aroused easily to SLP entering the room. Pt was oriented to self; place and partially to date/situation.  Pt was verbally responsive and able to follow simple commands. Pt does need continued assist with motor commands. · 8L/min O2 via nasal canula  · Pt tolerated all thin liquid presentations; puree; dysphagia minced and moist food consistencies and soft/bite size food consistencies with functional bolus prep/control and manipulation; no  overt clinical s/s post swallow. All presentations taken via straw. No pt complaints post swallow. No voice quality changes post swallow    Diet Recommendations:    dysphagia soft/bite size food consistencies  Thin liquids  meds close monitor with H20; may need with puree    Strategies:   Compensatory Swallowing Strategies: Upright as possible for all oral intake, Small bites/sips, Swallow 2 times per bite/sip, Remain upright for 30-45 minutes after meals    Education:  Consulted and agree with results and recommendations: Patient, RN  Patient Education Response: Verbalizes understanding, Needs reinforcement    Prognosis:   Prognosis  Prognosis for safe diet advancement: good(guarded medical DX; reduced mobility)    Plan:     Continue Dysphagia Therapy:   Interventions: Therapeutic Interventions: Diet tolerance monitoring, Patient/Family education, Therapeutic PO trials with SLP  Duration/Frequency of therapy while on unit: Duration/Frequency of Treatment  Duration/Frequency of Treatment: 3-4 times  Discharge Instructions:   Anticipate Yes__X__No__ for further skilled Speech Therapy for Dysphagia at discharge    This note serves as a D/C Summary in the event that this patient is discharged prior to the next therapy session. Coded treatment time: 0  Total treatment time: 25    Electronically signed by  Dante Tripp MS,CCC,SLP [id number]  Speech and Language Pathologist  on 1/28/2021 at 9:41 AM

## 2021-01-28 NOTE — PLAN OF CARE
Problem: Falls - Risk of:  Goal: Will remain free from falls  Description: Will remain free from falls  Outcome: Met This Shift  Goal: Absence of physical injury  Description: Absence of physical injury  Outcome: Met This Shift     Problem: Airway Clearance - Ineffective  Goal: Achieve or maintain patent airway  Outcome: Met This Shift     Problem: Gas Exchange - Impaired  Goal: Absence of hypoxia  Outcome: Ongoing  Goal: Promote optimal lung function  Outcome: Ongoing     Problem: Breathing Pattern - Ineffective  Goal: Ability to achieve and maintain a regular respiratory rate  Outcome: Ongoing     Problem:  Body Temperature -  Risk of, Imbalanced  Goal: Ability to maintain a body temperature within defined limits  Outcome: Met This Shift  Goal: Will regain or maintain usual level of consciousness  Outcome: Met This Shift  Goal: Complications related to the disease process, condition or treatment will be avoided or minimized  Outcome: Ongoing     Problem: Isolation Precautions - Risk of Spread of Infection  Goal: Prevent transmission of infection  Outcome: Met This Shift     Problem: Nutrition Deficits  Goal: Optimize nutritional status  Outcome: Ongoing     Problem: Risk for Fluid Volume Deficit  Goal: Maintain normal heart rhythm  Outcome: Ongoing  Goal: Maintain absence of muscle cramping  Outcome: Ongoing  Goal: Maintain normal serum potassium, sodium, calcium, phosphorus, and pH  Outcome: Ongoing     Problem: Loneliness or Risk for Loneliness  Goal: Demonstrate positive use of time alone when socialization is not possible  Outcome: Met This Shift     Problem: Fatigue  Goal: Verbalize increase energy and improved vitality  Outcome: Ongoing     Problem: Patient Education: Go to Patient Education Activity  Goal: Patient/Family Education  Outcome: Met This Shift     Problem: Skin Integrity:  Goal: Will show no infection signs and symptoms  Description: Will show no infection signs and symptoms  Outcome: Ongoing  Goal: Absence of new skin breakdown  Description: Absence of new skin breakdown  Outcome: Ongoing

## 2021-01-28 NOTE — PROGRESS NOTES
Clinical Pharmacy Note  Warfarin Consult    Isha Phillips is a 82 y.o. female receiving warfarin managed by pharmacy. Warfarin Indication: Hx VTE  Target INR range: 2-3  Dose prior to admission:   Verified dose with ECF - Warfarin has been on hold for several days due to supratherapeutic INR - received a couple doses of phytonadione at the facility. Dose prior to INR being significantly elevated was 2.5 mg TUE/SAT and 5 mg ALL OTHER DAYS    Current warfarin drug-drug interactions: Dexamethasone    Recent Labs     01/26/21  0555 01/27/21  0600 01/28/21  0600   HGB 8.2* 7.8* 8.2*   HCT 25.3* 25.2* 26.1*   INR 1.90* 2.12* 2.10*       Assessment/Plan:      She has received warfarin 5 mg daily for the past 3 days. INR is therapeutic. We will need to keep in mind that dexamethasone is on board as well. Give warfarin 5 mg again today. Daily PT/INR until stable within therapeutic range. Thank you for the consult. Will continue to follow.   Moiz Parker, PharmD, 1/28/2021 10:55 AM

## 2021-01-28 NOTE — PROGRESS NOTES
Late entry:  [facility] called stating concern of pt going in and out of Afib with intermittent pauses noted. Stat EKG ordered per protocol. On call Hospitalist Rebeca Garcia NP notified. EKG results reported as Normal Sinus Rhythm with Right bundle branch block. Pt also noted bradying to the 46s. Pt remained asymptomatic throughout. Denies chest pain. Additional lab orders recieved. No signs or symptoms of distress noted. Will continue to monitor.

## 2021-01-28 NOTE — PROGRESS NOTES
1/23/2021 with KIKO, Diarrhea, COVID + (1/10/2021). Recent admit 1/8/1/11/2021 following fall at home (d/c to SNF setting). PMH as noted including CVA, PE, Abd Wound/Debride, OA, L TKR. Exam: See above. Clinical Presentation: See above. PT Education: PT Role;Plan of Care;General Safety;Pressure Relief; Functional Mobility Training  Barriers to Learning: Endurance/Debility. REQUIRES PT FOLLOW UP: Yes  Activity Tolerance  Activity Tolerance: Patient limited by fatigue;Patient limited by endurance     Patient Diagnosis(es): The primary encounter diagnosis was KIKO (acute kidney injury) ([facility] Diagnoses of C. difficile diarrhea, Dehydration, and COVID-19 were also pertinent to this visit. has a past medical history of Acute kidney injury ([address] Arthritis, Cerebral artery occlusion with cerebral infarction ([address] COVID-19, Diabetes mellitus ([address] Hypertension, and Pulmonary embolism ([facility] has a past surgical history that includes Abdomen surgery (N/A, 2/15/2019) and Abdomen surgery (N/A, 3/19/2019). Restrictions  Restrictions/Precautions  Restrictions/Precautions: Fall Risk, Isolation, Contact Precautions  Position Activity Restriction  Other position/activity restrictions: Droplet Plus :  COVID +. O2 8L via High Flow. Contact : C-Diff. Subjective   General  Chart Reviewed: Yes  Additional Pertinent Hx: 81 y/o female admit 1/23/2021 with KIKO, Diarrhea, COVID + (1/10/2021). Recent admit 1/8/1/11/2021 following fall at home (d/c to SNF setting). PMH as noted including CVA, PE, Abd Wound/Debride, OA, L TKR. Response To Previous Treatment: Patient reporting fatigue but able to participate.   Family / Caregiver Present: No  Referring Practitioner: Dr. Radha Oh: Pt is agreeable to PT          Orientation  Orientation  Overall Orientation Status: Within Functional Limits     Cognition   Cognition  Overall Cognitive Status: WFL  Cognition Comment: Pt engages well - continues to repeate slef throughout session     Objective   Bed mobility  Supine to Sit: Maximum assistance;2 Person assistance  Sit to Supine: Unable to assess(in recliner at end of session)  Scooting: Dependent/Total  Comment: Pt sat on EOB x 5 minutes prior to attempt to stand at stedy  Transfers  Sit to Stand: Maximum Assistance;2 Person [email or web address])  Stand to sit: Maximum Assistance;2 Person [email or web address])  Bed to Chair: Dependent/Total(stedy)  Comment: HR to 140bpm, SpO2 stays in low 90's on 8L O2  Ambulation  Ambulation?: No     Balance  Posture: Fair  Sitting - Static: Fair;+  Sitting - Dynamic: Fair  Standing - Static: Poor           AM-PAC Score  AM-PAC Inpatient Mobility Raw Score : 6 (01/28/21 1517)  AM-PAC Inpatient T-Scale Score : 23.55 (01/28/21 1517)  Mobility Inpatient CMS 0-100% Score: 100 (01/28/21 1517)  Mobility Inpatient CMS G-Code Modifier : CN (01/28/21 1517)          Goals  Short term goals  Time Frame for Short term goals: Upon d/c acute care - all goals ongoing as of 1/28/21  Short term goal 1: Bed Mob Mod assist x 2. Short term goal 2: EOB SBA; Min assist scooting EOB. Short term goal 3: Transfer via Stedy Mod/Max assist x 2. Short term goal 4: Pt participating approp Strength Exs. Patient Goals   Patient goals : Be able to return home eventually. Plan    Plan  Times per week: 3-5x/week  Current Treatment Recommendations: Strengthening, Functional Mobility Training, Transfer Training, Endurance Training, Safety Education & Training, Patient/Caregiver Education & Training, Gait Training, Balance Training, Neuromuscular Re-education  Safety Devices  Type of devices:  All fall risk precautions in place, Call light within reach, Gait belt, Patient at risk for falls, Left in chair, Nurse notified     Therapy Time   Individual Concurrent Group Co-treatment   Time In 1430         Time Out 1515         Minutes 45         Timed Code Treatment Minutes: 243 Altaf Randhawa, PT

## 2021-01-29 LAB
ANION GAP SERPL CALCULATED.3IONS-SCNC: 7 MMOL/L (ref 3–16)
BASOPHILS ABSOLUTE: 0 K/UL (ref 0–0.2)
BASOPHILS RELATIVE PERCENT: 0.2 %
BUN BLDV-MCNC: 30 MG/DL (ref 7–20)
CALCIUM SERPL-MCNC: 8 MG/DL (ref 8.3–10.6)
CHLORIDE BLD-SCNC: 113 MMOL/L (ref 99–110)
CO2: 23 MMOL/L (ref 21–32)
CREAT SERPL-MCNC: 1.1 MG/DL (ref 0.6–1.2)
EOSINOPHILS ABSOLUTE: 0.1 K/UL (ref 0–0.6)
EOSINOPHILS RELATIVE PERCENT: 0.6 %
GFR AFRICAN AMERICAN: 57
GFR NON-AFRICAN AMERICAN: 47
GLUCOSE BLD-MCNC: 225 MG/DL (ref 70–99)
GLUCOSE BLD-MCNC: 272 MG/DL (ref 70–99)
GLUCOSE BLD-MCNC: 300 MG/DL (ref 70–99)
GLUCOSE BLD-MCNC: 70 MG/DL (ref 70–99)
GLUCOSE BLD-MCNC: 78 MG/DL (ref 70–99)
HCT VFR BLD CALC: 25.7 % (ref 36–48)
HEMOGLOBIN: 8.2 G/DL (ref 12–16)
INR BLD: 2.4 (ref 0.86–1.14)
LYMPHOCYTES ABSOLUTE: 0.9 K/UL (ref 1–5.1)
LYMPHOCYTES RELATIVE PERCENT: 6.9 %
MAGNESIUM: 1.9 MG/DL (ref 1.8–2.4)
MCH RBC QN AUTO: 27.4 PG (ref 26–34)
MCHC RBC AUTO-ENTMCNC: 31.8 G/DL (ref 31–36)
MCV RBC AUTO: 86.1 FL (ref 80–100)
MONOCYTES ABSOLUTE: 1 K/UL (ref 0–1.3)
MONOCYTES RELATIVE PERCENT: 7.4 %
NEUTROPHILS ABSOLUTE: 11.2 K/UL (ref 1.7–7.7)
NEUTROPHILS RELATIVE PERCENT: 84.9 %
PDW BLD-RTO: 15.1 % (ref 12.4–15.4)
PERFORMED ON: ABNORMAL
PERFORMED ON: NORMAL
PLATELET # BLD: 421 K/UL (ref 135–450)
PMV BLD AUTO: 9 FL (ref 5–10.5)
POTASSIUM SERPL-SCNC: 4.2 MMOL/L (ref 3.5–5.1)
PROTHROMBIN TIME: 28.1 SEC (ref 10–13.2)
RBC # BLD: 2.98 M/UL (ref 4–5.2)
SODIUM BLD-SCNC: 143 MMOL/L (ref 136–145)
WBC # BLD: 13.1 K/UL (ref 4–11)

## 2021-01-29 PROCEDURE — 97530 THERAPEUTIC ACTIVITIES: CPT

## 2021-01-29 PROCEDURE — 6370000000 HC RX 637 (ALT 250 FOR IP): Performed by: INTERNAL MEDICINE

## 2021-01-29 PROCEDURE — 2580000003 HC RX 258: Performed by: INTERNAL MEDICINE

## 2021-01-29 PROCEDURE — 80048 BASIC METABOLIC PNL TOTAL CA: CPT

## 2021-01-29 PROCEDURE — 97535 SELF CARE MNGMENT TRAINING: CPT

## 2021-01-29 PROCEDURE — 99232 SBSQ HOSP IP/OBS MODERATE 35: CPT | Performed by: INTERNAL MEDICINE

## 2021-01-29 PROCEDURE — 83735 ASSAY OF MAGNESIUM: CPT

## 2021-01-29 PROCEDURE — 85025 COMPLETE CBC W/AUTO DIFF WBC: CPT

## 2021-01-29 PROCEDURE — 6360000002 HC RX W HCPCS: Performed by: INTERNAL MEDICINE

## 2021-01-29 PROCEDURE — 2060000000 HC ICU INTERMEDIATE R&B

## 2021-01-29 PROCEDURE — 85610 PROTHROMBIN TIME: CPT

## 2021-01-29 RX ORDER — WARFARIN SODIUM 2.5 MG/1
2.5 TABLET ORAL
Status: COMPLETED | OUTPATIENT
Start: 2021-01-29 | End: 2021-01-29

## 2021-01-29 RX ADMIN — FAMOTIDINE 10 MG: 20 TABLET, FILM COATED ORAL at 09:03

## 2021-01-29 RX ADMIN — INSULIN LISPRO 12 UNITS: 100 INJECTION, SOLUTION INTRAVENOUS; SUBCUTANEOUS at 12:00

## 2021-01-29 RX ADMIN — SODIUM CHLORIDE, PRESERVATIVE FREE 10 ML: 5 INJECTION INTRAVENOUS at 09:04

## 2021-01-29 RX ADMIN — ASPIRIN 81 MG: 81 TABLET, CHEWABLE ORAL at 09:03

## 2021-01-29 RX ADMIN — SODIUM CHLORIDE, PRESERVATIVE FREE 10 ML: 5 INJECTION INTRAVENOUS at 21:59

## 2021-01-29 RX ADMIN — VANCOMYCIN HYDROCHLORIDE 125 MG: 125 CAPSULE ORAL at 09:03

## 2021-01-29 RX ADMIN — INSULIN LISPRO 3 UNITS: 100 INJECTION, SOLUTION INTRAVENOUS; SUBCUTANEOUS at 21:59

## 2021-01-29 RX ADMIN — INSULIN LISPRO 5 UNITS: 100 INJECTION, SOLUTION INTRAVENOUS; SUBCUTANEOUS at 12:00

## 2021-01-29 RX ADMIN — CLONIDINE HYDROCHLORIDE 0.1 MG: 0.1 TABLET ORAL at 09:03

## 2021-01-29 RX ADMIN — VANCOMYCIN HYDROCHLORIDE 125 MG: 125 CAPSULE ORAL at 21:58

## 2021-01-29 RX ADMIN — WARFARIN SODIUM 2.5 MG: 2.5 TABLET ORAL at 18:15

## 2021-01-29 RX ADMIN — INSULIN LISPRO 9 UNITS: 100 INJECTION, SOLUTION INTRAVENOUS; SUBCUTANEOUS at 17:00

## 2021-01-29 RX ADMIN — Medication: at 09:04

## 2021-01-29 RX ADMIN — VANCOMYCIN HYDROCHLORIDE 125 MG: 125 CAPSULE ORAL at 12:25

## 2021-01-29 RX ADMIN — NIFEDIPINE 30 MG: 30 TABLET, FILM COATED, EXTENDED RELEASE ORAL at 09:03

## 2021-01-29 RX ADMIN — CASTOR OIL AND BALSAM, PERU: 788; 87 OINTMENT TOPICAL at 09:04

## 2021-01-29 RX ADMIN — DEXAMETHASONE SODIUM PHOSPHATE 6 MG: 10 INJECTION, SOLUTION INTRAMUSCULAR; INTRAVENOUS at 09:04

## 2021-01-29 RX ADMIN — VANCOMYCIN HYDROCHLORIDE 125 MG: 125 CAPSULE ORAL at 18:15

## 2021-01-29 RX ADMIN — INSULIN LISPRO 5 UNITS: 100 INJECTION, SOLUTION INTRAVENOUS; SUBCUTANEOUS at 17:00

## 2021-01-29 RX ADMIN — CYANOCOBALAMIN TAB 1000 MCG 1000 MCG: 1000 TAB at 09:03

## 2021-01-29 RX ADMIN — CLONIDINE HYDROCHLORIDE 0.1 MG: 0.1 TABLET ORAL at 21:58

## 2021-01-29 RX ADMIN — INSULIN GLARGINE 15 UNITS: 100 INJECTION, SOLUTION SUBCUTANEOUS at 21:58

## 2021-01-29 RX ADMIN — ATORVASTATIN CALCIUM 20 MG: 20 TABLET, FILM COATED ORAL at 09:03

## 2021-01-29 RX ADMIN — CASTOR OIL AND BALSAM, PERU: 788; 87 OINTMENT TOPICAL at 21:59

## 2021-01-29 RX ADMIN — Medication: at 21:59

## 2021-01-29 RX ADMIN — Medication 5000 UNITS: at 09:03

## 2021-01-29 NOTE — PROGRESS NOTES
Comprehensive Nutrition Assessment    Type and Reason for Visit:  Initial    Nutrition Recommendations/Plan:   1. Continue carb control, dental soft diet  2. Begin Ensure High Protein once daily  3. Monitor meal / supplement intake and wound healing    Nutrition Assessment:  LOS. Pt with increased protein needs for wound healing. Pt with multiple pressure injuries and deep tissue injuries. Hx diabetes. On carb control, dental soft diet with varied PO intake % at meals. Will send Ensure HP once daily and monitor for acceptance. Malnutrition Assessment:  Malnutrition Status:  No malnutrition      Estimated Daily Nutrient Needs:  Energy (kcal):  [phone] (8-11kcal/108kg); Weight Used for Energy Requirements:  Current     Protein (g):  68-90 (1.5-2.0g/45kg); Weight Used for Protein Requirements:  Ideal        Fluid (ml/day):1 ml/kcal      Nutrition Related Findings:  Active BS. Trace BLE edema. Wounds:  Multiple, Pressure Injury, Deep Tissue Injury       Anthropometric Measures:  · Height: 5' (152.4 cm)  · Current Body Weight: 236 lb (107 kg)   · Admission Body Weight: 239 lb (108.4 kg)      · Ideal Body Weight: 100 lbs; % Ideal Body Weight 236 %   · BMI: 46.1  · Adjusted Body Weight:  ; No Adjustment   · BMI Categories: Obese Class 3 (BMI 40.0 or greater)       Nutrition Diagnosis:   · Increased nutrient needs related to increase demand for energy/nutrients as evidenced by wounds    Nutrition Interventions:   Food and/or Nutrient Delivery:  Continue Current Diet, Start Oral Nutrition Supplement  Nutrition Education/Counseling:  Education not indicated   Coordination of Nutrition Care:  Continue to monitor while inpatient    Goals:  PO intake greater than 50%       Nutrition Monitoring and Evaluation:   Behavioral-Environmental Outcomes:  None Identified   Food/Nutrient Intake Outcomes:  Food and Nutrient Intake, Supplement Intake  Physical Signs/Symptoms Outcomes:  Skin     Discharge Planning:     Too soon to determine     Electronically signed by Ken Allen RD, BERTRAM on 1/29/21 at 9:58 AM EST    Contact: [phone]

## 2021-01-29 NOTE — PROGRESS NOTES
Clinical Pharmacy Note  Warfarin Consult    Ayanna Villasenor is a 82 y.o. female receiving warfarin managed by pharmacy. Warfarin Indication: Hx VTE  Target INR range: 2-3  Dose prior to admission:   Verified dose with ECF - Warfarin has been on hold for several days due to supratherapeutic INR - received a couple doses of phytonadione at the facility. Dose prior to INR being significantly elevated was 2.5 mg TUE/SAT and 5 mg ALL OTHER DAYS    Current warfarin drug-drug interactions: Dexamethasone    Recent Labs     01/27/21  0600 01/28/21  0600 01/29/21  0510   HGB 7.8* 8.2* 8.2*   HCT 25.2* 26.1* 25.7*   INR 2.12* 2.10* 2.40*       Assessment/Plan:    She has received warfarin 5 mg daily for the past 4 days. INR is therapeutic. We will need to keep in mind that dexamethasone is on board as well. Give warfarin 2.5 mg again today. Daily PT/INR until stable within therapeutic range. Thank you for the consult. Will continue to follow.   Jessica Garsia, PharmD, 1/29/2021 8:09 AM

## 2021-01-29 NOTE — PROGRESS NOTES
Complexity  History: 83 y/o female admit 1/23/2021 with KIKO, Diarrhea, COVID + (1/10/2021). Recent admit 1/8/1/11/2021 following fall at home (d/c to SNF setting). PMH as noted including CVA, PE, Abd Wound/Debride, OA, L TKR. Exam: See above. Clinical Presentation: See above. PT Education: PT Role;Plan of Care;General Safety;Pressure Relief; Functional Mobility Training  Barriers to Learning: Endurance/Debility. REQUIRES PT FOLLOW UP: Yes  Activity Tolerance  Activity Tolerance: Patient limited by fatigue;Patient limited by endurance     Patient Diagnosis(es): The primary encounter diagnosis was KIKO (acute kidney injury) ([facility] Diagnoses of C. difficile diarrhea, Dehydration, and COVID-19 were also pertinent to this visit. has a past medical history of Acute kidney injury ([address] Arthritis, Cerebral artery occlusion with cerebral infarction ([address] COVID-19, Diabetes mellitus ([address] Hypertension, and Pulmonary embolism ([address] has a past surgical history that includes Abdomen surgery (N/A, 2/15/2019) and Abdomen surgery (N/A, 3/19/2019). Restrictions  Restrictions/Precautions  Restrictions/Precautions: Fall Risk, Isolation, Contact Precautions  Position Activity Restriction  Other position/activity restrictions: Droplet Plus :  COVID +. O2 2L via High Flow. Contact : C-Diff. Subjective   General  Chart Reviewed: Yes  Additional Pertinent Hx: 83 y/o female admit 1/23/2021 with KIKO, Diarrhea, COVID + (1/10/2021). Recent admit 1/8/1/11/2021 following fall at home (d/c to SNF setting). PMH as noted including CVA, PE, Abd Wound/Debride, OA, L TKR. Response To Previous Treatment: Patient reporting fatigue but able to participate.   Family / Caregiver Present: No  Referring Practitioner: Dr. Cristine Tariq: Pt is agreeable to PT          Orientation  Orientation  Overall Orientation Status: Within Functional Limits     Cognition   Cognition  Overall Cognitive Status: [facility]  Cognition PT

## 2021-01-29 NOTE — PROGRESS NOTES
This RN spoke with Obey Clark at [facility] regarding patient C-diff results. Obey Clark stated that she would fax the results to this RN. Fax number provided by this RN.  Electronically signed by Quinton Jordan RN on 1/29/2021 at 11:27 AM

## 2021-01-29 NOTE — CARE COORDINATION
Spoke with Maddy monique [facility], she advised they cannot accept her due to complexity of her wounds on the Covid unit is too much for their facility at this time. Spoke with Audelia Cleveland at [place], they can accept the patient at D/C. Call to patient's daughter, left HIPPA compliant message requesting a call back. Shashank Rivero RN, BSN, Case Management  Phone: [phone]  Electronically signed by Shashank Rivero RN on 1/29/2021 at 10:26 AM     Spoke with the patient's daughter, she is agreeable to going to [facility] at D/C. IMM e-mailed to her at [email or web address]. Left message for Georgina Mathis at [facility] to advise that the patient will not be returning to them.   Shashank Rivero RN, BSN, Case Management  Phone: [phone]  Electronically signed by Shashank Rivero RN on 1/29/2021 at 10:59 AM

## 2021-01-29 NOTE — PROGRESS NOTES
Infectious Disease Follow up Notes  Admit Date: 1/23/2021  Hospital Day: 7    Antibiotics :   IV Dexamethasone  S/p Convalescent plasma  Oral Vancomycin    CHIEF COMPLAINT:     COVID 19 PNA  WBC elevation   KIKO  C diff  Hypoxia      Subjective interval History :  82 y.o. woman with a past medical history of arthritis, CVA, diabetes on insulin, hypertension, history of pulmonary embolism on Coumadin, history of abdominal surgery in 2019 now admitted with acute shortness of breath and worsening kidney function. She was recently hospitalized at [facility] from 1/8/2021 to 1/11/2021 due to a fall before admission and also noted to have urinary tract infection and she had a COVID-19 testing before discharge was positive on 1/10/2021 she was discharged to [facility] now admitted from there with worsening kidney function also ongoing diarrhea and shortness of breath. She was on oral vancomycin on admission is not clear when she was tested for C. Difficile. Patient also complained of increasing shortness of breath with some cough since the Covid diagnosis. Labs indicate creatinine elevated 2.2 glucose at 419 procalcitonin 0.38 hemoglobin A1c 7.6, WBC elevation at 15. Urine pneumococcal antigen and Legionella antigen negative. She has no fever since admission, but currently requiring 6 L of nasal cannula for hypoxic respiratory failure. Chest x-ray with bilateral changes consistent with COVID-19 infection.   She had a PICC line at presentation not sure when it this was placed.     Interval History : Remains short of breath able to wean on oxygen she is currently using 4 L nasal cannula some dry intermittent cough, creatinine seems to be better,    Past Medical History:    Past Medical History:   Diagnosis Date    Acute kidney injury ([address]     Arthritis     Cerebral artery occlusion with cerebral infarction ([address]  COVID-19     Diabetes mellitus ([facility]     Hypertension     Pulmonary embolism ([facility] [address]     On warfarin       Past Surgical History:    Past Surgical History:   Procedure Laterality Date    ABDOMEN SURGERY N/A 2/15/2019    DEBRIDEMENT OF OPEN ABDOMINAL WOUND performed by William Ohara MD at [facility] N/A 3/19/2019    RIGHT LOWER QUADRANT ABDOMINAL DEBRIDEMENT performed by William Ohara MD at [address] [place] [address]       Current Medications:    Outpatient Medications Marked as Taking for the 21 encounter [facility] HOS[facility]ITAL Encounter)   Medication Sig Dispense Refill    ascorbic acid (VITAMIN C) 500 MG tablet Take 500 mg by mouth daily      HYDROcodone-acetaminophen (NORCO) 5-325 MG per tablet Take 1 tablet by mouth 2 times daily.  HYDROcodone-acetaminophen (NORCO) 5-325 MG per tablet Take 1 tablet by mouth every 6 hours as needed for Pain.  [] azithromycin (ZITHROMAX) 250 MG tablet Take 250 mg by mouth daily      vancomycin (VANCOCIN) 50 mg/mL oral solution Take 125 mg by mouth See Admin Instructions 125 mg QID x 14 days then 125 mg TID x 14 days then 125 mg BID x 14 days then 125 mg daily x 14 days      Probiotic Product (ACIDOPHILUS/BIFIDUS PO) Take 1 tablet by mouth daily      sucralfate (CARAFATE) 1 GM tablet Take 1 g by mouth 4 times daily (before meals and nightly)      metFORMIN (GLUCOPHAGE) 1000 MG tablet Take 0.5 tablets by mouth 2 times daily (with meals) 60 tablet 3    NIFEdipine (ADALAT CC) 30 MG extended release tablet Take 30 mg by mouth daily      gentamicin (GARAMYCIN) 0.1 % cream Apply topically daily.  Right foot 15 g 0    IRON PO Take 65 mg by mouth three times a week Indications:       vitamin B-12 (CYANOCOBALAMIN) 1000 MCG tablet Take 1,000 mcg by mouth three times a week Indications:       aspirin 81 MG tablet Take by mouth daily      lovastatin (MEVACOR) 40 MG tablet Take 40 mg by mouth nightly      Insulin NPH Isophane & Regular (HUMULIN 70/30 SC) Inject into the skin Indications: 15 units in am, 14 units at suppertime       Cholecalciferol (VITAMIN D3) 5000 units TABS Take 5,000 Units by mouth three times a week Indications: mon, wed frid       glimepiride (AMARYL) 4 MG tablet Take 4 mg by mouth every morning (before breakfast)      warfarin (COUMADIN) 5 MG tablet Take 2.5-5 mg by mouth daily Indications: 5 mg daily, except Wed take 2.5 mg 2.5 mg TUE/SAT and 5 mg ALL OTHER DAYS      cloNIDine (CATAPRES) 0.1 MG tablet Take 0.1 mg by mouth 2 times daily      lisinopril (PRINIVIL;ZESTRIL) 20 MG tablet Take 20 mg by mouth daily         Allergies:  Bactrim ds [sulfamethoxazole-trimethoprim]    Immunizations :   Immunization History   Administered Date(s) Administered    Influenza Vaccine, unspecified formulation 10/06/2003, 10/08/2005, 10/09/2006, 10/04/2007, 09/11/2009, 10/20/2011    Influenza, High Dose (Fluzone 65 yrs and older) 10/29/2012, 10/01/2014, 10/13/2015, 10/03/2016, 10/16/2017, 11/01/2018    Pneumococcal Conjugate 13-valent ([id number]) 04/14/2015    Pneumococcal Polysaccharide ([id number]) 01/29/2003    Tdap (Boostrix, Adacel) 04/12/2013, 11/21/2018       Social History:    Social History     Tobacco Use    Smoking status: Never Smoker    Smokeless tobacco: Never Used   Substance Use Topics    Alcohol use: No    Drug use: Never     Social History     Tobacco Use   Smoking Status Never Smoker   Smokeless Tobacco Never Used      FAMILY History : no DVT no COPD        REVIEW OF SYSTEMS:      Constitutional:  negative for fevers, chills, night sweats  Eyes:  negative for blurred vision, eye discharge, visual disturbance   HEENT:  negative for hearing loss, ear drainage,nasal congestion  Respiratory:   cough +  shortness of breath +  or hemoptysis   Cardiovascular:  negative for chest pain, palpitations, syncope  Gastrointestinal:  negative for nausea, vomiting, diarrhea  , constipation, abdominal pain  Genitourinary:  negative for frequency, dysuria, urinary incontinence, hematuria  Hematologic/Lymphatic:  negative for easy bruising, bleeding and lymphadenopathy  Allergic/Immunologic:  negative for recurrent infections, angioedema, anaphylaxis   Endocrine:  negative for weight changes, polyuria, polydipsia and polyphagia  Musculoskeletal:  negative for joint  pain, swelling, decreased range of motion  Integumentary: No rashes, skin lesions  Neurological:  negative for headaches, slurred speech, unilateral weakness  Psychiatric: negative for hallucinations,confusion,agitation.                 PHYSICAL EXAM:      Vitals:    /75   Pulse 90   Temp 98.4 °F (36.9 °C) (Oral)   Resp 18   Ht 5' (1.524 m)   Wt 236 lb 15.9 oz (107.5 kg)   SpO2 95%   BMI 46.28 kg/m²     PHYSICAL EXAM:   General Appearance: alert,in some acute distress, + pallor, no icterus on nasal cannula+   Skin: warm and dry, no rash or erythema  Head: normocephalic and atraumatic  Eyes: pupils equal, round, and reactive to light, conjunctivae normal  ENT: tympanic membrane, external ear and ear canal normal bilaterally, nose without deformity, nasal mucosa and turbinates normal without polyps  Neck: supple and non-tender without mass, no thyromegaly  no cervical lymphadenopathy  Pulmonary/Chest: clear to auscultation bilaterally- no wheezes, rales or rhonchi, normal air movement, no respiratory distress  Cardiovascular: normal rate, regular rhythm, normal S1 and S2, no murmurs, rubs, clicks, or gallops, no carotid bruits  Abdomen: soft, non-tender, non-distended, normal bowel sounds, no masses or organomegaly  Extremities: no cyanosis, clubbing or edema  Musculoskeletal: normal range of motion, no joint swelling, deformity or tenderness  Integumentary: No rashes, no abnormal skin lesions, no petechiae  Neurologic: reflexes normal and symmetric, no cranial nerve deficit  Psych:  Orientation, sensorium, mood normal            Lines: pneumococcal infection. Infection due to Strep pneumoniae   cannot be ruled out since the antigen present in the sample   may be below the detection limit of the test.   Normal Range:Presumptive Negative    Narrative:     ORDER#: [id number]                          ORDERED BY: Sonny Ovalles   SOURCE: Urine Clean Catch                  COLLECTED:  01/23/21 16:29   ANTIBIOTICS AT [place]. :                      RECEIVED :  01/25/21 08:02   Performed at:   [facility]   [address] [facility] [place] [address]   Phone [phone]   Legionella antigen, urine [[id number]] Collected: 01/23/21 1629   Order Status: Completed Specimen: Urine, clean catch Updated: 01/25/21 0843    L. pneumophila Serogp 1 Ur Ag --    Presumptive Negative   No Legionella pneumophila serogroup 1 antigens detected. A negative result does not exclude infection with   Legionella pneumophila serogroup 1 nor does it rule out   other microbial-caused respiratory infections or   disease caused by other serogroups of   Legionella pneumophila. Normal Range: Presumptive Negative    Narrative:     ORDER#: [id number]                          ORDERED BY: Sonny Ovalles   SOURCE: Urine Clean Catch                  COLLECTED:  01/23/21 16:29   ANTIBIOTICS AT [place]. :                      RECEIVED :  01/25/21 08:02   Performed at:   [address] [place] [address]   Phone [phone]   Culture, Respiratory, Sputum [[id number]]    Order Status: Sent Specimen: Sputum Expectorated        Lab Results   Component Value Date    BC No growth after 5 days of incubation. 03/14/2019    BLOODCULT2 No growth after 5 days of incubation.  03/14/2019       Respiratory Culture:  Lab Results   Component Value Date    LABGRAM  07/30/2019     No WBC's seen  No Cells seen  1+ Gram positive cocci       AFB:  Lab Results   Component Value Date    AFBSMEAR No AFB observed by Fluorescent stain 03/14/2019     Viral Culture:  Lab Results   Component Value Date    COVID19 DETECTED 01/10/2021     Urine Culture: No results for input(s): LABURIN in the last 72 hours. IMAGING:    XR CHEST PORTABLE   Final Result   Vascular congestion/mild pulmonary edema. Pattern is stable from prior exam.         XR CHEST PORTABLE   Final Result   Right PICC line with catheter tip upper SVC. Suspected multifocal pneumonia.                All the pertinent images and reports for the current Hospitalization were reviewed by me     Scheduled Meds:   warfarin  2.5 mg Oral Once    insulin glargine  15 Units Subcutaneous Nightly    Venelex   Topical BID    collagenase   Topical BID    dexamethasone  6 mg Intravenous Q24H    famotidine  10 mg Oral Daily    vancomycin  125 mg Oral 4x Daily    insulin lispro  5 Units Subcutaneous TID WC    aspirin  81 mg Oral Daily    atorvastatin  20 mg Oral Daily    vitamin D  5,000 Units Oral Once per day on Mon Wed Fri    cloNIDine  0.1 mg Oral BID    vitamin B-12  1,000 mcg Oral Once per day on Mon Wed Fri    NIFEdipine  30 mg Oral Daily    sodium chloride flush  10 mL Intravenous 2 times per day    warfarin (COUMADIN) daily dosing (placeholder)   Other RX Placeholder    insulin lispro  0-18 Units Subcutaneous TID     insulin lispro  0-9 Units Subcutaneous Nightly       Continuous Infusions:   sodium chloride Stopped (01/25/21 1708)    dextrose         PRN Meds:  sodium chloride, sodium chloride flush, promethazine **OR** ondansetron, polyethylene glycol, acetaminophen **OR** acetaminophen, guaiFENesin-dextromethorphan, glucose, dextrose, glucagon (rDNA), dextrose      Assessment:     Patient Active Problem List   Diagnosis    Abdominal wall cellulitis    Poorly controlled type 2 diabetes mellitus (HCC)    HTN (hypertension)    CKD (chronic kidney disease) stage 3, GFR 30-59 ml/min    History of pulmonary embolism    Chronic abdominal wound infection, subsequent encounter    Open abdominal wall wound, subsequent encounter    Severe infection    Acute kidney injury superimposed on chronic kidney disease ([address]    Diverticulosis of colon without diverticulitis    Morbid obesity due to excess calories ([address]    Open wound of abdomen    Diabetic ulcer of right midfoot associated with type 2 diabetes mellitus, with fat layer exposed ([address]    Chronic wound infection of abdomen    History of arterial ischemic stroke    Anticoagulated on Coumadin    MRSA colonization    Acinetobacter lwoffi infection    Citrobacter infection    Weight loss counseling, encounter for    Proteus infection    Frequent falls    KIKO (acute kidney injury) ([address]    Acute respiratory failure with hypoxia ([address]    Sepsis ([address]    Pneumonia due to COVID-19 virus     COVID-19 pneumonia  Acute hypoxic respiratory failure currently requiring  nasal cannula  Acute kidney injury creat improving    WBC elevation  C. difficile diarrhea she was already on oral vancomycin on admission not sure when and where  she was tested for C. Difficile ? ? Admitted from [address] [place] [address]   She had a PICC line at presentation  She was recently hospitalized [facility] with a fall and urinary tract infection on 1/8/2020 121/11/21. She was tested for +Ve  COVID-19 on 1/10/2021  CXR WITH CHANGES from COVID 19  ON Coumadin for h/o PE  Morbid obesity BMI at  52         Worsening respiratory status with acute kidney injury in the setting of COVID-19 pneumonia. She was on oral vancomycin before admission from nursing home not sure when she was tested for C . Diff  we do not have the records.   She also came in with a PICC line in place was given IV fluids at the nursing home.     She had KIKO and now creat is improving and diarrhea is better on therapy and completed Convalescent plasma therapy and would complete the course with steroids as planned    Respiratory status slowly improving oxygen able to be weaned down to 4 L today    Labs, Microbiology, Radiology and all the pertinent results from current hospitalization and  care every where were reviewed  by me as a part of the evaluation   Plan:   1. Cont supportive care  2. Cont Dexamethasone for 10 days total   3. Out of Window for IV Remdesivir >  2 weeks since diagnosis  4. Type and screen done   5. S/p  convalescent plasma  6. Check D dimer 883 , CRP 22.1 , Ferritin 145   7. Cont oral Vancomycin x 125 mg x q 6HR X 14 DAYS   8. oK FOR d/c when ok with primary     Will follow peripherally call with Questions ?      Discussed with patient/Family and Nursing staff     Thanks for allowing me to participate in your patient's care and please call me with any questions or concerns.     Sarah Wild MD  Infectious Disease  [facility] ([facility]) Physician  Phone: [phone]   Fax : [phone]

## 2021-01-29 NOTE — PLAN OF CARE
Nutrition Problem #1: Increased nutrient needs  Intervention: Food and/or Nutrient Delivery: Continue Current Diet, Start Oral Nutrition Supplement  Nutritional Goals: PO intake greater than 50%

## 2021-01-29 NOTE — PROGRESS NOTES
Hospitalist Progress Note      PCP: Anastacia Jay    Date of Admission: 1/23/2021        Hospital Course:  \"  89MK WF with PMhx sig for DMII insulin dependent, HTN, hx of PE on warfarin presents with abnormal lab work. Brenda Montgomery has had diarrhea for past few days, decreased PO intake and was dx with COVID19 infection recently. Suki Tavares was started on 6L oxygen in ED - unclear how much oxygen she was requiring at SNF. Suki Tavares was discharged from [facility] 2 weeks ago for knee contusion.  Pt is requiring increasing amounts of oxygen but she is requiring IVF for acute renal failure and ongoing diarrhea.  Baseline creatinine is 1. 2.  \"    Subjective: feels better, no diarrhea this am. No fever or chills.         Medications:  Reviewed    Infusion Medications    sodium chloride Stopped (01/25/21 1708)    dextrose       Scheduled Medications    warfarin  2.5 mg Oral Once    insulin glargine  15 Units Subcutaneous Nightly    Venelex   Topical BID    collagenase   Topical BID    dexamethasone  6 mg Intravenous Q24H    famotidine  10 mg Oral Daily    vancomycin  125 mg Oral 4x Daily    insulin lispro  5 Units Subcutaneous TID WC    aspirin  81 mg Oral Daily    atorvastatin  20 mg Oral Daily    vitamin D  5,000 Units Oral Once per day on Mon Wed Fri    cloNIDine  0.1 mg Oral BID    vitamin B-12  1,000 mcg Oral Once per day on Mon Wed Fri    NIFEdipine  30 mg Oral Daily    sodium chloride flush  10 mL Intravenous 2 times per day    warfarin (COUMADIN) daily dosing (placeholder)   Other RX Placeholder    insulin lispro  0-18 Units Subcutaneous TID WC    insulin lispro  0-9 Units Subcutaneous Nightly     PRN Meds: sodium chloride, sodium chloride flush, promethazine **OR** ondansetron, polyethylene glycol, acetaminophen **OR** acetaminophen, guaiFENesin-dextromethorphan, glucose, dextrose, glucagon (rDNA), dextrose      Intake/Output Summary (Last 24 hours) at 1/29/2021 3841  Last data filed at 1/29/2021 0600  Gross per 24 hour   Intake 370 ml   Output 2000 ml   Net -1630 ml       Physical Exam Performed:    BP (!) 144/69   Pulse 85   Temp 97.8 °F (36.6 °C) (Oral)   Resp 18   Ht 5' (1.524 m)   Wt 236 lb 15.9 oz (107.5 kg)   SpO2 92%   BMI 46.28 kg/m²     General appearance: No apparent distress  Neck: Supple  Respiratory:  Normal respiratory effort. Clear to auscultation, bilaterally without Rales/Wheezes/Rhonchi. Cardiovascular: Regular rate and rhythm with normal S1/S2 without murmurs, rubs or gallops. Abdomen: Soft, non-tender, non-distended with normal bowel sounds. Musculoskeletal: No clubbing, cyanosis or edema bilaterally. Full range of motion without deformity. Skin: Skin color, texture, turgor normal.  No rashes or lesions. Neurologic:  No focal weakness   Psychiatric: Alert and oriented  Capillary Refill: Brisk,< 3 seconds   Peripheral Pulses: +2 palpable, equal bilaterally       Labs:   Recent Labs     01/27/21  0600 01/28/21  0600 01/29/21  0510   WBC 12.1* 12.5* 13.1*   HGB 7.8* 8.2* 8.2*   HCT 25.2* 26.1* 25.7*   * 469* 421     Recent Labs     01/27/21  0600 01/28/21  0600 01/29/21  0510    142 143   K 3.9 4.1 4.2   * 110 113*   CO2 22 23 23   BUN 41* 33* 30*   CREATININE 1.3* 1.2 1.1   CALCIUM 8.0* 8.1* 8.0*     No results for input(s): AST, ALT, BILIDIR, BILITOT, ALKPHOS in the last 72 hours. Recent Labs     01/27/21  0600 01/28/21  0600 01/29/21  0510   INR 2.12* 2.10* 2.40*     No results for input(s): CKTOTAL, TROPONINI in the last 72 hours. Urinalysis:      Lab Results   Component Value Date    NITRU Negative 01/23/2021    WBCUA 6 01/23/2021    BACTERIA 1+ 01/08/2021    RBCUA 0-2 01/23/2021    BLOODU Negative 01/23/2021    SPECGRAV 1.019 01/23/2021    GLUCOSEU Negative 01/23/2021       Radiology:  XR CHEST PORTABLE   Final Result   Vascular congestion/mild pulmonary edema.   Pattern is stable from prior exam.         XR CHEST PORTABLE   Final Result   Right PICC line with catheter tip upper SVC. Suspected multifocal pneumonia. Assessment/Plan:    Active Hospital Problems    Diagnosis    Acute respiratory failure with hypoxia ([facility] [address] [J96.01]    Sepsis ([facility] [address] [facility] [A41.9]    Pneumonia due to COVID-19 virus [U07.1, J12.82]    KIKO (acute kidney injury) ([facility] [facility] [N17.9]    Anticoagulated on Coumadin [Z79.01]    Chronic wound infection of abdomen [S31.109A, L08.9]    Poorly controlled type 2 diabetes mellitus ([facility] [facility] [E11.65]    HTN (hypertension) [I10]    Morbid obesity due to excess calories ([facility] [address] [facility] [E66.01]    Acute kidney injury superimposed on chronic kidney disease ([facility] [address] [facility] [N17.9, N18.9]     1. COVID19 pneumonia, started on decadron, oxygen and supportive measures. consulted ID. 2. Acute renal failure on CKDII, improved. 3. C diff diarrhea, started on oral vancomycin, ID consulted.   4. Acute respiratory failure with hypoxia, down to 4 L currently   5. Sepsis POA, due to COVID 19 and cdif diarrhea, ID consulted. 6. Morbid Obesity, Body mass index is Body mass index is 46.67 kg/m².   7. DMII uncontrolled, due to steroids, better controlled now. 8. Buttock wound, GS consulted based on wound care recommendations, supportive measures. 9. Anemia, appears chronic, monitor. 10. Thrombocytosis, likely reactive, monitor, continue to improve. Diet: DIET CARB CONTROL;  Dental Soft  Code Status: Full Code      Dispo - 1-2 days    Francis Monahan MD

## 2021-01-29 NOTE — DISCHARGE INSTR - COC
Continuity of Care Form    Patient Name: Jami Powell   :  1935  MRN:  [id number]    Admit date:  2021  Discharge date:  21    Code Status Order: Full Code   Advance Directives:      Admitting Physician:  Bruce Brantley MD  PCP: Shannon Vidal    Discharging Nurse: [facility] Unit/Room#: H9A-7126/8002-35  Discharging Unit Phone Number: [phone]    Emergency Contact:   Extended Emergency Contact Information  Primary Emergency Contact: Nisha Crisostomo [address] Phone: [phone]  Mobile Phone: [phone]  Relation: Child  Secondary Emergency Contact: Sera Bonilla  Relation: Child    Past Surgical History:  Past Surgical History:   Procedure Laterality Date    ABDOMEN SURGERY N/A 2/15/2019    DEBRIDEMENT OF OPEN ABDOMINAL WOUND performed by Diego Mccabe MD at [facility] N/A 3/19/2019    RIGHT LOWER QUADRANT ABDOMINAL DEBRIDEMENT performed by Diego Mccabe MD at [address] [place] [address]       Immunization History:   Immunization History   Administered Date(s) Administered    Influenza Vaccine, unspecified formulation 10/06/2003, 10/08/2005, 10/09/2006, 10/04/2007, 2009, 10/20/2011    Influenza, High Dose (Fluzone 65 yrs and older) 10/29/2012, 10/01/2014, 10/13/2015, 10/03/2016, 10/16/2017, 2018    Pneumococcal Conjugate 13-valent ([id number]) 2015    Pneumococcal Polysaccharide ([id number]) 2003    Tdap (Boostrix, Adacel) 2013, 2018       Active Problems:  Patient Active Problem List   Diagnosis Code    Abdominal wall cellulitis L03.311    Poorly controlled type 2 diabetes mellitus ([facility] [address] E11.65    HTN (hypertension) I10    CKD (chronic kidney disease) stage 3, GFR 30-59 ml/min N18.30    History of pulmonary embolism Z86.711    Chronic abdominal wound infection, subsequent encounter S31.109D, L08.9    Open abdominal wall wound, subsequent encounter S31.109D    Severe infection B99.9    Acute kidney injury superimposed on chronic kidney disease ([address] N17.9, N18.9    Diverticulosis of colon without diverticulitis K57.30    Morbid obesity due to excess calories ([facility] [address] E66.01    Open wound of abdomen S31.109A    Diabetic ulcer of right midfoot associated with type 2 diabetes mellitus, with fat layer exposed ([facility] [address] E11.621, L97.412    Chronic wound infection of abdomen S31.109A, L08.9    History of arterial ischemic stroke Z86.73    Anticoagulated on Coumadin Z79.01    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    Acinetobacter lwoffi infection A49.8    Citrobacter infection A49.8    Weight loss counseling, encounter for Z71.3    Proteus infection A49.8    Frequent falls R29.6    KIKO (acute kidney injury) ([facility] [address] N17.9    Acute respiratory failure with hypoxia (HCC) J96.01    Sepsis (HCC) A41.9    Pneumonia due to COVID-19 virus U07.1, J12.82       Isolation/Infection:   Isolation            Droplet Plus          Patient Infection Status       Infection Onset Added Last Indicated Last Indicated By Review Planned Expiration Resolved Resolved By    COVID-19 01/10/21 01/11/21 01/10/21 COVID-19 01/18/21 02/07/21      Resolved    COVID-19 Rule Out 01/10/21 01/10/21 01/10/21 COVID-19 (Ordered)   01/11/21 Rule-Out Test Resulted    MRSA 03/14/19 03/15/19 05/28/19 Wound Culture   01/24/21 Ronaldo Geller RN            Nurse Assessment:  Last Vital Signs: /76   Pulse 74   Temp 97.9 °F (36.6 °C) (Oral)   Resp 16   Ht 5' (1.524 m)   Wt 236 lb 15.9 oz (107.5 kg)   SpO2 95%   BMI 46.28 kg/m²     Last documented pain score (0-10 scale): Pain Level: 0  Last Weight:   Wt Readings from Last 1 Encounters:   01/29/21 236 lb 15.9 oz (107.5 kg)     Mental Status:  oriented    IV Access:  - None    Nursing Mobility/ADLs:  Walking   Dependent  Transfer  Dependent  Bathing  Dependent  Dressing  Dependent  Toileting  Dependent  Feeding  Independent  Med Admin  Assisted  Med Delivery   whole    Wound Care Documentation and Therapy:  Wound 01/08/21 Other (Comment) Mid (Active)   Wound Etiology Other 01/24/21 0750   Dressing Status Clean;Dry; Intact 01/27/21 0815   Wound Cleansed Cleansed with saline 01/24/21 2210   Dressing/Treatment Protective barrier 01/25/21 0908   Wound Length (cm) 1.5 cm 01/24/21 0750   Wound Width (cm) 2.5 cm 01/24/21 0750   Wound Depth (cm) 0.2 cm 01/08/21 2341   Wound Surface Area (cm^2) 3.75 cm^2 01/24/21 0750   Change in Wound Size % (l*w) -2400 01/24/21 0750   Wound Volume (cm^3) 0.03 cm^3 01/08/21 2341   Wound Assessment Pink/red 01/27/21 0815   Drainage Amount None 01/27/21 0815   Drainage Description Yellow 01/27/21 0815   Odor Mild 01/27/21 0815   [place]-wound Assessment Intact 01/27/21 0815   Number of days: 20       Wound 01/24/21 Buttocks Left presure injury (Active)   Wound Image   01/25/21 0949   Wound Etiology Pressure Unstageable 01/29/21 0900   Dressing Status Clean;Dry; Intact 01/29/21 0900   Wound Cleansed Not Cleansed 01/29/21 0900   Dressing/Treatment Open to air 01/29/21 0900   Dressing Change Due 01/26/21 01/27/21 0815   Wound Length (cm) 4 cm 01/25/21 0949   Wound Width (cm) 5 cm 01/25/21 0949   Wound Surface Area (cm^2) 20 cm^2 01/25/21 0949   Wound Assessment Purple/maroon 01/29/21 0900   Drainage Amount None 01/29/21 0900   Drainage Description Clear 01/24/21 0750   Odor None 01/29/21 0900   [place]-wound Assessment Intact; Non-blanchable erythema 01/29/21 0900   Margins Undefined edges 01/29/21 0900   Wound Thickness Description not for Pressure Injury Full thickness 01/29/21 0900   Number of days: 5       Wound 01/25/21 Buttocks Right (Active)   Wound Etiology Deep tissue/Injury 01/29/21 0900   Dressing/Treatment Open to air 01/29/21 0900   Wound Length (cm) 6 cm 01/25/21 0949   Wound Width (cm) 6 cm 01/25/21 0949   Wound Surface Area (cm^2) 36 cm^2 01/25/21 0949   Wound Assessment Purple/maroon 01/29/21 0900   Drainage Amount None 01/29/21 0900   [place]-wound Assessment Intact 01/29/21 0900   Number of days: 4       Wound 01/25/21 Thigh Left; Inner (Active)   Wound Image   01/25/21 0949   Wound Etiology Deep tissue/Injury 01/29/21 0900   Dressing Status Other (Comment) 01/29/21 0900   Dressing/Treatment Open to air 01/29/21 0900   Wound Assessment Purple/maroon 01/29/21 0900   [place]-wound Assessment Intact 01/29/21 0900   Number of days: 4     L buttock wound: Santyl ( dampened fluffed guaze), dry dressing; change 2x daily  [place] area and buttock area: venelex barrier 2x daily and PRN  -if discharging facility unable to obtain santyl, can supplement with medihoney    Elimination:  Continence:   · Bowel: No  · Bladder: No  Urinary Catheter: None   Colostomy/Ileostomy/Ileal Conduit: No       Date of Last BM: 2/1/21    Intake/Output Summary (Last 24 hours) at 1/29/2021 1028  Last data filed at 1/29/2021 0835  Gross per 24 hour   Intake 730 ml   Output 2000 ml   Net -1270 ml     I/O last 3 completed shifts: In: 370 [P.O.:360; I.V.:10]  Out: 2000 [Urine:2000]    Safety Concerns: At Risk for Falls   Diabetes support with focus on hypo/hyperglycemia prevention with steroid use. Recommend BG targets 140 -180. Impairments/Disabilities:      None    Nutrition Therapy:  Current Nutrition Therapy:   - Oral Diet:  Carb Control 4 carbs/meal (1800kcals/day)    Routes of Feeding: Oral  Liquids: Thin Liquids  Daily Fluid Restriction: no  Last Modified Barium Swallow with Video (Video Swallowing Test): not done    Treatments at the Time of Hospital Discharge:   Respiratory Treatments: none  Oxygen Therapy:  is not on home oxygen therapy.   Ventilator:    - No ventilator support    Rehab Therapies: none  Weight Bearing Status/Restrictions: No weight bearing restirctions  Other Medical Equipment (for information only, NOT a DME order):  none  Other Treatments: none    Patient's personal belongings (please select all that are sent with patient):  None    RN

## 2021-01-29 NOTE — PROGRESS NOTES
Patient is alert and oriented x4, up with darlinedy per PT/OT, call light within reach, bed/chair alarm on. AM meds complete, patient tolerated well. VSS and WDL. No s/s of distress, no further needs noted at this time.  Electronically signed by Chari Way RN on 1/29/2021 at 10:30 AM

## 2021-01-29 NOTE — PROGRESS NOTES
RN contacted [facility] regarding C-diff results per Dr. Luis Mendoza. Speaker told RN that they will call back shortly.

## 2021-01-29 NOTE — PLAN OF CARE
signs and symptoms  1/29/2021 1028 by Noe Holder RN  Outcome: Ongoing     Problem: Nutrition  Goal: Optimal nutrition therapy  Outcome: Ongoing

## 2021-01-29 NOTE — PLAN OF CARE
Problem: Falls - Risk of:  Goal: Will remain free from falls  Description: Will remain free from falls  Outcome: Ongoing  Goal: Absence of physical injury  Description: Absence of physical injury  Outcome: Ongoing  Fall risk assessed. Precautions in place. Bed low and locked with side rails up x2. Nonskid socks on when OOB. Bed/chair alarm utilized. Call light within reach. Frequent checks performed. No falls at this time. Problem: Airway Clearance - Ineffective  Goal: Achieve or maintain patent airway  Outcome: Ongoing     Problem: Gas Exchange - Impaired  Goal: Absence of hypoxia  Outcome: Ongoing  Goal: Promote optimal lung function  Outcome: Ongoing     Problem: Breathing Pattern - Ineffective  Goal: Ability to achieve and maintain a regular respiratory rate  Outcome: Ongoing     Problem:  Body Temperature -  Risk of, Imbalanced  Goal: Ability to maintain a body temperature within defined limits  Outcome: Ongoing  Goal: Will regain or maintain usual level of consciousness  Outcome: Ongoing  Goal: Complications related to the disease process, condition or treatment will be avoided or minimized  Outcome: Ongoing     Problem: Isolation Precautions - Risk of Spread of Infection  Goal: Prevent transmission of infection  Outcome: Ongoing     Problem: Nutrition Deficits  Goal: Optimize nutritional status  Outcome: Ongoing     Problem: Risk for Fluid Volume Deficit  Goal: Maintain normal heart rhythm  Outcome: Ongoing  Goal: Maintain absence of muscle cramping  Outcome: Ongoing  Goal: Maintain normal serum potassium, sodium, calcium, phosphorus, and pH  Outcome: Ongoing     Problem: Loneliness or Risk for Loneliness  Goal: Demonstrate positive use of time alone when socialization is not possible  Outcome: Ongoing     Problem: Fatigue  Goal: Verbalize increase energy and improved vitality  Outcome: Ongoing     Problem: Patient Education: Go to Patient Education Activity  Goal: Patient/Family Education  Outcome:

## 2021-01-30 LAB
ANION GAP SERPL CALCULATED.3IONS-SCNC: 8 MMOL/L (ref 3–16)
BASOPHILS ABSOLUTE: 0 K/UL (ref 0–0.2)
BASOPHILS RELATIVE PERCENT: 0.1 %
BUN BLDV-MCNC: 29 MG/DL (ref 7–20)
CALCIUM SERPL-MCNC: 8.1 MG/DL (ref 8.3–10.6)
CHLORIDE BLD-SCNC: 107 MMOL/L (ref 99–110)
CO2: 23 MMOL/L (ref 21–32)
CREAT SERPL-MCNC: 1.2 MG/DL (ref 0.6–1.2)
EOSINOPHILS ABSOLUTE: 0.1 K/UL (ref 0–0.6)
EOSINOPHILS RELATIVE PERCENT: 0.8 %
GFR AFRICAN AMERICAN: 52
GFR NON-AFRICAN AMERICAN: 43
GLUCOSE BLD-MCNC: 188 MG/DL (ref 70–99)
GLUCOSE BLD-MCNC: 239 MG/DL (ref 70–99)
GLUCOSE BLD-MCNC: 307 MG/DL (ref 70–99)
GLUCOSE BLD-MCNC: 67 MG/DL (ref 70–99)
GLUCOSE BLD-MCNC: 68 MG/DL (ref 70–99)
GLUCOSE BLD-MCNC: 87 MG/DL (ref 70–99)
HCT VFR BLD CALC: 25.1 % (ref 36–48)
HEMOGLOBIN: 7.9 G/DL (ref 12–16)
INR BLD: 2.7 (ref 0.86–1.14)
LYMPHOCYTES ABSOLUTE: 1.2 K/UL (ref 1–5.1)
LYMPHOCYTES RELATIVE PERCENT: 9.3 %
MAGNESIUM: 1.8 MG/DL (ref 1.8–2.4)
MCH RBC QN AUTO: 27.3 PG (ref 26–34)
MCHC RBC AUTO-ENTMCNC: 31.5 G/DL (ref 31–36)
MCV RBC AUTO: 86.4 FL (ref 80–100)
MONOCYTES ABSOLUTE: 1.2 K/UL (ref 0–1.3)
MONOCYTES RELATIVE PERCENT: 9.1 %
NEUTROPHILS ABSOLUTE: 10.2 K/UL (ref 1.7–7.7)
NEUTROPHILS RELATIVE PERCENT: 80.7 %
PDW BLD-RTO: 15.6 % (ref 12.4–15.4)
PERFORMED ON: ABNORMAL
PERFORMED ON: NORMAL
PLATELET # BLD: 361 K/UL (ref 135–450)
PMV BLD AUTO: 9.5 FL (ref 5–10.5)
POTASSIUM SERPL-SCNC: 4.2 MMOL/L (ref 3.5–5.1)
PROTHROMBIN TIME: 31.6 SEC (ref 10–13.2)
RBC # BLD: 2.9 M/UL (ref 4–5.2)
SODIUM BLD-SCNC: 138 MMOL/L (ref 136–145)
WBC # BLD: 12.6 K/UL (ref 4–11)

## 2021-01-30 PROCEDURE — 6360000002 HC RX W HCPCS: Performed by: INTERNAL MEDICINE

## 2021-01-30 PROCEDURE — 6370000000 HC RX 637 (ALT 250 FOR IP): Performed by: INTERNAL MEDICINE

## 2021-01-30 PROCEDURE — 80048 BASIC METABOLIC PNL TOTAL CA: CPT

## 2021-01-30 PROCEDURE — 2700000000 HC OXYGEN THERAPY PER DAY

## 2021-01-30 PROCEDURE — 2060000000 HC ICU INTERMEDIATE R&B

## 2021-01-30 PROCEDURE — 94760 N-INVAS EAR/PLS OXIMETRY 1: CPT

## 2021-01-30 PROCEDURE — 85610 PROTHROMBIN TIME: CPT

## 2021-01-30 PROCEDURE — 83735 ASSAY OF MAGNESIUM: CPT

## 2021-01-30 PROCEDURE — 85025 COMPLETE CBC W/AUTO DIFF WBC: CPT

## 2021-01-30 PROCEDURE — 2580000003 HC RX 258: Performed by: INTERNAL MEDICINE

## 2021-01-30 RX ORDER — INSULIN GLARGINE 100 [IU]/ML
13 INJECTION, SOLUTION SUBCUTANEOUS NIGHTLY
Status: DISCONTINUED | OUTPATIENT
Start: 2021-01-30 | End: 2021-02-02 | Stop reason: HOSPADM

## 2021-01-30 RX ORDER — WARFARIN SODIUM 2.5 MG/1
1.25 TABLET ORAL
Status: COMPLETED | OUTPATIENT
Start: 2021-01-30 | End: 2021-01-30

## 2021-01-30 RX ADMIN — SODIUM CHLORIDE, PRESERVATIVE FREE 10 ML: 5 INJECTION INTRAVENOUS at 09:54

## 2021-01-30 RX ADMIN — WARFARIN SODIUM 1.25 MG: 2.5 TABLET ORAL at 18:07

## 2021-01-30 RX ADMIN — INSULIN LISPRO 7 UNITS: 100 INJECTION, SOLUTION INTRAVENOUS; SUBCUTANEOUS at 12:29

## 2021-01-30 RX ADMIN — FAMOTIDINE 10 MG: 20 TABLET, FILM COATED ORAL at 09:54

## 2021-01-30 RX ADMIN — VANCOMYCIN HYDROCHLORIDE 125 MG: 125 CAPSULE ORAL at 18:50

## 2021-01-30 RX ADMIN — ATORVASTATIN CALCIUM 20 MG: 20 TABLET, FILM COATED ORAL at 09:54

## 2021-01-30 RX ADMIN — VANCOMYCIN HYDROCHLORIDE 125 MG: 125 CAPSULE ORAL at 09:54

## 2021-01-30 RX ADMIN — CASTOR OIL AND BALSAM, PERU: 788; 87 OINTMENT TOPICAL at 21:24

## 2021-01-30 RX ADMIN — CLONIDINE HYDROCHLORIDE 0.1 MG: 0.1 TABLET ORAL at 21:23

## 2021-01-30 RX ADMIN — VANCOMYCIN HYDROCHLORIDE 125 MG: 125 CAPSULE ORAL at 12:30

## 2021-01-30 RX ADMIN — INSULIN LISPRO 6 UNITS: 100 INJECTION, SOLUTION INTRAVENOUS; SUBCUTANEOUS at 18:08

## 2021-01-30 RX ADMIN — INSULIN LISPRO 6 UNITS: 100 INJECTION, SOLUTION INTRAVENOUS; SUBCUTANEOUS at 21:24

## 2021-01-30 RX ADMIN — DEXAMETHASONE SODIUM PHOSPHATE 6 MG: 10 INJECTION, SOLUTION INTRAMUSCULAR; INTRAVENOUS at 09:55

## 2021-01-30 RX ADMIN — NIFEDIPINE 30 MG: 30 TABLET, FILM COATED, EXTENDED RELEASE ORAL at 09:54

## 2021-01-30 RX ADMIN — CLONIDINE HYDROCHLORIDE 0.1 MG: 0.1 TABLET ORAL at 09:54

## 2021-01-30 RX ADMIN — VANCOMYCIN HYDROCHLORIDE 125 MG: 125 CAPSULE ORAL at 21:23

## 2021-01-30 RX ADMIN — INSULIN GLARGINE 13 UNITS: 100 INJECTION, SOLUTION SUBCUTANEOUS at 21:24

## 2021-01-30 RX ADMIN — CASTOR OIL AND BALSAM, PERU: 788; 87 OINTMENT TOPICAL at 09:54

## 2021-01-30 RX ADMIN — Medication: at 21:24

## 2021-01-30 RX ADMIN — ASPIRIN 81 MG: 81 TABLET, CHEWABLE ORAL at 09:54

## 2021-01-30 RX ADMIN — Medication: at 09:54

## 2021-01-30 RX ADMIN — SODIUM CHLORIDE, PRESERVATIVE FREE 10 ML: 5 INJECTION INTRAVENOUS at 21:30

## 2021-01-30 RX ADMIN — INSULIN LISPRO 7 UNITS: 100 INJECTION, SOLUTION INTRAVENOUS; SUBCUTANEOUS at 18:09

## 2021-01-30 RX ADMIN — INSULIN LISPRO 3 UNITS: 100 INJECTION, SOLUTION INTRAVENOUS; SUBCUTANEOUS at 12:29

## 2021-01-30 ASSESSMENT — PAIN SCALES - GENERAL
PAINLEVEL_OUTOF10: 0
PAINLEVEL_OUTOF10: 0

## 2021-01-30 NOTE — PROGRESS NOTES
Pt blood sugar 67, pt given 4 ox fruit juice per PCA. RN assessed pt denies s/s of hypoglycemia, blood sugar rechecked 87 pt stable stating \" I thought the goal was to keep my blood sugar down\" Pt recieved hypoglycemic education regarding normal parameters, signs and symptoms. Will continue to monitor and educate.

## 2021-01-30 NOTE — PLAN OF CARE
Problem: Falls - Risk of:  Goal: Will remain free from falls  Description: Will remain free from falls  1/30/2021 1153 by Duke Colin RN  Outcome: Met This Shift     Problem: Falls - Risk of:  Goal: Absence of physical injury  Description: Absence of physical injury  1/30/2021 1153 by Duke Colin RN  Outcome: Met This Shift     Problem: Falls - Risk of:  Goal: Absence of physical injury  Description: Absence of physical injury  1/30/2021 1153 by Duke Colin RN  Outcome: Met This Shift     Problem: Airway Clearance - Ineffective  Goal: Achieve or maintain patent airway  1/30/2021 1153 by Duke Colin RN  Outcome: Met This Shift     Problem: Airway Clearance - Ineffective  Goal: Achieve or maintain patent airway  1/30/2021 1153 by Dkue Colin RN  Outcome: Met This Shift     Problem: Gas Exchange - Impaired  Goal: Absence of hypoxia  1/30/2021 1153 by Duke Colin RN  Outcome: Ongoing     Problem: Gas Exchange - Impaired  Goal: Promote optimal lung function  1/30/2021 1153 by Duke Colin RN  Outcome: Ongoing     Problem: Breathing Pattern - Ineffective  Goal: Ability to achieve and maintain a regular respiratory rate  1/30/2021 1153 by Duke Colin RN  Outcome: Ongoing     Problem: Body Temperature -  Risk of, Imbalanced  Goal: Ability to maintain a body temperature within defined limits  1/30/2021 1153 by Duke Colin RN  Outcome: Met This Shift     Problem: Body Temperature -  Risk of, Imbalanced  Goal: Will regain or maintain usual level of consciousness  1/30/2021 1153 by Duke Colin RN  Outcome: Met This Shift     Problem:  Body Temperature -  Risk of, Imbalanced  Goal: Complications related to the disease process, condition or treatment will be avoided or minimized  1/30/2021 1153 by Duke Colin RN  Outcome: Ongoing     Problem: Isolation Precautions - Risk of Spread of Infection  Goal: Prevent transmission of infection  1/30/2021 1153 by Duke Colin RN  Outcome: Met This Shift Problem: Nutrition Deficits  Goal: Optimize nutritional status  1/30/2021 1153 by Mannie Ang RN  Outcome: Ongoing     Problem: Risk for Fluid Volume Deficit  Goal: Maintain normal heart rhythm  1/30/2021 1153 by Mannie Ang RN  Outcome: Ongoing     Problem: Risk for Fluid Volume Deficit  Goal: Maintain absence of muscle cramping  1/30/2021 1153 by Mannie Ang RN  Outcome: Ongoing     Problem: Risk for Fluid Volume Deficit  Goal: Maintain normal serum potassium, sodium, calcium, phosphorus, and pH  1/30/2021 1153 by Mannie Ang RN  Outcome: Ongoing     Problem: Loneliness or Risk for Loneliness  Goal: Demonstrate positive use of time alone when socialization is not possible  1/30/2021 1153 by Mannie Ang RN  Outcome: Ongoing     Problem: Fatigue  Goal: Verbalize increase energy and improved vitality  1/30/2021 1153 by Mannie Ang RN  Outcome: Ongoing     Problem: Patient Education: Go to Patient Education Activity  Goal: Patient/Family Education  1/30/2021 1153 by Mannie Ang RN  Outcome: Ongoing     Problem: Skin Integrity:  Goal: Will show no infection signs and symptoms  Description: Will show no infection signs and symptoms  1/30/2021 1153 by Mannie Ang RN  Outcome: Met This Shift     Problem: Skin Integrity:  Goal: Absence of new skin breakdown  Description: Absence of new skin breakdown  1/30/2021 1153 by Mannie Ang RN  Outcome: Met This Shift     Problem: Nutrition  Goal: Optimal nutrition therapy  1/30/2021 1153 by Mannie Ang RN  Outcome: Ongoing     Problem: Nutrition  Goal: Optimal nutrition therapy  1/30/2021 1153 by Mannie Ang RN  Outcome: Ongoing

## 2021-01-30 NOTE — PROGRESS NOTES
Clinical Pharmacy Note  Warfarin Consult    Janece Adjutant is a 82 y.o. female receiving warfarin managed by pharmacy. Warfarin Indication: Hx VTE  Target INR range: 2-3  Dose prior to admission:   Verified dose with ECF - Warfarin has been on hold for several days due to supratherapeutic INR - received a couple doses of phytonadione at the facility. Dose prior to INR being significantly elevated was 2.5 mg TUE/SAT and 5 mg ALL OTHER DAYS    Current warfarin drug-drug interactions: Dexamethasone    Recent Labs     01/28/21  0600 01/29/21  0510 01/30/21  0540   HGB 8.2* 8.2* 7.9*   HCT 26.1* 25.7* 25.1*   INR 2.10* 2.40* 2.70*       Assessment/Plan:    She has received warfarin 5 mg daily x 4 days, then she receive warfarin 2.5 mg yesterday. INR is therapeutic but continue to increase. 1.9 => 2.12 => 2.1 => 2.4 => 2.7. We will need to keep in mind that dexamethasone is on board. Will further decrease dose tonight in lieu of increasing INR. Give warfarin 1.25 mg today. Daily PT/INR until stable within therapeutic range. Thank you for the consult. Will continue to follow.   Seng Varner RPh 1/30/2021 3:03 PM

## 2021-01-30 NOTE — PLAN OF CARE
Problem: Falls - Risk of:  Goal: Will remain free from falls  Description: Will remain free from falls  Outcome: Ongoing  Goal: Absence of physical injury  Description: Absence of physical injury  Outcome: Ongoing     Problem: Airway Clearance - Ineffective  Goal: Achieve or maintain patent airway  Outcome: Ongoing     Problem: Gas Exchange - Impaired  Goal: Absence of hypoxia  Outcome: Ongoing  Goal: Promote optimal lung function  Outcome: Ongoing     Problem: Isolation Precautions - Risk of Spread of Infection  Goal: Prevent transmission of infection  Outcome: Ongoing     Problem: Loneliness or Risk for Loneliness  Goal: Demonstrate positive use of time alone when socialization is not possible  Outcome: Ongoing     Problem: Skin Integrity:  Goal: Will show no infection signs and symptoms  Description: Will show no infection signs and symptoms  Outcome: Ongoing  Goal: Absence of new skin breakdown  Description: Absence of new skin breakdown  Outcome: Ongoing     Problem: Nutrition  Goal: Optimal nutrition therapy  Outcome: Ongoing

## 2021-01-30 NOTE — PROGRESS NOTES
Hospitalist Progress Note      PCP: Rah Bateman    Date of Admission: 1/23/2021      Hospital Course:  \"  89GC WF with PMhx sig for DMII insulin dependent, HTN, hx of PE on warfarin presents with abnormal lab work. Emerald Weiss has had diarrhea for past few days, decreased PO intake and was dx with COVID19 infection recently. Gigi Whiting was started on 6L oxygen in ED - unclear how much oxygen she was requiring at SNF. Gigi Whiting was discharged from [facility] 2 weeks ago for knee contusion.  Pt is requiring increasing amounts of oxygen but she is requiring IVF for acute renal failure and ongoing diarrhea.  Baseline creatinine is 1. 2.  \"    Subjective: No diarrhea today, no abdominal pain, no nausea or vomiting. No SOB or cough.         Medications:  Reviewed    Infusion Medications    sodium chloride Stopped (01/25/21 1708)    dextrose       Scheduled Medications    insulin glargine  15 Units Subcutaneous Nightly    Venelex   Topical BID    collagenase   Topical BID    dexamethasone  6 mg Intravenous Q24H    famotidine  10 mg Oral Daily    vancomycin  125 mg Oral 4x Daily    insulin lispro  5 Units Subcutaneous TID     aspirin  81 mg Oral Daily    atorvastatin  20 mg Oral Daily    vitamin D  5,000 Units Oral Once per day on Mon Wed Fri    cloNIDine  0.1 mg Oral BID    vitamin B-12  1,000 mcg Oral Once per day on Mon Wed Fri    NIFEdipine  30 mg Oral Daily    sodium chloride flush  10 mL Intravenous 2 times per day    warfarin (COUMADIN) daily dosing (placeholder)   Other RX Placeholder    insulin lispro  0-18 Units Subcutaneous TID WC    insulin lispro  0-9 Units Subcutaneous Nightly     PRN Meds: sodium chloride, sodium chloride flush, promethazine **OR** ondansetron, polyethylene glycol, acetaminophen **OR** acetaminophen, guaiFENesin-dextromethorphan, glucose, dextrose, glucagon (rDNA), dextrose      Intake/Output Summary (Last 24 hours) at 1/30/2021 9732  Last data filed at 1/30/2021 7530  Gross per 24 hour   Intake 360 ml   Output 200 ml   Net 160 ml       Physical Exam Performed:    BP (!) 157/70   Pulse 85   Temp 97.9 °F (36.6 °C) (Oral)   Resp 18   Ht 5' (1.524 m)   Wt 237 lb 14 oz (107.9 kg)   SpO2 96%   BMI 46.46 kg/m²     General appearance: No apparent distress  Neck: Supple  Respiratory:  Normal respiratory effort. Clear to auscultation, bilaterally without Rales/Wheezes/Rhonchi. Cardiovascular: Regular rate and rhythm with normal S1/S2 without murmurs, rubs or gallops. Abdomen: Soft, non-tender, non-distended  Musculoskeletal: No clubbing, cyanosis  Skin: Skin color, texture, turgor normal.  No rashes or lesions. Neurologic:  No focal weakness   Psychiatric: Alert and oriented  Capillary Refill: Brisk,< 3 seconds   Peripheral Pulses: +2 palpable, equal bilaterally       Labs:   Recent Labs     01/28/21  0600 01/29/21  0510 01/30/21  0540   WBC 12.5* 13.1* 12.6*   HGB 8.2* 8.2* 7.9*   HCT 26.1* 25.7* 25.1*   * 421 361     Recent Labs     01/28/21  0600 01/29/21  0510 01/30/21  0540    143 138   K 4.1 4.2 4.2    113* 107   CO2 23 23 23   BUN 33* 30* 29*   CREATININE 1.2 1.1 1.2   CALCIUM 8.1* 8.0* 8.1*     No results for input(s): AST, ALT, BILIDIR, BILITOT, ALKPHOS in the last 72 hours. Recent Labs     01/28/21  0600 01/29/21  0510 01/30/21  0540   INR 2.10* 2.40* 2.70*     No results for input(s): CKTOTAL, TROPONINI in the last 72 hours. Urinalysis:      Lab Results   Component Value Date    NITRU Negative 01/23/2021    WBCUA 6 01/23/2021    BACTERIA 1+ 01/08/2021    RBCUA 0-2 01/23/2021    BLOODU Negative 01/23/2021    SPECGRAV 1.019 01/23/2021    GLUCOSEU Negative 01/23/2021       Radiology:  XR CHEST PORTABLE   Final Result   Vascular congestion/mild pulmonary edema. Pattern is stable from prior exam.         XR CHEST PORTABLE   Final Result   Right PICC line with catheter tip upper SVC. Suspected multifocal pneumonia. Assessment/Plan:    Active Hospital Problems    Diagnosis    [facility] [J96.01]    Sepsis ([facility] [A41.9]    Pneumonia due to COVID-19 virus [U07.1, J12.82]    KIKO (acute kidney injury) ([facility] [N17.9]    Anticoagulated on Coumadin [Z79.01]    Chronic wound infection of abdomen [S31.109A, L08.9]    Poorly controlled type 2 diabetes mellitus ([facility] [E11.65]    HTN (hypertension) [I10]    Morbid obesity due to excess calories ([facility] [E66.01]    Acute kidney injury superimposed on chronic kidney disease ([facility] [N17.9, N18.9]     1. COVID19 pneumonia, started on decadron, oxygen and supportive measures. clinically continue to be stable. 2. Acute renal failure on CKDII, improved.   3. C diff diarrhea, started on oral vancomycin, ID consulted.   4. Acute respiratory failure with hypoxia, down to 4 L currently   5. Sepsis POA, due to COVID 19 and cdif diarrhea, ID consulted. 6. Morbid Obesity, Body mass index is Body mass index is 46.67 kg/m².   7. DMII uncontrolled, due to steroids, with episode of hypoglycemia in am, will decrease lantus and increase preprandial. Likely home in am.   8. Buttock wound, GS consulted based on wound care recommendations, supportive measures. 9. Anemia, appears chronic, monitor. 10. Thrombocytosis, likely reactive, monitor, continue to improve. Diet: DIET CARB CONTROL;  Dental Soft  Dietary Nutrition Supplements: Low Calorie High Protein Supplement  Code Status: Full Code        Dispo - hopefully to discharge in am.     Melida Babinski, MD

## 2021-01-31 LAB
ANION GAP SERPL CALCULATED.3IONS-SCNC: 9 MMOL/L (ref 3–16)
BASOPHILS ABSOLUTE: 0 K/UL (ref 0–0.2)
BASOPHILS RELATIVE PERCENT: 0.3 %
BUN BLDV-MCNC: 32 MG/DL (ref 7–20)
CALCIUM SERPL-MCNC: 8.1 MG/DL (ref 8.3–10.6)
CHLORIDE BLD-SCNC: 105 MMOL/L (ref 99–110)
CO2: 23 MMOL/L (ref 21–32)
CREAT SERPL-MCNC: 1.1 MG/DL (ref 0.6–1.2)
EOSINOPHILS ABSOLUTE: 0 K/UL (ref 0–0.6)
EOSINOPHILS RELATIVE PERCENT: 0.2 %
GFR AFRICAN AMERICAN: 57
GFR NON-AFRICAN AMERICAN: 47
GLUCOSE BLD-MCNC: 129 MG/DL (ref 70–99)
GLUCOSE BLD-MCNC: 132 MG/DL (ref 70–99)
GLUCOSE BLD-MCNC: 174 MG/DL (ref 70–99)
GLUCOSE BLD-MCNC: 216 MG/DL (ref 70–99)
GLUCOSE BLD-MCNC: 269 MG/DL (ref 70–99)
HCT VFR BLD CALC: 25.2 % (ref 36–48)
HEMOGLOBIN: 7.6 G/DL (ref 12–16)
INR BLD: 2.33 (ref 0.86–1.14)
LYMPHOCYTES ABSOLUTE: 1.1 K/UL (ref 1–5.1)
LYMPHOCYTES RELATIVE PERCENT: 8.9 %
MAGNESIUM: 1.7 MG/DL (ref 1.8–2.4)
MCH RBC QN AUTO: 26.2 PG (ref 26–34)
MCHC RBC AUTO-ENTMCNC: 30.3 G/DL (ref 31–36)
MCV RBC AUTO: 86.7 FL (ref 80–100)
MONOCYTES ABSOLUTE: 1 K/UL (ref 0–1.3)
MONOCYTES RELATIVE PERCENT: 8.2 %
NEUTROPHILS ABSOLUTE: 10 K/UL (ref 1.7–7.7)
NEUTROPHILS RELATIVE PERCENT: 82.4 %
PDW BLD-RTO: 15.6 % (ref 12.4–15.4)
PERFORMED ON: ABNORMAL
PLATELET # BLD: 360 K/UL (ref 135–450)
PMV BLD AUTO: 9.8 FL (ref 5–10.5)
POTASSIUM SERPL-SCNC: 4.2 MMOL/L (ref 3.5–5.1)
PROTHROMBIN TIME: 27.3 SEC (ref 10–13.2)
RBC # BLD: 2.91 M/UL (ref 4–5.2)
SODIUM BLD-SCNC: 137 MMOL/L (ref 136–145)
WBC # BLD: 12.1 K/UL (ref 4–11)

## 2021-01-31 PROCEDURE — 6360000002 HC RX W HCPCS: Performed by: INTERNAL MEDICINE

## 2021-01-31 PROCEDURE — 6370000000 HC RX 637 (ALT 250 FOR IP): Performed by: INTERNAL MEDICINE

## 2021-01-31 PROCEDURE — 85610 PROTHROMBIN TIME: CPT

## 2021-01-31 PROCEDURE — 85025 COMPLETE CBC W/AUTO DIFF WBC: CPT

## 2021-01-31 PROCEDURE — 2580000003 HC RX 258: Performed by: INTERNAL MEDICINE

## 2021-01-31 PROCEDURE — 2060000000 HC ICU INTERMEDIATE R&B

## 2021-01-31 PROCEDURE — 80048 BASIC METABOLIC PNL TOTAL CA: CPT

## 2021-01-31 PROCEDURE — 83735 ASSAY OF MAGNESIUM: CPT

## 2021-01-31 RX ORDER — DEXAMETHASONE 6 MG/1
6 TABLET ORAL
Qty: 3 TABLET | Refills: 0 | Status: SHIPPED | OUTPATIENT
Start: 2021-01-31 | End: 2021-02-02 | Stop reason: HOSPADM

## 2021-01-31 RX ORDER — VANCOMYCIN HYDROCHLORIDE 125 MG/1
125 CAPSULE ORAL 4 TIMES DAILY
Qty: 32 CAPSULE | Refills: 0 | Status: SHIPPED | OUTPATIENT
Start: 2021-01-31 | End: 2021-02-02

## 2021-01-31 RX ORDER — WARFARIN SODIUM 2.5 MG/1
2.5 TABLET ORAL
Status: COMPLETED | OUTPATIENT
Start: 2021-01-31 | End: 2021-01-31

## 2021-01-31 RX ORDER — MAGNESIUM SULFATE 1 G/100ML
1000 INJECTION INTRAVENOUS ONCE
Status: COMPLETED | OUTPATIENT
Start: 2021-01-31 | End: 2021-01-31

## 2021-01-31 RX ORDER — INSULIN GLARGINE 100 [IU]/ML
13 INJECTION, SOLUTION SUBCUTANEOUS NIGHTLY
Qty: 1 VIAL | Refills: 0 | Status: SHIPPED | OUTPATIENT
Start: 2021-01-31 | End: 2021-02-02

## 2021-01-31 RX ADMIN — SODIUM CHLORIDE, PRESERVATIVE FREE 10 ML: 5 INJECTION INTRAVENOUS at 21:50

## 2021-01-31 RX ADMIN — FAMOTIDINE 10 MG: 20 TABLET, FILM COATED ORAL at 09:03

## 2021-01-31 RX ADMIN — CASTOR OIL AND BALSAM, PERU: 788; 87 OINTMENT TOPICAL at 09:03

## 2021-01-31 RX ADMIN — CLONIDINE HYDROCHLORIDE 0.1 MG: 0.1 TABLET ORAL at 21:50

## 2021-01-31 RX ADMIN — CASTOR OIL AND BALSAM, PERU: 788; 87 OINTMENT TOPICAL at 21:51

## 2021-01-31 RX ADMIN — Medication: at 21:51

## 2021-01-31 RX ADMIN — VANCOMYCIN HYDROCHLORIDE 125 MG: 125 CAPSULE ORAL at 17:39

## 2021-01-31 RX ADMIN — ATORVASTATIN CALCIUM 20 MG: 20 TABLET, FILM COATED ORAL at 09:03

## 2021-01-31 RX ADMIN — ASPIRIN 81 MG: 81 TABLET, CHEWABLE ORAL at 09:03

## 2021-01-31 RX ADMIN — VANCOMYCIN HYDROCHLORIDE 125 MG: 125 CAPSULE ORAL at 12:56

## 2021-01-31 RX ADMIN — MAGNESIUM SULFATE 1000 MG: 1 INJECTION INTRAVENOUS at 10:49

## 2021-01-31 RX ADMIN — INSULIN LISPRO 7 UNITS: 100 INJECTION, SOLUTION INTRAVENOUS; SUBCUTANEOUS at 15:26

## 2021-01-31 RX ADMIN — SODIUM CHLORIDE, PRESERVATIVE FREE 10 ML: 5 INJECTION INTRAVENOUS at 09:03

## 2021-01-31 RX ADMIN — VANCOMYCIN HYDROCHLORIDE 125 MG: 125 CAPSULE ORAL at 09:03

## 2021-01-31 RX ADMIN — WARFARIN SODIUM 2.5 MG: 2.5 TABLET ORAL at 17:39

## 2021-01-31 RX ADMIN — NIFEDIPINE 30 MG: 30 TABLET, FILM COATED, EXTENDED RELEASE ORAL at 09:03

## 2021-01-31 RX ADMIN — VANCOMYCIN HYDROCHLORIDE 125 MG: 125 CAPSULE ORAL at 21:50

## 2021-01-31 RX ADMIN — INSULIN LISPRO 9 UNITS: 100 INJECTION, SOLUTION INTRAVENOUS; SUBCUTANEOUS at 15:26

## 2021-01-31 RX ADMIN — INSULIN LISPRO 7 UNITS: 100 INJECTION, SOLUTION INTRAVENOUS; SUBCUTANEOUS at 17:39

## 2021-01-31 RX ADMIN — INSULIN LISPRO 6 UNITS: 100 INJECTION, SOLUTION INTRAVENOUS; SUBCUTANEOUS at 17:39

## 2021-01-31 RX ADMIN — Medication: at 09:03

## 2021-01-31 RX ADMIN — CLONIDINE HYDROCHLORIDE 0.1 MG: 0.1 TABLET ORAL at 09:03

## 2021-01-31 RX ADMIN — INSULIN GLARGINE 13 UNITS: 100 INJECTION, SOLUTION SUBCUTANEOUS at 21:50

## 2021-01-31 RX ADMIN — INSULIN LISPRO 2 UNITS: 100 INJECTION, SOLUTION INTRAVENOUS; SUBCUTANEOUS at 21:51

## 2021-01-31 RX ADMIN — DEXAMETHASONE SODIUM PHOSPHATE 6 MG: 10 INJECTION, SOLUTION INTRAMUSCULAR; INTRAVENOUS at 09:03

## 2021-01-31 ASSESSMENT — PAIN SCALES - GENERAL
PAINLEVEL_OUTOF10: 0

## 2021-01-31 NOTE — PLAN OF CARE
Problem: Falls - Risk of:  Goal: Will remain free from falls  Description: Will remain free from falls  1/31/2021 1113 by Adrianne Schneider RN  Outcome: Met This Shift     Problem: Falls - Risk of:  Goal: Absence of physical injury  Description: Absence of physical injury  Outcome: Met This Shift     Problem: Airway Clearance - Ineffective  Goal: Achieve or maintain patent airway  1/31/2021 1113 by Adrianne Schneider RN  Outcome: Met This Shift     Problem: Gas Exchange - Impaired  Goal: Absence of hypoxia  Outcome: Ongoing     Problem: Gas Exchange - Impaired  Goal: Promote optimal lung function  Outcome: Met This Shift     Problem: Breathing Pattern - Ineffective  Goal: Ability to achieve and maintain a regular respiratory rate  1/31/2021 1113 by Adrianne Schneider RN  Outcome: Met This Shift     Problem: Body Temperature -  Risk of, Imbalanced  Goal: Ability to maintain a body temperature within defined limits  Outcome: Met This Shift     Problem: Body Temperature -  Risk of, Imbalanced  Goal: Will regain or maintain usual level of consciousness  Outcome: Met This Shift     Problem:  Body Temperature -  Risk of, Imbalanced  Goal: Complications related to the disease process, condition or treatment will be avoided or minimized  Outcome: Ongoing     Problem: Isolation Precautions - Risk of Spread of Infection  Goal: Prevent transmission of infection  Outcome: Met This Shift     Problem: Nutrition Deficits  Goal: Optimize nutritional status  Outcome: Met This Shift     Problem: Risk for Fluid Volume Deficit  Goal: Maintain normal heart rhythm  Outcome: Met This Shift     Problem: Risk for Fluid Volume Deficit  Goal: Maintain absence of muscle cramping  Outcome: Met This Shift     Problem: Risk for Fluid Volume Deficit  Goal: Maintain normal serum potassium, sodium, calcium, phosphorus, and pH  Outcome: Ongoing     Problem: Loneliness or Risk for Loneliness  Goal: Demonstrate positive use of time alone when socialization is not possible  Outcome: Met This Shift     Problem: Fatigue  Goal: Verbalize increase energy and improved vitality  Outcome: Ongoing     Problem: Patient Education: Go to Patient Education Activity  Goal: Patient/Family Education  Outcome: Met This Shift     Problem: Skin Integrity:  Goal: Will show no infection signs and symptoms  Description: Will show no infection signs and symptoms  Outcome: Met This Shift  Goal: Absence of new skin breakdown  Description: Absence of new skin breakdown  Outcome: Met This Shift     Problem: Nutrition  Goal: Optimal nutrition therapy  Outcome: Met This Shift

## 2021-01-31 NOTE — PROGRESS NOTES
Clinical Pharmacy Note  Warfarin Consult    Boaz Llanos is a 82 y.o. female receiving warfarin managed by pharmacy. Warfarin Indication: Hx VTE  Target INR range: 2-3  Dose prior to admission:   Verified dose with ECF - Warfarin has been on hold for several days due to supratherapeutic INR - received a couple doses of phytonadione at the facility. Dose prior to INR being significantly elevated was 2.5 mg TUE/SAT and 5 mg ALL OTHER DAYS    Current warfarin drug-drug interactions: Dexamethasone    Recent Labs     01/29/21  0510 01/30/21  0540 01/31/21  0600   HGB 8.2* 7.9* 7.6*   HCT 25.7* 25.1* 25.2*   INR 2.40* 2.70* 2.33*       Assessment/Plan:    Warfarin 2.5 mg today. Daily PT/INR until stable within therapeutic range. Thank you for the consult. Will continue to follow.   Corinne Edin, [address] 1/31/2021 12:59 PM

## 2021-01-31 NOTE — PLAN OF CARE
Problem: Falls - Risk of:  Goal: Will remain free from falls  Description: Will remain free from falls  Outcome: Ongoing     Problem: Airway Clearance - Ineffective  Goal: Achieve or maintain patent airway  Outcome: Ongoing     Problem: Breathing Pattern - Ineffective  Goal: Ability to achieve and maintain a regular respiratory rate  Outcome: Ongoing

## 2021-01-31 NOTE — DISCHARGE SUMMARY
vancomycin, diarrhea resolved. 4. Acute respiratory failure with hypoxia, down to 2 L currently.   5. Sepsis POA, due to COVID 19 and cdif diarrhea, ID consulted. 6. Morbid Obesity, Body mass index is Body mass index is 46.67 kg/m².   7. DMII uncontrolled, due to steroids, with episode of hypoglycemia in am, decreased lantus and increased preprandial. Ok to restart metformin, monitor as outpatient as insulin needs might decrease further after stopping steroids. 8. Buttock wound, GS consulted based on wound care recommendations, supportive measures. 9. Anemia, appears chronic, monitor. 10. Thrombocytosis, likely reactive, monitor, continue to improve. Physical Exam Performed:     BP (!) 169/87   Pulse 71   Temp 98.1 °F (36.7 °C) (Oral)   Resp 24   Ht 5' (1.524 m)   Wt 240 lb 4.8 oz (109 kg)   SpO2 95%   BMI 46.93 kg/m²       General appearance:  No apparent distress  HEENT:  Normal cephalic  Neck: Supple  Respiratory:  Normal respiratory effort. Clear to auscultation, bilaterally without Rales/Wheezes/Rhonchi. Cardiovascular:  Regular rate and rhythm with normal S1/S2 without murmurs, rubs or gallops. Abdomen: Soft, non-tender, non-distended  Musculoskeletal:  No clubbing, cyanosis   Skin: Skin color, texture, turgor normal.  No rashes or lesions. Neurologic:  No focal weakness   Psychiatric:  Alert and oriented  Capillary Refill: Brisk,< 3 seconds   Peripheral Pulses: +2 palpable, equal bilaterally       Labs:  For convenience and continuity at follow-up the following most recent labs are provided:      CBC:    Lab Results   Component Value Date    WBC 12.1 01/31/2021    HGB 7.6 01/31/2021    HCT 25.2 01/31/2021     01/31/2021       Renal:    Lab Results   Component Value Date     01/31/2021    K 4.2 01/31/2021    K 3.9 01/24/2021     01/31/2021    CO2 23 01/31/2021    BUN 32 01/31/2021    CREATININE 1.1 01/31/2021    CALCIUM 8.1 01/31/2021    PHOS 4.3 01/10/2021 Significant Diagnostic Studies    Radiology:   XR CHEST PORTABLE   Final Result   Vascular congestion/mild pulmonary edema. Pattern is stable from prior exam.         XR CHEST PORTABLE   Final Result   Right PICC line with catheter tip upper SVC. Suspected multifocal pneumonia. Consults:     IP CONSULT TO NEPHROLOGY  IP CONSULT TO PHARMACY  IP CONSULT TO GENERAL SURGERY  IP CONSULT TO INFECTIOUS DISEASES    Disposition:  snf    Condition at Discharge: Stable    Discharge Instructions/Follow-up:  PCP    Code Status:  Full Code     Activity: activity as tolerated    Diet: diabetic diet      Discharge Medications:     Current Discharge Medication List           Details   insulin glargine (LANTUS) 100 UNIT/ML injection vial Inject 13 Units into the skin nightly  Qty: 1 vial, Refills: 0      !! insulin lispro (HUMALOG) 100 UNIT/ML injection vial Inject 0-18 Units into the skin 3 times daily (with meals)  Qty: 1 vial, Refills: 0      !! insulin lispro (HUMALOG) 100 UNIT/ML injection vial Inject 7 Units into the skin 3 times daily (with meals)  Qty: 1 vial, Refills: 0      dexamethasone (DECADRON) 6 MG tablet Take 1 tablet by mouth daily (with breakfast) for 3 days  Qty: 3 tablet, Refills: 0      vancomycin (VANCOCIN) 125 MG capsule Take 1 capsule by mouth 4 times daily for 8 days  Qty: 32 capsule, Refills: 0       !! - Potential duplicate medications found. Please discuss with provider.            Details   ascorbic acid (VITAMIN C) 500 MG tablet Take 500 mg by mouth daily      Probiotic Product (ACIDOPHILUS/BIFIDUS PO) Take 1 tablet by mouth daily      sucralfate (CARAFATE) 1 GM tablet Take 1 g by mouth 4 times daily (before meals and nightly)      metFORMIN (GLUCOPHAGE) 1000 MG tablet Take 0.5 tablets by mouth 2 times daily (with meals)  Qty: 60 tablet, Refills: 3      NIFEdipine (ADALAT CC) 30 MG extended release tablet Take 30 mg by mouth daily      gentamicin (GARAMYCIN) 0.1 % cream Apply topically daily. Right foot  Qty: 15 g, Refills: 0      IRON PO Take 65 mg by mouth three times a week Indications: mon wed fri      vitamin B-12 (CYANOCOBALAMIN) 1000 MCG tablet Take 1,000 mcg by mouth three times a week Indications: mon wed fri      aspirin 81 MG tablet Take by mouth daily      lovastatin (MEVACOR) 40 MG tablet Take 40 mg by mouth nightly      Cholecalciferol (VITAMIN D3) 5000 units TABS Take 5,000 Units by mouth three times a week Indications: mon, wed frid       warfarin (COUMADIN) 5 MG tablet Take 2.5-5 mg by mouth daily Indications: 5 mg daily, except Wed take 2.5 mg 2.5 mg TUE/SAT and 5 mg ALL OTHER DAYS      cloNIDine (CATAPRES) 0.1 MG tablet Take 0.1 mg by mouth 2 times daily             Time Spent on discharge is more than 1 hour in the examination, evaluation, counseling and review of medications and discharge plan. Signed:    Nissa Stallings MD   1/31/2021      Thank you Marcus Dunn for the opportunity to be involved in this patient's care. If you have any questions or concerns please feel free to contact me at [phone].

## 2021-01-31 NOTE — PROGRESS NOTES
Pt resting in bed alert and oriented assist to reposition assessment completed picc intact and patent bed in low position call light and belongings within reach needs met will continue to monitor.

## 2021-01-31 NOTE — CARE COORDINATION
Received a message from the patient's daughter reporting that she is appealing the D/C. She reports the Case # as [id number]. Notified Dr. Jose Whelan via [employer].   Acacia Metz RN, BSN, Case Management  Phone: [phone]  Electronically signed by Acacia Metz RN on 1/31/2021 at 12:51 PM

## 2021-02-01 LAB
ANION GAP SERPL CALCULATED.3IONS-SCNC: 9 MMOL/L (ref 3–16)
BASOPHILS ABSOLUTE: 0 K/UL (ref 0–0.2)
BASOPHILS RELATIVE PERCENT: 0.2 %
BUN BLDV-MCNC: 31 MG/DL (ref 7–20)
CALCIUM SERPL-MCNC: 8.2 MG/DL (ref 8.3–10.6)
CHLORIDE BLD-SCNC: 107 MMOL/L (ref 99–110)
CO2: 23 MMOL/L (ref 21–32)
CREAT SERPL-MCNC: 1.3 MG/DL (ref 0.6–1.2)
EOSINOPHILS ABSOLUTE: 0.1 K/UL (ref 0–0.6)
EOSINOPHILS RELATIVE PERCENT: 0.7 %
GFR AFRICAN AMERICAN: 47
GFR NON-AFRICAN AMERICAN: 39
GLUCOSE BLD-MCNC: 162 MG/DL (ref 70–99)
GLUCOSE BLD-MCNC: 348 MG/DL (ref 70–99)
GLUCOSE BLD-MCNC: 374 MG/DL (ref 70–99)
GLUCOSE BLD-MCNC: 84 MG/DL (ref 70–99)
GLUCOSE BLD-MCNC: 89 MG/DL (ref 70–99)
HCT VFR BLD CALC: 24.5 % (ref 36–48)
HEMOGLOBIN: 7.8 G/DL (ref 12–16)
INR BLD: 2.36 (ref 0.86–1.14)
LYMPHOCYTES ABSOLUTE: 1.2 K/UL (ref 1–5.1)
LYMPHOCYTES RELATIVE PERCENT: 11 %
MAGNESIUM: 2 MG/DL (ref 1.8–2.4)
MCH RBC QN AUTO: 27.4 PG (ref 26–34)
MCHC RBC AUTO-ENTMCNC: 31.7 G/DL (ref 31–36)
MCV RBC AUTO: 86.5 FL (ref 80–100)
MONOCYTES ABSOLUTE: 1.1 K/UL (ref 0–1.3)
MONOCYTES RELATIVE PERCENT: 10 %
NEUTROPHILS ABSOLUTE: 8.4 K/UL (ref 1.7–7.7)
NEUTROPHILS RELATIVE PERCENT: 78.1 %
PDW BLD-RTO: 15.6 % (ref 12.4–15.4)
PERFORMED ON: ABNORMAL
PERFORMED ON: NORMAL
PLATELET # BLD: 312 K/UL (ref 135–450)
PMV BLD AUTO: 10.4 FL (ref 5–10.5)
POTASSIUM SERPL-SCNC: 4.2 MMOL/L (ref 3.5–5.1)
PROTHROMBIN TIME: 27.6 SEC (ref 10–13.2)
RBC # BLD: 2.83 M/UL (ref 4–5.2)
SODIUM BLD-SCNC: 139 MMOL/L (ref 136–145)
WBC # BLD: 10.8 K/UL (ref 4–11)

## 2021-02-01 PROCEDURE — 2060000000 HC ICU INTERMEDIATE R&B

## 2021-02-01 PROCEDURE — 92526 ORAL FUNCTION THERAPY: CPT

## 2021-02-01 PROCEDURE — 6370000000 HC RX 637 (ALT 250 FOR IP): Performed by: INTERNAL MEDICINE

## 2021-02-01 PROCEDURE — 80048 BASIC METABOLIC PNL TOTAL CA: CPT

## 2021-02-01 PROCEDURE — 2700000000 HC OXYGEN THERAPY PER DAY

## 2021-02-01 PROCEDURE — 85025 COMPLETE CBC W/AUTO DIFF WBC: CPT

## 2021-02-01 PROCEDURE — 94761 N-INVAS EAR/PLS OXIMETRY MLT: CPT

## 2021-02-01 PROCEDURE — 6360000002 HC RX W HCPCS: Performed by: INTERNAL MEDICINE

## 2021-02-01 PROCEDURE — 2580000003 HC RX 258: Performed by: INTERNAL MEDICINE

## 2021-02-01 PROCEDURE — 85610 PROTHROMBIN TIME: CPT

## 2021-02-01 PROCEDURE — 83735 ASSAY OF MAGNESIUM: CPT

## 2021-02-01 RX ORDER — WARFARIN SODIUM 2.5 MG/1
2.5 TABLET ORAL
Status: COMPLETED | OUTPATIENT
Start: 2021-02-01 | End: 2021-02-01

## 2021-02-01 RX ADMIN — Medication: at 20:41

## 2021-02-01 RX ADMIN — CASTOR OIL AND BALSAM, PERU: 788; 87 OINTMENT TOPICAL at 08:49

## 2021-02-01 RX ADMIN — Medication 5000 UNITS: at 08:48

## 2021-02-01 RX ADMIN — ATORVASTATIN CALCIUM 20 MG: 20 TABLET, FILM COATED ORAL at 08:49

## 2021-02-01 RX ADMIN — INSULIN LISPRO 7 UNITS: 100 INJECTION, SOLUTION INTRAVENOUS; SUBCUTANEOUS at 20:42

## 2021-02-01 RX ADMIN — SODIUM CHLORIDE, PRESERVATIVE FREE 10 ML: 5 INJECTION INTRAVENOUS at 09:32

## 2021-02-01 RX ADMIN — INSULIN LISPRO 7 UNITS: 100 INJECTION, SOLUTION INTRAVENOUS; SUBCUTANEOUS at 16:52

## 2021-02-01 RX ADMIN — INSULIN LISPRO 7 UNITS: 100 INJECTION, SOLUTION INTRAVENOUS; SUBCUTANEOUS at 09:32

## 2021-02-01 RX ADMIN — Medication: at 08:49

## 2021-02-01 RX ADMIN — VANCOMYCIN HYDROCHLORIDE 125 MG: 125 CAPSULE ORAL at 17:05

## 2021-02-01 RX ADMIN — INSULIN LISPRO 12 UNITS: 100 INJECTION, SOLUTION INTRAVENOUS; SUBCUTANEOUS at 16:52

## 2021-02-01 RX ADMIN — CLONIDINE HYDROCHLORIDE 0.1 MG: 0.1 TABLET ORAL at 20:42

## 2021-02-01 RX ADMIN — VANCOMYCIN HYDROCHLORIDE 125 MG: 125 CAPSULE ORAL at 20:41

## 2021-02-01 RX ADMIN — INSULIN LISPRO 7 UNITS: 100 INJECTION, SOLUTION INTRAVENOUS; SUBCUTANEOUS at 11:55

## 2021-02-01 RX ADMIN — VANCOMYCIN HYDROCHLORIDE 125 MG: 125 CAPSULE ORAL at 08:48

## 2021-02-01 RX ADMIN — CYANOCOBALAMIN TAB 1000 MCG 1000 MCG: 1000 TAB at 08:48

## 2021-02-01 RX ADMIN — SODIUM CHLORIDE, PRESERVATIVE FREE 10 ML: 5 INJECTION INTRAVENOUS at 21:02

## 2021-02-01 RX ADMIN — ASPIRIN 81 MG: 81 TABLET, CHEWABLE ORAL at 08:48

## 2021-02-01 RX ADMIN — INSULIN LISPRO 3 UNITS: 100 INJECTION, SOLUTION INTRAVENOUS; SUBCUTANEOUS at 11:55

## 2021-02-01 RX ADMIN — NIFEDIPINE 30 MG: 30 TABLET, FILM COATED, EXTENDED RELEASE ORAL at 08:48

## 2021-02-01 RX ADMIN — VANCOMYCIN HYDROCHLORIDE 125 MG: 125 CAPSULE ORAL at 12:02

## 2021-02-01 RX ADMIN — DEXAMETHASONE SODIUM PHOSPHATE 6 MG: 10 INJECTION, SOLUTION INTRAMUSCULAR; INTRAVENOUS at 08:48

## 2021-02-01 RX ADMIN — CLONIDINE HYDROCHLORIDE 0.1 MG: 0.1 TABLET ORAL at 08:48

## 2021-02-01 RX ADMIN — INSULIN GLARGINE 13 UNITS: 100 INJECTION, SOLUTION SUBCUTANEOUS at 20:42

## 2021-02-01 RX ADMIN — CASTOR OIL AND BALSAM, PERU: 788; 87 OINTMENT TOPICAL at 20:41

## 2021-02-01 RX ADMIN — WARFARIN SODIUM 2.5 MG: 2.5 TABLET ORAL at 17:05

## 2021-02-01 RX ADMIN — FAMOTIDINE 10 MG: 20 TABLET, FILM COATED ORAL at 08:48

## 2021-02-01 ASSESSMENT — PAIN SCALES - GENERAL: PAINLEVEL_OUTOF10: 0

## 2021-02-01 NOTE — PROGRESS NOTES
Clinical Pharmacy Note  Warfarin Consult    Radha Oropzea is a 82 y.o. female receiving warfarin managed by pharmacy. Warfarin Indication: Hx VTE  Target INR range: 2-3  Dose prior to admission:   Verified dose with ECF - Warfarin has been on hold for several days due to supratherapeutic INR - received a couple doses of phytonadione at the facility. Dose prior to INR being significantly elevated was 2.5 mg TUE/SAT and 5 mg ALL OTHER DAYS    Current warfarin drug-drug interactions: Dexamethasone    Recent Labs     01/30/21  0540 01/31/21  0600 02/01/21  0550   HGB 7.9* 7.6* 7.8*   HCT 25.1* 25.2* 24.5*   INR 2.70* 2.33* 2.36*       Assessment/Plan:    Warfarin 2.5 mg again today. Daily PT/INR until stable within therapeutic range. Thank you for the consult. Will continue to follow.   Jonathan Fung [place] 2/1/2021 9:48 AM

## 2021-02-01 NOTE — PLAN OF CARE
Problem: Falls - Risk of:  Goal: Will remain free from falls  Description: Will remain free from falls  2/1/2021 1033 by Igor Jones RN  Outcome: Ongoing  2/1/2021 0214 by Pop Manzo RN  Outcome: Ongoing  Goal: Absence of physical injury  Description: Absence of physical injury  2/1/2021 1033 by Igor Jones RN  Outcome: Ongoing  2/1/2021 0214 by Pop Manzo RN  Outcome: Ongoing     Problem: Airway Clearance - Ineffective  Goal: Achieve or maintain patent airway  2/1/2021 1033 by Igor Jones RN  Outcome: Ongoing  2/1/2021 0214 by Pop Manzo RN  Outcome: Ongoing     Problem: Gas Exchange - Impaired  Goal: Absence of hypoxia  2/1/2021 1033 by Igor Jones RN  Outcome: Ongoing  2/1/2021 0214 by Pop Manzo RN  Outcome: Ongoing  Goal: Promote optimal lung function  2/1/2021 1033 by Igor Jones RN  Outcome: Ongoing  2/1/2021 0214 by Pop Manzo RN  Outcome: Ongoing     Problem: Breathing Pattern - Ineffective  Goal: Ability to achieve and maintain a regular respiratory rate  2/1/2021 1033 by Igor Jones RN  Outcome: Ongoing  2/1/2021 0214 by Pop Manzo RN  Outcome: Ongoing     Problem:  Body Temperature -  Risk of, Imbalanced  Goal: Ability to maintain a body temperature within defined limits  2/1/2021 1033 by Igor Jones RN  Outcome: Ongoing  2/1/2021 0214 by Pop Manzo RN  Outcome: Ongoing  Goal: Will regain or maintain usual level of consciousness  2/1/2021 1033 by Igor Jones RN  Outcome: Ongoing  2/1/2021 0214 by Pop Manzo RN  Outcome: Ongoing  Goal: Complications related to the disease process, condition or treatment will be avoided or minimized  2/1/2021 1033 by Igor Jones RN  Outcome: Ongoing  2/1/2021 0214 by Pop Manzo RN  Outcome: Ongoing     Problem: Isolation Precautions - Risk of Spread of Infection  Goal: Prevent transmission of infection  2/1/2021 1033 by Igor Jones RN  Outcome: Ongoing  2/1/2021 0214 by Pop Manzo RN  Outcome: Ongoing Problem: Nutrition Deficits  Goal: Optimize nutritional status  2/1/2021 1033 by Giovanni Minaya RN  Outcome: Ongoing  2/1/2021 0214 by Bhupinder Ambriz RN  Outcome: Ongoing     Problem: Risk for Fluid Volume Deficit  Goal: Maintain normal heart rhythm  2/1/2021 1033 by Giovanni Minaya RN  Outcome: Ongoing  2/1/2021 0214 by Bhupinder Ambriz RN  Outcome: Ongoing  Goal: Maintain absence of muscle cramping  2/1/2021 1033 by Giovanni Minaya RN  Outcome: Ongoing  2/1/2021 0214 by Bhupinder Ambriz RN  Outcome: Ongoing  Goal: Maintain normal serum potassium, sodium, calcium, phosphorus, and pH  2/1/2021 1033 by Giovanni Minaya RN  Outcome: Ongoing  2/1/2021 0214 by Bhupinder Ambriz RN  Outcome: Ongoing     Problem: Loneliness or Risk for Loneliness  Goal: Demonstrate positive use of time alone when socialization is not possible  2/1/2021 1033 by Giovanni Minaya RN  Outcome: Ongoing  2/1/2021 0214 by Bhupinder Ambriz RN  Outcome: Ongoing     Problem: Fatigue  Goal: Verbalize increase energy and improved vitality  2/1/2021 1033 by Giovanni Minaya RN  Outcome: Ongoing  2/1/2021 0214 by Bhupinder Ambriz RN  Outcome: Ongoing     Problem: Patient Education: Go to Patient Education Activity  Goal: Patient/Family Education  2/1/2021 1033 by Givoanni Minaya RN  Outcome: Ongoing  2/1/2021 0214 by Bhupinder Ambriz RN  Outcome: Ongoing     Problem: Skin Integrity:  Goal: Will show no infection signs and symptoms  Description: Will show no infection signs and symptoms  2/1/2021 1033 by Giovanni Minaya RN  Outcome: Ongoing  2/1/2021 0214 by Bhupinder Ambriz RN  Outcome: Ongoing  Goal: Absence of new skin breakdown  Description: Absence of new skin breakdown  2/1/2021 1033 by Giovanni Minaya RN  Outcome: Ongoing  2/1/2021 0214 by Bhupinder Ambriz RN  Outcome: Ongoing     Problem: Nutrition  Goal: Optimal nutrition therapy  2/1/2021 1033 by Giovanni Minaya RN  Outcome: Ongoing  2/1/2021 0214 by Bhupinder Ambriz RN  Outcome: Ongoing

## 2021-02-01 NOTE — PROGRESS NOTES
Pt discontinued from O2 2L/NC. Pt sat 92%, on RA. No respiratory distress noted. Pt denies SOB. Will continue to montior.   Electronically signed by Christine Bishop RN on 2/1/2021 at 6:53 PM

## 2021-02-01 NOTE — PLAN OF CARE
Problem: Falls - Risk of:  Goal: Will remain free from falls  Description: Will remain free from falls  Outcome: Ongoing  Goal: Absence of physical injury  Description: Absence of physical injury  Outcome: Ongoing     Problem: Gas Exchange - Impaired  Goal: Absence of hypoxia  Outcome: Ongoing  Goal: Promote optimal lung function  Outcome: Ongoing     Problem: Isolation Precautions - Risk of Spread of Infection  Goal: Prevent transmission of infection  Outcome: Ongoing     Problem: Loneliness or Risk for Loneliness  Goal: Demonstrate positive use of time alone when socialization is not possible  Outcome: Ongoing     Problem: Skin Integrity:  Goal: Will show no infection signs and symptoms  Description: Will show no infection signs and symptoms  Outcome: Ongoing  Goal: Absence of new skin breakdown  Description: Absence of new skin breakdown  Outcome: Ongoing     Problem: Nutrition  Goal: Optimal nutrition therapy  Outcome: Ongoing

## 2021-02-01 NOTE — PROGRESS NOTES
Hospitalist Progress Note      PCP: Avery Castanon    Date of Admission: 1/23/2021      Hospital Course:  \"  42FW WF with PMhx sig for DMII insulin dependent, HTN, hx of PE on warfarin presents with abnormal lab work. Violetta Menjivar has had diarrhea for past few days, decreased PO intake and was dx with COVID19 infection recently. Kwabena Locke was started on 6L oxygen in ED - unclear how much oxygen she was requiring at SNF. Kwabena Locke was discharged from [facility] 2 weeks ago for knee contusion.  Pt is requiring increasing amounts of oxygen but she is requiring IVF for acute renal failure and ongoing diarrhea.  Baseline creatinine is 1. 2.  \"    Subjective: Patient denies any new complaints. Breathing is stable.   No new events reported    Medications:  Reviewed    Infusion Medications    sodium chloride Stopped (01/25/21 1708)    dextrose       Scheduled Medications    warfarin  2.5 mg Oral Once    insulin lispro  7 Units Subcutaneous TID WC    insulin glargine  13 Units Subcutaneous Nightly    Venelex   Topical BID    collagenase   Topical BID    famotidine  10 mg Oral Daily    vancomycin  125 mg Oral 4x Daily    aspirin  81 mg Oral Daily    atorvastatin  20 mg Oral Daily    vitamin D  5,000 Units Oral Once per day on Mon Wed Fri    cloNIDine  0.1 mg Oral BID    vitamin B-12  1,000 mcg Oral Once per day on Mon Wed Fri    NIFEdipine  30 mg Oral Daily    sodium chloride flush  10 mL Intravenous 2 times per day    warfarin (COUMADIN) daily dosing (placeholder)   Other RX Placeholder    insulin lispro  0-18 Units Subcutaneous TID WC    insulin lispro  0-9 Units Subcutaneous Nightly     PRN Meds: sodium chloride, sodium chloride flush, promethazine **OR** ondansetron, polyethylene glycol, acetaminophen **OR** acetaminophen, guaiFENesin-dextromethorphan, glucose, dextrose, glucagon (rDNA), dextrose      Intake/Output Summary (Last 24 hours) at 2/1/2021 1325  Last data filed at 2/1/2021 0539  Gross per 24 hour   Intake 100 ml   Output 2000 ml   Net -1900 ml       Physical Exam Performed:    BP (!) 157/69   Pulse 69   Temp 98.3 °F (36.8 °C) (Oral)   Resp 16   Ht 5' (1.524 m)   Wt 240 lb 4.8 oz (109 kg)   SpO2 98%   BMI 46.93 kg/m²     General appearance: No apparent distress  Neck: Supple  Respiratory:  Normal respiratory effort. Clear to auscultation, bilaterally without Rales/Wheezes/Rhonchi. Cardiovascular: Regular rate and rhythm with normal S1/S2 without murmurs, rubs or gallops. Abdomen: Soft, non-tender, non-distended  Musculoskeletal: No clubbing, cyanosis  Skin: Skin color, texture, turgor normal.  No rashes or lesions. Neurologic:  No focal weakness   Psychiatric: Alert and oriented  Capillary Refill: Brisk,< 3 seconds   Peripheral Pulses: +2 palpable, equal bilaterally       Labs:   Recent Labs     01/30/21  0540 01/31/21  0600 02/01/21  0550   WBC 12.6* 12.1* 10.8   HGB 7.9* 7.6* 7.8*   HCT 25.1* 25.2* 24.5*    360 312     Recent Labs     01/30/21  0540 01/31/21  0600 02/01/21  0550    137 139   K 4.2 4.2 4.2    105 107   CO2 23 23 23   BUN 29* 32* 31*   CREATININE 1.2 1.1 1.3*   CALCIUM 8.1* 8.1* 8.2*     No results for input(s): AST, ALT, BILIDIR, BILITOT, ALKPHOS in the last 72 hours. Recent Labs     01/30/21  0540 01/31/21  0600 02/01/21  0550   INR 2.70* 2.33* 2.36*     No results for input(s): CKTOTAL, TROPONINI in the last 72 hours. Urinalysis:      Lab Results   Component Value Date    NITRU Negative 01/23/2021    WBCUA 6 01/23/2021    BACTERIA 1+ 01/08/2021    RBCUA 0-2 01/23/2021    BLOODU Negative 01/23/2021    SPECGRAV 1.019 01/23/2021    GLUCOSEU Negative 01/23/2021       Radiology:  XR CHEST PORTABLE   Final Result   Vascular congestion/mild pulmonary edema. Pattern is stable from prior exam.         XR CHEST PORTABLE   Final Result   Right PICC line with catheter tip upper SVC. Suspected multifocal pneumonia.                  Assessment/Plan:    Acute hypoxic respiratory failure/sepsis secondary to COVID-19 pneumonia  -Continue Decadron  -Continue to wean oxygen    C. difficile colitis  -Continue oral vancomycin    Diabetes mellitus type 2  -Continues on sliding scale, glargine    Hypertension  -Continue Catapres, nifedipine    History of pulmonary embolism  -Continue Coumadin    Normocytic anemia  -Continue monitor    Stage II left buttock ulcer - POA  -Continue wound care  -General surgery was consulted, do not recommend any acute surgical intervention    Morbid obesity with BMI 69.37  -Complicating medical problem and placing patient at high risk of morbidity and mortality      Due to prophylaxis: On Coumadin  Diet: DIET CARB CONTROL; Dental Soft  Dietary Nutrition Supplements: Low Calorie High Protein Supplement  Code Status: Full Code    Dispo -patient is medically stable for discharge and discharge order has been placed.   Family has appealed discharge    Parveen Rene MD

## 2021-02-01 NOTE — PROGRESS NOTES
[facility]   Speech Therapy  Daily Dysphagia Treatment Note        Chika Espinoza  AGE: 82 y.o. GENDER: female  : 1935  [id number]  EPISODE DATE:  2021   ADMITTING DIAGNOSIS: The primary encounter diagnosis was KIKO (acute kidney injury) ([address] Diagnoses of C. difficile diarrhea, Dehydration, and COVID-19 were also pertinent to this visit. · has Abdominal wall cellulitis; Poorly controlled type 2 diabetes mellitus (HCC); HTN (hypertension); CKD (chronic kidney disease) stage 3, GFR 30-59 ml/min; History of pulmonary embolism; Chronic abdominal wound infection, subsequent encounter; Open abdominal wall wound, subsequent encounter; Severe infection; Acute kidney injury superimposed on chronic kidney disease ([address] Diverticulosis of colon without diverticulitis; Morbid obesity due to excess calories ([address] Open wound of abdomen; Diabetic ulcer of right midfoot associated with type 2 diabetes mellitus, with fat layer exposed ([address] Chronic wound infection of abdomen; History of arterial ischemic stroke; Anticoagulated on Coumadin; MRSA colonization; Acinetobacter lwoffi infection; Citrobacter infection; Weight loss counseling, encounter for; Proteus infection; Frequent falls; KIKO (acute kidney injury) ([address] Acute respiratory failure with hypoxia ([address] Sepsis ([address] and Pneumonia due to COVID-19 virus on their problem list.  ·  has a past medical history of Acute kidney injury ([address] Arthritis, Cerebral artery occlusion with cerebral infarction ([address] COVID-19, Diabetes mellitus ([address] Hypertension, and Pulmonary embolism ([address] · History of a  EGD with findings of small hiatal hernia and erosive esophagitis grade A and erosive gastritis  · Allergies; bactrim Dx  · Chart review indicates pt lives alone and is active with [facility].  Pt gets meals on wheels  ONSET DATE: 2021     Treatment Diagnosis: Dysphagia       Chart review:   · MD History and Physical Documentation revealed:   History Of Present Illness:   82-year-old female with past medical history of diabetes mellitus, hypertension, history of pulmonary embolism on Coumadin presented to the hospital with abnormal lab work. [facility]ai [facility] and they had done routine lab work which showed elevated BUN and creatinine and she was sent to the hospital.  Patient denies any specific complaints.  She said she has been having diarrhea for a few days and has been having decreased p.o. intake.  She states that her shortness of breath is at her baseline after being diagnosed with a COVID-19 infection.  Patient was placed on 6 L of oxygen in the ER.  Is unknown how much oxygen patient was on at the nursing facility.  On arrival to the hospital she was noted to be hemodynamically stable.  Lab work showed KIKO, hyperglycemia. Sarai Mejia will be admitted to the hospital for the management     · 1/24/2021 Chest XR  Impression   Vascular congestion/mild pulmonary edema. Villatoro Spencer is stable from prior exam.          1/26/2021 Clinical Evaluation of Swallowing was completed due to staff reporting some pt difficulties with regular consistency diet. Diet was downgraded to soft/bite size with thin liquids  Subjective:     Current diet   Soft/bite size food consistencies  Thin liquids   close monitor of meds if with H20;may need with puree    Comments regarding tolerating Current Diet:   No new complaints by staff  No new complaints by pt. Pt reporting good appetite    Objective:     Pain   Patient Currently in Pain: Denies    Cognitive/Behavior   Pt was awake in bed and was on the telephone with a family member upon SLP entering the room. Once pt was off the phone; pt was oriented to self; place and partially to date/situation. Pt was verbally responsive and able to follow simple commands. Pt does need continued assist with motor commands.   Pt has been weaned to 2L/min O2 via nasal

## 2021-02-02 VITALS
BODY MASS INDEX: 47.35 KG/M2 | DIASTOLIC BLOOD PRESSURE: 52 MMHG | HEIGHT: 60 IN | SYSTOLIC BLOOD PRESSURE: 113 MMHG | HEART RATE: 66 BPM | OXYGEN SATURATION: 92 % | TEMPERATURE: 98.3 F | RESPIRATION RATE: 15 BRPM | WEIGHT: 241.18 LBS

## 2021-02-02 LAB
ANION GAP SERPL CALCULATED.3IONS-SCNC: 9 MMOL/L (ref 3–16)
BASOPHILS ABSOLUTE: 0 K/UL (ref 0–0.2)
BASOPHILS RELATIVE PERCENT: 0.2 %
BUN BLDV-MCNC: 37 MG/DL (ref 7–20)
CALCIUM SERPL-MCNC: 8 MG/DL (ref 8.3–10.6)
CHLORIDE BLD-SCNC: 106 MMOL/L (ref 99–110)
CO2: 22 MMOL/L (ref 21–32)
CREAT SERPL-MCNC: 1.2 MG/DL (ref 0.6–1.2)
EOSINOPHILS ABSOLUTE: 0 K/UL (ref 0–0.6)
EOSINOPHILS RELATIVE PERCENT: 0.3 %
GFR AFRICAN AMERICAN: 52
GFR NON-AFRICAN AMERICAN: 43
GLUCOSE BLD-MCNC: 144 MG/DL (ref 70–99)
GLUCOSE BLD-MCNC: 147 MG/DL (ref 70–99)
GLUCOSE BLD-MCNC: 150 MG/DL (ref 70–99)
HCT VFR BLD CALC: 23.2 % (ref 36–48)
HEMOGLOBIN: 7.1 G/DL (ref 12–16)
INR BLD: 2.21 (ref 0.86–1.14)
LYMPHOCYTES ABSOLUTE: 1.2 K/UL (ref 1–5.1)
LYMPHOCYTES RELATIVE PERCENT: 11.7 %
MAGNESIUM: 1.8 MG/DL (ref 1.8–2.4)
MCH RBC QN AUTO: 26.9 PG (ref 26–34)
MCHC RBC AUTO-ENTMCNC: 30.7 G/DL (ref 31–36)
MCV RBC AUTO: 87.5 FL (ref 80–100)
MONOCYTES ABSOLUTE: 1 K/UL (ref 0–1.3)
MONOCYTES RELATIVE PERCENT: 10.3 %
NEUTROPHILS ABSOLUTE: 7.7 K/UL (ref 1.7–7.7)
NEUTROPHILS RELATIVE PERCENT: 77.5 %
PDW BLD-RTO: 16 % (ref 12.4–15.4)
PERFORMED ON: ABNORMAL
PERFORMED ON: ABNORMAL
PLATELET # BLD: 295 K/UL (ref 135–450)
PMV BLD AUTO: 10.5 FL (ref 5–10.5)
POTASSIUM SERPL-SCNC: 4.2 MMOL/L (ref 3.5–5.1)
PROTHROMBIN TIME: 25.9 SEC (ref 10–13.2)
RBC # BLD: 2.65 M/UL (ref 4–5.2)
SODIUM BLD-SCNC: 137 MMOL/L (ref 136–145)
WBC # BLD: 10 K/UL (ref 4–11)

## 2021-02-02 PROCEDURE — 85025 COMPLETE CBC W/AUTO DIFF WBC: CPT

## 2021-02-02 PROCEDURE — 6370000000 HC RX 637 (ALT 250 FOR IP): Performed by: INTERNAL MEDICINE

## 2021-02-02 PROCEDURE — 97535 SELF CARE MNGMENT TRAINING: CPT

## 2021-02-02 PROCEDURE — 97530 THERAPEUTIC ACTIVITIES: CPT

## 2021-02-02 PROCEDURE — 2580000003 HC RX 258: Performed by: INTERNAL MEDICINE

## 2021-02-02 PROCEDURE — 85610 PROTHROMBIN TIME: CPT

## 2021-02-02 PROCEDURE — 83735 ASSAY OF MAGNESIUM: CPT

## 2021-02-02 PROCEDURE — 94761 N-INVAS EAR/PLS OXIMETRY MLT: CPT

## 2021-02-02 PROCEDURE — 80048 BASIC METABOLIC PNL TOTAL CA: CPT

## 2021-02-02 RX ORDER — INSULIN GLARGINE 100 [IU]/ML
13 INJECTION, SOLUTION SUBCUTANEOUS NIGHTLY
Qty: 1 VIAL | Refills: 0 | Status: SHIPPED | OUTPATIENT
Start: 2021-02-02

## 2021-02-02 RX ORDER — VANCOMYCIN HYDROCHLORIDE 125 MG/1
125 CAPSULE ORAL 4 TIMES DAILY
Qty: 24 CAPSULE | Refills: 0 | Status: SHIPPED | OUTPATIENT
Start: 2021-02-02 | End: 2021-02-08

## 2021-02-02 RX ADMIN — Medication: at 08:20

## 2021-02-02 RX ADMIN — INSULIN LISPRO 3 UNITS: 100 INJECTION, SOLUTION INTRAVENOUS; SUBCUTANEOUS at 08:20

## 2021-02-02 RX ADMIN — VANCOMYCIN HYDROCHLORIDE 125 MG: 125 CAPSULE ORAL at 08:20

## 2021-02-02 RX ADMIN — ATORVASTATIN CALCIUM 20 MG: 20 TABLET, FILM COATED ORAL at 08:20

## 2021-02-02 RX ADMIN — SODIUM CHLORIDE, PRESERVATIVE FREE 10 ML: 5 INJECTION INTRAVENOUS at 08:20

## 2021-02-02 RX ADMIN — NIFEDIPINE 30 MG: 30 TABLET, FILM COATED, EXTENDED RELEASE ORAL at 08:20

## 2021-02-02 RX ADMIN — CASTOR OIL AND BALSAM, PERU: 788; 87 OINTMENT TOPICAL at 08:20

## 2021-02-02 RX ADMIN — FAMOTIDINE 10 MG: 20 TABLET, FILM COATED ORAL at 08:20

## 2021-02-02 RX ADMIN — CLONIDINE HYDROCHLORIDE 0.1 MG: 0.1 TABLET ORAL at 08:20

## 2021-02-02 RX ADMIN — ASPIRIN 81 MG: 81 TABLET, CHEWABLE ORAL at 08:20

## 2021-02-02 RX ADMIN — ACETAMINOPHEN 650 MG: 325 TABLET ORAL at 02:27

## 2021-02-02 RX ADMIN — INSULIN LISPRO 7 UNITS: 100 INJECTION, SOLUTION INTRAVENOUS; SUBCUTANEOUS at 08:20

## 2021-02-02 ASSESSMENT — PAIN SCALES - GENERAL: PAINLEVEL_OUTOF10: 0

## 2021-02-02 NOTE — PROGRESS NOTES
1/23/2021 with KIKO, Diarrhea, COVID + (1/10/2021). Recent admit 1/8/1/11/2021 following fall at home (d/c to SNF setting). PMH as noted including CVA, PE, Abd Wound/Debride, OA, L TKR. Exam: See above. Clinical Presentation: See above. PT Education: PT Role;Plan of Care;General Safety;Pressure Relief; Functional Mobility Training  Barriers to Learning: Endurance/Debility. REQUIRES PT FOLLOW UP: Yes  Activity Tolerance  Activity Tolerance: Patient limited by fatigue;Patient limited by endurance     Patient Diagnosis(es): The primary encounter diagnosis was KIKO (acute kidney injury) ([facility] [address] Diagnoses of C. difficile diarrhea, Dehydration, and COVID-19 were also pertinent to this visit. has a past medical history of Acute kidney injury ([address] Arthritis, Cerebral artery occlusion with cerebral infarction ([address] COVID-19, Diabetes mellitus ([address] Hypertension, and Pulmonary embolism ([facility] [address] has a past surgical history that includes Abdomen surgery (N/A, 2/15/2019) and Abdomen surgery (N/A, 3/19/2019). Restrictions  Restrictions/Precautions  Restrictions/Precautions: Fall Risk, Isolation, Contact Precautions  Position Activity Restriction  Other position/activity restrictions: Droplet Plus :  COVID +.  RA. Contact : C-Diff. Subjective   General  Chart Reviewed: Yes  Additional Pertinent Hx: 83 y/o female admit 1/23/2021 with KIKO, Diarrhea, COVID + (1/10/2021). Recent admit 1/8/1/11/2021 following fall at home (d/c to SNF setting). PMH as noted including CVA, PE, Abd Wound/Debride, OA, L TKR. Response To Previous Treatment: Patient reporting fatigue but able to participate. Family / Caregiver Present: No  Referring Practitioner: Dr. Issa Alvarez: Pt is agreeable to PT - reports she has not been oob since her last PT session 4 days ago.           Orientation  Orientation  Overall Orientation Status: Within Functional Limits     Objective   Bed mobility  Rolling to Left: Moderate assistance;Maximum assistance  Rolling to Right: Moderate assistance;Maximum assistance  Supine to Sit: Maximum assistance;2 Person assistance  Sit to Supine: Maximum assistance;2 Person assistance  Scooting: Dependent/Total  Comment: incontinent of stool and urine  Transfers  Comment: Attempted to stand to stedy - able to clear bed but unable to bring pelvis forward. HR in 120's. Ambulation  Ambulation?: No     Balance  Posture: Fair  Sitting - Static: Fair;+  Sitting - Dynamic: Fair        AM-PAC Score  AM-PAC Inpatient Mobility Raw Score : 6 (02/02/21 1117)  AM-PAC Inpatient T-Scale Score : 23.55 (02/02/21 1117)  Mobility Inpatient CMS 0-100% Score: 100 (02/02/21 1117)  Mobility Inpatient CMS G-Code Modifier : CN (02/02/21 1117)          Goals  Short term goals  Time Frame for Short term goals: Upon d/c acute care - all goals ongoing as of 2/2/21  Short term goal 1: Bed Mob Mod assist x 2. Short term goal 2: EOB SBA; Min assist scooting EOB. Short term goal 3: Transfer via Stedy Mod/Max assist x 2. Short term goal 4: Pt participating approp Strength Exs. Patient Goals   Patient goals : Be able to return home eventually. Plan    Plan  Times per week: 3-5x/week  Current Treatment Recommendations: Strengthening, Functional Mobility Training, Transfer Training, Endurance Training, Safety Education & Training, Patient/Caregiver Education & Training, Gait Training, Balance Training, Neuromuscular Re-education  Safety Devices  Type of devices:  All fall risk precautions in place, Call light within reach, Patient at risk for falls, Left in bed, Bed alarm in place, Nurse notified     Therapy Time   Individual Concurrent Group Co-treatment   Time In 1040         Time Out 1118         Minutes 38         Timed Code Treatment Minutes: [address] Ubaldo Godfrey., PT

## 2021-02-02 NOTE — CARE COORDINATION
SW received phone call back from [facility] stating that they agree with decision of physician to discharge patient. They stated that patient liability begins tomorrow at 12pm, however, this patient has already been discharged to [address] [place] [address]. No further needs. Respectfully submitted,    DULCE MARIA Hilton  [facility]   [phone]    Electronically signed by Ubaldo Hamman, LISW-S on 2/2/2021 at 3:11 PM

## 2021-02-02 NOTE — DISCHARGE SUMMARY
Hospital Medicine Discharge Summary    Patient ID: Brigitte Adjutant      Patient's PCP: Kathy Nairssing Date: 1/23/2021     Discharge Date: 2/2/2021 01/31/2021    Admitting Physician: Cici Carpio MD     Discharge Physician: Cici Carpio MD     Discharge Diagnoses: Active Hospital Problems    Diagnosis    Acute respiratory failure with hypoxia (HCC) [J96.01]    Sepsis ([address] [A41.9]    Pneumonia due to COVID-19 virus [U07.1, J12.82]    KIKO (acute kidney injury) ([address] [N17.9]    Anticoagulated on Coumadin [Z79.01]    Chronic wound infection of abdomen [S31.109A, L08.9]    Poorly controlled type 2 diabetes mellitus ([address] [E11.65]    HTN (hypertension) [I10]    Morbid obesity due to excess calories ([address] [E66.01]    Acute kidney injury superimposed on chronic kidney disease ([address] [N17.9, N18.9]       The patient was seen and examined on day of discharge and this discharge summary is in conjunction with any daily progress note from day of discharge. Hospital Course:     \"  89ZS WF with PMhx sig for DMII insulin dependent, HTN, hx of PE on warfarin presents with abnormal lab work. Page Smith has had diarrhea for past few days, decreased PO intake and was dx with COVID19 infection recently. Alix Hu was started on 6L oxygen in ED - unclear how much oxygen she was requiring at SNF. Alix Hu was discharged from [facility] 2 weeks ago for knee contusion.  Pt is requiring increasing amounts of oxygen but she is requiring IVF for acute renal failure and ongoing diarrhea.  Baseline creatinine is 1. 2.  \"  Following problems were addressed during hospital stay    Acute hypoxic respiratory failure/sepsis secondary to COVID-19 pneumonia  -Continue Decadron  -Continue to wean oxygen     C. difficile colitis  -Continue oral vancomycin     Diabetes mellitus type 2  -Continues on sliding scale, glargine     Hypertension  -Continue Catapres, nifedipine     History of pulmonary embolism  -Continue Coumadin     Normocytic anemia  -Continue monitor     Stage II left buttock ulcer - POA  -Continue wound care  -General surgery was consulted, do not recommend any acute surgical intervention     Morbid obesity with BMI 02.65  -Complicating medical problem and placing patient at high risk of morbidity and mortality         Physical Exam Performed:     BP (!) 113/52   Pulse 66   Temp 98.3 °F (36.8 °C) (Oral)   Resp 15   Ht 5' (1.524 m)   Wt 241 lb 2.9 oz (109.4 kg)   SpO2 92%   BMI 47.10 kg/m²       General appearance:  No apparent distress  HEENT:  Normal cephalic  Neck: Supple  Respiratory:  Normal respiratory effort. Clear to auscultation, bilaterally without Rales/Wheezes/Rhonchi. Cardiovascular:  Regular rate and rhythm with normal S1/S2 without murmurs, rubs or gallops. Abdomen: Soft, non-tender, non-distended  Musculoskeletal:  No clubbing, cyanosis   Skin: Skin color, texture, turgor normal.  No rashes or lesions. Neurologic:  No focal weakness   Psychiatric:  Alert and oriented  Capillary Refill: Brisk,< 3 seconds   Peripheral Pulses: +2 palpable, equal bilaterally       Labs: For convenience and continuity at follow-up the following most recent labs are provided:      CBC:    Lab Results   Component Value Date    WBC 10.0 02/02/2021    HGB 7.1 02/02/2021    HCT 23.2 02/02/2021     02/02/2021       Renal:    Lab Results   Component Value Date     02/02/2021    K 4.2 02/02/2021    K 3.9 01/24/2021     02/02/2021    CO2 22 02/02/2021    BUN 37 02/02/2021    CREATININE 1.2 02/02/2021    CALCIUM 8.0 02/02/2021    PHOS 4.3 01/10/2021         Significant Diagnostic Studies    Radiology:   XR CHEST PORTABLE   Final Result   Vascular congestion/mild pulmonary edema. Pattern is stable from prior exam.         XR CHEST PORTABLE   Final Result   Right PICC line with catheter tip upper SVC. Suspected multifocal pneumonia.                 Consults:     IP CONSULT TO NEPHROLOGY  IP CONSULT TO PHARMACY  IP CONSULT TO GENERAL SURGERY  IP CONSULT TO INFECTIOUS DISEASES    Disposition:  snf    Condition at Discharge: Stable    Discharge Instructions/Follow-up:  PCP    Code Status:  Full Code     Activity: activity as tolerated    Diet: diabetic diet      Discharge Medications:     Discharge Medication List as of 2/2/2021 11:58 AM           Details   insulin glargine (LANTUS) 100 UNIT/ML injection vial Inject 13 Units into the skin nightly, Disp-1 vial, R-0Print      !! insulin lispro (HUMALOG) 100 UNIT/ML injection vial Inject 0-18 Units into the skin 3 times daily (with meals), Disp-1 vial, R-0Print      !! insulin lispro (HUMALOG) 100 UNIT/ML injection vial Inject 7 Units into the skin 3 times daily (with meals), Disp-1 vial, R-0Print      dexamethasone (DECADRON) 6 MG tablet Take 1 tablet by mouth daily (with breakfast) for 3 days, Disp-3 tablet, R-0Print      vancomycin (VANCOCIN) 125 MG capsule Take 1 capsule by mouth 4 times daily for 8 days, Disp-32 capsule, R-0Print       !! - Potential duplicate medications found. Please discuss with provider. Details   ascorbic acid (VITAMIN C) 500 MG tablet Take 500 mg by mouth dailyHistorical Med      Probiotic Product (ACIDOPHILUS/BIFIDUS PO) Take 1 tablet by mouth dailyHistorical Med      sucralfate (CARAFATE) 1 GM tablet Take 1 g by mouth 4 times daily (before meals and nightly)Historical Med      metFORMIN (GLUCOPHAGE) 1000 MG tablet Take 0.5 tablets by mouth 2 times daily (with meals), Disp-60 tablet, R-3Normal      NIFEdipine (ADALAT CC) 30 MG extended release tablet Take 30 mg by mouth dailyHistorical Med      gentamicin (GARAMYCIN) 0.1 % cream Apply topically daily.  Right foot, Disp-15 g, R-0, Normal      IRON PO Take 65 mg by mouth three times a week Indications: mon wed friHistorical Med      vitamin B-12 (CYANOCOBALAMIN) 1000 MCG tablet Take 1,000 mcg by mouth three times a week Indications: mon wed friHistorical Med      aspirin 81 MG tablet Take by mouth dailyHistorical Med      lovastatin (MEVACOR) 40 MG tablet Take 40 mg by mouth nightlyHistorical Med      Cholecalciferol (VITAMIN D3) 5000 units TABS Take 5,000 Units by mouth three times a week Indications: mon, wed frid Historical Med      warfarin (COUMADIN) 5 MG tablet Take 2.5-5 mg by mouth daily Indications: 5 mg daily, except Wed take 2.5 mg 2.5 mg TUE/SAT and 5 mg ALL OTHER DAYSHistorical Med      cloNIDine (CATAPRES) 0.1 MG tablet Take 0.1 mg by mouth 2 times dailyHistorical Med             Time Spent on discharge is more than 1 hour in the examination, evaluation, counseling and review of medications and discharge plan. Signed:    Jessie Smith MD   2/2/2021      Thank you Malcolm Nichole for the opportunity to be involved in this patient's care. If you have any questions or concerns please feel free to contact me at [phone].

## 2021-02-02 NOTE — PROGRESS NOTES
PICC line removed, pressure held for 5 mins. Petroleum gauze, dry dressing and transparent dressing applied, clean dry and intact. Pt educated to lay flat for 30 minutes. Pt denies needs at this time. Will continue to monitor.   Electronically signed by Lois Roberson RN on 2/2/2021 at 12:02 PM

## 2021-02-02 NOTE — PLAN OF CARE
Problem: Falls - Risk of:  Goal: Will remain free from falls  Description: Will remain free from falls  2/1/2021 2004 by Ruddy Khan  Outcome: Ongoing  2/1/2021 1033 by Demetria Morgan RN  Outcome: Ongoing  Goal: Absence of physical injury  Description: Absence of physical injury  2/1/2021 2004 by Ruddy Khan  Outcome: Ongoing  2/1/2021 1033 by Demetria Morgan RN  Outcome: Ongoing     Problem: Airway Clearance - Ineffective  Goal: Achieve or maintain patent airway  2/1/2021 2004 by Ruddy Khan  Outcome: Ongoing  2/1/2021 1033 by Demetria Morgan RN  Outcome: Ongoing     Problem: Gas Exchange - Impaired  Goal: Absence of hypoxia  2/1/2021 2004 by Ruddy Khan  Outcome: Ongoing  2/1/2021 1033 by Demetria Morgan RN  Outcome: Ongoing  Goal: Promote optimal lung function  2/1/2021 2004 by Ruddy Khan  Outcome: Ongoing  2/1/2021 1033 by Demetria Morgan RN  Outcome: Ongoing     Problem: Breathing Pattern - Ineffective  Goal: Ability to achieve and maintain a regular respiratory rate  2/1/2021 2004 by Ruddy Khan  Outcome: Ongoing  2/1/2021 1033 by Demetria Morgan RN  Outcome: Ongoing     Problem:  Body Temperature -  Risk of, Imbalanced  Goal: Ability to maintain a body temperature within defined limits  2/1/2021 2004 by Ruddy Khan  Outcome: Ongoing  2/1/2021 1033 by Demetria Morgan RN  Outcome: Ongoing  Goal: Will regain or maintain usual level of consciousness  2/1/2021 2004 by Ruddy Khan  Outcome: Ongoing  2/1/2021 1033 by Demetria Morgan RN  Outcome: Ongoing  Goal: Complications related to the disease process, condition or treatment will be avoided or minimized  2/1/2021 2004 by Ruddy Khan  Outcome: Ongoing  2/1/2021 1033 by Demetria Morgan RN  Outcome: Ongoing     Problem: Isolation Precautions - Risk of Spread of Infection  Goal: Prevent transmission of infection  2/1/2021 2004 by Ruddy Khan  Outcome: Ongoing  2/1/2021 1033 by Demetria Morgan RN  Outcome: Ongoing     Problem: Nutrition Deficits  Goal: Optimize nutritional status  2/1/2021 2004 by Stefanie Merino  Outcome: Ongoing  2/1/2021 1033 by Tatiana Oviedo RN  Outcome: Ongoing     Problem: Risk for Fluid Volume Deficit  Goal: Maintain normal heart rhythm  2/1/2021 2004 by Stefanie Merino  Outcome: Ongoing  2/1/2021 1033 by Tatiana Oviedo RN  Outcome: Ongoing  Goal: Maintain absence of muscle cramping  2/1/2021 2004 by Stefanie Merino  Outcome: Ongoing  2/1/2021 1033 by Tatiana Oviedo RN  Outcome: Ongoing  Goal: Maintain normal serum potassium, sodium, calcium, phosphorus, and pH  2/1/2021 2004 by Stefanie Merino  Outcome: Ongoing  2/1/2021 1033 by Tatiana Oviedo RN  Outcome: Ongoing     Problem: Loneliness or Risk for Loneliness  Goal: Demonstrate positive use of time alone when socialization is not possible  2/1/2021 2004 by Stefanie Merino  Outcome: Ongoing  2/1/2021 1033 by Tatiana Oviedo RN  Outcome: Ongoing     Problem: Fatigue  Goal: Verbalize increase energy and improved vitality  2/1/2021 2004 by Stefanie Merino  Outcome: Ongoing  2/1/2021 1033 by Tatiana Oviedo RN  Outcome: Ongoing     Problem: Patient Education: Go to Patient Education Activity  Goal: Patient/Family Education  2/1/2021 2004 by Stefanie Merino  Outcome: Ongoing  2/1/2021 1033 by Tatiana Oviedo RN  Outcome: Ongoing     Problem: Skin Integrity:  Goal: Will show no infection signs and symptoms  Description: Will show no infection signs and symptoms  2/1/2021 2004 by Stefanie Merino  Outcome: Ongoing  2/1/2021 1033 by Tatiana Oviedo RN  Outcome: Ongoing  Goal: Absence of new skin breakdown  Description: Absence of new skin breakdown  2/1/2021 2004 by Stefanie Merino  Outcome: Ongoing  2/1/2021 1033 by Tatiana Oviedo RN  Outcome: Ongoing     Problem: Nutrition  Goal: Optimal nutrition therapy  2/1/2021 2004 by Stefanie Merino  Outcome: Ongoing  2/1/2021 1033 by Tatiana Oviedo RN  Outcome: Ongoing

## 2021-02-02 NOTE — CARE COORDINATION
[facility] Wound Ostomy Continence Nurse  Follow-up Progress Note       NAME:  Jami Powell  MEDICAL RECORD NUMBER:  [id number]  AGE:  82 y.o. GENDER:  female  :  1935  TODAY'S DATE:  2021    Subjective:   Wound Identification:  Unstageable to L buttock. Areas of DTI's to L thigh and R buttock. Patient Goal of Care:  [x] Wound Healing  [] Odor Control  [] Palliative Care  [] Pain Control   [] Other:     Objective:    BP (!) 113/52   Pulse 66   Temp 98.3 °F (36.8 °C) (Oral)   Resp 15   Ht 5' (1.524 m)   Wt 241 lb 2.9 oz (109.4 kg)   SpO2 92%   BMI 47.10 kg/m²   Lauro Risk Score: Lauro Scale Score: 12  Assessment:   Measurements:  Wound 21 Other (Comment) Mid (Active)   Wound Etiology Pressure Unstageable 21   Dressing Status New dressing applied 21 1041   Wound Cleansed Cleansed with saline 21 2210   Dressing/Treatment Dry dressing; Pharmaceutical agent (see MAR) 21 1041   Wound Length (cm) 1.5 cm 21 0750   Wound Width (cm) 2.5 cm 21 0750   Wound Depth (cm) 0.2 cm 21 2341   Wound Surface Area (cm^2) 3.75 cm^2 21 0750   Change in Wound Size % (l*w) -2400 21 0750   Wound Volume (cm^3) 0.03 cm^3 21 2341   Wound Assessment Pink/red 21 1041   Drainage Amount None 21 1041   Drainage Description Yellow 21 0815   Odor Mild 21 0815   Sheridan-wound Assessment Intact 21 1041   Number of days: 24       Wound 21 Buttocks Left presure injury (Active)   Wound Image   21 0949   Wound Etiology Pressure Unstageable 21 2148   Dressing Status New dressing applied 21 1041   Wound Cleansed Irrigated with saline 21 0900   Dressing/Treatment Dry dressing; Pharmaceutical agent (see MAR) 21 1041   Dressing Change Due 21 0815   Wound Length (cm) 4 cm 21 0949   Wound Width (cm) 5 cm 21 0949   Wound Surface Area (cm^2) 20 cm^2 21 7230   Wound Assessment Purple/maroon 02/02/21 1041   Drainage Amount None 02/02/21 1041   Drainage Description Clear 02/02/21 1041   Odor None 02/02/21 1041   Sheridan-wound Assessment Intact; Non-blanchable erythema 02/02/21 1041   Margins Undefined edges 02/02/21 1041   Wound Thickness Description not for Pressure Injury Full thickness 02/02/21 1041   Number of days: 9       Wound 01/25/21 Buttocks Right (Active)   Wound Etiology Deep tissue/Injury 02/01/21 2148   Dressing/Treatment Protective barrier;Open to air 02/02/21 1041   Wound Length (cm) 6 cm 01/25/21 0949   Wound Width (cm) 6 cm 01/25/21 0949   Wound Surface Area (cm^2) 36 cm^2 01/25/21 0949   Wound Assessment Purple/maroon 02/02/21 1041   Drainage Amount None 02/02/21 1041   Sheridan-wound Assessment Intact 02/02/21 1041   Number of days: 8       Wound 01/25/21 Thigh Left; Inner (Active)   Wound Image   01/25/21 0949   Wound Etiology Deep tissue/Injury 02/01/21 2148   Dressing Status Other (Comment) 01/29/21 0900   Dressing/Treatment Protective barrier;Open to air 02/02/21 1041   Wound Assessment Purple/maroon 02/02/21 1041   Sheridan-wound Assessment Intact 02/02/21 1041   Number of days: 8                 Pt in COVID isolation. Bedside RN obtained images. Follow-up from 1/25. Per surgery, santyl was being used for treatment. Would continue venelex barrier and santyl to L buttock wound. *pt to d/c today    Plan:   Plan of Care:   L buttock wound: Santyl ( dampened fluffed guaze), dry dressing; change 2x daily  Sheridan area and buttock area: venelex barrier 2x daily and PRN   -if discharging facility unable to obtain santyl, can supplement with [facility]    Specialty Bed Required : Yes   [] Low Air Loss   [] Pressure Redistribution  [] Fluid Immersion  [] Bariatric  [] Total Pressure Relief  [] Other:     Current Diet: DIET CARB CONTROL;  Dental Soft  Dietary Nutrition Supplements: Low Calorie High Protein Supplement  Dietician consult:  N/A    Discharge Plan:  Placement for patient upon discharge: skilled nursing   Patient appropriate for Outpatient [address]: Yes  *If patient able would recommend follow up at the outpatient wound care clinic.     Referrals:  [x]   [] [facility]  [] Supplies  [] Other       Electronically signed by Jani Grimm RN,  on 2/2/2021 at 11:45 AM

## 2021-02-02 NOTE — CARE COORDINATION
DISCHARGE SUMMARY     DATE OF DISCHARGE: 02/02/2021    DISCHARGE DESTINATION:     [facility] of [address] [place] [address] [facility]  Level of Care: Skilled    Report Number: [phone]  Fax Number:  [phone]    WONG completed    TRANSPORTATION: [facility] Name:  Emily Bernardo up Time: 1pm  Phone Number: [phone]    COMMENTS: SW spoke with daughter and she is in agreement with medical discharge to SNF. No further needs noted unless indicated by patient, family and/or medical staff. Resspectfully submitted,    DULCE MARIA Aguiar  [facility]   [phone]    Electronically signed by DULCE MARIA Frye on 2/2/2021 at 10:18 AM

## 2021-02-02 NOTE — PROGRESS NOTES
Transport picked up pt via stretcher to transport to [facility].    Electronically signed by Je Moreno RN on 2/2/2021 at 1:17 PM

## 2021-02-02 NOTE — CARE COORDINATION
SW checked the [employer] appeal website this morning and it reports that the physician review has been completed by medicare, however, there is no decision as of yet. [id number] is the case number. SW will check the website again momentarily and follow up with the family when a decision is made. Respectfully submitted,    DULCE MARIA Blanca  [facility]   [phone]  Electronically signed by DULCE MARIA Cristina on 2/2/2021 at 8:50 AM

## 2021-02-02 NOTE — PROGRESS NOTES
Occupational Therapy  Facility/Department: [address] MED SURG  Daily Treatment Note  NAME: Khadijah Sessions  : 1935  MRN: [id number]    Date of Service: 2021    Assessment: Pt tolerated session well this date - on  at this time, O2 sats [facility] however HR elevates quickly with attempts at mobility. Agreeable to attempt OOB activity this date however reports she has not been up since therapy last session. Pt required max Ax2 for bed mobility and [facility] sit <> [facility] with max Ax2 however unsuccessful. Pt will require max A for toileting needs and declined further ADL tasks - anticipate pt to continue to require max A for ADL needs. Pt remains unsafe to d/c home and would benefit from continued therapy to increase mobility, safety, and independence. Discharge Recommendations:  3-5 sessions per week     Khadijah Sessions scored a 13/24 on the AM-PAC ADL Inpatient form. Current research shows that an AM-PAC score of 17 or less is typically not associated with a discharge to the patient's home setting. Based on the patient's AM-PAC score and their current ADL deficits, it is recommended that the patient have 3-5 sessions per week of Occupational Therapy at d/c to increase the patient's independence. Please see assessment section for further patient specific details. If patient discharges prior to next session this note will serve as a discharge summary. Please see below for the latest assessment towards goals. Assessment   Performance deficits / Impairments: Decreased functional mobility ; Decreased balance;Decreased ADL status; Decreased endurance;Decreased strength  Assessment: Pt tolerated session well this date - on RA at this time, O2 sats WFL however HR elevates quickly with attempts at mobility. Agreeable to attempt OOB activity this date however reports she has not been up since therapy last session.  Pt required max Ax2 for bed mobility and attempted sit <> routine lab work which showed elevated BUN and creatinine and she was sent to the hospital.  Patient denies any specific complaints. She said she has been having diarrhea for a few days and has been having decreased p.o. intake. She states that her shortness of breath is at her baseline after being diagnosed with a COVID-19 infection. Patient was placed on 6 L of oxygen in the ER. Is unknown how much oxygen patient was on at the nursing facility. On arrival to the hospital she was noted to be hemodynamically stable. Lab work showed KIKO, hyperglycemia. Patient will be admitted to the hospital for the management\"  Family / Caregiver Present: No  Referring Practitioner: Donnie Philippe MD  Diagnosis: Pneumonia d/t COVID19  Subjective  Subjective: Pt met bedside, agreeable for therapy treatment session. Vital Signs  Patient Currently in Pain: (No complaints of pain)        Objective    ADL  Grooming: (Combed hair with setup)  Toileting: Dependent/Total(Assist to roll and complete pericare following BM - assist to place new depends.)  Additional Comments: Pt declined further ADL needs. Balance  Sitting Balance: Stand by assistance  Standing Balance: (Unable to obtained full upright posture)     Bed mobility  Rolling to Left: Moderate assistance;Maximum assistance  Rolling to Right: Moderate assistance;Maximum assistance  Supine to Sit: Maximum assistance;2 Person assistance  Sit to Supine: Maximum assistance;2 Person assistance  Scooting: Dependent/Total  Comment: incontinent of stool and urine     Transfers  Transfer Comments: Despite Max Ax2 for sit <> stand transfers from elevated EOB unsuccessful in obtaining upright stance in delano cabrales. Pt close to upright posture, providing good effort however reports weakness.         Cognition  Overall Cognitive Status: [facility]  Cognition Comment: Continues to require encouragement to lead her care           Plan   Plan  Times per week: 3-5  Current Treatment OTR/L#[id number]

## 2021-02-02 NOTE — CARE COORDINATION
SW received phone call from patient's daughter today informing that she spoke with her sister about the appeal and they think it would be best for the patient to transition to [facility] today. SW transferred her to [facility] as she indicated that she changed her mind about the appeal.     [place] [phone]. SW informed via voicemail that the patient can transition to [address] [place] [address]. SW left a message for her to call back as soon as possible. We have a tentative transport set up for 1pm.     Respectfully submitted,    DULCE MARIA Dewitt  [facility]   [phone]    Electronically signed by DULCE MARIA Glass on 2/2/2021 at 9:32 AM

## 2021-02-02 NOTE — PROGRESS NOTES
Report called to Joce at [facility], [phone].    Electronically signed by Tejinder Rogers RN on 2/2/2021 at 12:11 PM

## 2021-08-27 NOTE — PROGRESS NOTES
Prior Authorization requested. Plan does not cover this med. Call or change if necessary. PT ID [id number]    •  Doxycycline Hyclate 100 MG Oral Tab EC, Take 1 tablet (100 mg total) by mouth 2 (two) times daily for 10 days. , Disp: 14 tablet, Rfl: 0 will require max A for toileting needs and declined further ADL tasks - anticipate pt to continue to require max A for ADL needs. Pt remains unsafe to d/c home and would benefit from continued therapy to increase mobility, safety, and independence. OT Education: Transfer Training;OT Role;Plan of Care;Precautions; ADL Adaptive Strategies  REQUIRES OT FOLLOW UP: Yes  Activity Tolerance  Activity Tolerance: Patient limited by fatigue  Activity Tolerance: Pt on 3L of O2 with O2 sats in low to mid 90s. HR in 120s  Safety Devices  Safety Devices in place: Yes(RN Dell) notified)  Type of devices: Nurse notified;Call light within reach; Left in chair;Chair alarm in place;Gait belt         Patient Diagnosis(es): The primary encounter diagnosis was KIKO (acute kidney injury) ([facility] [address] [address] Diagnoses of C. difficile diarrhea, Dehydration, and COVID-19 were also pertinent to this visit. has a past medical history of Acute kidney injury ([address] Arthritis, Cerebral artery occlusion with cerebral infarction ([address] COVID-19, Diabetes mellitus ([address] Hypertension, and Pulmonary embolism ([facility] [address] has a past surgical history that includes Abdomen surgery (N/A, 2/15/2019) and Abdomen surgery (N/A, 3/19/2019). Restrictions  Restrictions/Precautions  Restrictions/Precautions: Fall Risk, Isolation, Contact Precautions  Position Activity Restriction  Other position/activity restrictions: Droplet Plus :  COVID +. O2 2L via High Flow. Contact : C-Diff. Subjective   General  Chart Reviewed: Yes  Patient assessed for rehabilitation services?: Yes  Additional Pertinent Hx: Per Elfego Ashton MD: Pt is \"82year-old female with past medical history of diabetes mellitus, hypertension, history of pulmonary embolism on Coumadin presented to the hospital with abnormal lab work.   Patient lives abdomen [facility] nursing facility and they had done routine lab work which showed elevated BUN and creatinine and she was sent to the hospital.  Patient denies any specific complaints. She said she has been having diarrhea for a few days and has been having decreased p.o. intake. She states that her shortness of breath is at her baseline after being diagnosed with a COVID-19 infection. Patient was placed on 6 L of oxygen in the ER. Is unknown how much oxygen patient was on at the nursing facility. On arrival to the hospital she was noted to be hemodynamically stable. Lab work showed KIKO, hyperglycemia. Patient will be admitted to the hospital for the management\"  Family / Caregiver Present: No  Referring Practitioner: Merlin Cabrera MD  Diagnosis: Pneumonia d/t COVID19  Subjective  Subjective: Pt met bedside, agreeable for therapy treatment session. Pt continues require encouragement for best effort. Vital Signs  Patient Currently in Pain: (No complaints of pain)     Orientation  Orientation  Overall Orientation Status: Within Functional Limits     Objective    ADL  Toileting: Dependent/Total(Pt saturated colton pad with urine and smear of BM. Assist for pericare)  Additional Comments: Pt declined further ADL needs. Balance  Sitting Balance: Contact guard assistance(at EOB for 5+ minutes)  Standing Balance: Maximum assistance(in stedy for transfers - max A to remain upright)  Standing Balance  Time: ~10 seconds  Activity: Transfers in stedy    Functional Mobility  Functional Mobility Comments: Use of delano stedy for mobility. Bed mobility  Supine to Sit: Maximum assistance;2 Person assistance  Sit to Supine: Unable to assess(in recliner at end of session)  Scooting: Dependent/Total     Transfers  Sit to stand: Maximum assistance;2 Person assistance  Stand to sit: Maximum assistance;2 Person assistance  Transfer Comments: Max Ax2 for sit <> stand from elevated EOB (on second attempt) to delano stedy and sit to recliner chair        Cognition  Overall Cognitive Status: [facility]  Cognition Comment: Pt engages well - remains forgetful.  Continue to provide max encouragement. Plan   Plan  Times per week: 3-5  Current Treatment Recommendations: Strengthening, Endurance Training, Balance Training, Functional Mobility Training, Safety Education & Training, Equipment Evaluation, Education, & procurement, Patient/Caregiver Education & Training, Self-Care / ADL, Home Management Training    AM-PAC Score    Doreen Li scored a 13/24 on the AM-[facility] ADL Inpatient form. Current research shows that an AM-PAC score of 17 or less is typically not associated with a discharge to the patient's home setting. Based on the patient's AM-PAC score and their current ADL deficits, it is recommended that the patient have 3-5 sessions per week of Occupational Therapy at d/c to increase the patient's independence. Please see assessment section for further patient specific details. If patient discharges prior to next session this note will serve as a discharge summary. Please see below for the latest assessment towards goals. AM-PAC Inpatient Daily Activity Raw Score: 13 (01/29/21 1440)  AM-PAC Inpatient ADL T-Scale Score : 32.03 (01/29/21 1440)  ADL Inpatient CMS 0-100% Score: 63.03 (01/29/21 1440)  ADL Inpatient CMS G-Code Modifier : CL (01/29/21 1440)    Goals  Short term goals  Time Frame for Short term goals: prior to d/c: status of goals ongoing as of 1/29/21  Short term goal 1: Pt will complete bed mobility with min A  Short term goal 2: Pt will complete sit <> stand transfers with min A  Short term goal 3: Pt will complete stand pivot transfer with min A  Short term goal 4: Pt will tolerate 1+ minute of standing activity to increase strength and activity tolerance for self-care and transfers with O2 sats WFL.   Patient Goals   Patient goals : \"to feel better\"       Therapy Time   Individual Concurrent Group Co-treatment   Time In       2959   Time Out       1415   Minutes       40   Timed Code Treatment Minutes: 40 Minutes     If pt is discharged prior to next OT

## 2022-08-05 NOTE — PLAN OF CARE
Patient interviewed and examined.  .  Chief Complaint: Palpitations  .  HPI: This patient is a 61-year-old female with no chronic medical problems who presents to the ED with complaint of the above.  She states she has occasionally felt an early beat in the past but goes away, but it has been pretty consistent now for the past 24 hours.  She describes feeling a funny beat and then a short pause afterward.  She does not feel like it is consistently regular.  Does not feel like her heart is racing or pounding.  Denies any chest pain or shortness of breath.  No dizziness or passing out.  Drinks coffee in the morning only, not excessively.  Denies any recent illness.  .  Positive ROS: see above  Negative ROS: fever, vision changes, rhinorrhea, chest pain, cough, abdominal pain, dysuria, injury, rash, skin changes, dizziness, all other systems reviewed and negative  .  PMD:[]  PMH: Denies  MEDS: see anibal, []  ALL: see anibal, denies  PSH: [noncontributory]  SOC: Non-smoker, occasional alcohol, no illicits  FH: [noncontributory]  .  PE  VS REVIEWED, []  GEN: awake and oriented, interactive, no acute distress  HEENT: NC/AT, EOMI, oral mucosa pink and moist  NECK: supple, no nuchal ridigity  HEART: regular rate and rhythm, no murmurs noted  LUNGS: clear to auscultation, no rales, rhonchi, or wheezing noted  BACK: no deformity or trauma noted  EXT: no clubbing or cyanosis present  NEURO: no acute focal deficits  SKIN: no acute rashes or lesions noted  .  The case was reviewed with the patient. Nursing notes reviewed.  .  Plan of care: EKG done at 1453 my interpretation shows sinus rhythm with occasional PVC, rate 77, normal intervals, normal axis, no acute ST-T changes.  Patient is presentation is consistent exactly with PVC with an abnormal beat followed by a short pause.  She has no other associated symptoms.  I did discuss with her that she may need some additional testing such as labs, possibly heart monitor, possibly  Absence of new skin breakdown  Outcome: Ongoing cardiology referral.  At this point I do not feel she needs to go emergently to the emergency department, she understands to follow-up with her primary care doctor on Monday or Tuesday.  Discussed ED return precautions with injury verbalized understanding.  Will discharge home.  .  Final Diagnosis: PVCs, heart palpitations

## (undated) DEVICE — CURETTE SURG 5MM GRN S STL DERM SYMMETRICALLY GRND BVL EDGE

## (undated) DEVICE — BANDAGE,GAUZE,BULKEE II,4.5"X4.1YD,STRL: Brand: MEDLINE

## (undated) DEVICE — Z CONVERTED USE 2271043 CONTAINER SPEC COLL 4OZ SCR ON LID PEEL PCH

## (undated) DEVICE — PAD,ABDOMINAL,8"X10",ST,LF: Brand: MEDLINE

## (undated) DEVICE — YANKAUER,BULB TIP,W/O VENT,RIGID,STERILE: Brand: MEDLINE

## (undated) DEVICE — PENCIL ES L3M BTTN SWCH S STL HEX LOK BLDE ELECTRD HOLSTER

## (undated) DEVICE — SYRINGE MED 10ML TRNSLUC BRL PLUNG BLK MRK POLYPR CTRL

## (undated) DEVICE — STERILE POLYISOPRENE POWDER-FREE SURGICAL GLOVES: Brand: PROTEXIS

## (undated) DEVICE — HYPODERMIC SAFETY NEEDLE: Brand: MAGELLAN

## (undated) DEVICE — SPONGE LAP W18XL18IN WHT COT 4 PLY FLD STRUNG RADPQ DISP ST

## (undated) DEVICE — SUTURE ETHLN SZ 3-0 L30IN NONABSORBABLE BLK L36MM FSLX 3/8 1673BH

## (undated) DEVICE — TRAY PREP DRY W/ PREM GLV 2 APPL 6 SPNG 2 UNDPD 1 OVERWRAP

## (undated) DEVICE — GAUZE,SPONGE,4"X4",8PLY,STRL,LF,10/TRAY: Brand: MEDLINE

## (undated) DEVICE — SOLUTION IV IRRIG POUR BRL 0.9% SODIUM CHL 2F7124

## (undated) DEVICE — SOLUTION IV IRRIG WATER 1000ML POUR BRL 2F7114

## (undated) DEVICE — SUTURE ETHLN SZ 2 L30IN NONABSORBABLE BLK L75MM LR 3/8 CIR 490T

## (undated) DEVICE — KIT OR ROOM TURNOVER W/STRAP

## (undated) DEVICE — SHEET,DRAPE,53X77,STERILE: Brand: MEDLINE

## (undated) DEVICE — SKIN MARKER,REGULAR TIP WITH RULER: Brand: DEVON

## (undated) DEVICE — MAJOR SET UP PK

## (undated) DEVICE — TUBING, SUCTION, 1/4" X 10', STRAIGHT: Brand: MEDLINE